# Patient Record
Sex: MALE | Race: WHITE | Employment: OTHER | URBAN - METROPOLITAN AREA
[De-identification: names, ages, dates, MRNs, and addresses within clinical notes are randomized per-mention and may not be internally consistent; named-entity substitution may affect disease eponyms.]

---

## 2017-01-01 ENCOUNTER — TRANSFERRED RECORDS (OUTPATIENT)
Dept: HEALTH INFORMATION MANAGEMENT | Facility: CLINIC | Age: 82
End: 2017-01-01

## 2017-01-01 ENCOUNTER — TELEPHONE (OUTPATIENT)
Dept: FAMILY MEDICINE | Facility: CLINIC | Age: 82
End: 2017-01-01

## 2017-01-01 ENCOUNTER — APPOINTMENT (OUTPATIENT)
Dept: OCCUPATIONAL THERAPY | Facility: CLINIC | Age: 82
DRG: 291 | End: 2017-01-01
Payer: COMMERCIAL

## 2017-01-01 ENCOUNTER — APPOINTMENT (OUTPATIENT)
Dept: SPEECH THERAPY | Facility: CLINIC | Age: 82
DRG: 291 | End: 2017-01-01
Payer: COMMERCIAL

## 2017-01-01 ENCOUNTER — APPOINTMENT (OUTPATIENT)
Dept: GENERAL RADIOLOGY | Facility: CLINIC | Age: 82
DRG: 291 | End: 2017-01-01
Attending: EMERGENCY MEDICINE
Payer: COMMERCIAL

## 2017-01-01 ENCOUNTER — APPOINTMENT (OUTPATIENT)
Dept: ULTRASOUND IMAGING | Facility: CLINIC | Age: 82
DRG: 291 | End: 2017-01-01
Attending: HOSPITALIST
Payer: COMMERCIAL

## 2017-01-01 ENCOUNTER — CARE COORDINATION (OUTPATIENT)
Dept: CASE MANAGEMENT | Facility: CLINIC | Age: 82
End: 2017-01-01

## 2017-01-01 ENCOUNTER — APPOINTMENT (OUTPATIENT)
Dept: CT IMAGING | Facility: CLINIC | Age: 82
DRG: 291 | End: 2017-01-01
Attending: EMERGENCY MEDICINE
Payer: COMMERCIAL

## 2017-01-01 ENCOUNTER — DOCUMENTATION ONLY (OUTPATIENT)
Dept: CASE MANAGEMENT | Facility: CLINIC | Age: 82
End: 2017-01-01

## 2017-01-01 ENCOUNTER — APPOINTMENT (OUTPATIENT)
Dept: GENERAL RADIOLOGY | Facility: CLINIC | Age: 82
DRG: 291 | End: 2017-01-01
Attending: HOSPITALIST
Payer: COMMERCIAL

## 2017-01-01 ENCOUNTER — TELEPHONE (OUTPATIENT)
Dept: CARDIOLOGY | Facility: CLINIC | Age: 82
End: 2017-01-01

## 2017-01-01 ENCOUNTER — DOCUMENTATION ONLY (OUTPATIENT)
Dept: CARE COORDINATION | Facility: CLINIC | Age: 82
End: 2017-01-01

## 2017-01-01 ENCOUNTER — APPOINTMENT (OUTPATIENT)
Dept: GENERAL RADIOLOGY | Facility: CLINIC | Age: 82
DRG: 314 | End: 2017-01-01
Attending: NURSE PRACTITIONER
Payer: COMMERCIAL

## 2017-01-01 ENCOUNTER — NURSING HOME VISIT (OUTPATIENT)
Dept: GERIATRICS | Facility: CLINIC | Age: 82
End: 2017-01-01
Payer: COMMERCIAL

## 2017-01-01 ENCOUNTER — APPOINTMENT (OUTPATIENT)
Dept: OCCUPATIONAL THERAPY | Facility: CLINIC | Age: 82
DRG: 314 | End: 2017-01-01
Attending: INTERNAL MEDICINE
Payer: COMMERCIAL

## 2017-01-01 ENCOUNTER — CARE COORDINATION (OUTPATIENT)
Dept: CARDIOLOGY | Facility: CLINIC | Age: 82
End: 2017-01-01

## 2017-01-01 ENCOUNTER — APPOINTMENT (OUTPATIENT)
Dept: CARDIOLOGY | Facility: CLINIC | Age: 82
DRG: 291 | End: 2017-01-01
Attending: INTERNAL MEDICINE
Payer: COMMERCIAL

## 2017-01-01 ENCOUNTER — DISCHARGE SUMMARY NURSING HOME (OUTPATIENT)
Dept: GERIATRICS | Facility: CLINIC | Age: 82
End: 2017-01-01
Payer: COMMERCIAL

## 2017-01-01 ENCOUNTER — APPOINTMENT (OUTPATIENT)
Dept: OCCUPATIONAL THERAPY | Facility: CLINIC | Age: 82
DRG: 291 | End: 2017-01-01
Attending: HOSPITALIST
Payer: COMMERCIAL

## 2017-01-01 ENCOUNTER — APPOINTMENT (OUTPATIENT)
Dept: GENERAL RADIOLOGY | Facility: CLINIC | Age: 82
DRG: 314 | End: 2017-01-01
Attending: INTERNAL MEDICINE
Payer: COMMERCIAL

## 2017-01-01 ENCOUNTER — HOSPITAL ENCOUNTER (INPATIENT)
Facility: CLINIC | Age: 82
LOS: 4 days | DRG: 314 | End: 2017-05-06
Attending: NURSE PRACTITIONER | Admitting: INTERNAL MEDICINE
Payer: COMMERCIAL

## 2017-01-01 ENCOUNTER — OFFICE VISIT (OUTPATIENT)
Dept: FAMILY MEDICINE | Facility: CLINIC | Age: 82
End: 2017-01-01
Payer: COMMERCIAL

## 2017-01-01 ENCOUNTER — APPOINTMENT (OUTPATIENT)
Dept: GENERAL RADIOLOGY | Facility: CLINIC | Age: 82
DRG: 291 | End: 2017-01-01
Attending: PHYSICIAN ASSISTANT
Payer: COMMERCIAL

## 2017-01-01 ENCOUNTER — APPOINTMENT (OUTPATIENT)
Dept: ULTRASOUND IMAGING | Facility: CLINIC | Age: 82
DRG: 291 | End: 2017-01-01
Attending: EMERGENCY MEDICINE
Payer: COMMERCIAL

## 2017-01-01 ENCOUNTER — HOSPITAL ENCOUNTER (INPATIENT)
Facility: CLINIC | Age: 82
LOS: 12 days | Discharge: NURSING, REHAB OR LONG-TERM CARE | DRG: 291 | End: 2017-02-01
Attending: EMERGENCY MEDICINE | Admitting: HOSPITALIST
Payer: COMMERCIAL

## 2017-01-01 ENCOUNTER — APPOINTMENT (OUTPATIENT)
Dept: PHYSICAL THERAPY | Facility: CLINIC | Age: 82
DRG: 291 | End: 2017-01-01
Attending: HOSPITALIST
Payer: COMMERCIAL

## 2017-01-01 ENCOUNTER — APPOINTMENT (OUTPATIENT)
Dept: PHYSICAL THERAPY | Facility: CLINIC | Age: 82
DRG: 291 | End: 2017-01-01
Payer: COMMERCIAL

## 2017-01-01 ENCOUNTER — APPOINTMENT (OUTPATIENT)
Dept: PHYSICAL THERAPY | Facility: CLINIC | Age: 82
DRG: 314 | End: 2017-01-01
Attending: INTERNAL MEDICINE
Payer: COMMERCIAL

## 2017-01-01 ENCOUNTER — TELEPHONE (OUTPATIENT)
Dept: GERIATRICS | Facility: CLINIC | Age: 82
End: 2017-01-01

## 2017-01-01 ENCOUNTER — APPOINTMENT (OUTPATIENT)
Dept: LAB | Facility: CLINIC | Age: 82
End: 2017-01-01
Attending: FAMILY MEDICINE
Payer: COMMERCIAL

## 2017-01-01 ENCOUNTER — TELEPHONE (OUTPATIENT)
Dept: NURSING | Facility: CLINIC | Age: 82
End: 2017-01-01

## 2017-01-01 ENCOUNTER — APPOINTMENT (OUTPATIENT)
Dept: OCCUPATIONAL THERAPY | Facility: CLINIC | Age: 82
DRG: 314 | End: 2017-01-01
Payer: COMMERCIAL

## 2017-01-01 VITALS
WEIGHT: 161.7 LBS | OXYGEN SATURATION: 93 % | HEIGHT: 71 IN | TEMPERATURE: 97.8 F | HEART RATE: 81 BPM | SYSTOLIC BLOOD PRESSURE: 133 MMHG | BODY MASS INDEX: 22.64 KG/M2 | RESPIRATION RATE: 18 BRPM | DIASTOLIC BLOOD PRESSURE: 66 MMHG

## 2017-01-01 VITALS
HEART RATE: 86 BPM | SYSTOLIC BLOOD PRESSURE: 124 MMHG | WEIGHT: 173.8 LBS | TEMPERATURE: 98.4 F | DIASTOLIC BLOOD PRESSURE: 64 MMHG | RESPIRATION RATE: 18 BRPM | OXYGEN SATURATION: 92 % | BODY MASS INDEX: 24.59 KG/M2

## 2017-01-01 VITALS
OXYGEN SATURATION: 92 % | HEIGHT: 71 IN | DIASTOLIC BLOOD PRESSURE: 62 MMHG | RESPIRATION RATE: 18 BRPM | HEART RATE: 83 BPM | TEMPERATURE: 96.8 F | SYSTOLIC BLOOD PRESSURE: 127 MMHG | WEIGHT: 178.4 LBS | BODY MASS INDEX: 24.98 KG/M2

## 2017-01-01 VITALS
RESPIRATION RATE: 16 BRPM | DIASTOLIC BLOOD PRESSURE: 62 MMHG | WEIGHT: 189.1 LBS | HEART RATE: 79 BPM | OXYGEN SATURATION: 93 % | HEIGHT: 71 IN | TEMPERATURE: 97.4 F | SYSTOLIC BLOOD PRESSURE: 125 MMHG | BODY MASS INDEX: 26.47 KG/M2

## 2017-01-01 VITALS
SYSTOLIC BLOOD PRESSURE: 156 MMHG | HEIGHT: 71 IN | BODY MASS INDEX: 24.33 KG/M2 | RESPIRATION RATE: 20 BRPM | HEART RATE: 81 BPM | WEIGHT: 173.8 LBS | OXYGEN SATURATION: 94 % | TEMPERATURE: 97 F | DIASTOLIC BLOOD PRESSURE: 87 MMHG

## 2017-01-01 VITALS
DIASTOLIC BLOOD PRESSURE: 60 MMHG | BODY MASS INDEX: 23.59 KG/M2 | RESPIRATION RATE: 18 BRPM | WEIGHT: 168.5 LBS | HEART RATE: 79 BPM | OXYGEN SATURATION: 94 % | SYSTOLIC BLOOD PRESSURE: 112 MMHG | TEMPERATURE: 96.9 F | HEIGHT: 71 IN

## 2017-01-01 VITALS
HEART RATE: 80 BPM | DIASTOLIC BLOOD PRESSURE: 51 MMHG | RESPIRATION RATE: 18 BRPM | HEIGHT: 72 IN | WEIGHT: 205.69 LBS | OXYGEN SATURATION: 91 % | SYSTOLIC BLOOD PRESSURE: 98 MMHG | BODY MASS INDEX: 27.86 KG/M2 | TEMPERATURE: 97.6 F

## 2017-01-01 VITALS
OXYGEN SATURATION: 92 % | WEIGHT: 177.3 LBS | DIASTOLIC BLOOD PRESSURE: 59 MMHG | HEART RATE: 85 BPM | TEMPERATURE: 97.5 F | BODY MASS INDEX: 24.82 KG/M2 | RESPIRATION RATE: 18 BRPM | SYSTOLIC BLOOD PRESSURE: 119 MMHG | HEIGHT: 71 IN

## 2017-01-01 VITALS
RESPIRATION RATE: 18 BRPM | HEIGHT: 71 IN | SYSTOLIC BLOOD PRESSURE: 97 MMHG | TEMPERATURE: 97.5 F | DIASTOLIC BLOOD PRESSURE: 53 MMHG | HEART RATE: 79 BPM | WEIGHT: 180.4 LBS | OXYGEN SATURATION: 92 % | BODY MASS INDEX: 25.26 KG/M2

## 2017-01-01 VITALS
WEIGHT: 178.2 LBS | HEIGHT: 71 IN | SYSTOLIC BLOOD PRESSURE: 126 MMHG | BODY MASS INDEX: 24.95 KG/M2 | OXYGEN SATURATION: 92 % | RESPIRATION RATE: 17 BRPM | TEMPERATURE: 96.8 F | DIASTOLIC BLOOD PRESSURE: 65 MMHG | HEART RATE: 83 BPM

## 2017-01-01 VITALS
BODY MASS INDEX: 23.44 KG/M2 | WEIGHT: 167.99 LBS | RESPIRATION RATE: 18 BRPM | SYSTOLIC BLOOD PRESSURE: 96 MMHG | DIASTOLIC BLOOD PRESSURE: 48 MMHG | TEMPERATURE: 96.8 F | HEART RATE: 80 BPM | OXYGEN SATURATION: 98 %

## 2017-01-01 VITALS
TEMPERATURE: 96 F | HEART RATE: 84 BPM | WEIGHT: 169 LBS | SYSTOLIC BLOOD PRESSURE: 110 MMHG | DIASTOLIC BLOOD PRESSURE: 50 MMHG | BODY MASS INDEX: 23.91 KG/M2

## 2017-01-01 VITALS
TEMPERATURE: 97.1 F | WEIGHT: 187.2 LBS | OXYGEN SATURATION: 96 % | BODY MASS INDEX: 26.21 KG/M2 | HEART RATE: 68 BPM | HEIGHT: 71 IN | SYSTOLIC BLOOD PRESSURE: 111 MMHG | DIASTOLIC BLOOD PRESSURE: 54 MMHG | RESPIRATION RATE: 18 BRPM

## 2017-01-01 DIAGNOSIS — I50.31 ACUTE DIASTOLIC HEART FAILURE (H): Primary | ICD-10-CM

## 2017-01-01 DIAGNOSIS — R53.81 PHYSICAL DECONDITIONING: ICD-10-CM

## 2017-01-01 DIAGNOSIS — I50.31 ACUTE DIASTOLIC CONGESTIVE HEART FAILURE (H): ICD-10-CM

## 2017-01-01 DIAGNOSIS — I48.20 CHRONIC ATRIAL FIBRILLATION (H): ICD-10-CM

## 2017-01-01 DIAGNOSIS — N18.4 CKD (CHRONIC KIDNEY DISEASE) STAGE 4, GFR 15-29 ML/MIN (H): ICD-10-CM

## 2017-01-01 DIAGNOSIS — I10 ESSENTIAL HYPERTENSION, BENIGN: ICD-10-CM

## 2017-01-01 DIAGNOSIS — I50.32 CHRONIC DIASTOLIC CONGESTIVE HEART FAILURE (H): ICD-10-CM

## 2017-01-01 DIAGNOSIS — N18.4 TYPE 2 DIABETES MELLITUS WITH STAGE 4 CHRONIC KIDNEY DISEASE, WITHOUT LONG-TERM CURRENT USE OF INSULIN (H): ICD-10-CM

## 2017-01-01 DIAGNOSIS — K59.03 DRUG-INDUCED CONSTIPATION: ICD-10-CM

## 2017-01-01 DIAGNOSIS — H10.33 ACUTE BACTERIAL CONJUNCTIVITIS OF BOTH EYES: ICD-10-CM

## 2017-01-01 DIAGNOSIS — I50.32 CHRONIC DIASTOLIC CONGESTIVE HEART FAILURE (H): Primary | ICD-10-CM

## 2017-01-01 DIAGNOSIS — I50.31 ACUTE DIASTOLIC CONGESTIVE HEART FAILURE (H): Primary | ICD-10-CM

## 2017-01-01 DIAGNOSIS — E87.5 HYPERKALEMIA: ICD-10-CM

## 2017-01-01 DIAGNOSIS — M54.50 LOW BACK PAIN WITHOUT SCIATICA, UNSPECIFIED BACK PAIN LATERALITY, UNSPECIFIED CHRONICITY: Primary | ICD-10-CM

## 2017-01-01 DIAGNOSIS — R19.7 DIARRHEA, UNSPECIFIED TYPE: Primary | ICD-10-CM

## 2017-01-01 DIAGNOSIS — M1A.9XX0 CHRONIC GOUT, UNSPECIFIED CAUSE, UNSPECIFIED SITE: ICD-10-CM

## 2017-01-01 DIAGNOSIS — J18.9 PNEUMONIA OF LEFT LOWER LOBE DUE TO INFECTIOUS ORGANISM: ICD-10-CM

## 2017-01-01 DIAGNOSIS — N17.9 ACUTE-ON-CHRONIC KIDNEY INJURY (H): ICD-10-CM

## 2017-01-01 DIAGNOSIS — I87.2 VENOUS (PERIPHERAL) INSUFFICIENCY: ICD-10-CM

## 2017-01-01 DIAGNOSIS — S91.302D WOUND, OPEN, FOOT, LEFT, SUBSEQUENT ENCOUNTER: Primary | ICD-10-CM

## 2017-01-01 DIAGNOSIS — E11.22 TYPE 2 DIABETES MELLITUS WITH STAGE 4 CHRONIC KIDNEY DISEASE, WITHOUT LONG-TERM CURRENT USE OF INSULIN (H): ICD-10-CM

## 2017-01-01 DIAGNOSIS — F51.01 PRIMARY INSOMNIA: ICD-10-CM

## 2017-01-01 DIAGNOSIS — G20.A1 PARALYSIS AGITANS (H): ICD-10-CM

## 2017-01-01 DIAGNOSIS — R29.6 FALLS FREQUENTLY: ICD-10-CM

## 2017-01-01 DIAGNOSIS — S41.111A SKIN TEAR OF RIGHT UPPER EXTREMITY: ICD-10-CM

## 2017-01-01 DIAGNOSIS — Z71.89 ADVANCED DIRECTIVES, COUNSELING/DISCUSSION: ICD-10-CM

## 2017-01-01 DIAGNOSIS — I50.32 CHRONIC DIASTOLIC HEART FAILURE (H): Primary | ICD-10-CM

## 2017-01-01 DIAGNOSIS — J96.01 ACUTE RESPIRATORY FAILURE WITH HYPOXIA (H): Primary | ICD-10-CM

## 2017-01-01 DIAGNOSIS — F41.8 DEPRESSION WITH ANXIETY: ICD-10-CM

## 2017-01-01 DIAGNOSIS — G20.A1 PARKINSON'S DISEASE (H): Primary | ICD-10-CM

## 2017-01-01 DIAGNOSIS — N17.9 ACUTE RENAL FAILURE, UNSPECIFIED ACUTE RENAL FAILURE TYPE (H): ICD-10-CM

## 2017-01-01 DIAGNOSIS — J96.01 ACUTE RESPIRATORY FAILURE WITH HYPOXIA (H): ICD-10-CM

## 2017-01-01 DIAGNOSIS — E11.9 TYPE 2 DIABETES MELLITUS (H): Primary | ICD-10-CM

## 2017-01-01 DIAGNOSIS — M54.50 ACUTE BILATERAL LOW BACK PAIN WITHOUT SCIATICA: ICD-10-CM

## 2017-01-01 DIAGNOSIS — J18.8 OTHER PNEUMONIA, UNSPECIFIED ORGANISM: ICD-10-CM

## 2017-01-01 DIAGNOSIS — D50.9 IRON DEFICIENCY ANEMIA, UNSPECIFIED IRON DEFICIENCY ANEMIA TYPE: ICD-10-CM

## 2017-01-01 DIAGNOSIS — I50.810 RIGHT-SIDED HEART FAILURE (H): Primary | ICD-10-CM

## 2017-01-01 DIAGNOSIS — J96.21 ACUTE ON CHRONIC RESPIRATORY FAILURE WITH HYPOXIA (H): ICD-10-CM

## 2017-01-01 DIAGNOSIS — R60.9 EDEMA, UNSPECIFIED TYPE: ICD-10-CM

## 2017-01-01 DIAGNOSIS — I50.33 ACUTE ON CHRONIC DIASTOLIC CONGESTIVE HEART FAILURE (H): ICD-10-CM

## 2017-01-01 DIAGNOSIS — G47.00 INSOMNIA, UNSPECIFIED TYPE: Primary | ICD-10-CM

## 2017-01-01 DIAGNOSIS — S81.802S WOUND OF LEFT LEG, SEQUELA: ICD-10-CM

## 2017-01-01 DIAGNOSIS — R09.02 HYPOXIA: ICD-10-CM

## 2017-01-01 DIAGNOSIS — D64.9 ANEMIA, UNSPECIFIED TYPE: ICD-10-CM

## 2017-01-01 DIAGNOSIS — N18.9 ACUTE-ON-CHRONIC KIDNEY INJURY (H): ICD-10-CM

## 2017-01-01 DIAGNOSIS — R60.0 LOWER LEG EDEMA: ICD-10-CM

## 2017-01-01 DIAGNOSIS — S91.302D WOUND, OPEN, FOOT, LEFT, SUBSEQUENT ENCOUNTER: ICD-10-CM

## 2017-01-01 DIAGNOSIS — E79.0 HYPERURICEMIA: Primary | ICD-10-CM

## 2017-01-01 DIAGNOSIS — M54.50 ACUTE BILATERAL LOW BACK PAIN WITHOUT SCIATICA: Primary | ICD-10-CM

## 2017-01-01 DIAGNOSIS — J96.21 ACUTE AND CHRONIC RESPIRATORY FAILURE WITH HYPOXIA (H): ICD-10-CM

## 2017-01-01 DIAGNOSIS — R41.82 ALTERED MENTAL STATUS, UNSPECIFIED ALTERED MENTAL STATUS TYPE: ICD-10-CM

## 2017-01-01 LAB
ABO + RH BLD: NORMAL
ABO + RH BLD: NORMAL
ALBUMIN SERPL ELPH-MCNC: 3.1 G/DL (ref 3.7–5.1)
ALBUMIN SERPL-MCNC: 2.3 G/DL (ref 3.4–5)
ALBUMIN SERPL-MCNC: 2.3 G/DL (ref 3.4–5)
ALBUMIN SERPL-MCNC: 2.4 G/DL (ref 3.4–5)
ALBUMIN SERPL-MCNC: 2.4 G/DL (ref 3.4–5)
ALBUMIN SERPL-MCNC: 2.6 G/DL (ref 3.4–5)
ALBUMIN UR-MCNC: 10 MG/DL
ALP SERPL-CCNC: 117 U/L (ref 40–150)
ALP SERPL-CCNC: 158 U/L (ref 40–150)
ALP SERPL-CCNC: 93 U/L (ref 40–150)
ALP SERPL-CCNC: 95 U/L (ref 40–150)
ALP SERPL-CCNC: 96 U/L (ref 40–150)
ALPHA1 GLOB SERPL ELPH-MCNC: 0.4 G/DL (ref 0.2–0.4)
ALPHA2 GLOB SERPL ELPH-MCNC: 0.8 G/DL (ref 0.5–0.9)
ALT SERPL W P-5'-P-CCNC: 17 U/L (ref 0–70)
ALT SERPL W P-5'-P-CCNC: 32 U/L (ref 0–70)
ALT SERPL W P-5'-P-CCNC: 54 U/L (ref 0–70)
ALT SERPL W P-5'-P-CCNC: 56 U/L (ref 0–70)
ALT SERPL-CCNC: <10 U/L (ref 9–55)
ANION GAP SERPL CALCULATED.3IONS-SCNC: 10 MMOL/L (ref 3–14)
ANION GAP SERPL CALCULATED.3IONS-SCNC: 12 MMOL/L (ref 3–14)
ANION GAP SERPL CALCULATED.3IONS-SCNC: 2 MMOL/L (ref 3–14)
ANION GAP SERPL CALCULATED.3IONS-SCNC: 3 MMOL/L (ref 3–14)
ANION GAP SERPL CALCULATED.3IONS-SCNC: 4 MMOL/L (ref 3–14)
ANION GAP SERPL CALCULATED.3IONS-SCNC: 5 MMOL/L (ref 3–14)
ANION GAP SERPL CALCULATED.3IONS-SCNC: 6 MMOL/L (ref 3–14)
ANION GAP SERPL CALCULATED.3IONS-SCNC: 6 MMOL/L (ref 3–14)
ANION GAP SERPL CALCULATED.3IONS-SCNC: 7 MMOL/L (ref 3–14)
ANION GAP SERPL CALCULATED.3IONS-SCNC: 8 MMOL/L (ref 3–14)
ANION GAP SERPL CALCULATED.3IONS-SCNC: 9 MMOL/L (ref 3–14)
ANION GAP SERPL CALCULATED.3IONS-SCNC: 9 MMOL/L (ref 3–14)
APPEARANCE FLD: NORMAL
APPEARANCE UR: CLEAR
AST SERPL W P-5'-P-CCNC: 133 U/L (ref 0–45)
AST SERPL W P-5'-P-CCNC: 206 U/L (ref 0–45)
AST SERPL W P-5'-P-CCNC: 262 U/L (ref 0–45)
AST SERPL W P-5'-P-CCNC: 287 U/L (ref 0–45)
AST SERPL-CCNC: 17 U/L (ref 10–40)
B-GLOBULIN SERPL ELPH-MCNC: 0.9 G/DL (ref 0.6–1)
BACTERIA SPEC CULT: NO GROWTH
BACTERIA SPEC CULT: NORMAL
BASE DEFICIT BLDV-SCNC: 0.5 MMOL/L
BASE EXCESS BLDA CALC-SCNC: 2.3 MMOL/L
BASOPHILS # BLD AUTO: 0 10E9/L (ref 0–0.2)
BASOPHILS NFR BLD AUTO: 0 %
BASOPHILS NFR BLD AUTO: 0.2 %
BASOPHILS NFR BLD AUTO: 0.5 %
BASOPHILS NFR BLD AUTO: 0.5 %
BILIRUB DIRECT SERPL-MCNC: 0.2 MG/DL (ref 0–0.2)
BILIRUB DIRECT SERPL-MCNC: 0.2 MG/DL (ref 0–0.2)
BILIRUB SERPL-MCNC: 0.5 MG/DL (ref 0.2–1.2)
BILIRUB SERPL-MCNC: 0.6 MG/DL (ref 0.2–1.3)
BILIRUB SERPL-MCNC: 1 MG/DL (ref 0.2–1.3)
BILIRUB UR QL STRIP: NEGATIVE
BLD GP AB SCN SERPL QL: NORMAL
BLD PROD TYP BPU: NORMAL
BLD PROD TYP BPU: NORMAL
BLD UNIT ID BPU: 0
BLOOD BANK CMNT PATIENT-IMP: NORMAL
BLOOD PRODUCT CODE: NORMAL
BPU ID: NORMAL
BUN SERPL-MCNC: 105 MG/DL (ref 7–30)
BUN SERPL-MCNC: 106 MG/DL (ref 7–30)
BUN SERPL-MCNC: 110 MG/DL (ref 7–30)
BUN SERPL-MCNC: 113 MG/DL (ref 7–30)
BUN SERPL-MCNC: 118 MG/DL (ref 7–30)
BUN SERPL-MCNC: 122 MG/DL (ref 7–30)
BUN SERPL-MCNC: 36 MG/DL (ref 9–26)
BUN SERPL-MCNC: 39 MG/DL (ref 9–26)
BUN SERPL-MCNC: 40 MG/DL (ref 9–26)
BUN SERPL-MCNC: 40 MG/DL (ref 9–26)
BUN SERPL-MCNC: 42 MG/DL (ref 7–30)
BUN SERPL-MCNC: 44 MG/DL (ref 7–30)
BUN SERPL-MCNC: 44 MG/DL (ref 7–30)
BUN SERPL-MCNC: 45 MG/DL (ref 7–30)
BUN SERPL-MCNC: 48 MG/DL (ref 9–26)
BUN SERPL-MCNC: 49 MG/DL (ref 9–26)
BUN SERPL-MCNC: 50 MG/DL (ref 7–30)
BUN SERPL-MCNC: 55 MG/DL (ref 7–30)
BUN SERPL-MCNC: 58 MG/DL (ref 7–30)
BUN SERPL-MCNC: 61 MG/DL (ref 7–30)
BUN SERPL-MCNC: 63 MG/DL (ref 7–30)
BUN SERPL-MCNC: 65 MG/DL (ref 7–30)
BUN SERPL-MCNC: 67 MG/DL (ref 7–30)
BUN SERPL-MCNC: 81 MG/DL (ref 7–30)
CALCIUM SERPL-MCNC: 7.8 MG/DL (ref 8.5–10.1)
CALCIUM SERPL-MCNC: 7.9 MG/DL (ref 8.5–10.1)
CALCIUM SERPL-MCNC: 8 MG/DL (ref 8.5–10.1)
CALCIUM SERPL-MCNC: 8.1 MG/DL (ref 8.5–10.1)
CALCIUM SERPL-MCNC: 8.2 MG/DL (ref 8.5–10.1)
CALCIUM SERPL-MCNC: 8.3 MG/DL (ref 8.4–10.2)
CALCIUM SERPL-MCNC: 8.3 MG/DL (ref 8.4–10.2)
CALCIUM SERPL-MCNC: 8.3 MG/DL (ref 8.5–10.1)
CALCIUM SERPL-MCNC: 8.3 MG/DL (ref 8.5–10.1)
CALCIUM SERPL-MCNC: 8.4 MG/DL (ref 8.4–10.2)
CALCIUM SERPL-MCNC: 8.4 MG/DL (ref 8.5–10.1)
CALCIUM SERPL-MCNC: 8.5 MG/DL (ref 8.5–10.1)
CALCIUM SERPL-MCNC: 8.5 MG/DL (ref 8.5–10.1)
CALCIUM SERPL-MCNC: 8.6 MG/DL (ref 8.4–10.2)
CALCIUM SERPL-MCNC: 8.6 MG/DL (ref 8.5–10.1)
CALCIUM SERPL-MCNC: 8.6 MG/DL (ref 8.5–10.1)
CALCIUM SERPL-MCNC: 8.7 MG/DL (ref 8.4–10.2)
CALCIUM SERPL-MCNC: 9.1 MG/DL (ref 8.4–10.2)
CHLORIDE SERPL-SCNC: 101 MMOL/L (ref 94–109)
CHLORIDE SERPL-SCNC: 102 MMOL/L (ref 94–109)
CHLORIDE SERPL-SCNC: 102 MMOL/L (ref 94–109)
CHLORIDE SERPL-SCNC: 103 MMOL/L (ref 94–109)
CHLORIDE SERPL-SCNC: 104 MMOL/L (ref 94–109)
CHLORIDE SERPL-SCNC: 105 MMOL/L (ref 94–109)
CHLORIDE SERPL-SCNC: 106 MMOL/L (ref 94–109)
CHLORIDE SERPL-SCNC: 106 MMOL/L (ref 94–109)
CHLORIDE SERPL-SCNC: 107 MMOL/L (ref 94–109)
CHLORIDE SERPL-SCNC: 107 MMOL/L (ref 94–109)
CHLORIDE SERPL-SCNC: 108 MMOL/L (ref 94–109)
CHLORIDE SERPL-SCNC: 108 MMOL/L (ref 94–109)
CHLORIDE SERPLBLD-SCNC: 103 MMOL/L (ref 98–109)
CHLORIDE SERPLBLD-SCNC: 105 MMOL/L (ref 98–109)
CHLORIDE SERPLBLD-SCNC: 106 MMOL/L (ref 98–109)
CHLORIDE SERPLBLD-SCNC: 106 MMOL/L (ref 98–109)
CK SERPL-CCNC: 64 U/L (ref 30–300)
CO2 SERPL-SCNC: 25 MMOL/L (ref 20–32)
CO2 SERPL-SCNC: 25 MMOL/L (ref 22–31)
CO2 SERPL-SCNC: 26 MMOL/L (ref 20–32)
CO2 SERPL-SCNC: 27 MMOL/L (ref 20–32)
CO2 SERPL-SCNC: 27 MMOL/L (ref 20–32)
CO2 SERPL-SCNC: 27 MMOL/L (ref 22–31)
CO2 SERPL-SCNC: 27 MMOL/L (ref 22–31)
CO2 SERPL-SCNC: 28 MMOL/L (ref 22–31)
CO2 SERPL-SCNC: 28 MMOL/L (ref 22–31)
CO2 SERPL-SCNC: 29 MMOL/L (ref 20–32)
CO2 SERPL-SCNC: 29 MMOL/L (ref 20–32)
CO2 SERPL-SCNC: 31 MMOL/L (ref 20–32)
CO2 SERPL-SCNC: 32 MMOL/L (ref 20–32)
CO2 SERPL-SCNC: 32 MMOL/L (ref 20–32)
CO2 SERPL-SCNC: 32 MMOL/L (ref 22–31)
CO2 SERPL-SCNC: 34 MMOL/L (ref 20–32)
CO2 SERPL-SCNC: 35 MMOL/L (ref 20–32)
CO2 SERPL-SCNC: 37 MMOL/L (ref 20–32)
CO2 SERPL-SCNC: 39 MMOL/L (ref 20–32)
COLOR FLD: NORMAL
COLOR UR AUTO: YELLOW
COPATH REPORT: NORMAL
COPATH REPORT: NORMAL
CREAT SERPL-MCNC: 1.73 MG/DL (ref 0.66–1.25)
CREAT SERPL-MCNC: 1.81 MG/DL (ref 0.66–1.25)
CREAT SERPL-MCNC: 1.84 MG/DL (ref 0.66–1.25)
CREAT SERPL-MCNC: 1.85 MG/DL (ref 0.66–1.25)
CREAT SERPL-MCNC: 1.88 MG/DL (ref 0.73–1.18)
CREAT SERPL-MCNC: 1.9 MG/DL (ref 0.73–1.18)
CREAT SERPL-MCNC: 1.92 MG/DL (ref 0.66–1.25)
CREAT SERPL-MCNC: 2.01 MG/DL (ref 0.66–1.25)
CREAT SERPL-MCNC: 2.02 MG/DL (ref 0.73–1.18)
CREAT SERPL-MCNC: 2.02 MG/DL (ref 0.73–1.18)
CREAT SERPL-MCNC: 2.04 MG/DL (ref 0.66–1.25)
CREAT SERPL-MCNC: 2.06 MG/DL (ref 0.66–1.25)
CREAT SERPL-MCNC: 2.08 MG/DL (ref 0.73–1.18)
CREAT SERPL-MCNC: 2.22 MG/DL (ref 0.73–1.18)
CREAT SERPL-MCNC: 2.29 MG/DL (ref 0.66–1.25)
CREAT SERPL-MCNC: 2.29 MG/DL (ref 0.66–1.25)
CREAT SERPL-MCNC: 2.33 MG/DL (ref 0.66–1.25)
CREAT SERPL-MCNC: 2.45 MG/DL (ref 0.66–1.25)
CREAT SERPL-MCNC: 2.65 MG/DL (ref 0.66–1.25)
CREAT SERPL-MCNC: 2.88 MG/DL (ref 0.66–1.25)
CREAT SERPL-MCNC: 3.27 MG/DL (ref 0.66–1.25)
CREAT SERPL-MCNC: 3.5 MG/DL (ref 0.66–1.25)
CREAT SERPL-MCNC: 3.58 MG/DL (ref 0.66–1.25)
CREAT SERPL-MCNC: 3.71 MG/DL (ref 0.66–1.25)
CREAT SERPL-MCNC: 3.97 MG/DL (ref 0.66–1.25)
DAT IGG-SP REAG RBC-IMP: NORMAL
DEPRECATED CALCIDIOL+CALCIFEROL SERPL-MC: 40 UG/L (ref 20–75)
DIFFERENTIAL METHOD BLD: ABNORMAL
DIFFERENTIAL: ABNORMAL
DIFFERENTIAL: ABNORMAL
EOSINOPHIL # BLD AUTO: 0 10E9/L (ref 0–0.7)
EOSINOPHIL # BLD AUTO: 0 10E9/L (ref 0–0.7)
EOSINOPHIL # BLD AUTO: 0.1 10E9/L (ref 0–0.7)
EOSINOPHIL # BLD AUTO: 0.3 10E9/L (ref 0–0.7)
EOSINOPHIL NFR BLD AUTO: 0 %
EOSINOPHIL NFR BLD AUTO: 0 %
EOSINOPHIL NFR BLD AUTO: 1 %
EOSINOPHIL NFR BLD AUTO: 1.3 %
EOSINOPHIL NFR BLD AUTO: 2.9 %
EOSINOPHIL NFR BLD AUTO: 3.3 %
EOSINOPHIL NFR BLD AUTO: 6.6 %
ERYTHROCYTE [DISTWIDTH] IN BLOOD BY AUTOMATED COUNT: 16.6 % (ref 10–15)
ERYTHROCYTE [DISTWIDTH] IN BLOOD BY AUTOMATED COUNT: 16.7 % (ref 10–15)
ERYTHROCYTE [DISTWIDTH] IN BLOOD BY AUTOMATED COUNT: 16.8 % (ref 10–15)
ERYTHROCYTE [DISTWIDTH] IN BLOOD BY AUTOMATED COUNT: 16.9 % (ref 10–15)
ERYTHROCYTE [DISTWIDTH] IN BLOOD BY AUTOMATED COUNT: 17 % (ref 10–15)
ERYTHROCYTE [DISTWIDTH] IN BLOOD BY AUTOMATED COUNT: 17.1 % (ref 10–15)
ERYTHROCYTE [DISTWIDTH] IN BLOOD BY AUTOMATED COUNT: 17.1 % (ref 10–15)
ERYTHROCYTE [DISTWIDTH] IN BLOOD BY AUTOMATED COUNT: 17.2 % (ref 10–15)
ERYTHROCYTE [DISTWIDTH] IN BLOOD BY AUTOMATED COUNT: 17.2 % (ref 10–15)
ERYTHROCYTE [DISTWIDTH] IN BLOOD BY AUTOMATED COUNT: 17.5 % (ref 10–15)
ERYTHROCYTE [DISTWIDTH] IN BLOOD BY AUTOMATED COUNT: 17.8 % (ref 10–15)
ERYTHROCYTE [DISTWIDTH] IN BLOOD BY AUTOMATED COUNT: 18.1 % (ref 10–15)
ERYTHROCYTE [DISTWIDTH] IN BLOOD BY AUTOMATED COUNT: 19.7 % (ref 11–15)
ERYTHROCYTE [DISTWIDTH] IN BLOOD BY AUTOMATED COUNT: 19.7 % (ref 11–15)
FERRITIN SERPL-MCNC: 117 NG/ML (ref 26–388)
FERRITIN SERPL-MCNC: 32 NG/ML (ref 26–388)
FLUAV+FLUBV AG SPEC QL: NEGATIVE
FLUAV+FLUBV AG SPEC QL: NORMAL
FOLATE SERPL-MCNC: 19.8 NG/ML
GAMMA GLOB SERPL ELPH-MCNC: 1.6 G/DL (ref 0.7–1.6)
GFR SERPL CREATININE-BSD FRML MDRD: 14 ML/MIN/1.7M2
GFR SERPL CREATININE-BSD FRML MDRD: 16 ML/MIN/1.7M2
GFR SERPL CREATININE-BSD FRML MDRD: 16 ML/MIN/1.7M2
GFR SERPL CREATININE-BSD FRML MDRD: 17 ML/MIN/1.7M2
GFR SERPL CREATININE-BSD FRML MDRD: 18 ML/MIN/1.7M2
GFR SERPL CREATININE-BSD FRML MDRD: 21 ML/MIN/1.7M2
GFR SERPL CREATININE-BSD FRML MDRD: 23 ML/MIN/1.7M2
GFR SERPL CREATININE-BSD FRML MDRD: 25 ML/MIN/1.7M2
GFR SERPL CREATININE-BSD FRML MDRD: 26 ML/MIN/1.73M2
GFR SERPL CREATININE-BSD FRML MDRD: 27 ML/MIN/1.7M2
GFR SERPL CREATININE-BSD FRML MDRD: 28 ML/MIN/1.73M2
GFR SERPL CREATININE-BSD FRML MDRD: 29 ML/MIN/1.73M2
GFR SERPL CREATININE-BSD FRML MDRD: 29 ML/MIN/1.73M2
GFR SERPL CREATININE-BSD FRML MDRD: 31 ML/MIN/1.73M2
GFR SERPL CREATININE-BSD FRML MDRD: 31 ML/MIN/1.73M2
GFR SERPL CREATININE-BSD FRML MDRD: 31 ML/MIN/1.7M2
GFR SERPL CREATININE-BSD FRML MDRD: 31 ML/MIN/1.7M2
GFR SERPL CREATININE-BSD FRML MDRD: 32 ML/MIN/1.7M2
GFR SERPL CREATININE-BSD FRML MDRD: 33 ML/MIN/1.7M2
GFR SERPL CREATININE-BSD FRML MDRD: 35 ML/MIN/1.7M2
GFR SERPL CREATININE-BSD FRML MDRD: 35 ML/MIN/1.7M2
GFR SERPL CREATININE-BSD FRML MDRD: 36 ML/MIN/1.7M2
GFR SERPL CREATININE-BSD FRML MDRD: 38 ML/MIN/1.7M2
GLUCOSE BLDC GLUCOMTR-MCNC: 100 MG/DL (ref 70–99)
GLUCOSE BLDC GLUCOMTR-MCNC: 100 MG/DL (ref 70–99)
GLUCOSE BLDC GLUCOMTR-MCNC: 102 MG/DL (ref 70–99)
GLUCOSE BLDC GLUCOMTR-MCNC: 103 MG/DL (ref 70–99)
GLUCOSE BLDC GLUCOMTR-MCNC: 104 MG/DL (ref 70–99)
GLUCOSE BLDC GLUCOMTR-MCNC: 107 MG/DL (ref 70–99)
GLUCOSE BLDC GLUCOMTR-MCNC: 111 MG/DL (ref 70–99)
GLUCOSE BLDC GLUCOMTR-MCNC: 111 MG/DL (ref 70–99)
GLUCOSE BLDC GLUCOMTR-MCNC: 112 MG/DL (ref 70–99)
GLUCOSE BLDC GLUCOMTR-MCNC: 115 MG/DL (ref 70–99)
GLUCOSE BLDC GLUCOMTR-MCNC: 116 MG/DL (ref 70–99)
GLUCOSE BLDC GLUCOMTR-MCNC: 118 MG/DL (ref 70–99)
GLUCOSE BLDC GLUCOMTR-MCNC: 119 MG/DL (ref 70–99)
GLUCOSE BLDC GLUCOMTR-MCNC: 125 MG/DL (ref 70–99)
GLUCOSE BLDC GLUCOMTR-MCNC: 126 MG/DL (ref 70–99)
GLUCOSE BLDC GLUCOMTR-MCNC: 128 MG/DL (ref 70–99)
GLUCOSE BLDC GLUCOMTR-MCNC: 131 MG/DL (ref 70–99)
GLUCOSE BLDC GLUCOMTR-MCNC: 133 MG/DL (ref 70–99)
GLUCOSE BLDC GLUCOMTR-MCNC: 134 MG/DL (ref 70–99)
GLUCOSE BLDC GLUCOMTR-MCNC: 134 MG/DL (ref 70–99)
GLUCOSE BLDC GLUCOMTR-MCNC: 135 MG/DL (ref 70–99)
GLUCOSE BLDC GLUCOMTR-MCNC: 136 MG/DL (ref 70–99)
GLUCOSE BLDC GLUCOMTR-MCNC: 138 MG/DL (ref 70–99)
GLUCOSE BLDC GLUCOMTR-MCNC: 140 MG/DL (ref 70–99)
GLUCOSE BLDC GLUCOMTR-MCNC: 143 MG/DL (ref 70–99)
GLUCOSE BLDC GLUCOMTR-MCNC: 146 MG/DL (ref 70–99)
GLUCOSE BLDC GLUCOMTR-MCNC: 148 MG/DL (ref 70–99)
GLUCOSE BLDC GLUCOMTR-MCNC: 150 MG/DL (ref 70–99)
GLUCOSE BLDC GLUCOMTR-MCNC: 152 MG/DL (ref 70–99)
GLUCOSE BLDC GLUCOMTR-MCNC: 152 MG/DL (ref 70–99)
GLUCOSE BLDC GLUCOMTR-MCNC: 155 MG/DL (ref 70–99)
GLUCOSE BLDC GLUCOMTR-MCNC: 156 MG/DL (ref 70–99)
GLUCOSE BLDC GLUCOMTR-MCNC: 158 MG/DL (ref 70–99)
GLUCOSE BLDC GLUCOMTR-MCNC: 160 MG/DL (ref 70–99)
GLUCOSE BLDC GLUCOMTR-MCNC: 166 MG/DL (ref 70–99)
GLUCOSE BLDC GLUCOMTR-MCNC: 170 MG/DL (ref 70–99)
GLUCOSE BLDC GLUCOMTR-MCNC: 170 MG/DL (ref 70–99)
GLUCOSE BLDC GLUCOMTR-MCNC: 171 MG/DL (ref 70–99)
GLUCOSE BLDC GLUCOMTR-MCNC: 171 MG/DL (ref 70–99)
GLUCOSE BLDC GLUCOMTR-MCNC: 172 MG/DL (ref 70–99)
GLUCOSE BLDC GLUCOMTR-MCNC: 175 MG/DL (ref 70–99)
GLUCOSE BLDC GLUCOMTR-MCNC: 187 MG/DL (ref 70–99)
GLUCOSE BLDC GLUCOMTR-MCNC: 193 MG/DL (ref 70–99)
GLUCOSE BLDC GLUCOMTR-MCNC: 196 MG/DL (ref 70–99)
GLUCOSE BLDC GLUCOMTR-MCNC: 196 MG/DL (ref 70–99)
GLUCOSE BLDC GLUCOMTR-MCNC: 203 MG/DL (ref 70–99)
GLUCOSE BLDC GLUCOMTR-MCNC: 207 MG/DL (ref 70–99)
GLUCOSE BLDC GLUCOMTR-MCNC: 208 MG/DL (ref 70–99)
GLUCOSE BLDC GLUCOMTR-MCNC: 210 MG/DL (ref 70–99)
GLUCOSE BLDC GLUCOMTR-MCNC: 218 MG/DL (ref 70–99)
GLUCOSE BLDC GLUCOMTR-MCNC: 226 MG/DL (ref 70–99)
GLUCOSE BLDC GLUCOMTR-MCNC: 232 MG/DL (ref 70–99)
GLUCOSE BLDC GLUCOMTR-MCNC: 268 MG/DL (ref 70–99)
GLUCOSE BLDC GLUCOMTR-MCNC: 324 MG/DL (ref 70–99)
GLUCOSE BLDC GLUCOMTR-MCNC: 48 MG/DL (ref 70–99)
GLUCOSE BLDC GLUCOMTR-MCNC: 60 MG/DL (ref 70–99)
GLUCOSE BLDC GLUCOMTR-MCNC: 61 MG/DL (ref 70–99)
GLUCOSE BLDC GLUCOMTR-MCNC: 70 MG/DL (ref 70–99)
GLUCOSE BLDC GLUCOMTR-MCNC: 73 MG/DL (ref 70–99)
GLUCOSE BLDC GLUCOMTR-MCNC: 73 MG/DL (ref 70–99)
GLUCOSE BLDC GLUCOMTR-MCNC: 80 MG/DL (ref 70–99)
GLUCOSE BLDC GLUCOMTR-MCNC: 82 MG/DL (ref 70–99)
GLUCOSE BLDC GLUCOMTR-MCNC: 83 MG/DL (ref 70–99)
GLUCOSE BLDC GLUCOMTR-MCNC: 86 MG/DL (ref 70–99)
GLUCOSE BLDC GLUCOMTR-MCNC: 87 MG/DL (ref 70–99)
GLUCOSE BLDC GLUCOMTR-MCNC: 87 MG/DL (ref 70–99)
GLUCOSE BLDC GLUCOMTR-MCNC: 88 MG/DL (ref 70–99)
GLUCOSE BLDC GLUCOMTR-MCNC: 89 MG/DL (ref 70–99)
GLUCOSE BLDC GLUCOMTR-MCNC: 90 MG/DL (ref 70–99)
GLUCOSE BLDC GLUCOMTR-MCNC: 92 MG/DL (ref 70–99)
GLUCOSE BLDC GLUCOMTR-MCNC: 98 MG/DL (ref 70–99)
GLUCOSE SERPL-MCNC: 100 MG/DL (ref 70–100)
GLUCOSE SERPL-MCNC: 104 MG/DL (ref 70–99)
GLUCOSE SERPL-MCNC: 105 MG/DL (ref 70–99)
GLUCOSE SERPL-MCNC: 117 MG/DL (ref 70–100)
GLUCOSE SERPL-MCNC: 132 MG/DL (ref 70–100)
GLUCOSE SERPL-MCNC: 132 MG/DL (ref 70–100)
GLUCOSE SERPL-MCNC: 136 MG/DL (ref 70–99)
GLUCOSE SERPL-MCNC: 141 MG/DL (ref 70–99)
GLUCOSE SERPL-MCNC: 149 MG/DL (ref 70–99)
GLUCOSE SERPL-MCNC: 156 MG/DL (ref 70–100)
GLUCOSE SERPL-MCNC: 178 MG/DL (ref 70–99)
GLUCOSE SERPL-MCNC: 185 MG/DL (ref 70–99)
GLUCOSE SERPL-MCNC: 193 MG/DL (ref 70–99)
GLUCOSE SERPL-MCNC: 298 MG/DL (ref 70–99)
GLUCOSE SERPL-MCNC: 38 MG/DL (ref 70–99)
GLUCOSE SERPL-MCNC: 51 MG/DL (ref 70–99)
GLUCOSE SERPL-MCNC: 71 MG/DL (ref 70–99)
GLUCOSE SERPL-MCNC: 73 MG/DL (ref 70–99)
GLUCOSE SERPL-MCNC: 77 MG/DL (ref 70–99)
GLUCOSE SERPL-MCNC: 88 MG/DL (ref 70–99)
GLUCOSE SERPL-MCNC: 91 MG/DL (ref 70–99)
GLUCOSE SERPL-MCNC: 93 MG/DL (ref 70–99)
GLUCOSE SERPL-MCNC: 97 MG/DL (ref 70–100)
GLUCOSE SERPL-MCNC: 97 MG/DL (ref 70–99)
GLUCOSE UR STRIP-MCNC: NEGATIVE MG/DL
GRAM STN SPEC: NORMAL
GRAM STN SPEC: NORMAL
HAPTOGLOB SERPL-MCNC: 128 MG/DL (ref 35–175)
HBA1C MFR BLD: 6.9 % (ref 4.3–6)
HCO3 BLD-SCNC: 29 MMOL/L (ref 21–28)
HCO3 BLDV-SCNC: 27 MMOL/L (ref 21–28)
HCT VFR BLD AUTO: 21.1 % (ref 40–53)
HCT VFR BLD AUTO: 22.7 % (ref 40–53)
HCT VFR BLD AUTO: 24.3 % (ref 40–53)
HCT VFR BLD AUTO: 24.3 % (ref 40–53)
HCT VFR BLD AUTO: 24.5 % (ref 40–53)
HCT VFR BLD AUTO: 24.6 % (ref 40–53)
HCT VFR BLD AUTO: 25.8 % (ref 40–53)
HCT VFR BLD AUTO: 25.9 % (ref 40–53)
HCT VFR BLD AUTO: 26 % (ref 40–53)
HCT VFR BLD AUTO: 26.3 % (ref 39–51)
HCT VFR BLD AUTO: 26.3 % (ref 40–53)
HCT VFR BLD AUTO: 26.4 % (ref 40–53)
HCT VFR BLD AUTO: 26.4 % (ref 40–53)
HCT VFR BLD AUTO: 26.6 % (ref 40–53)
HCT VFR BLD AUTO: 33.1 % (ref 39–51)
HCT VFR BLD AUTO: 33.1 % (ref 39–51)
HCT VFR BLD AUTO: 36 % (ref 40–53)
HCT VFR BLD AUTO: 36.2 % (ref 40–53)
HEMOGLOBIN: 10.5 G/DL (ref 13.4–17.5)
HEMOGLOBIN: 10.5 G/DL (ref 13.4–17.5)
HEMOGLOBIN: 8.3 G/DL (ref 13.4–17.5)
HGB BLD-MCNC: 11.5 G/DL (ref 13.3–17.7)
HGB BLD-MCNC: 11.9 G/DL (ref 13.3–17.7)
HGB BLD-MCNC: 6.8 G/DL (ref 13.3–17.7)
HGB BLD-MCNC: 7.2 G/DL (ref 13.3–17.7)
HGB BLD-MCNC: 7.7 G/DL (ref 13.3–17.7)
HGB BLD-MCNC: 7.9 G/DL (ref 13.3–17.7)
HGB BLD-MCNC: 8.2 G/DL (ref 13.3–17.7)
HGB BLD-MCNC: 8.2 G/DL (ref 13.3–17.7)
HGB BLD-MCNC: 8.3 G/DL (ref 13.3–17.7)
HGB BLD-MCNC: 8.4 G/DL (ref 13.3–17.7)
HGB BLD-MCNC: 8.6 G/DL (ref 13.3–17.7)
HGB UR QL STRIP: NEGATIVE
HYALINE CASTS #/AREA URNS LPF: 20 /LPF (ref 0–2)
IMM GRANULOCYTES # BLD: 0 10E9/L (ref 0–0.4)
IMM GRANULOCYTES NFR BLD: 0.3 %
IMM GRANULOCYTES NFR BLD: 0.3 %
IMM GRANULOCYTES NFR BLD: 0.4 %
IMM GRANULOCYTES NFR BLD: 0.4 %
IMM GRANULOCYTES NFR BLD: 0.5 %
INR PPP: 1.2 (ref 0.86–1.14)
INTERPRETATION ECG - MUSE: NORMAL
INTERPRETATION ECG - MUSE: NORMAL
IRON SATN MFR SERPL: 5 % (ref 15–46)
IRON SATN MFR SERPL: 6 % (ref 15–46)
IRON SERPL-MCNC: 15 UG/DL (ref 35–180)
IRON SERPL-MCNC: 19 UG/DL (ref 35–180)
KAPPA LC UR-MCNC: 12.7 MG/DL (ref 0.33–1.94)
KAPPA LC/LAMBDA SER: 1.77 {RATIO} (ref 0.26–1.65)
KETONES UR STRIP-MCNC: NEGATIVE MG/DL
LACTATE BLD-SCNC: 1.6 MMOL/L (ref 0.7–2.1)
LACTATE BLD-SCNC: 1.9 MMOL/L (ref 0.7–2.1)
LAMBDA LC SERPL-MCNC: 7.19 MG/DL (ref 0.57–2.63)
LDH FLD L TO P-CCNC: 97 U/L
LDH SERPL L TO P-CCNC: 209 U/L (ref 85–227)
LDH SERPL L TO P-CCNC: 294 U/L (ref 85–227)
LEUKOCYTE ESTERASE UR QL STRIP: NEGATIVE
LYMPHOCYTES # BLD AUTO: 0.4 10E9/L (ref 0.8–5.3)
LYMPHOCYTES # BLD AUTO: 0.5 10E9/L (ref 0.8–5.3)
LYMPHOCYTES # BLD AUTO: 0.6 10E9/L (ref 0.8–5.3)
LYMPHOCYTES # BLD AUTO: 0.6 10E9/L (ref 0.8–5.3)
LYMPHOCYTES # BLD AUTO: 0.7 10E9/L (ref 0.8–5.3)
LYMPHOCYTES # BLD AUTO: 0.8 10E9/L (ref 0.8–5.3)
LYMPHOCYTES NFR BLD AUTO: 11 %
LYMPHOCYTES NFR BLD AUTO: 11.6 %
LYMPHOCYTES NFR BLD AUTO: 12 %
LYMPHOCYTES NFR BLD AUTO: 16.1 %
LYMPHOCYTES NFR BLD AUTO: 18.4 %
LYMPHOCYTES NFR BLD AUTO: 18.5 %
LYMPHOCYTES NFR BLD AUTO: 19 %
LYMPHOCYTES NFR BLD AUTO: 5.7 %
LYMPHOCYTES NFR FLD MANUAL: 89 %
Lab: NORMAL
M PROTEIN SERPL ELPH-MCNC: 0 G/DL
MAGNESIUM SERPL-MCNC: 2.8 MG/DL (ref 1.6–2.3)
MAGNESIUM SERPL-MCNC: 2.9 MG/DL (ref 1.6–2.3)
MCH RBC QN AUTO: 27.7 PG (ref 26.5–33)
MCH RBC QN AUTO: 28 PG (ref 26.5–33)
MCH RBC QN AUTO: 28.1 PG (ref 26.5–33)
MCH RBC QN AUTO: 28.1 PG (ref 26.5–33)
MCH RBC QN AUTO: 28.3 PG (ref 26.5–33)
MCH RBC QN AUTO: 28.4 PG (ref 26.5–33)
MCH RBC QN AUTO: 28.5 PG (ref 26.5–33)
MCH RBC QN AUTO: 28.8 PG (ref 26.5–33)
MCH RBC QN AUTO: 30.3 PG (ref 26.5–33)
MCH RBC QN AUTO: 30.6 PG (ref 26.5–33)
MCHC RBC AUTO-ENTMCNC: 31.2 G/DL (ref 31.5–36.5)
MCHC RBC AUTO-ENTMCNC: 31.4 G/DL (ref 31.5–36.5)
MCHC RBC AUTO-ENTMCNC: 31.7 G/DL (ref 31.5–36.5)
MCHC RBC AUTO-ENTMCNC: 31.8 G/DL (ref 31.5–36.5)
MCHC RBC AUTO-ENTMCNC: 31.8 G/DL (ref 31.5–36.5)
MCHC RBC AUTO-ENTMCNC: 31.9 G/DL (ref 31.5–36.5)
MCHC RBC AUTO-ENTMCNC: 31.9 G/DL (ref 31.5–36.5)
MCHC RBC AUTO-ENTMCNC: 32 G/DL (ref 31.5–36.5)
MCHC RBC AUTO-ENTMCNC: 32.2 G/DL (ref 31.5–36.5)
MCHC RBC AUTO-ENTMCNC: 32.2 G/DL (ref 31.5–36.5)
MCHC RBC AUTO-ENTMCNC: 32.3 G/DL (ref 31.5–36.5)
MCHC RBC AUTO-ENTMCNC: 32.9 G/DL (ref 31.5–36.5)
MCV RBC AUTO: 88 FL (ref 78–100)
MCV RBC AUTO: 89 FL (ref 78–100)
MCV RBC AUTO: 90 FL (ref 78–100)
MCV RBC AUTO: 90 FL (ref 78–100)
MCV RBC AUTO: 90.9 FL (ref 80–100)
MCV RBC AUTO: 90.9 FL (ref 80–100)
MCV RBC AUTO: 93 FL (ref 78–100)
MCV RBC AUTO: 95 FL (ref 78–100)
METAMYELOCYTES # BLD: 0 10E9/L
METAMYELOCYTES NFR BLD MANUAL: 1 %
MICRO REPORT STATUS: NORMAL
MONOCYTES # BLD AUTO: 0.3 10E9/L (ref 0–1.3)
MONOCYTES # BLD AUTO: 0.5 10E9/L (ref 0–1.3)
MONOCYTES # BLD AUTO: 0.6 10E9/L (ref 0–1.3)
MONOCYTES # BLD AUTO: 0.6 10E9/L (ref 0–1.3)
MONOCYTES # BLD AUTO: 0.7 10E9/L (ref 0–1.3)
MONOCYTES # BLD AUTO: 0.8 10E9/L (ref 0–1.3)
MONOCYTES # BLD AUTO: 0.8 10E9/L (ref 0–1.3)
MONOCYTES # BLD AUTO: 1.2 10E9/L (ref 0–1.3)
MONOCYTES NFR BLD AUTO: 12.8 %
MONOCYTES NFR BLD AUTO: 13 %
MONOCYTES NFR BLD AUTO: 15.1 %
MONOCYTES NFR BLD AUTO: 16.5 %
MONOCYTES NFR BLD AUTO: 18 %
MONOCYTES NFR BLD AUTO: 18.2 %
MONOCYTES NFR BLD AUTO: 19.5 %
MONOCYTES NFR BLD AUTO: 6 %
MONOS+MACROS NFR FLD MANUAL: 9 %
MUCOUS THREADS #/AREA URNS LPF: PRESENT /LPF
NEUTROPHILS # BLD AUTO: 2.2 10E9/L (ref 1.6–8.3)
NEUTROPHILS # BLD AUTO: 2.5 10E9/L (ref 1.6–8.3)
NEUTROPHILS # BLD AUTO: 2.7 10E9/L (ref 1.6–8.3)
NEUTROPHILS # BLD AUTO: 3.2 10E9/L (ref 1.6–8.3)
NEUTROPHILS # BLD AUTO: 3.2 10E9/L (ref 1.6–8.3)
NEUTROPHILS # BLD AUTO: 3.3 10E9/L (ref 1.6–8.3)
NEUTROPHILS # BLD AUTO: 4.3 10E9/L (ref 1.6–8.3)
NEUTROPHILS # BLD AUTO: 5 10E9/L (ref 1.6–8.3)
NEUTROPHILS NFR BLD AUTO: 58.4 %
NEUTROPHILS NFR BLD AUTO: 61 %
NEUTROPHILS NFR BLD AUTO: 62 %
NEUTROPHILS NFR BLD AUTO: 67.1 %
NEUTROPHILS NFR BLD AUTO: 68.6 %
NEUTROPHILS NFR BLD AUTO: 75.3 %
NEUTROPHILS NFR BLD AUTO: 76 %
NEUTROPHILS NFR BLD AUTO: 81 %
NEUTS BAND NFR FLD MANUAL: 2 %
NITRATE UR QL: NEGATIVE
NRBC # BLD AUTO: 0.1 10*3/UL
NRBC # BLD AUTO: 0.2 10*3/UL
NRBC BLD AUTO-RTO: 2 /100
NRBC BLD AUTO-RTO: 3 /100
NT-PROBNP SERPL-MCNC: 6608 PG/ML (ref 0–1800)
NT-PROBNP SERPL-MCNC: 8761 PG/ML (ref 0–1800)
NUM BPU REQUESTED: 1
OXYHGB MFR BLD: 92 % (ref 92–100)
OXYHGB MFR BLDV: 27 %
PCO2 BLD: 57 MM HG (ref 35–45)
PCO2 BLDV: 64 MM HG (ref 40–50)
PH BLD: 7.31 PH (ref 7.35–7.45)
PH BLDV: 7.23 PH (ref 7.32–7.43)
PH FLD: 8.5 PH
PH UR STRIP: 5.5 PH (ref 5–7)
PHOSPHATE SERPL-MCNC: 6.1 MG/DL (ref 2.5–4.5)
PHOSPHATE SERPL-MCNC: 6.3 MG/DL (ref 2.5–4.5)
PHOSPHATE SERPL-MCNC: 8.3 MG/DL (ref 2.5–4.5)
PLATELET # BLD AUTO: 104 10E9/L (ref 150–450)
PLATELET # BLD AUTO: 106 10E9/L (ref 150–450)
PLATELET # BLD AUTO: 108 10E9/L (ref 150–450)
PLATELET # BLD AUTO: 108 10E9/L (ref 150–450)
PLATELET # BLD AUTO: 112 10E9/L (ref 150–450)
PLATELET # BLD AUTO: 114 10E9/L (ref 150–450)
PLATELET # BLD AUTO: 114 10E9/L (ref 150–450)
PLATELET # BLD AUTO: 115 10E9/L (ref 150–450)
PLATELET # BLD AUTO: 120 10E9/L (ref 150–450)
PLATELET # BLD AUTO: 125 10E9/L (ref 150–450)
PLATELET # BLD AUTO: 152 10E9/L (ref 150–450)
PLATELET # BLD AUTO: 158 10E9/L (ref 150–450)
PLATELET # BLD AUTO: 178 10E9/L (ref 150–450)
PLATELET # BLD AUTO: 181 K/CMM (ref 140–450)
PLATELET # BLD AUTO: 181 K/CMM (ref 140–450)
PLATELET # BLD AUTO: 187 10E9/L (ref 150–450)
PLATELET # BLD AUTO: 98 10E9/L (ref 150–450)
PO2 BLD: 82 MM HG (ref 80–105)
PO2 BLDV: 25 MM HG (ref 25–47)
POTASSIUM SERPL-SCNC: 3.6 MMOL/L (ref 3.4–5.3)
POTASSIUM SERPL-SCNC: 3.7 MMOL/L (ref 3.4–5.3)
POTASSIUM SERPL-SCNC: 3.8 MMOL/L (ref 3.4–5.3)
POTASSIUM SERPL-SCNC: 3.8 MMOL/L (ref 3.4–5.3)
POTASSIUM SERPL-SCNC: 3.9 MMOL/L (ref 3.4–5.3)
POTASSIUM SERPL-SCNC: 3.9 MMOL/L (ref 3.4–5.3)
POTASSIUM SERPL-SCNC: 4 MMOL/L (ref 3.4–5.3)
POTASSIUM SERPL-SCNC: 4.1 MMOL/L (ref 3.4–5.3)
POTASSIUM SERPL-SCNC: 4.2 MMOL/L (ref 3.4–5.3)
POTASSIUM SERPL-SCNC: 4.3 MMOL/L (ref 3.5–5.2)
POTASSIUM SERPL-SCNC: 4.6 MMOL/L (ref 3.4–5.3)
POTASSIUM SERPL-SCNC: 4.6 MMOL/L (ref 3.5–5.2)
POTASSIUM SERPL-SCNC: 4.6 MMOL/L (ref 3.5–5.2)
POTASSIUM SERPL-SCNC: 4.7 MMOL/L (ref 3.5–5.2)
POTASSIUM SERPL-SCNC: 4.7 MMOL/L (ref 3.5–5.2)
POTASSIUM SERPL-SCNC: 4.8 MMOL/L (ref 3.4–5.3)
POTASSIUM SERPL-SCNC: 4.8 MMOL/L (ref 3.5–5.2)
POTASSIUM SERPL-SCNC: 4.9 MMOL/L (ref 3.4–5.3)
POTASSIUM SERPL-SCNC: 5.1 MMOL/L (ref 3.4–5.3)
POTASSIUM SERPL-SCNC: 5.3 MMOL/L (ref 3.4–5.3)
POTASSIUM SERPL-SCNC: 5.4 MMOL/L (ref 3.4–5.3)
POTASSIUM SERPL-SCNC: 5.6 MMOL/L (ref 3.4–5.3)
POTASSIUM SERPL-SCNC: 5.7 MMOL/L (ref 3.4–5.3)
POTASSIUM SERPL-SCNC: 5.7 MMOL/L (ref 3.4–5.3)
PROCALCITONIN SERPL-MCNC: 0.25 NG/ML
PROCALCITONIN SERPL-MCNC: 0.44 NG/ML
PROT FLD-MCNC: 2.5 G/DL
PROT PATTERN SERPL ELPH-IMP: ABNORMAL
PROT SERPL-MCNC: 6 G/DL (ref 6.4–8.3)
PROT SERPL-MCNC: 6 G/DL (ref 6.8–8.8)
PROT SERPL-MCNC: 6.3 G/DL (ref 6.8–8.8)
PROT SERPL-MCNC: 6.3 G/DL (ref 6.8–8.8)
PROT SERPL-MCNC: 7.1 G/DL (ref 6.8–8.8)
PTH-INTACT SERPL-MCNC: 329 PG/ML (ref 12–72)
RBC # BLD AUTO: 2.39 10E12/L (ref 4.4–5.9)
RBC # BLD AUTO: 2.54 10E12/L (ref 4.4–5.9)
RBC # BLD AUTO: 2.71 10E12/L (ref 4.4–5.9)
RBC # BLD AUTO: 2.75 10E12/L (ref 4.4–5.9)
RBC # BLD AUTO: 2.78 10E12/L (ref 4.4–5.9)
RBC # BLD AUTO: 2.92 10E12/L (ref 4.4–5.9)
RBC # BLD AUTO: 2.92 10E12/L (ref 4.4–5.9)
RBC # BLD AUTO: 2.93 10E12/L (ref 4.4–5.9)
RBC # BLD AUTO: 2.96 10E12/L (ref 4.4–5.9)
RBC # BLD AUTO: 2.99 10E12/L (ref 4.4–5.9)
RBC # BLD AUTO: 2.99 10E12/L (ref 4.4–5.9)
RBC # BLD AUTO: 3 10E12/L (ref 4.4–5.9)
RBC # BLD AUTO: 3.64 M/CMM (ref 4.2–5.9)
RBC # BLD AUTO: 3.64 M/CMM (ref 4.2–5.9)
RBC # BLD AUTO: 3.79 10E12/L (ref 4.4–5.9)
RBC # BLD AUTO: 3.89 10E12/L (ref 4.4–5.9)
RBC # FLD: NORMAL /UL
RBC #/AREA URNS AUTO: 1 /HPF (ref 0–2)
RETICS # AUTO: 25.4 10E9/L (ref 25–95)
RETICS/RBC NFR AUTO: 0.9 % (ref 0.5–2)
SODIUM SERPL-SCNC: 138 MMOL/L (ref 133–144)
SODIUM SERPL-SCNC: 138 MMOL/L (ref 133–144)
SODIUM SERPL-SCNC: 139 MMOL/L (ref 133–144)
SODIUM SERPL-SCNC: 139 MMOL/L (ref 136–145)
SODIUM SERPL-SCNC: 140 MMOL/L (ref 133–144)
SODIUM SERPL-SCNC: 140 MMOL/L (ref 136–145)
SODIUM SERPL-SCNC: 140 MMOL/L (ref 136–145)
SODIUM SERPL-SCNC: 141 MMOL/L (ref 133–144)
SODIUM SERPL-SCNC: 142 MMOL/L (ref 133–144)
SODIUM SERPL-SCNC: 142 MMOL/L (ref 133–144)
SODIUM SERPL-SCNC: 143 MMOL/L (ref 133–144)
SODIUM SERPL-SCNC: 144 MMOL/L (ref 133–144)
SODIUM SERPL-SCNC: 144 MMOL/L (ref 136–145)
SODIUM SERPL-SCNC: 145 MMOL/L (ref 133–144)
SODIUM SERPL-SCNC: 146 MMOL/L (ref 133–144)
SODIUM SERPL-SCNC: 146 MMOL/L (ref 133–144)
SODIUM SERPL-SCNC: 148 MMOL/L (ref 133–144)
SODIUM SERPL-SCNC: 148 MMOL/L (ref 133–144)
SP GR UR STRIP: 1.01 (ref 1–1.03)
SPECIMEN EXP DATE BLD: NORMAL
SPECIMEN SOURCE FLD: NORMAL
SPECIMEN SOURCE: NORMAL
SQUAMOUS #/AREA URNS AUTO: 1 /HPF (ref 0–1)
TIBC SERPL-MCNC: 290 UG/DL (ref 240–430)
TIBC SERPL-MCNC: 296 UG/DL (ref 240–430)
TRANSFUSION STATUS PATIENT QL: NORMAL
TRANSFUSION STATUS PATIENT QL: NORMAL
TROPONIN I SERPL-MCNC: 0.1 UG/L (ref 0–0.04)
TROPONIN I SERPL-MCNC: 0.1 UG/L (ref 0–0.04)
TROPONIN I SERPL-MCNC: 0.11 UG/L (ref 0–0.04)
TROPONIN I SERPL-MCNC: 0.22 UG/L (ref 0–0.04)
TSH SERPL DL<=0.005 MIU/L-ACNC: 1.73 MU/L (ref 0.4–4)
URN SPEC COLLECT METH UR: ABNORMAL
UROBILINOGEN UR STRIP-MCNC: NORMAL MG/DL (ref 0–2)
VIT B12 SERPL-MCNC: 1758 PG/ML (ref 193–986)
WBC # BLD AUTO: 3.1 10E9/L (ref 4–11)
WBC # BLD AUTO: 3.6 10E9/L (ref 4–11)
WBC # BLD AUTO: 3.6 K/CMM (ref 3.8–11)
WBC # BLD AUTO: 3.6 K/CMM (ref 3.8–11)
WBC # BLD AUTO: 4.2 10E9/L (ref 4–11)
WBC # BLD AUTO: 4.2 10E9/L (ref 4–11)
WBC # BLD AUTO: 4.3 10E9/L (ref 4–11)
WBC # BLD AUTO: 4.8 10E9/L (ref 4–11)
WBC # BLD AUTO: 4.8 10E9/L (ref 4–11)
WBC # BLD AUTO: 5.3 10E9/L (ref 4–11)
WBC # BLD AUTO: 5.3 10E9/L (ref 4–11)
WBC # BLD AUTO: 5.4 10E9/L (ref 4–11)
WBC # BLD AUTO: 5.8 10E9/L (ref 4–11)
WBC # BLD AUTO: 6.7 10E9/L (ref 4–11)
WBC # BLD AUTO: 6.7 10E9/L (ref 4–11)
WBC # BLD AUTO: 7.4 10E9/L (ref 4–11)
WBC # FLD AUTO: 551 /UL
WBC #/AREA URNS AUTO: 1 /HPF (ref 0–2)

## 2017-01-01 PROCEDURE — 00000146 ZZHCL STATISTIC GLUCOSE BY METER IP

## 2017-01-01 PROCEDURE — 80048 BASIC METABOLIC PNL TOTAL CA: CPT | Performed by: FAMILY MEDICINE

## 2017-01-01 PROCEDURE — 99232 SBSQ HOSP IP/OBS MODERATE 35: CPT | Performed by: HOSPITALIST

## 2017-01-01 PROCEDURE — 94640 AIRWAY INHALATION TREATMENT: CPT | Mod: 76

## 2017-01-01 PROCEDURE — 40000257 ZZH STATISTIC CONSULT NO CHARGE VASC ACCESS

## 2017-01-01 PROCEDURE — 25000132 ZZH RX MED GY IP 250 OP 250 PS 637: Performed by: HOSPITALIST

## 2017-01-01 PROCEDURE — 25000132 ZZH RX MED GY IP 250 OP 250 PS 637: Performed by: INTERNAL MEDICINE

## 2017-01-01 PROCEDURE — 99223 1ST HOSP IP/OBS HIGH 75: CPT | Mod: AI | Performed by: HOSPITALIST

## 2017-01-01 PROCEDURE — 85027 COMPLETE CBC AUTOMATED: CPT | Performed by: INTERNAL MEDICINE

## 2017-01-01 PROCEDURE — 97110 THERAPEUTIC EXERCISES: CPT | Mod: GO | Performed by: OCCUPATIONAL THERAPIST

## 2017-01-01 PROCEDURE — 25000132 ZZH RX MED GY IP 250 OP 250 PS 637

## 2017-01-01 PROCEDURE — 99233 SBSQ HOSP IP/OBS HIGH 50: CPT | Performed by: HOSPITALIST

## 2017-01-01 PROCEDURE — 99207 ZZC CDG-CORRECTLY CODED, REVIEWED AND AGREE: CPT | Performed by: INTERNAL MEDICINE

## 2017-01-01 PROCEDURE — 97165 OT EVAL LOW COMPLEX 30 MIN: CPT | Mod: GO

## 2017-01-01 PROCEDURE — 25000125 ZZHC RX 250: Performed by: PHYSICIAN ASSISTANT

## 2017-01-01 PROCEDURE — 25000125 ZZHC RX 250: Performed by: INTERNAL MEDICINE

## 2017-01-01 PROCEDURE — 80048 BASIC METABOLIC PNL TOTAL CA: CPT | Performed by: INTERNAL MEDICINE

## 2017-01-01 PROCEDURE — 40000275 ZZH STATISTIC RCP TIME EA 10 MIN

## 2017-01-01 PROCEDURE — 40000225 ZZH STATISTIC SLP WARD VISIT: Performed by: SPEECH-LANGUAGE PATHOLOGIST

## 2017-01-01 PROCEDURE — 25000125 ZZHC RX 250: Performed by: HOSPITALIST

## 2017-01-01 PROCEDURE — 80048 BASIC METABOLIC PNL TOTAL CA: CPT | Performed by: HOSPITALIST

## 2017-01-01 PROCEDURE — 99207 ZZC CDG-CORRECTLY CODED, REVIEWED AND AGREE: CPT | Performed by: NURSE PRACTITIONER

## 2017-01-01 PROCEDURE — 97535 SELF CARE MNGMENT TRAINING: CPT | Mod: GO | Performed by: OCCUPATIONAL THERAPIST

## 2017-01-01 PROCEDURE — 96374 THER/PROPH/DIAG INJ IV PUSH: CPT

## 2017-01-01 PROCEDURE — 20000003 ZZH R&B ICU

## 2017-01-01 PROCEDURE — 40000193 ZZH STATISTIC PT WARD VISIT: Performed by: PHYSICAL THERAPIST

## 2017-01-01 PROCEDURE — 21000001 ZZH R&B HEART CARE

## 2017-01-01 PROCEDURE — 36569 INSJ PICC 5 YR+ W/O IMAGING: CPT

## 2017-01-01 PROCEDURE — 83883 ASSAY NEPHELOMETRY NOT SPEC: CPT | Performed by: INTERNAL MEDICINE

## 2017-01-01 PROCEDURE — 40000986 XR CHEST PORT 1 VW

## 2017-01-01 PROCEDURE — 85018 HEMOGLOBIN: CPT | Performed by: HOSPITALIST

## 2017-01-01 PROCEDURE — 99223 1ST HOSP IP/OBS HIGH 75: CPT | Mod: AI | Performed by: INTERNAL MEDICINE

## 2017-01-01 PROCEDURE — 99233 SBSQ HOSP IP/OBS HIGH 50: CPT | Performed by: INTERNAL MEDICINE

## 2017-01-01 PROCEDURE — 27210222 ZZH KIT SHRLOCK 6FR POWER PICC

## 2017-01-01 PROCEDURE — 92526 ORAL FUNCTION THERAPY: CPT | Mod: GN | Performed by: SPEECH-LANGUAGE PATHOLOGIST

## 2017-01-01 PROCEDURE — 25000128 H RX IP 250 OP 636: Performed by: INTERNAL MEDICINE

## 2017-01-01 PROCEDURE — 84100 ASSAY OF PHOSPHORUS: CPT | Performed by: INTERNAL MEDICINE

## 2017-01-01 PROCEDURE — 40000239 ZZH STATISTIC VAT ROUNDS

## 2017-01-01 PROCEDURE — 99223 1ST HOSP IP/OBS HIGH 75: CPT | Performed by: FAMILY MEDICINE

## 2017-01-01 PROCEDURE — 83735 ASSAY OF MAGNESIUM: CPT | Performed by: HOSPITALIST

## 2017-01-01 PROCEDURE — 99291 CRITICAL CARE FIRST HOUR: CPT | Performed by: PHYSICIAN ASSISTANT

## 2017-01-01 PROCEDURE — 82310 ASSAY OF CALCIUM: CPT | Performed by: HOSPITALIST

## 2017-01-01 PROCEDURE — 40000225 ZZH STATISTIC SLP WARD VISIT

## 2017-01-01 PROCEDURE — 97140 MANUAL THERAPY 1/> REGIONS: CPT | Mod: GP

## 2017-01-01 PROCEDURE — 99309 SBSQ NF CARE MODERATE MDM 30: CPT | Performed by: NURSE PRACTITIONER

## 2017-01-01 PROCEDURE — S0171 BUMETANIDE 0.5 MG: HCPCS | Performed by: INTERNAL MEDICINE

## 2017-01-01 PROCEDURE — 85027 COMPLETE CBC AUTOMATED: CPT | Performed by: HOSPITALIST

## 2017-01-01 PROCEDURE — 87205 SMEAR GRAM STAIN: CPT | Performed by: HOSPITALIST

## 2017-01-01 PROCEDURE — 93325 DOPPLER ECHO COLOR FLOW MAPG: CPT | Mod: 26 | Performed by: INTERNAL MEDICINE

## 2017-01-01 PROCEDURE — 94640 AIRWAY INHALATION TREATMENT: CPT

## 2017-01-01 PROCEDURE — 86923 COMPATIBILITY TEST ELECTRIC: CPT | Performed by: HOSPITALIST

## 2017-01-01 PROCEDURE — 83540 ASSAY OF IRON: CPT | Performed by: INTERNAL MEDICINE

## 2017-01-01 PROCEDURE — 99233 SBSQ HOSP IP/OBS HIGH 50: CPT | Mod: 25 | Performed by: INTERNAL MEDICINE

## 2017-01-01 PROCEDURE — 36415 COLL VENOUS BLD VENIPUNCTURE: CPT | Performed by: INTERNAL MEDICINE

## 2017-01-01 PROCEDURE — 97530 THERAPEUTIC ACTIVITIES: CPT | Mod: GO | Performed by: OCCUPATIONAL THERAPIST

## 2017-01-01 PROCEDURE — 99238 HOSP IP/OBS DSCHRG MGMT 30/<: CPT | Performed by: INTERNAL MEDICINE

## 2017-01-01 PROCEDURE — 25000131 ZZH RX MED GY IP 250 OP 636 PS 637: Performed by: INTERNAL MEDICINE

## 2017-01-01 PROCEDURE — 83550 IRON BINDING TEST: CPT | Performed by: HOSPITALIST

## 2017-01-01 PROCEDURE — 92610 EVALUATE SWALLOWING FUNCTION: CPT | Mod: GN | Performed by: SPEECH-LANGUAGE PATHOLOGIST

## 2017-01-01 PROCEDURE — 32555 ASPIRATE PLEURA W/ IMAGING: CPT

## 2017-01-01 PROCEDURE — 94799 UNLISTED PULMONARY SVC/PX: CPT

## 2017-01-01 PROCEDURE — 99310 SBSQ NF CARE HIGH MDM 45: CPT | Performed by: INTERNAL MEDICINE

## 2017-01-01 PROCEDURE — 71020 XR CHEST 2 VW: CPT

## 2017-01-01 PROCEDURE — 82565 ASSAY OF CREATININE: CPT | Performed by: HOSPITALIST

## 2017-01-01 PROCEDURE — 93308 TTE F-UP OR LMTD: CPT

## 2017-01-01 PROCEDURE — 85610 PROTHROMBIN TIME: CPT | Performed by: HOSPITALIST

## 2017-01-01 PROCEDURE — 82607 VITAMIN B-12: CPT | Performed by: HOSPITALIST

## 2017-01-01 PROCEDURE — 99232 SBSQ HOSP IP/OBS MODERATE 35: CPT | Performed by: INTERNAL MEDICINE

## 2017-01-01 PROCEDURE — 99222 1ST HOSP IP/OBS MODERATE 55: CPT | Performed by: INTERNAL MEDICINE

## 2017-01-01 PROCEDURE — 83605 ASSAY OF LACTIC ACID: CPT | Performed by: EMERGENCY MEDICINE

## 2017-01-01 PROCEDURE — 85014 HEMATOCRIT: CPT | Performed by: HOSPITALIST

## 2017-01-01 PROCEDURE — 76770 US EXAM ABDO BACK WALL COMP: CPT

## 2017-01-01 PROCEDURE — 99223 1ST HOSP IP/OBS HIGH 75: CPT | Performed by: INTERNAL MEDICINE

## 2017-01-01 PROCEDURE — 87070 CULTURE OTHR SPECIMN AEROBIC: CPT | Performed by: HOSPITALIST

## 2017-01-01 PROCEDURE — 25000125 ZZHC RX 250: Performed by: EMERGENCY MEDICINE

## 2017-01-01 PROCEDURE — 96375 TX/PRO/DX INJ NEW DRUG ADDON: CPT

## 2017-01-01 PROCEDURE — 84484 ASSAY OF TROPONIN QUANT: CPT | Performed by: EMERGENCY MEDICINE

## 2017-01-01 PROCEDURE — 99207 ZZC APP CREDIT; MD BILLING SHARED VISIT: CPT | Performed by: INTERNAL MEDICINE

## 2017-01-01 PROCEDURE — 82550 ASSAY OF CK (CPK): CPT | Performed by: HOSPITALIST

## 2017-01-01 PROCEDURE — 40000847 ZZHCL STATISTIC MORPHOLOGY W/INTERP HISTOLOGY TC 85060: Performed by: HOSPITALIST

## 2017-01-01 PROCEDURE — 85025 COMPLETE CBC W/AUTO DIFF WBC: CPT | Performed by: NURSE PRACTITIONER

## 2017-01-01 PROCEDURE — 40000133 ZZH STATISTIC OT WARD VISIT: Performed by: OCCUPATIONAL THERAPIST

## 2017-01-01 PROCEDURE — 82947 ASSAY GLUCOSE BLOOD QUANT: CPT | Performed by: HOSPITALIST

## 2017-01-01 PROCEDURE — 93321 DOPPLER ECHO F-UP/LMTD STD: CPT | Mod: 26 | Performed by: INTERNAL MEDICINE

## 2017-01-01 PROCEDURE — 63400005 ZZH RX 634: Performed by: INTERNAL MEDICINE

## 2017-01-01 PROCEDURE — 83010 ASSAY OF HAPTOGLOBIN QUANT: CPT | Performed by: HOSPITALIST

## 2017-01-01 PROCEDURE — 84100 ASSAY OF PHOSPHORUS: CPT | Performed by: HOSPITALIST

## 2017-01-01 PROCEDURE — 94660 CPAP INITIATION&MGMT: CPT

## 2017-01-01 PROCEDURE — 27210995 ZZH RX 272: Performed by: INTERNAL MEDICINE

## 2017-01-01 PROCEDURE — 84145 PROCALCITONIN (PCT): CPT | Performed by: EMERGENCY MEDICINE

## 2017-01-01 PROCEDURE — 85045 AUTOMATED RETICULOCYTE COUNT: CPT | Performed by: HOSPITALIST

## 2017-01-01 PROCEDURE — 99310 SBSQ NF CARE HIGH MDM 45: CPT | Performed by: NURSE PRACTITIONER

## 2017-01-01 PROCEDURE — 25000128 H RX IP 250 OP 636: Performed by: STUDENT IN AN ORGANIZED HEALTH CARE EDUCATION/TRAINING PROGRAM

## 2017-01-01 PROCEDURE — 99214 OFFICE O/P EST MOD 30 MIN: CPT | Performed by: FAMILY MEDICINE

## 2017-01-01 PROCEDURE — 40000264 ECHO LIMITED WITH LUMASON

## 2017-01-01 PROCEDURE — 93970 EXTREMITY STUDY: CPT

## 2017-01-01 PROCEDURE — 40000193 ZZH STATISTIC PT WARD VISIT

## 2017-01-01 PROCEDURE — 93005 ELECTROCARDIOGRAM TRACING: CPT

## 2017-01-01 PROCEDURE — 86900 BLOOD TYPING SEROLOGIC ABO: CPT | Performed by: HOSPITALIST

## 2017-01-01 PROCEDURE — 97162 PT EVAL MOD COMPLEX 30 MIN: CPT | Mod: GP

## 2017-01-01 PROCEDURE — 86850 RBC ANTIBODY SCREEN: CPT | Performed by: HOSPITALIST

## 2017-01-01 PROCEDURE — S0090 SILDENAFIL CITRATE, 25 MG: HCPCS | Performed by: INTERNAL MEDICINE

## 2017-01-01 PROCEDURE — 83880 ASSAY OF NATRIURETIC PEPTIDE: CPT | Performed by: NURSE PRACTITIONER

## 2017-01-01 PROCEDURE — 99316 NF DSCHRG MGMT 30 MIN+: CPT | Performed by: NURSE PRACTITIONER

## 2017-01-01 PROCEDURE — 80053 COMPREHEN METABOLIC PANEL: CPT | Performed by: NURSE PRACTITIONER

## 2017-01-01 PROCEDURE — 96376 TX/PRO/DX INJ SAME DRUG ADON: CPT

## 2017-01-01 PROCEDURE — 84520 ASSAY OF UREA NITROGEN: CPT | Performed by: HOSPITALIST

## 2017-01-01 PROCEDURE — 84484 ASSAY OF TROPONIN QUANT: CPT | Performed by: INTERNAL MEDICINE

## 2017-01-01 PROCEDURE — 25500064 ZZH RX 255 OP 636: Performed by: HOSPITALIST

## 2017-01-01 PROCEDURE — 99285 EMERGENCY DEPT VISIT HI MDM: CPT | Mod: 25

## 2017-01-01 PROCEDURE — 00000402 ZZHCL STATISTIC TOTAL PROTEIN: Performed by: INTERNAL MEDICINE

## 2017-01-01 PROCEDURE — 86880 COOMBS TEST DIRECT: CPT | Performed by: PATHOLOGY

## 2017-01-01 PROCEDURE — 85049 AUTOMATED PLATELET COUNT: CPT | Performed by: HOSPITALIST

## 2017-01-01 PROCEDURE — 36415 COLL VENOUS BLD VENIPUNCTURE: CPT | Performed by: HOSPITALIST

## 2017-01-01 PROCEDURE — 88112 CYTOPATH CELL ENHANCE TECH: CPT | Performed by: HOSPITALIST

## 2017-01-01 PROCEDURE — 82805 BLOOD GASES W/O2 SATURATION: CPT | Performed by: HOSPITALIST

## 2017-01-01 PROCEDURE — 40000133 ZZH STATISTIC OT WARD VISIT

## 2017-01-01 PROCEDURE — 80053 COMPREHEN METABOLIC PANEL: CPT | Performed by: HOSPITALIST

## 2017-01-01 PROCEDURE — 25000125 ZZHC RX 250

## 2017-01-01 PROCEDURE — 87040 BLOOD CULTURE FOR BACTERIA: CPT | Performed by: HOSPITALIST

## 2017-01-01 PROCEDURE — 71010 XR CHEST PORT 1 VW: CPT

## 2017-01-01 PROCEDURE — 99211 OFF/OP EST MAY X REQ PHY/QHP: CPT

## 2017-01-01 PROCEDURE — 25000128 H RX IP 250 OP 636: Performed by: HOSPITALIST

## 2017-01-01 PROCEDURE — 88112 CYTOPATH CELL ENHANCE TECH: CPT | Mod: 26 | Performed by: HOSPITALIST

## 2017-01-01 PROCEDURE — 84145 PROCALCITONIN (PCT): CPT | Performed by: HOSPITALIST

## 2017-01-01 PROCEDURE — 83880 ASSAY OF NATRIURETIC PEPTIDE: CPT | Performed by: EMERGENCY MEDICINE

## 2017-01-01 PROCEDURE — 99222 1ST HOSP IP/OBS MODERATE 55: CPT | Performed by: NURSE PRACTITIONER

## 2017-01-01 PROCEDURE — 85060 BLOOD SMEAR INTERPRETATION: CPT | Performed by: HOSPITALIST

## 2017-01-01 PROCEDURE — P9047 ALBUMIN (HUMAN), 25%, 50ML: HCPCS | Performed by: INTERNAL MEDICINE

## 2017-01-01 PROCEDURE — 70450 CT HEAD/BRAIN W/O DYE: CPT

## 2017-01-01 PROCEDURE — 85025 COMPLETE CBC W/AUTO DIFF WBC: CPT | Performed by: HOSPITALIST

## 2017-01-01 PROCEDURE — 83615 LACTATE (LD) (LDH) ENZYME: CPT | Performed by: HOSPITALIST

## 2017-01-01 PROCEDURE — 25000308 HC RX OP HPI UCR WEL MED 250 IP 250: Performed by: HOSPITALIST

## 2017-01-01 PROCEDURE — 82306 VITAMIN D 25 HYDROXY: CPT | Performed by: INTERNAL MEDICINE

## 2017-01-01 PROCEDURE — G0180 MD CERTIFICATION HHA PATIENT: HCPCS | Performed by: FAMILY MEDICINE

## 2017-01-01 PROCEDURE — 27210219 ZZH KIT SHRLOCK 5FR DBL LUM PWR P

## 2017-01-01 PROCEDURE — 88305 TISSUE EXAM BY PATHOLOGIST: CPT | Performed by: HOSPITALIST

## 2017-01-01 PROCEDURE — 84484 ASSAY OF TROPONIN QUANT: CPT | Performed by: HOSPITALIST

## 2017-01-01 PROCEDURE — 88305 TISSUE EXAM BY PATHOLOGIST: CPT | Mod: 26 | Performed by: HOSPITALIST

## 2017-01-01 PROCEDURE — 99239 HOSP IP/OBS DSCHRG MGMT >30: CPT | Performed by: HOSPITALIST

## 2017-01-01 PROCEDURE — 82728 ASSAY OF FERRITIN: CPT | Performed by: INTERNAL MEDICINE

## 2017-01-01 PROCEDURE — 99207 ZZC CDG-CODE CATEGORY CHANGED: CPT | Performed by: INTERNAL MEDICINE

## 2017-01-01 PROCEDURE — 12000000 ZZH R&B MED SURG/OB

## 2017-01-01 PROCEDURE — 87804 INFLUENZA ASSAY W/OPTIC: CPT | Performed by: EMERGENCY MEDICINE

## 2017-01-01 PROCEDURE — 96365 THER/PROPH/DIAG IV INF INIT: CPT

## 2017-01-01 PROCEDURE — 99207 ZZC CDG-CORRECTLY CODED, REVIEWED AND AGREE: CPT | Performed by: FAMILY MEDICINE

## 2017-01-01 PROCEDURE — 82746 ASSAY OF FOLIC ACID SERUM: CPT | Performed by: HOSPITALIST

## 2017-01-01 PROCEDURE — 82248 BILIRUBIN DIRECT: CPT | Performed by: HOSPITALIST

## 2017-01-01 PROCEDURE — 84443 ASSAY THYROID STIM HORMONE: CPT | Performed by: INTERNAL MEDICINE

## 2017-01-01 PROCEDURE — 83970 ASSAY OF PARATHORMONE: CPT | Performed by: INTERNAL MEDICINE

## 2017-01-01 PROCEDURE — 92526 ORAL FUNCTION THERAPY: CPT | Mod: GN

## 2017-01-01 PROCEDURE — 93308 TTE F-UP OR LMTD: CPT | Mod: 26 | Performed by: INTERNAL MEDICINE

## 2017-01-01 PROCEDURE — 83036 HEMOGLOBIN GLYCOSYLATED A1C: CPT | Performed by: INTERNAL MEDICINE

## 2017-01-01 PROCEDURE — 83540 ASSAY OF IRON: CPT | Performed by: HOSPITALIST

## 2017-01-01 PROCEDURE — 97140 MANUAL THERAPY 1/> REGIONS: CPT | Mod: GP | Performed by: PHYSICAL THERAPIST

## 2017-01-01 PROCEDURE — 83986 ASSAY PH BODY FLUID NOS: CPT | Performed by: HOSPITALIST

## 2017-01-01 PROCEDURE — 27210347 ZZH DRESSING D STAT DRY WRAP SPEC

## 2017-01-01 PROCEDURE — 40000940 XR CHEST 1 VW

## 2017-01-01 PROCEDURE — 97165 OT EVAL LOW COMPLEX 30 MIN: CPT | Mod: GO | Performed by: OCCUPATIONAL THERAPIST

## 2017-01-01 PROCEDURE — 84157 ASSAY OF PROTEIN OTHER: CPT | Performed by: HOSPITALIST

## 2017-01-01 PROCEDURE — 86901 BLOOD TYPING SEROLOGIC RH(D): CPT | Performed by: HOSPITALIST

## 2017-01-01 PROCEDURE — 80076 HEPATIC FUNCTION PANEL: CPT | Performed by: HOSPITALIST

## 2017-01-01 PROCEDURE — 82805 BLOOD GASES W/O2 SATURATION: CPT | Performed by: EMERGENCY MEDICINE

## 2017-01-01 PROCEDURE — 82728 ASSAY OF FERRITIN: CPT | Performed by: HOSPITALIST

## 2017-01-01 PROCEDURE — 99212 OFFICE O/P EST SF 10 MIN: CPT

## 2017-01-01 PROCEDURE — 89051 BODY FLUID CELL COUNT: CPT | Performed by: HOSPITALIST

## 2017-01-01 PROCEDURE — 00000155 ZZHCL STATISTIC H-CELL BLOCK W/STAIN: Performed by: HOSPITALIST

## 2017-01-01 PROCEDURE — 76705 ECHO EXAM OF ABDOMEN: CPT

## 2017-01-01 PROCEDURE — 25000131 ZZH RX MED GY IP 250 OP 636 PS 637: Performed by: HOSPITALIST

## 2017-01-01 PROCEDURE — 85025 COMPLETE CBC W/AUTO DIFF WBC: CPT | Performed by: EMERGENCY MEDICINE

## 2017-01-01 PROCEDURE — P9016 RBC LEUKOCYTES REDUCED: HCPCS | Performed by: HOSPITALIST

## 2017-01-01 PROCEDURE — 81001 URINALYSIS AUTO W/SCOPE: CPT | Performed by: EMERGENCY MEDICINE

## 2017-01-01 PROCEDURE — 83550 IRON BINDING TEST: CPT | Performed by: INTERNAL MEDICINE

## 2017-01-01 PROCEDURE — 97162 PT EVAL MOD COMPLEX 30 MIN: CPT | Mod: GP | Performed by: PHYSICAL THERAPIST

## 2017-01-01 PROCEDURE — 84165 PROTEIN E-PHORESIS SERUM: CPT | Performed by: INTERNAL MEDICINE

## 2017-01-01 PROCEDURE — 80051 ELECTROLYTE PANEL: CPT | Performed by: HOSPITALIST

## 2017-01-01 PROCEDURE — 80048 BASIC METABOLIC PNL TOTAL CA: CPT | Performed by: EMERGENCY MEDICINE

## 2017-01-01 PROCEDURE — 25000128 H RX IP 250 OP 636: Performed by: NURSE PRACTITIONER

## 2017-01-01 PROCEDURE — 82565 ASSAY OF CREATININE: CPT | Performed by: INTERNAL MEDICINE

## 2017-01-01 PROCEDURE — 83605 ASSAY OF LACTIC ACID: CPT | Performed by: HOSPITALIST

## 2017-01-01 PROCEDURE — 97535 SELF CARE MNGMENT TRAINING: CPT | Mod: GO

## 2017-01-01 PROCEDURE — 0W9B3ZZ DRAINAGE OF LEFT PLEURAL CAVITY, PERCUTANEOUS APPROACH: ICD-10-PCS | Performed by: RADIOLOGY

## 2017-01-01 RX ORDER — ASPIRIN 81 MG/1
324 TABLET, CHEWABLE ORAL ONCE
Status: DISCONTINUED | OUTPATIENT
Start: 2017-01-01 | End: 2017-01-01

## 2017-01-01 RX ORDER — DIAZEPAM 2 MG
2 TABLET ORAL EVERY 6 HOURS PRN
COMMUNITY
End: 2017-01-01

## 2017-01-01 RX ORDER — FUROSEMIDE 20 MG
20 TABLET ORAL EVERY MORNING
COMMUNITY
End: 2017-01-01

## 2017-01-01 RX ORDER — FUROSEMIDE 10 MG/ML
40 INJECTION INTRAMUSCULAR; INTRAVENOUS ONCE
Status: COMPLETED | OUTPATIENT
Start: 2017-01-01 | End: 2017-01-01

## 2017-01-01 RX ORDER — LEVOFLOXACIN 5 MG/ML
250 INJECTION, SOLUTION INTRAVENOUS ONCE
Status: COMPLETED | OUTPATIENT
Start: 2017-01-01 | End: 2017-01-01

## 2017-01-01 RX ORDER — NITROGLYCERIN 0.4 MG/1
0.4 TABLET SUBLINGUAL EVERY 5 MIN PRN
Status: DISCONTINUED | OUTPATIENT
Start: 2017-01-01 | End: 2017-01-01 | Stop reason: HOSPADM

## 2017-01-01 RX ORDER — AMOXICILLIN 250 MG
1-2 CAPSULE ORAL 2 TIMES DAILY PRN
Status: DISCONTINUED | OUTPATIENT
Start: 2017-01-01 | End: 2017-01-01 | Stop reason: HOSPADM

## 2017-01-01 RX ORDER — GUAIFENESIN 600 MG/1
600 TABLET, EXTENDED RELEASE ORAL 2 TIMES DAILY
Qty: 14 TABLET | DISCHARGE
Start: 2017-01-01 | End: 2017-01-01

## 2017-01-01 RX ORDER — MORPHINE SULFATE 2 MG/ML
1 INJECTION, SOLUTION INTRAMUSCULAR; INTRAVENOUS
Status: DISCONTINUED | OUTPATIENT
Start: 2017-01-01 | End: 2017-01-01

## 2017-01-01 RX ORDER — UBIDECARENONE 100 MG
100 CAPSULE ORAL DAILY
Status: DISCONTINUED | OUTPATIENT
Start: 2017-01-01 | End: 2017-01-01 | Stop reason: HOSPADM

## 2017-01-01 RX ORDER — AMOXICILLIN 250 MG
1 CAPSULE ORAL 2 TIMES DAILY PRN
Qty: 100 TABLET | DISCHARGE
Start: 2017-01-01

## 2017-01-01 RX ORDER — ONDANSETRON 2 MG/ML
INJECTION INTRAMUSCULAR; INTRAVENOUS
Status: COMPLETED
Start: 2017-01-01 | End: 2017-01-01

## 2017-01-01 RX ORDER — NICOTINE POLACRILEX 4 MG
15-30 LOZENGE BUCCAL
Status: DISCONTINUED | OUTPATIENT
Start: 2017-01-01 | End: 2017-01-01

## 2017-01-01 RX ORDER — HYDROMORPHONE HYDROCHLORIDE 2 MG/1
2 TABLET ORAL EVERY 6 HOURS PRN
COMMUNITY
End: 2017-01-01

## 2017-01-01 RX ORDER — HYDROMORPHONE HYDROCHLORIDE 1 MG/ML
.3-.5 INJECTION, SOLUTION INTRAMUSCULAR; INTRAVENOUS; SUBCUTANEOUS
Status: DISCONTINUED | OUTPATIENT
Start: 2017-01-01 | End: 2017-01-01 | Stop reason: HOSPADM

## 2017-01-01 RX ORDER — CARBIDOPA AND LEVODOPA 25; 100 MG/1; MG/1
1 TABLET ORAL EVERY 6 HOURS PRN
Status: DISCONTINUED | OUTPATIENT
Start: 2017-01-01 | End: 2017-01-01 | Stop reason: HOSPADM

## 2017-01-01 RX ORDER — HYDROCODONE BITARTRATE AND ACETAMINOPHEN 5; 325 MG/1; MG/1
1 TABLET ORAL EVERY 6 HOURS PRN
Status: DISCONTINUED | OUTPATIENT
Start: 2017-01-01 | End: 2017-01-01

## 2017-01-01 RX ORDER — CARBOXYMETHYLCELLULOSE SODIUM 5 MG/ML
1 SOLUTION/ DROPS OPHTHALMIC 4 TIMES DAILY
Status: DISCONTINUED | OUTPATIENT
Start: 2017-01-01 | End: 2017-01-01 | Stop reason: HOSPADM

## 2017-01-01 RX ORDER — FUROSEMIDE 10 MG/ML
40 INJECTION INTRAMUSCULAR; INTRAVENOUS
Status: DISCONTINUED | OUTPATIENT
Start: 2017-01-01 | End: 2017-01-01

## 2017-01-01 RX ORDER — POLYETHYLENE GLYCOL 1450
17 POWDER (GRAM) MISCELLANEOUS PRN
COMMUNITY
End: 2017-01-01

## 2017-01-01 RX ORDER — ALLOPURINOL 100 MG/1
200 TABLET ORAL DAILY
Qty: 180 TABLET | Refills: 1 | Status: SHIPPED | OUTPATIENT
Start: 2017-01-01

## 2017-01-01 RX ORDER — MIRTAZAPINE 15 MG/1
7.5 TABLET, FILM COATED ORAL AT BEDTIME
COMMUNITY
End: 2017-01-01

## 2017-01-01 RX ORDER — HEPARIN SODIUM,PORCINE 10 UNIT/ML
2-5 VIAL (ML) INTRAVENOUS
Status: DISCONTINUED | OUTPATIENT
Start: 2017-01-01 | End: 2017-01-01 | Stop reason: HOSPADM

## 2017-01-01 RX ORDER — LIDOCAINE 40 MG/G
CREAM TOPICAL
Status: DISCONTINUED | OUTPATIENT
Start: 2017-01-01 | End: 2017-01-01

## 2017-01-01 RX ORDER — PROCHLORPERAZINE MALEATE 5 MG
5 TABLET ORAL EVERY 6 HOURS PRN
Status: DISCONTINUED | OUTPATIENT
Start: 2017-01-01 | End: 2017-01-01

## 2017-01-01 RX ORDER — ALBUTEROL SULFATE 0.83 MG/ML
2.5 SOLUTION RESPIRATORY (INHALATION)
Qty: 360 ML | DISCHARGE
Start: 2017-01-01 | End: 2017-01-01

## 2017-01-01 RX ORDER — LORAZEPAM 2 MG/ML
.5-1 INJECTION INTRAMUSCULAR
Status: DISCONTINUED | OUTPATIENT
Start: 2017-01-01 | End: 2017-01-01 | Stop reason: HOSPADM

## 2017-01-01 RX ORDER — LORAZEPAM 0.5 MG/1
.5-1 TABLET ORAL
Status: DISCONTINUED | OUTPATIENT
Start: 2017-01-01 | End: 2017-01-01 | Stop reason: HOSPADM

## 2017-01-01 RX ORDER — LEVOFLOXACIN 500 MG/1
500 TABLET, FILM COATED ORAL
Status: DISCONTINUED | OUTPATIENT
Start: 2017-01-01 | End: 2017-01-01

## 2017-01-01 RX ORDER — PROCHLORPERAZINE 25 MG
12.5 SUPPOSITORY, RECTAL RECTAL EVERY 12 HOURS PRN
Status: DISCONTINUED | OUTPATIENT
Start: 2017-01-01 | End: 2017-01-01

## 2017-01-01 RX ORDER — MORPHINE SULFATE 2 MG/ML
2-4 INJECTION, SOLUTION INTRAMUSCULAR; INTRAVENOUS
Status: DISCONTINUED | OUTPATIENT
Start: 2017-01-01 | End: 2017-01-01

## 2017-01-01 RX ORDER — ACETAZOLAMIDE 500 MG/5ML
250 INJECTION, POWDER, LYOPHILIZED, FOR SOLUTION INTRAVENOUS ONCE
Status: COMPLETED | OUTPATIENT
Start: 2017-01-01 | End: 2017-01-01

## 2017-01-01 RX ORDER — POLYETHYLENE GLYCOL 3350 17 G/17G
17 POWDER, FOR SOLUTION ORAL DAILY PRN
Status: DISCONTINUED | OUTPATIENT
Start: 2017-01-01 | End: 2017-01-01

## 2017-01-01 RX ORDER — CITALOPRAM HYDROBROMIDE 10 MG/1
10 TABLET ORAL DAILY
COMMUNITY
End: 2017-01-01

## 2017-01-01 RX ORDER — NALOXONE HYDROCHLORIDE 0.4 MG/ML
INJECTION, SOLUTION INTRAMUSCULAR; INTRAVENOUS; SUBCUTANEOUS
Status: DISCONTINUED
Start: 2017-01-01 | End: 2017-01-01 | Stop reason: HOSPADM

## 2017-01-01 RX ORDER — DOBUTAMINE HYDROCHLORIDE 200 MG/100ML
5 INJECTION INTRAVENOUS CONTINUOUS
Status: DISCONTINUED | OUTPATIENT
Start: 2017-01-01 | End: 2017-01-01

## 2017-01-01 RX ORDER — FUROSEMIDE 10 MG/ML
20 INJECTION INTRAMUSCULAR; INTRAVENOUS 3 TIMES DAILY
Status: COMPLETED | OUTPATIENT
Start: 2017-01-01 | End: 2017-01-01

## 2017-01-01 RX ORDER — ASPIRIN 300 MG/1
300 SUPPOSITORY RECTAL DAILY
Status: DISCONTINUED | OUTPATIENT
Start: 2017-01-01 | End: 2017-01-01

## 2017-01-01 RX ORDER — MEROPENEM 500 MG/1
500 INJECTION, POWDER, FOR SOLUTION INTRAVENOUS EVERY 12 HOURS
Status: COMPLETED | OUTPATIENT
Start: 2017-01-01 | End: 2017-01-01

## 2017-01-01 RX ORDER — HYDROCODONE BITARTRATE AND ACETAMINOPHEN 5; 325 MG/1; MG/1
1 TABLET ORAL EVERY 4 HOURS PRN
COMMUNITY
End: 2017-01-01

## 2017-01-01 RX ORDER — FUROSEMIDE 40 MG
40 TABLET ORAL DAILY
Qty: 30 TABLET | DISCHARGE
Start: 2017-01-01 | End: 2017-01-01 | Stop reason: DRUGHIGH

## 2017-01-01 RX ORDER — POLYETHYLENE GLYCOL 3350 17 G/17G
17 POWDER, FOR SOLUTION ORAL 2 TIMES DAILY PRN
Status: DISCONTINUED | OUTPATIENT
Start: 2017-01-01 | End: 2017-01-01 | Stop reason: HOSPADM

## 2017-01-01 RX ORDER — FUROSEMIDE 40 MG
40 TABLET ORAL EVERY MORNING
COMMUNITY
End: 2017-01-01

## 2017-01-01 RX ORDER — AMOXICILLIN 250 MG
1 CAPSULE ORAL 2 TIMES DAILY PRN
Status: DISCONTINUED | OUTPATIENT
Start: 2017-01-01 | End: 2017-01-01 | Stop reason: HOSPADM

## 2017-01-01 RX ORDER — FUROSEMIDE 20 MG
40 TABLET ORAL EVERY MORNING
Qty: 60 TABLET | Refills: 1 | Status: SHIPPED | OUTPATIENT
Start: 2017-01-01

## 2017-01-01 RX ORDER — BUMETANIDE 2 MG/1
2 TABLET ORAL DAILY
Qty: 90 TABLET | Refills: 1 | Status: ON HOLD | OUTPATIENT
Start: 2017-01-01 | End: 2017-01-01

## 2017-01-01 RX ORDER — BISACODYL 10 MG
10 SUPPOSITORY, RECTAL RECTAL DAILY PRN
Status: DISCONTINUED | OUTPATIENT
Start: 2017-01-01 | End: 2017-01-01 | Stop reason: HOSPADM

## 2017-01-01 RX ORDER — ONDANSETRON 4 MG/1
4 TABLET, ORALLY DISINTEGRATING ORAL EVERY 6 HOURS PRN
Status: DISCONTINUED | OUTPATIENT
Start: 2017-01-01 | End: 2017-01-01

## 2017-01-01 RX ORDER — SODIUM CHLORIDE 450 MG/100ML
INJECTION, SOLUTION INTRAVENOUS CONTINUOUS
Status: DISCONTINUED | OUTPATIENT
Start: 2017-01-01 | End: 2017-01-01

## 2017-01-01 RX ORDER — CARBIDOPA AND LEVODOPA 50; 200 MG/1; MG/1
1 TABLET, EXTENDED RELEASE ORAL 2 TIMES DAILY
Status: DISCONTINUED | OUTPATIENT
Start: 2017-01-01 | End: 2017-01-01 | Stop reason: HOSPADM

## 2017-01-01 RX ORDER — FUROSEMIDE 10 MG/ML
20 INJECTION INTRAMUSCULAR; INTRAVENOUS
Status: COMPLETED | OUTPATIENT
Start: 2017-01-01 | End: 2017-01-01

## 2017-01-01 RX ORDER — ACETAMINOPHEN 650 MG/1
650 SUPPOSITORY RECTAL EVERY 4 HOURS PRN
Status: DISCONTINUED | OUTPATIENT
Start: 2017-01-01 | End: 2017-01-01 | Stop reason: HOSPADM

## 2017-01-01 RX ORDER — SIMVASTATIN 40 MG
40 TABLET ORAL AT BEDTIME
Status: DISCONTINUED | OUTPATIENT
Start: 2017-01-01 | End: 2017-01-01 | Stop reason: HOSPADM

## 2017-01-01 RX ORDER — LEVOFLOXACIN 250 MG/1
250 TABLET, FILM COATED ORAL DAILY
COMMUNITY
Start: 2017-01-01 | End: 2017-01-01

## 2017-01-01 RX ORDER — IPRATROPIUM BROMIDE AND ALBUTEROL SULFATE 2.5; .5 MG/3ML; MG/3ML
3 SOLUTION RESPIRATORY (INHALATION) 4 TIMES DAILY
Qty: 360 ML | DISCHARGE
Start: 2017-01-01 | End: 2017-01-01

## 2017-01-01 RX ORDER — PRAMIPEXOLE DIHYDROCHLORIDE 0.5 MG/1
0.5 TABLET ORAL DAILY
Status: DISCONTINUED | OUTPATIENT
Start: 2017-01-01 | End: 2017-01-01 | Stop reason: HOSPADM

## 2017-01-01 RX ORDER — DIAZEPAM 2 MG
2 TABLET ORAL AT BEDTIME
COMMUNITY
End: 2017-01-01

## 2017-01-01 RX ORDER — DEXTROSE MONOHYDRATE 25 G/50ML
25-50 INJECTION, SOLUTION INTRAVENOUS
Status: DISCONTINUED | OUTPATIENT
Start: 2017-01-01 | End: 2017-01-01

## 2017-01-01 RX ORDER — FUROSEMIDE 10 MG/ML
20 INJECTION INTRAMUSCULAR; INTRAVENOUS
Status: DISCONTINUED | OUTPATIENT
Start: 2017-01-01 | End: 2017-01-01

## 2017-01-01 RX ORDER — ACETAMINOPHEN 325 MG/1
650 TABLET ORAL EVERY 4 HOURS PRN
Status: DISCONTINUED | OUTPATIENT
Start: 2017-01-01 | End: 2017-01-01 | Stop reason: HOSPADM

## 2017-01-01 RX ORDER — LIDOCAINE HYDROCHLORIDE 10 MG/ML
10 INJECTION, SOLUTION EPIDURAL; INFILTRATION; INTRACAUDAL; PERINEURAL ONCE
Status: COMPLETED | OUTPATIENT
Start: 2017-01-01 | End: 2017-01-01

## 2017-01-01 RX ORDER — PANTOPRAZOLE SODIUM 40 MG/1
40 TABLET, DELAYED RELEASE ORAL EVERY MORNING
Status: DISCONTINUED | OUTPATIENT
Start: 2017-01-01 | End: 2017-01-01 | Stop reason: HOSPADM

## 2017-01-01 RX ORDER — IPRATROPIUM BROMIDE AND ALBUTEROL SULFATE 2.5; .5 MG/3ML; MG/3ML
3 SOLUTION RESPIRATORY (INHALATION)
Status: DISCONTINUED | OUTPATIENT
Start: 2017-01-01 | End: 2017-01-01 | Stop reason: HOSPADM

## 2017-01-01 RX ORDER — ALBUMIN (HUMAN) 12.5 G/50ML
12.5 SOLUTION INTRAVENOUS EVERY 8 HOURS
Status: COMPLETED | OUTPATIENT
Start: 2017-01-01 | End: 2017-01-01

## 2017-01-01 RX ORDER — NALOXONE HYDROCHLORIDE 0.4 MG/ML
0.4 INJECTION, SOLUTION INTRAMUSCULAR; INTRAVENOUS; SUBCUTANEOUS ONCE
Status: COMPLETED | OUTPATIENT
Start: 2017-01-01 | End: 2017-01-01

## 2017-01-01 RX ORDER — MIRTAZAPINE 15 MG/1
7.5 TABLET, FILM COATED ORAL AT BEDTIME
Qty: 90 TABLET | Refills: 1 | Status: SHIPPED | OUTPATIENT
Start: 2017-01-01

## 2017-01-01 RX ORDER — CARBOXYMETHYLCELLULOSE SODIUM 5 MG/ML
1 SOLUTION/ DROPS OPHTHALMIC EVERY 4 HOURS PRN
Status: DISCONTINUED | OUTPATIENT
Start: 2017-01-01 | End: 2017-01-01 | Stop reason: HOSPADM

## 2017-01-01 RX ORDER — ALLOPURINOL 100 MG/1
200 TABLET ORAL DAILY
Status: DISCONTINUED | OUTPATIENT
Start: 2017-01-01 | End: 2017-01-01

## 2017-01-01 RX ORDER — CARBOXYMETHYLCELLULOSE SODIUM 5 MG/ML
1 SOLUTION/ DROPS OPHTHALMIC 4 TIMES DAILY
COMMUNITY

## 2017-01-01 RX ORDER — ONDANSETRON 2 MG/ML
4 INJECTION INTRAMUSCULAR; INTRAVENOUS ONCE
Status: COMPLETED | OUTPATIENT
Start: 2017-01-01 | End: 2017-01-01

## 2017-01-01 RX ORDER — BISACODYL 10 MG
10 SUPPOSITORY, RECTAL RECTAL
Qty: 30 SUPPOSITORY | DISCHARGE
Start: 2017-01-01 | End: 2017-01-01

## 2017-01-01 RX ORDER — LEVOFLOXACIN 5 MG/ML
500 INJECTION, SOLUTION INTRAVENOUS ONCE
Status: COMPLETED | OUTPATIENT
Start: 2017-01-01 | End: 2017-01-01

## 2017-01-01 RX ORDER — FERROUS SULFATE 325(65) MG
325 TABLET ORAL 2 TIMES DAILY
Status: DISCONTINUED | OUTPATIENT
Start: 2017-01-01 | End: 2017-01-01

## 2017-01-01 RX ORDER — LIDOCAINE 40 MG/G
CREAM TOPICAL
Status: DISCONTINUED | OUTPATIENT
Start: 2017-01-01 | End: 2017-01-01 | Stop reason: HOSPADM

## 2017-01-01 RX ORDER — NICOTINE POLACRILEX 4 MG
15-30 LOZENGE BUCCAL
Status: DISCONTINUED | OUTPATIENT
Start: 2017-01-01 | End: 2017-01-01 | Stop reason: HOSPADM

## 2017-01-01 RX ORDER — FUROSEMIDE 10 MG/ML
20 INJECTION INTRAMUSCULAR; INTRAVENOUS DAILY
Status: DISCONTINUED | OUTPATIENT
Start: 2017-01-01 | End: 2017-01-01

## 2017-01-01 RX ORDER — SODIUM FERRIC GLUCONATE COMPLEX IN SUCROSE 12.5 MG/ML
125 INJECTION INTRAVENOUS DAILY
Status: DISCONTINUED | OUTPATIENT
Start: 2017-01-01 | End: 2017-01-01 | Stop reason: ALTCHOICE

## 2017-01-01 RX ORDER — MIRTAZAPINE 7.5 MG/1
7.5 TABLET, FILM COATED ORAL AT BEDTIME
Status: DISCONTINUED | OUTPATIENT
Start: 2017-01-01 | End: 2017-01-01 | Stop reason: HOSPADM

## 2017-01-01 RX ORDER — FERROUS SULFATE 325(65) MG
325 TABLET ORAL
Status: DISCONTINUED | OUTPATIENT
Start: 2017-01-01 | End: 2017-01-01

## 2017-01-01 RX ORDER — CARBIDOPA AND LEVODOPA 50; 200 MG/1; MG/1
1 TABLET, EXTENDED RELEASE ORAL 2 TIMES DAILY
Qty: 60 TABLET | Refills: 5 | Status: SHIPPED | OUTPATIENT
Start: 2017-01-01 | End: 2017-01-01

## 2017-01-01 RX ORDER — ALBUMIN (HUMAN) 12.5 G/50ML
25 SOLUTION INTRAVENOUS EVERY 8 HOURS
Status: DISCONTINUED | OUTPATIENT
Start: 2017-01-01 | End: 2017-01-01

## 2017-01-01 RX ORDER — ONDANSETRON 2 MG/ML
4 INJECTION INTRAMUSCULAR; INTRAVENOUS EVERY 6 HOURS PRN
Status: DISCONTINUED | OUTPATIENT
Start: 2017-01-01 | End: 2017-01-01 | Stop reason: HOSPADM

## 2017-01-01 RX ORDER — HYDROMORPHONE HYDROCHLORIDE 2 MG/1
2 TABLET ORAL EVERY 6 HOURS PRN
Qty: 60 TABLET | Refills: 0 | Status: ON HOLD | OUTPATIENT
Start: 2017-01-01 | End: 2017-01-01

## 2017-01-01 RX ORDER — ACETAMINOPHEN 650 MG/1
650 SUPPOSITORY RECTAL EVERY 6 HOURS PRN
Status: DISCONTINUED | OUTPATIENT
Start: 2017-01-01 | End: 2017-01-01 | Stop reason: HOSPADM

## 2017-01-01 RX ORDER — OXYCODONE HCL 20 MG/ML
5-7.5 CONCENTRATE, ORAL ORAL
Status: DISCONTINUED | OUTPATIENT
Start: 2017-01-01 | End: 2017-01-01 | Stop reason: HOSPADM

## 2017-01-01 RX ORDER — ALBUTEROL SULFATE 0.83 MG/ML
2.5 SOLUTION RESPIRATORY (INHALATION)
Status: DISCONTINUED | OUTPATIENT
Start: 2017-01-01 | End: 2017-01-01 | Stop reason: HOSPADM

## 2017-01-01 RX ORDER — HYDROMORPHONE HYDROCHLORIDE 2 MG/1
2 TABLET ORAL EVERY 6 HOURS PRN
Status: DISCONTINUED | OUTPATIENT
Start: 2017-01-01 | End: 2017-01-01 | Stop reason: HOSPADM

## 2017-01-01 RX ORDER — DOBUTAMINE HYDROCHLORIDE 200 MG/100ML
3 INJECTION INTRAVENOUS CONTINUOUS
Status: DISCONTINUED | OUTPATIENT
Start: 2017-01-01 | End: 2017-01-01

## 2017-01-01 RX ORDER — NALOXONE HYDROCHLORIDE 0.4 MG/ML
.1-.4 INJECTION, SOLUTION INTRAMUSCULAR; INTRAVENOUS; SUBCUTANEOUS
Status: DISCONTINUED | OUTPATIENT
Start: 2017-01-01 | End: 2017-01-01 | Stop reason: HOSPADM

## 2017-01-01 RX ORDER — ACETAMINOPHEN 650 MG/1
650 SUPPOSITORY RECTAL EVERY 4 HOURS PRN
Status: DISCONTINUED | OUTPATIENT
Start: 2017-01-01 | End: 2017-01-01

## 2017-01-01 RX ORDER — FERROUS SULFATE 325(65) MG
325 TABLET ORAL DAILY
Status: DISCONTINUED | OUTPATIENT
Start: 2017-01-01 | End: 2017-01-01 | Stop reason: HOSPADM

## 2017-01-01 RX ORDER — PRAMIPEXOLE DIHYDROCHLORIDE 0.5 MG/1
0.5 TABLET ORAL
Status: DISCONTINUED | OUTPATIENT
Start: 2017-01-01 | End: 2017-01-01 | Stop reason: HOSPADM

## 2017-01-01 RX ORDER — SIMVASTATIN 40 MG
40 TABLET ORAL AT BEDTIME
Status: DISCONTINUED | OUTPATIENT
Start: 2017-01-01 | End: 2017-01-01

## 2017-01-01 RX ORDER — FUROSEMIDE 20 MG
20 TABLET ORAL EVERY MORNING
Qty: 90 TABLET | Refills: 1 | Status: SHIPPED | OUTPATIENT
Start: 2017-01-01 | End: 2017-01-01

## 2017-01-01 RX ORDER — HYDROMORPHONE HYDROCHLORIDE 2 MG/1
2 TABLET ORAL EVERY 8 HOURS PRN
Qty: 60 TABLET | Refills: 0 | Status: SHIPPED | DISCHARGE
Start: 2017-01-01 | End: 2017-01-01 | Stop reason: DRUGHIGH

## 2017-01-01 RX ORDER — LEVOFLOXACIN 5 MG/ML
500 INJECTION, SOLUTION INTRAVENOUS
Status: DISCONTINUED | OUTPATIENT
Start: 2017-01-01 | End: 2017-01-01

## 2017-01-01 RX ORDER — METHYLPREDNISOLONE SODIUM SUCCINATE 40 MG/ML
40 INJECTION, POWDER, LYOPHILIZED, FOR SOLUTION INTRAMUSCULAR; INTRAVENOUS ONCE
Status: COMPLETED | OUTPATIENT
Start: 2017-01-01 | End: 2017-01-01

## 2017-01-01 RX ORDER — FUROSEMIDE 20 MG
20 TABLET ORAL DAILY
COMMUNITY
End: 2017-01-01

## 2017-01-01 RX ORDER — HYDROCODONE BITARTRATE AND ACETAMINOPHEN 5; 325 MG/1; MG/1
1 TABLET ORAL 4 TIMES DAILY
COMMUNITY
End: 2017-01-01

## 2017-01-01 RX ORDER — HYDROCODONE BITARTRATE AND ACETAMINOPHEN 5; 325 MG/1; MG/1
1 TABLET ORAL EVERY 6 HOURS PRN
Qty: 30 TABLET | Refills: 0 | Status: SHIPPED | OUTPATIENT
Start: 2017-01-01

## 2017-01-01 RX ORDER — ONDANSETRON 2 MG/ML
4 INJECTION INTRAMUSCULAR; INTRAVENOUS EVERY 6 HOURS PRN
Status: DISCONTINUED | OUTPATIENT
Start: 2017-01-01 | End: 2017-01-01

## 2017-01-01 RX ORDER — BUMETANIDE 0.25 MG/ML
2 INJECTION INTRAMUSCULAR; INTRAVENOUS EVERY 8 HOURS
Status: COMPLETED | OUTPATIENT
Start: 2017-01-01 | End: 2017-01-01

## 2017-01-01 RX ORDER — ATROPINE SULFATE 10 MG/ML
1-2 SOLUTION/ DROPS OPHTHALMIC
Status: DISCONTINUED | OUTPATIENT
Start: 2017-01-01 | End: 2017-01-01 | Stop reason: HOSPADM

## 2017-01-01 RX ORDER — ONDANSETRON 4 MG/1
4 TABLET, ORALLY DISINTEGRATING ORAL EVERY 6 HOURS PRN
Status: DISCONTINUED | OUTPATIENT
Start: 2017-01-01 | End: 2017-01-01 | Stop reason: HOSPADM

## 2017-01-01 RX ORDER — ALUMINA, MAGNESIA, AND SIMETHICONE 2400; 2400; 240 MG/30ML; MG/30ML; MG/30ML
15-30 SUSPENSION ORAL EVERY 4 HOURS PRN
Status: DISCONTINUED | OUTPATIENT
Start: 2017-01-01 | End: 2017-01-01 | Stop reason: HOSPADM

## 2017-01-01 RX ORDER — DEXTROSE MONOHYDRATE 25 G/50ML
25-50 INJECTION, SOLUTION INTRAVENOUS
Status: DISCONTINUED | OUTPATIENT
Start: 2017-01-01 | End: 2017-01-01 | Stop reason: HOSPADM

## 2017-01-01 RX ORDER — FUROSEMIDE 40 MG
40 TABLET ORAL DAILY
Status: DISCONTINUED | OUTPATIENT
Start: 2017-01-01 | End: 2017-01-01 | Stop reason: HOSPADM

## 2017-01-01 RX ORDER — GUAIFENESIN 600 MG/1
600 TABLET, EXTENDED RELEASE ORAL 2 TIMES DAILY
Status: DISCONTINUED | OUTPATIENT
Start: 2017-01-01 | End: 2017-01-01 | Stop reason: HOSPADM

## 2017-01-01 RX ORDER — CIPROFLOXACIN HYDROCHLORIDE 3.5 MG/ML
1 SOLUTION/ DROPS TOPICAL 4 TIMES DAILY
Qty: 1 BOTTLE | DISCHARGE
Start: 2017-01-01 | End: 2017-01-01

## 2017-01-01 RX ORDER — MEROPENEM 500 MG/1
500 INJECTION, POWDER, FOR SOLUTION INTRAVENOUS EVERY 12 HOURS
Status: DISCONTINUED | OUTPATIENT
Start: 2017-01-01 | End: 2017-01-01

## 2017-01-01 RX ADMIN — DOBUTAMINE IN DEXTROSE 3 MCG/KG/MIN: 200 INJECTION, SOLUTION INTRAVENOUS at 18:07

## 2017-01-01 RX ADMIN — SIMVASTATIN 40 MG: 40 TABLET, FILM COATED ORAL at 22:28

## 2017-01-01 RX ADMIN — CARBIDOPA AND LEVODOPA 1 TABLET: 50; 200 TABLET, EXTENDED RELEASE ORAL at 21:05

## 2017-01-01 RX ADMIN — DARBEPOETIN ALFA 100 MCG: 100 INJECTION, SOLUTION INTRAVENOUS; SUBCUTANEOUS at 14:38

## 2017-01-01 RX ADMIN — CARBIDOPA AND LEVODOPA 1 TABLET: 50; 200 TABLET, EXTENDED RELEASE ORAL at 08:20

## 2017-01-01 RX ADMIN — IRON SUCROSE 200 MG: 20 INJECTION, SOLUTION INTRAVENOUS at 20:43

## 2017-01-01 RX ADMIN — CARBIDOPA AND LEVODOPA 1 TABLET: 50; 200 TABLET, EXTENDED RELEASE ORAL at 10:34

## 2017-01-01 RX ADMIN — HYDROMORPHONE HYDROCHLORIDE 0.3 MG: 1 INJECTION, SOLUTION INTRAMUSCULAR; INTRAVENOUS; SUBCUTANEOUS at 15:06

## 2017-01-01 RX ADMIN — CARBIDOPA AND LEVODOPA 1 TABLET: 25; 100 TABLET ORAL at 21:28

## 2017-01-01 RX ADMIN — INSULIN GLARGINE 12 UNITS: 100 INJECTION, SOLUTION SUBCUTANEOUS at 22:04

## 2017-01-01 RX ADMIN — IPRATROPIUM BROMIDE AND ALBUTEROL SULFATE 3 ML: .5; 3 SOLUTION RESPIRATORY (INHALATION) at 15:14

## 2017-01-01 RX ADMIN — ALBUMIN (HUMAN) 25 G: 12.5 SOLUTION INTRAVENOUS at 13:58

## 2017-01-01 RX ADMIN — IRON SUCROSE 200 MG: 20 INJECTION, SOLUTION INTRAVENOUS at 21:57

## 2017-01-01 RX ADMIN — GUAIFENESIN 600 MG: 600 TABLET, EXTENDED RELEASE ORAL at 20:34

## 2017-01-01 RX ADMIN — MIRTAZAPINE 7.5 MG: 7.5 TABLET, FILM COATED ORAL at 21:17

## 2017-01-01 RX ADMIN — IPRATROPIUM BROMIDE AND ALBUTEROL SULFATE 3 ML: .5; 3 SOLUTION RESPIRATORY (INHALATION) at 20:16

## 2017-01-01 RX ADMIN — IPRATROPIUM BROMIDE AND ALBUTEROL SULFATE 3 ML: .5; 3 SOLUTION RESPIRATORY (INHALATION) at 11:51

## 2017-01-01 RX ADMIN — SIMVASTATIN 40 MG: 40 TABLET, FILM COATED ORAL at 20:32

## 2017-01-01 RX ADMIN — ASPIRIN 325 MG: 325 TABLET, DELAYED RELEASE ORAL at 09:16

## 2017-01-01 RX ADMIN — GUAIFENESIN 600 MG: 600 TABLET, EXTENDED RELEASE ORAL at 19:59

## 2017-01-01 RX ADMIN — IPRATROPIUM BROMIDE AND ALBUTEROL SULFATE 3 ML: .5; 3 SOLUTION RESPIRATORY (INHALATION) at 19:45

## 2017-01-01 RX ADMIN — MEROPENEM 500 MG: 500 INJECTION, POWDER, FOR SOLUTION INTRAVENOUS at 09:14

## 2017-01-01 RX ADMIN — ONDANSETRON HYDROCHLORIDE 4 MG: 2 SOLUTION INTRAMUSCULAR; INTRAVENOUS at 09:57

## 2017-01-01 RX ADMIN — Medication 100 MG: at 09:17

## 2017-01-01 RX ADMIN — ASPIRIN 300 MG: 300 SUPPOSITORY RECTAL at 09:39

## 2017-01-01 RX ADMIN — IPRATROPIUM BROMIDE AND ALBUTEROL SULFATE 3 ML: .5; 3 SOLUTION RESPIRATORY (INHALATION) at 15:33

## 2017-01-01 RX ADMIN — PRAMIPEXOLE DIHYDROCHLORIDE 0.5 MG: 0.5 TABLET ORAL at 18:40

## 2017-01-01 RX ADMIN — IRON SUCROSE 200 MG: 20 INJECTION, SOLUTION INTRAVENOUS at 21:39

## 2017-01-01 RX ADMIN — SULFUR HEXAFLUORIDE 5 ML: KIT at 13:00

## 2017-01-01 RX ADMIN — CARBOXYMETHYLCELLULOSE SODIUM 1 DROP: 5 SOLUTION/ DROPS OPHTHALMIC at 21:46

## 2017-01-01 RX ADMIN — Medication: at 21:01

## 2017-01-01 RX ADMIN — IPRATROPIUM BROMIDE AND ALBUTEROL SULFATE 3 ML: .5; 3 SOLUTION RESPIRATORY (INHALATION) at 11:17

## 2017-01-01 RX ADMIN — Medication: at 08:56

## 2017-01-01 RX ADMIN — GUAIFENESIN 600 MG: 600 TABLET, EXTENDED RELEASE ORAL at 09:16

## 2017-01-01 RX ADMIN — INSULIN GLARGINE 3 UNITS: 100 INJECTION, SOLUTION SUBCUTANEOUS at 21:06

## 2017-01-01 RX ADMIN — ASPIRIN 325 MG: 325 TABLET, DELAYED RELEASE ORAL at 08:43

## 2017-01-01 RX ADMIN — INSULIN GLARGINE 5 UNITS: 100 INJECTION, SOLUTION SUBCUTANEOUS at 22:08

## 2017-01-01 RX ADMIN — ACETAZOLAMIDE 250 MG: 500 INJECTION, POWDER, LYOPHILIZED, FOR SOLUTION INTRAVENOUS at 14:56

## 2017-01-01 RX ADMIN — IPRATROPIUM BROMIDE AND ALBUTEROL SULFATE 3 ML: .5; 3 SOLUTION RESPIRATORY (INHALATION) at 11:33

## 2017-01-01 RX ADMIN — IPRATROPIUM BROMIDE AND ALBUTEROL SULFATE 3 ML: .5; 3 SOLUTION RESPIRATORY (INHALATION) at 11:36

## 2017-01-01 RX ADMIN — HYDROMORPHONE HYDROCHLORIDE 2 MG: 2 TABLET ORAL at 22:38

## 2017-01-01 RX ADMIN — PANTOPRAZOLE SODIUM 40 MG: 40 INJECTION, POWDER, FOR SOLUTION INTRAVENOUS at 19:53

## 2017-01-01 RX ADMIN — GUAIFENESIN 600 MG: 600 TABLET, EXTENDED RELEASE ORAL at 20:32

## 2017-01-01 RX ADMIN — PRAMIPEXOLE DIHYDROCHLORIDE 0.5 MG: 0.5 TABLET ORAL at 18:53

## 2017-01-01 RX ADMIN — CARBOXYMETHYLCELLULOSE SODIUM 1 DROP: 5 SOLUTION/ DROPS OPHTHALMIC at 13:31

## 2017-01-01 RX ADMIN — FERROUS SULFATE TAB 325 MG (65 MG ELEMENTAL FE) 325 MG: 325 (65 FE) TAB at 10:21

## 2017-01-01 RX ADMIN — BUMETANIDE 0.5 MG/HR: 0.25 INJECTION, SOLUTION INTRAMUSCULAR; INTRAVENOUS at 13:58

## 2017-01-01 RX ADMIN — PANTOPRAZOLE SODIUM 40 MG: 40 TABLET, DELAYED RELEASE ORAL at 08:20

## 2017-01-01 RX ADMIN — IPRATROPIUM BROMIDE AND ALBUTEROL SULFATE 3 ML: .5; 3 SOLUTION RESPIRATORY (INHALATION) at 07:45

## 2017-01-01 RX ADMIN — SIMVASTATIN 40 MG: 40 TABLET, FILM COATED ORAL at 21:38

## 2017-01-01 RX ADMIN — ALBUTEROL SULFATE 2.5 MG: 2.5 SOLUTION RESPIRATORY (INHALATION) at 23:12

## 2017-01-01 RX ADMIN — CARBIDOPA AND LEVODOPA 1 TABLET: 50; 200 TABLET, EXTENDED RELEASE ORAL at 08:46

## 2017-01-01 RX ADMIN — IPRATROPIUM BROMIDE AND ALBUTEROL SULFATE 3 ML: .5; 3 SOLUTION RESPIRATORY (INHALATION) at 11:56

## 2017-01-01 RX ADMIN — ACETAMINOPHEN 650 MG: 325 TABLET, FILM COATED ORAL at 10:19

## 2017-01-01 RX ADMIN — CARBIDOPA AND LEVODOPA 1 TABLET: 50; 200 TABLET, EXTENDED RELEASE ORAL at 08:42

## 2017-01-01 RX ADMIN — FERROUS SULFATE TAB 325 MG (65 MG ELEMENTAL FE) 325 MG: 325 (65 FE) TAB at 20:44

## 2017-01-01 RX ADMIN — DEXTROSE MONOHYDRATE: 50 INJECTION, SOLUTION INTRAVENOUS at 18:19

## 2017-01-01 RX ADMIN — INSULIN GLARGINE 8 UNITS: 100 INJECTION, SOLUTION SUBCUTANEOUS at 22:02

## 2017-01-01 RX ADMIN — CARBOXYMETHYLCELLULOSE SODIUM 1 DROP: 5 SOLUTION/ DROPS OPHTHALMIC at 18:40

## 2017-01-01 RX ADMIN — FERROUS SULFATE TAB 325 MG (65 MG ELEMENTAL FE) 325 MG: 325 (65 FE) TAB at 21:39

## 2017-01-01 RX ADMIN — PRAMIPEXOLE DIHYDROCHLORIDE 0.5 MG: 0.5 TABLET ORAL at 09:10

## 2017-01-01 RX ADMIN — IPRATROPIUM BROMIDE AND ALBUTEROL SULFATE 3 ML: .5; 3 SOLUTION RESPIRATORY (INHALATION) at 21:16

## 2017-01-01 RX ADMIN — IPRATROPIUM BROMIDE AND ALBUTEROL SULFATE 3 ML: .5; 3 SOLUTION RESPIRATORY (INHALATION) at 14:49

## 2017-01-01 RX ADMIN — ASPIRIN 325 MG: 325 TABLET, DELAYED RELEASE ORAL at 08:36

## 2017-01-01 RX ADMIN — CARBOXYMETHYLCELLULOSE SODIUM 1 DROP: 5 SOLUTION/ DROPS OPHTHALMIC at 14:42

## 2017-01-01 RX ADMIN — PANTOPRAZOLE SODIUM 40 MG: 40 TABLET, DELAYED RELEASE ORAL at 09:10

## 2017-01-01 RX ADMIN — INSULIN ASPART 1 UNITS: 100 INJECTION, SOLUTION INTRAVENOUS; SUBCUTANEOUS at 08:33

## 2017-01-01 RX ADMIN — HYDROMORPHONE HYDROCHLORIDE 2 MG: 2 TABLET ORAL at 23:31

## 2017-01-01 RX ADMIN — CARBOXYMETHYLCELLULOSE SODIUM 1 DROP: 5 SOLUTION/ DROPS OPHTHALMIC at 08:07

## 2017-01-01 RX ADMIN — HYDROMORPHONE HYDROCHLORIDE 2 MG: 2 TABLET ORAL at 13:36

## 2017-01-01 RX ADMIN — PANTOPRAZOLE SODIUM 40 MG: 40 INJECTION, POWDER, FOR SOLUTION INTRAVENOUS at 08:12

## 2017-01-01 RX ADMIN — CARBOXYMETHYLCELLULOSE SODIUM 1 DROP: 5 SOLUTION/ DROPS OPHTHALMIC at 12:34

## 2017-01-01 RX ADMIN — MEROPENEM 500 MG: 500 INJECTION, POWDER, FOR SOLUTION INTRAVENOUS at 09:45

## 2017-01-01 RX ADMIN — FUROSEMIDE 40 MG: 10 INJECTION, SOLUTION INTRAVENOUS at 20:36

## 2017-01-01 RX ADMIN — Medication 100 MG: at 10:21

## 2017-01-01 RX ADMIN — MEROPENEM 500 MG: 500 INJECTION, POWDER, FOR SOLUTION INTRAVENOUS at 20:34

## 2017-01-01 RX ADMIN — FERROUS SULFATE TAB 325 MG (65 MG ELEMENTAL FE) 325 MG: 325 (65 FE) TAB at 12:34

## 2017-01-01 RX ADMIN — ALBUTEROL SULFATE 2.5 MG: 2.5 SOLUTION RESPIRATORY (INHALATION) at 00:55

## 2017-01-01 RX ADMIN — ASPIRIN 300 MG: 300 SUPPOSITORY RECTAL at 09:51

## 2017-01-01 RX ADMIN — FUROSEMIDE 20 MG: 10 INJECTION, SOLUTION INTRAVENOUS at 22:26

## 2017-01-01 RX ADMIN — CARBIDOPA AND LEVODOPA 1 TABLET: 25; 100 TABLET ORAL at 14:42

## 2017-01-01 RX ADMIN — CARBOXYMETHYLCELLULOSE SODIUM 1 DROP: 5 SOLUTION/ DROPS OPHTHALMIC at 11:55

## 2017-01-01 RX ADMIN — CARBOXYMETHYLCELLULOSE SODIUM 1 DROP: 5 SOLUTION/ DROPS OPHTHALMIC at 08:35

## 2017-01-01 RX ADMIN — Medication: at 21:58

## 2017-01-01 RX ADMIN — PRAMIPEXOLE DIHYDROCHLORIDE 0.5 MG: 0.5 TABLET ORAL at 08:30

## 2017-01-01 RX ADMIN — FERROUS SULFATE TAB 325 MG (65 MG ELEMENTAL FE) 325 MG: 325 (65 FE) TAB at 08:36

## 2017-01-01 RX ADMIN — MIRTAZAPINE 7.5 MG: 7.5 TABLET, FILM COATED ORAL at 21:22

## 2017-01-01 RX ADMIN — INSULIN GLARGINE 10 UNITS: 100 INJECTION, SOLUTION SUBCUTANEOUS at 21:33

## 2017-01-01 RX ADMIN — Medication: at 10:00

## 2017-01-01 RX ADMIN — IPRATROPIUM BROMIDE AND ALBUTEROL SULFATE 3 ML: .5; 3 SOLUTION RESPIRATORY (INHALATION) at 07:56

## 2017-01-01 RX ADMIN — LEVOFLOXACIN 500 MG: 5 INJECTION, SOLUTION INTRAVENOUS at 18:47

## 2017-01-01 RX ADMIN — LEVOFLOXACIN 250 MG: 5 INJECTION, SOLUTION INTRAVENOUS at 19:53

## 2017-01-01 RX ADMIN — HYDROMORPHONE HYDROCHLORIDE 2 MG: 2 TABLET ORAL at 16:01

## 2017-01-01 RX ADMIN — PRAMIPEXOLE DIHYDROCHLORIDE 0.5 MG: 0.5 TABLET ORAL at 10:34

## 2017-01-01 RX ADMIN — CARBIDOPA AND LEVODOPA 1 TABLET: 50; 200 TABLET, EXTENDED RELEASE ORAL at 08:34

## 2017-01-01 RX ADMIN — ASPIRIN 325 MG: 325 TABLET, DELAYED RELEASE ORAL at 09:17

## 2017-01-01 RX ADMIN — GUAIFENESIN 600 MG: 600 TABLET, EXTENDED RELEASE ORAL at 10:34

## 2017-01-01 RX ADMIN — ACETAMINOPHEN 650 MG: 325 TABLET, FILM COATED ORAL at 00:03

## 2017-01-01 RX ADMIN — CARBIDOPA AND LEVODOPA 1 TABLET: 50; 200 TABLET, EXTENDED RELEASE ORAL at 20:52

## 2017-01-01 RX ADMIN — IPRATROPIUM BROMIDE AND ALBUTEROL SULFATE 3 ML: .5; 3 SOLUTION RESPIRATORY (INHALATION) at 19:10

## 2017-01-01 RX ADMIN — SIMVASTATIN 40 MG: 40 TABLET, FILM COATED ORAL at 21:21

## 2017-01-01 RX ADMIN — CARBIDOPA AND LEVODOPA 1 TABLET: 50; 200 TABLET, EXTENDED RELEASE ORAL at 21:29

## 2017-01-01 RX ADMIN — LEVOFLOXACIN 500 MG: 5 INJECTION, SOLUTION INTRAVENOUS at 15:40

## 2017-01-01 RX ADMIN — ALLOPURINOL 200 MG: 100 TABLET ORAL at 08:35

## 2017-01-01 RX ADMIN — IPRATROPIUM BROMIDE AND ALBUTEROL SULFATE 3 ML: .5; 3 SOLUTION RESPIRATORY (INHALATION) at 07:09

## 2017-01-01 RX ADMIN — Medication 100 MG: at 08:42

## 2017-01-01 RX ADMIN — SIMVASTATIN 40 MG: 40 TABLET, FILM COATED ORAL at 21:53

## 2017-01-01 RX ADMIN — Medication: at 15:30

## 2017-01-01 RX ADMIN — FUROSEMIDE 40 MG: 10 INJECTION, SOLUTION INTRAVENOUS at 08:08

## 2017-01-01 RX ADMIN — ASPIRIN 325 MG: 325 TABLET, DELAYED RELEASE ORAL at 10:34

## 2017-01-01 RX ADMIN — INSULIN GLARGINE 5 UNITS: 100 INJECTION, SOLUTION SUBCUTANEOUS at 21:06

## 2017-01-01 RX ADMIN — DOBUTAMINE HYDROCHLORIDE 5 MCG/KG/MIN: 200 INJECTION INTRAVENOUS at 13:31

## 2017-01-01 RX ADMIN — SIMVASTATIN 40 MG: 40 TABLET, FILM COATED ORAL at 21:31

## 2017-01-01 RX ADMIN — GUAIFENESIN 600 MG: 600 TABLET, EXTENDED RELEASE ORAL at 08:20

## 2017-01-01 RX ADMIN — INSULIN GLARGINE 8 UNITS: 100 INJECTION, SOLUTION SUBCUTANEOUS at 21:31

## 2017-01-01 RX ADMIN — CARBOXYMETHYLCELLULOSE SODIUM 1 DROP: 5 SOLUTION/ DROPS OPHTHALMIC at 08:46

## 2017-01-01 RX ADMIN — PANTOPRAZOLE SODIUM 40 MG: 40 TABLET, DELAYED RELEASE ORAL at 08:36

## 2017-01-01 RX ADMIN — IPRATROPIUM BROMIDE AND ALBUTEROL SULFATE 3 ML: .5; 3 SOLUTION RESPIRATORY (INHALATION) at 20:13

## 2017-01-01 RX ADMIN — IPRATROPIUM BROMIDE AND ALBUTEROL SULFATE 3 ML: .5; 3 SOLUTION RESPIRATORY (INHALATION) at 12:46

## 2017-01-01 RX ADMIN — Medication 10 MG: at 13:59

## 2017-01-01 RX ADMIN — PANTOPRAZOLE SODIUM 40 MG: 40 TABLET, DELAYED RELEASE ORAL at 12:09

## 2017-01-01 RX ADMIN — Medication 100 MG: at 09:20

## 2017-01-01 RX ADMIN — INSULIN ASPART 1 UNITS: 100 INJECTION, SOLUTION INTRAVENOUS; SUBCUTANEOUS at 17:40

## 2017-01-01 RX ADMIN — PRAMIPEXOLE DIHYDROCHLORIDE 0.5 MG: 0.5 TABLET ORAL at 08:20

## 2017-01-01 RX ADMIN — GUAIFENESIN 600 MG: 600 TABLET, EXTENDED RELEASE ORAL at 20:52

## 2017-01-01 RX ADMIN — HYDROMORPHONE HYDROCHLORIDE 0.5 MG: 1 INJECTION, SOLUTION INTRAMUSCULAR; INTRAVENOUS; SUBCUTANEOUS at 18:53

## 2017-01-01 RX ADMIN — GUAIFENESIN 600 MG: 600 TABLET, EXTENDED RELEASE ORAL at 20:44

## 2017-01-01 RX ADMIN — FUROSEMIDE 20 MG: 10 INJECTION, SOLUTION INTRAVENOUS at 09:20

## 2017-01-01 RX ADMIN — HYDROCODONE BITARTRATE AND ACETAMINOPHEN 1 TABLET: 5; 325 TABLET ORAL at 04:07

## 2017-01-01 RX ADMIN — SIMVASTATIN 40 MG: 40 TABLET, FILM COATED ORAL at 21:05

## 2017-01-01 RX ADMIN — PRAMIPEXOLE DIHYDROCHLORIDE 0.5 MG: 0.5 TABLET ORAL at 08:42

## 2017-01-01 RX ADMIN — CARBIDOPA AND LEVODOPA 1 TABLET: 50; 200 TABLET, EXTENDED RELEASE ORAL at 19:58

## 2017-01-01 RX ADMIN — ASPIRIN 325 MG: 325 TABLET, DELAYED RELEASE ORAL at 08:46

## 2017-01-01 RX ADMIN — PANTOPRAZOLE SODIUM 40 MG: 40 TABLET, DELAYED RELEASE ORAL at 10:21

## 2017-01-01 RX ADMIN — NALOXONE HYDROCHLORIDE 0.4 MG: 0.4 INJECTION, SOLUTION INTRAMUSCULAR; INTRAVENOUS; SUBCUTANEOUS at 14:50

## 2017-01-01 RX ADMIN — FERROUS SULFATE TAB 325 MG (65 MG ELEMENTAL FE) 325 MG: 325 (65 FE) TAB at 08:20

## 2017-01-01 RX ADMIN — CARBIDOPA AND LEVODOPA 1 TABLET: 25; 100 TABLET ORAL at 21:26

## 2017-01-01 RX ADMIN — LIDOCAINE HYDROCHLORIDE 2 ML: 10 INJECTION, SOLUTION INFILTRATION; PERINEURAL at 18:35

## 2017-01-01 RX ADMIN — NALOXONE HYDROCHLORIDE 0.4 MG: 0.4 INJECTION, SOLUTION INTRAMUSCULAR; INTRAVENOUS; SUBCUTANEOUS at 09:50

## 2017-01-01 RX ADMIN — SENNOSIDES AND DOCUSATE SODIUM 1 TABLET: 8.6; 5 TABLET ORAL at 16:21

## 2017-01-01 RX ADMIN — Medication: at 10:24

## 2017-01-01 RX ADMIN — Medication: at 13:33

## 2017-01-01 RX ADMIN — PANTOPRAZOLE SODIUM 40 MG: 40 TABLET, DELAYED RELEASE ORAL at 10:34

## 2017-01-01 RX ADMIN — Medication: at 20:47

## 2017-01-01 RX ADMIN — BUMETANIDE 2 MG: 0.25 INJECTION INTRAMUSCULAR; INTRAVENOUS at 21:16

## 2017-01-01 RX ADMIN — CARBIDOPA AND LEVODOPA 1 TABLET: 50; 200 TABLET, EXTENDED RELEASE ORAL at 20:27

## 2017-01-01 RX ADMIN — HYDROMORPHONE HYDROCHLORIDE 0.5 MG: 1 INJECTION, SOLUTION INTRAMUSCULAR; INTRAVENOUS; SUBCUTANEOUS at 00:24

## 2017-01-01 RX ADMIN — CARBIDOPA AND LEVODOPA 1 TABLET: 50; 200 TABLET, EXTENDED RELEASE ORAL at 21:55

## 2017-01-01 RX ADMIN — MEROPENEM 500 MG: 500 INJECTION, POWDER, FOR SOLUTION INTRAVENOUS at 08:41

## 2017-01-01 RX ADMIN — PRAMIPEXOLE DIHYDROCHLORIDE 0.5 MG: 0.5 TABLET ORAL at 10:20

## 2017-01-01 RX ADMIN — Medication: at 21:07

## 2017-01-01 RX ADMIN — Medication 10 MG: at 21:41

## 2017-01-01 RX ADMIN — Medication 100 MG: at 08:36

## 2017-01-01 RX ADMIN — FUROSEMIDE 20 MG: 10 INJECTION, SOLUTION INTRAVENOUS at 01:15

## 2017-01-01 RX ADMIN — IPRATROPIUM BROMIDE AND ALBUTEROL SULFATE 3 ML: .5; 3 SOLUTION RESPIRATORY (INHALATION) at 08:15

## 2017-01-01 RX ADMIN — Medication: at 20:35

## 2017-01-01 RX ADMIN — FERROUS SULFATE TAB 325 MG (65 MG ELEMENTAL FE) 325 MG: 325 (65 FE) TAB at 10:34

## 2017-01-01 RX ADMIN — IPRATROPIUM BROMIDE AND ALBUTEROL SULFATE 3 ML: .5; 3 SOLUTION RESPIRATORY (INHALATION) at 15:49

## 2017-01-01 RX ADMIN — GUAIFENESIN 600 MG: 600 TABLET, EXTENDED RELEASE ORAL at 21:05

## 2017-01-01 RX ADMIN — MEROPENEM 500 MG: 500 INJECTION, POWDER, FOR SOLUTION INTRAVENOUS at 12:09

## 2017-01-01 RX ADMIN — PANTOPRAZOLE SODIUM 40 MG: 40 INJECTION, POWDER, FOR SOLUTION INTRAVENOUS at 06:35

## 2017-01-01 RX ADMIN — PRAMIPEXOLE DIHYDROCHLORIDE 0.5 MG: 0.5 TABLET ORAL at 09:17

## 2017-01-01 RX ADMIN — PRAMIPEXOLE DIHYDROCHLORIDE 0.5 MG: 0.5 TABLET ORAL at 09:21

## 2017-01-01 RX ADMIN — PANTOPRAZOLE SODIUM 40 MG: 40 TABLET, DELAYED RELEASE ORAL at 08:43

## 2017-01-01 RX ADMIN — Medication: at 22:20

## 2017-01-01 RX ADMIN — MEROPENEM 500 MG: 500 INJECTION, POWDER, FOR SOLUTION INTRAVENOUS at 20:32

## 2017-01-01 RX ADMIN — DEXTRAN 70, AND HYPROMELLOSE 2910 3 DROP: 1; 3 SOLUTION/ DROPS OPHTHALMIC at 16:17

## 2017-01-01 RX ADMIN — SODIUM CHLORIDE: 4.5 INJECTION, SOLUTION INTRAVENOUS at 11:19

## 2017-01-01 RX ADMIN — ONDANSETRON 4 MG: 2 INJECTION INTRAMUSCULAR; INTRAVENOUS at 09:57

## 2017-01-01 RX ADMIN — Medication 100 MG: at 09:10

## 2017-01-01 RX ADMIN — Medication 100 MG: at 08:31

## 2017-01-01 RX ADMIN — FUROSEMIDE 20 MG: 10 INJECTION, SOLUTION INTRAVENOUS at 13:11

## 2017-01-01 RX ADMIN — IPRATROPIUM BROMIDE AND ALBUTEROL SULFATE 3 ML: .5; 3 SOLUTION RESPIRATORY (INHALATION) at 07:22

## 2017-01-01 RX ADMIN — FUROSEMIDE 20 MG: 10 INJECTION, SOLUTION INTRAVENOUS at 16:23

## 2017-01-01 RX ADMIN — GUAIFENESIN 600 MG: 600 TABLET, EXTENDED RELEASE ORAL at 09:20

## 2017-01-01 RX ADMIN — DOBUTAMINE IN DEXTROSE 3 MCG/KG/MIN: 200 INJECTION, SOLUTION INTRAVENOUS at 12:25

## 2017-01-01 RX ADMIN — HYDROMORPHONE HYDROCHLORIDE 0.5 MG: 1 INJECTION, SOLUTION INTRAMUSCULAR; INTRAVENOUS; SUBCUTANEOUS at 05:16

## 2017-01-01 RX ADMIN — INSULIN GLARGINE 4 UNITS: 100 INJECTION, SOLUTION SUBCUTANEOUS at 21:16

## 2017-01-01 RX ADMIN — HYDROMORPHONE HYDROCHLORIDE 2 MG: 2 TABLET ORAL at 20:39

## 2017-01-01 RX ADMIN — Medication 100 MG: at 10:33

## 2017-01-01 RX ADMIN — INSULIN GLARGINE 15 UNITS: 100 INJECTION, SOLUTION SUBCUTANEOUS at 22:27

## 2017-01-01 RX ADMIN — CARBOXYMETHYLCELLULOSE SODIUM 1 DROP: 5 SOLUTION/ DROPS OPHTHALMIC at 23:04

## 2017-01-01 RX ADMIN — ALBUMIN (HUMAN) 12.5 G: 12.5 SOLUTION INTRAVENOUS at 12:06

## 2017-01-01 RX ADMIN — FERROUS SULFATE TAB 325 MG (65 MG ELEMENTAL FE) 325 MG: 325 (65 FE) TAB at 08:31

## 2017-01-01 RX ADMIN — HYDROMORPHONE HYDROCHLORIDE 2 MG: 2 TABLET ORAL at 16:21

## 2017-01-01 RX ADMIN — ACETAMINOPHEN 650 MG: 325 TABLET, FILM COATED ORAL at 21:31

## 2017-01-01 RX ADMIN — CARBIDOPA AND LEVODOPA 1 TABLET: 50; 200 TABLET, EXTENDED RELEASE ORAL at 21:22

## 2017-01-01 RX ADMIN — FUROSEMIDE 40 MG: 10 INJECTION, SOLUTION INTRAVENOUS at 08:11

## 2017-01-01 RX ADMIN — INSULIN GLARGINE 10 UNITS: 100 INJECTION, SOLUTION SUBCUTANEOUS at 22:13

## 2017-01-01 RX ADMIN — MEROPENEM 500 MG: 500 INJECTION, POWDER, FOR SOLUTION INTRAVENOUS at 10:25

## 2017-01-01 RX ADMIN — BISACODYL 10 MG: 10 SUPPOSITORY RECTAL at 16:34

## 2017-01-01 RX ADMIN — IPRATROPIUM BROMIDE AND ALBUTEROL SULFATE 3 ML: .5; 3 SOLUTION RESPIRATORY (INHALATION) at 20:07

## 2017-01-01 RX ADMIN — INSULIN GLARGINE 10 UNITS: 100 INJECTION, SOLUTION SUBCUTANEOUS at 21:40

## 2017-01-01 RX ADMIN — MEROPENEM 500 MG: 500 INJECTION, POWDER, FOR SOLUTION INTRAVENOUS at 20:33

## 2017-01-01 RX ADMIN — OMEPRAZOLE 20 MG: 20 CAPSULE, DELAYED RELEASE ORAL at 08:34

## 2017-01-01 RX ADMIN — GUAIFENESIN 600 MG: 600 TABLET, EXTENDED RELEASE ORAL at 08:42

## 2017-01-01 RX ADMIN — INSULIN GLARGINE 6 UNITS: 100 INJECTION, SOLUTION SUBCUTANEOUS at 21:21

## 2017-01-01 RX ADMIN — IRON SUCROSE 200 MG: 20 INJECTION, SOLUTION INTRAVENOUS at 21:32

## 2017-01-01 RX ADMIN — CARBIDOPA AND LEVODOPA 1 TABLET: 50; 200 TABLET, EXTENDED RELEASE ORAL at 21:41

## 2017-01-01 RX ADMIN — FUROSEMIDE 40 MG: 40 TABLET ORAL at 08:21

## 2017-01-01 RX ADMIN — IPRATROPIUM BROMIDE AND ALBUTEROL SULFATE 3 ML: .5; 3 SOLUTION RESPIRATORY (INHALATION) at 16:05

## 2017-01-01 RX ADMIN — ALBUMIN (HUMAN) 25 G: 12.5 SOLUTION INTRAVENOUS at 21:50

## 2017-01-01 RX ADMIN — HYDROMORPHONE HYDROCHLORIDE 0.5 MG: 1 INJECTION, SOLUTION INTRAMUSCULAR; INTRAVENOUS; SUBCUTANEOUS at 02:36

## 2017-01-01 RX ADMIN — IPRATROPIUM BROMIDE AND ALBUTEROL SULFATE 3 ML: .5; 3 SOLUTION RESPIRATORY (INHALATION) at 15:11

## 2017-01-01 RX ADMIN — MEROPENEM 500 MG: 500 INJECTION, POWDER, FOR SOLUTION INTRAVENOUS at 20:52

## 2017-01-01 RX ADMIN — Medication 100 MG: at 09:16

## 2017-01-01 RX ADMIN — FUROSEMIDE 20 MG: 10 INJECTION, SOLUTION INTRAVENOUS at 01:06

## 2017-01-01 RX ADMIN — HYDROMORPHONE HYDROCHLORIDE 2 MG: 2 TABLET ORAL at 09:42

## 2017-01-01 RX ADMIN — ALBUTEROL SULFATE 2.5 MG: 2.5 SOLUTION RESPIRATORY (INHALATION) at 05:31

## 2017-01-01 RX ADMIN — Medication: at 08:06

## 2017-01-01 RX ADMIN — MORPHINE SULFATE 2 MG: 2 INJECTION, SOLUTION INTRAMUSCULAR; INTRAVENOUS at 03:32

## 2017-01-01 RX ADMIN — ALBUMIN (HUMAN) 12.5 G: 12.5 SOLUTION INTRAVENOUS at 21:17

## 2017-01-01 RX ADMIN — CARBOXYMETHYLCELLULOSE SODIUM 1 DROP: 5 SOLUTION/ DROPS OPHTHALMIC at 18:54

## 2017-01-01 RX ADMIN — Medication: at 09:11

## 2017-01-01 RX ADMIN — MORPHINE SULFATE 2 MG: 2 INJECTION, SOLUTION INTRAMUSCULAR; INTRAVENOUS at 00:28

## 2017-01-01 RX ADMIN — CARBIDOPA AND LEVODOPA 1 TABLET: 50; 200 TABLET, EXTENDED RELEASE ORAL at 09:21

## 2017-01-01 RX ADMIN — FERROUS SULFATE TAB 325 MG (65 MG ELEMENTAL FE) 325 MG: 325 (65 FE) TAB at 09:17

## 2017-01-01 RX ADMIN — LIDOCAINE HYDROCHLORIDE 100 MG: 10 INJECTION, SOLUTION EPIDURAL; INFILTRATION; INTRACAUDAL; PERINEURAL at 13:35

## 2017-01-01 RX ADMIN — SIMVASTATIN 40 MG: 40 TABLET, FILM COATED ORAL at 21:40

## 2017-01-01 RX ADMIN — FUROSEMIDE 40 MG: 40 TABLET ORAL at 13:06

## 2017-01-01 RX ADMIN — BUMETANIDE 2 MG: 0.25 INJECTION INTRAMUSCULAR; INTRAVENOUS at 06:52

## 2017-01-01 RX ADMIN — ALTEPLASE 2 MG: 2.2 INJECTION, POWDER, LYOPHILIZED, FOR SOLUTION INTRAVENOUS at 18:14

## 2017-01-01 RX ADMIN — HYDROMORPHONE HYDROCHLORIDE 0.5 MG: 1 INJECTION, SOLUTION INTRAMUSCULAR; INTRAVENOUS; SUBCUTANEOUS at 09:53

## 2017-01-01 RX ADMIN — CARBIDOPA AND LEVODOPA 1 TABLET: 50; 200 TABLET, EXTENDED RELEASE ORAL at 08:31

## 2017-01-01 RX ADMIN — DOBUTAMINE IN DEXTROSE 3 MCG/KG/MIN: 200 INJECTION, SOLUTION INTRAVENOUS at 01:05

## 2017-01-01 RX ADMIN — Medication: at 09:23

## 2017-01-01 RX ADMIN — ALLOPURINOL 200 MG: 100 TABLET ORAL at 08:46

## 2017-01-01 RX ADMIN — IPRATROPIUM BROMIDE AND ALBUTEROL SULFATE 3 ML: .5; 3 SOLUTION RESPIRATORY (INHALATION) at 20:06

## 2017-01-01 RX ADMIN — MEROPENEM 500 MG: 500 INJECTION, POWDER, FOR SOLUTION INTRAVENOUS at 21:19

## 2017-01-01 RX ADMIN — GUAIFENESIN 600 MG: 600 TABLET, EXTENDED RELEASE ORAL at 08:36

## 2017-01-01 RX ADMIN — INSULIN ASPART 1 UNITS: 100 INJECTION, SOLUTION INTRAVENOUS; SUBCUTANEOUS at 22:21

## 2017-01-01 RX ADMIN — MEROPENEM 500 MG: 500 INJECTION, POWDER, FOR SOLUTION INTRAVENOUS at 08:15

## 2017-01-01 RX ADMIN — FERROUS SULFATE TAB 325 MG (65 MG ELEMENTAL FE) 325 MG: 325 (65 FE) TAB at 20:00

## 2017-01-01 RX ADMIN — CARBIDOPA AND LEVODOPA 1 TABLET: 25; 100 TABLET ORAL at 21:23

## 2017-01-01 RX ADMIN — HYDROCODONE BITARTRATE AND ACETAMINOPHEN 1 TABLET: 5; 325 TABLET ORAL at 21:17

## 2017-01-01 RX ADMIN — ASPIRIN 325 MG: 325 TABLET, DELAYED RELEASE ORAL at 09:20

## 2017-01-01 RX ADMIN — CARBOXYMETHYLCELLULOSE SODIUM 1 DROP: 5 SOLUTION/ DROPS OPHTHALMIC at 21:17

## 2017-01-01 RX ADMIN — FERROUS SULFATE TAB 325 MG (65 MG ELEMENTAL FE) 325 MG: 325 (65 FE) TAB at 19:58

## 2017-01-01 RX ADMIN — GUAIFENESIN 600 MG: 600 TABLET, EXTENDED RELEASE ORAL at 09:17

## 2017-01-01 RX ADMIN — IPRATROPIUM BROMIDE AND ALBUTEROL SULFATE 3 ML: .5; 3 SOLUTION RESPIRATORY (INHALATION) at 08:20

## 2017-01-01 RX ADMIN — IPRATROPIUM BROMIDE AND ALBUTEROL SULFATE 3 ML: .5; 3 SOLUTION RESPIRATORY (INHALATION) at 11:46

## 2017-01-01 RX ADMIN — IPRATROPIUM BROMIDE AND ALBUTEROL SULFATE 3 ML: .5; 3 SOLUTION RESPIRATORY (INHALATION) at 19:25

## 2017-01-01 RX ADMIN — MIRTAZAPINE 7.5 MG: 7.5 TABLET, FILM COATED ORAL at 21:41

## 2017-01-01 RX ADMIN — DOBUTAMINE IN DEXTROSE 3 MCG/KG/MIN: 200 INJECTION, SOLUTION INTRAVENOUS at 22:47

## 2017-01-01 RX ADMIN — IPRATROPIUM BROMIDE AND ALBUTEROL SULFATE 3 ML: .5; 3 SOLUTION RESPIRATORY (INHALATION) at 11:18

## 2017-01-01 RX ADMIN — LIDOCAINE HYDROCHLORIDE 3 ML: 10 INJECTION, SOLUTION INFILTRATION; PERINEURAL at 17:42

## 2017-01-01 RX ADMIN — ASPIRIN 300 MG: 300 SUPPOSITORY RECTAL at 19:54

## 2017-01-01 RX ADMIN — ALUMINUM HYDROXIDE, MAGNESIUM HYDROXIDE, SIMETHICONE 10 ML: 400; 400; 40 SUSPENSION ORAL at 20:39

## 2017-01-01 RX ADMIN — GUAIFENESIN 600 MG: 600 TABLET, EXTENDED RELEASE ORAL at 20:00

## 2017-01-01 RX ADMIN — IPRATROPIUM BROMIDE AND ALBUTEROL SULFATE 3 ML: .5; 3 SOLUTION RESPIRATORY (INHALATION) at 12:47

## 2017-01-01 RX ADMIN — PRAMIPEXOLE DIHYDROCHLORIDE 0.5 MG: 0.5 TABLET ORAL at 18:11

## 2017-01-01 RX ADMIN — CARBIDOPA AND LEVODOPA 1 TABLET: 50; 200 TABLET, EXTENDED RELEASE ORAL at 09:17

## 2017-01-01 RX ADMIN — Medication: at 21:44

## 2017-01-01 RX ADMIN — CARBIDOPA AND LEVODOPA 1 TABLET: 50; 200 TABLET, EXTENDED RELEASE ORAL at 08:36

## 2017-01-01 RX ADMIN — Medication: at 08:32

## 2017-01-01 RX ADMIN — DOBUTAMINE IN DEXTROSE 3 MCG/KG/MIN: 200 INJECTION, SOLUTION INTRAVENOUS at 13:33

## 2017-01-01 RX ADMIN — PANTOPRAZOLE SODIUM 40 MG: 40 TABLET, DELAYED RELEASE ORAL at 09:20

## 2017-01-01 RX ADMIN — CARBOXYMETHYLCELLULOSE SODIUM 1 DROP: 5 SOLUTION/ DROPS OPHTHALMIC at 21:42

## 2017-01-01 RX ADMIN — FUROSEMIDE 20 MG: 10 INJECTION, SOLUTION INTRAVENOUS at 17:07

## 2017-01-01 RX ADMIN — MORPHINE SULFATE 2 MG: 2 INJECTION, SOLUTION INTRAMUSCULAR; INTRAVENOUS at 08:07

## 2017-01-01 RX ADMIN — FUROSEMIDE 20 MG: 10 INJECTION, SOLUTION INTRAVENOUS at 16:17

## 2017-01-01 RX ADMIN — HYDROMORPHONE HYDROCHLORIDE 2 MG: 2 TABLET ORAL at 15:07

## 2017-01-01 RX ADMIN — Medication: at 22:07

## 2017-01-01 RX ADMIN — HYDROMORPHONE HYDROCHLORIDE 0.5 MG: 1 INJECTION, SOLUTION INTRAMUSCULAR; INTRAVENOUS; SUBCUTANEOUS at 07:39

## 2017-01-01 RX ADMIN — CARBIDOPA AND LEVODOPA 1 TABLET: 25; 100 TABLET ORAL at 22:38

## 2017-01-01 RX ADMIN — Medication 100 MG: at 08:20

## 2017-01-01 RX ADMIN — CARBIDOPA AND LEVODOPA 1 TABLET: 25; 100 TABLET ORAL at 17:55

## 2017-01-01 RX ADMIN — OMEPRAZOLE 20 MG: 20 CAPSULE, DELAYED RELEASE ORAL at 08:46

## 2017-01-01 RX ADMIN — IPRATROPIUM BROMIDE AND ALBUTEROL SULFATE 3 ML: .5; 3 SOLUTION RESPIRATORY (INHALATION) at 07:51

## 2017-01-01 RX ADMIN — METHYLPREDNISOLONE SODIUM SUCCINATE 40 MG: 40 INJECTION, POWDER, FOR SOLUTION INTRAMUSCULAR; INTRAVENOUS at 09:49

## 2017-01-01 RX ADMIN — FERROUS SULFATE TAB 325 MG (65 MG ELEMENTAL FE) 325 MG: 325 (65 FE) TAB at 20:34

## 2017-01-01 RX ADMIN — FUROSEMIDE 20 MG: 10 INJECTION, SOLUTION INTRAVENOUS at 09:16

## 2017-01-01 RX ADMIN — ACETAMINOPHEN 650 MG: 325 TABLET, FILM COATED ORAL at 17:54

## 2017-01-01 RX ADMIN — IPRATROPIUM BROMIDE AND ALBUTEROL SULFATE 3 ML: .5; 3 SOLUTION RESPIRATORY (INHALATION) at 12:13

## 2017-01-01 RX ADMIN — IPRATROPIUM BROMIDE AND ALBUTEROL SULFATE 3 ML: .5; 3 SOLUTION RESPIRATORY (INHALATION) at 10:58

## 2017-01-01 RX ADMIN — CARBIDOPA AND LEVODOPA 1 TABLET: 50; 200 TABLET, EXTENDED RELEASE ORAL at 20:34

## 2017-01-01 RX ADMIN — IPRATROPIUM BROMIDE AND ALBUTEROL SULFATE 3 ML: .5; 3 SOLUTION RESPIRATORY (INHALATION) at 15:20

## 2017-01-01 RX ADMIN — VANCOMYCIN HYDROCHLORIDE 2000 MG: 1 INJECTION, POWDER, LYOPHILIZED, FOR SOLUTION INTRAVENOUS at 22:22

## 2017-01-01 RX ADMIN — ASPIRIN 325 MG: 325 TABLET, DELAYED RELEASE ORAL at 10:21

## 2017-01-01 RX ADMIN — Medication: at 15:48

## 2017-01-01 RX ADMIN — HYDROMORPHONE HYDROCHLORIDE 2 MG: 2 TABLET ORAL at 04:47

## 2017-01-01 RX ADMIN — CARBIDOPA AND LEVODOPA 1 TABLET: 50; 200 TABLET, EXTENDED RELEASE ORAL at 20:00

## 2017-01-01 RX ADMIN — PRAMIPEXOLE DIHYDROCHLORIDE 0.5 MG: 0.5 TABLET ORAL at 17:32

## 2017-01-01 RX ADMIN — IPRATROPIUM BROMIDE AND ALBUTEROL SULFATE 3 ML: .5; 3 SOLUTION RESPIRATORY (INHALATION) at 07:35

## 2017-01-01 RX ADMIN — IPRATROPIUM BROMIDE AND ALBUTEROL SULFATE 3 ML: .5; 3 SOLUTION RESPIRATORY (INHALATION) at 07:12

## 2017-01-01 RX ADMIN — INSULIN ASPART 1 UNITS: 100 INJECTION, SOLUTION INTRAVENOUS; SUBCUTANEOUS at 18:11

## 2017-01-01 RX ADMIN — CARBOXYMETHYLCELLULOSE SODIUM 1 DROP: 5 SOLUTION/ DROPS OPHTHALMIC at 17:32

## 2017-01-01 RX ADMIN — FUROSEMIDE 40 MG: 10 INJECTION, SOLUTION INTRAVENOUS at 15:29

## 2017-01-01 RX ADMIN — MEROPENEM 500 MG: 500 INJECTION, POWDER, FOR SOLUTION INTRAVENOUS at 09:09

## 2017-01-01 RX ADMIN — Medication: at 21:32

## 2017-01-01 RX ADMIN — CARBIDOPA AND LEVODOPA 1 TABLET: 50; 200 TABLET, EXTENDED RELEASE ORAL at 20:32

## 2017-01-01 RX ADMIN — DOBUTAMINE HYDROCHLORIDE 5 MCG/KG/MIN: 200 INJECTION INTRAVENOUS at 20:03

## 2017-01-01 RX ADMIN — CARBIDOPA AND LEVODOPA 1 TABLET: 50; 200 TABLET, EXTENDED RELEASE ORAL at 09:16

## 2017-01-01 RX ADMIN — FUROSEMIDE 20 MG: 10 INJECTION, SOLUTION INTRAVENOUS at 16:39

## 2017-01-01 RX ADMIN — ASPIRIN 325 MG: 325 TABLET, DELAYED RELEASE ORAL at 09:10

## 2017-01-01 RX ADMIN — SIMVASTATIN 40 MG: 40 TABLET, FILM COATED ORAL at 22:03

## 2017-01-01 RX ADMIN — ASPIRIN 325 MG: 325 TABLET, DELAYED RELEASE ORAL at 08:20

## 2017-01-01 RX ADMIN — IPRATROPIUM BROMIDE AND ALBUTEROL SULFATE 3 ML: .5; 3 SOLUTION RESPIRATORY (INHALATION) at 15:09

## 2017-01-01 RX ADMIN — Medication: at 21:08

## 2017-01-01 RX ADMIN — HYDROMORPHONE HYDROCHLORIDE 2 MG: 2 TABLET ORAL at 21:09

## 2017-01-01 RX ADMIN — GUAIFENESIN 600 MG: 600 TABLET, EXTENDED RELEASE ORAL at 20:27

## 2017-01-01 RX ADMIN — HYDROMORPHONE HYDROCHLORIDE 0.5 MG: 1 INJECTION, SOLUTION INTRAMUSCULAR; INTRAVENOUS; SUBCUTANEOUS at 20:15

## 2017-01-01 RX ADMIN — PRAMIPEXOLE DIHYDROCHLORIDE 0.5 MG: 0.5 TABLET ORAL at 08:36

## 2017-01-01 RX ADMIN — CARBOXYMETHYLCELLULOSE SODIUM 1 DROP: 5 SOLUTION/ DROPS OPHTHALMIC at 16:41

## 2017-01-01 RX ADMIN — CARBIDOPA AND LEVODOPA 1 TABLET: 50; 200 TABLET, EXTENDED RELEASE ORAL at 10:21

## 2017-01-01 RX ADMIN — IPRATROPIUM BROMIDE AND ALBUTEROL SULFATE 3 ML: .5; 3 SOLUTION RESPIRATORY (INHALATION) at 08:28

## 2017-01-01 RX ADMIN — GUAIFENESIN 600 MG: 600 TABLET, EXTENDED RELEASE ORAL at 21:38

## 2017-01-01 RX ADMIN — CARBIDOPA AND LEVODOPA 1 TABLET: 50; 200 TABLET, EXTENDED RELEASE ORAL at 09:10

## 2017-01-01 RX ADMIN — INSULIN GLARGINE 4 UNITS: 100 INJECTION, SOLUTION SUBCUTANEOUS at 21:57

## 2017-01-01 RX ADMIN — GUAIFENESIN 600 MG: 600 TABLET, EXTENDED RELEASE ORAL at 08:30

## 2017-01-01 RX ADMIN — ALBUTEROL SULFATE 2.5 MG: 2.5 SOLUTION RESPIRATORY (INHALATION) at 22:45

## 2017-01-01 RX ADMIN — SIMVASTATIN 40 MG: 40 TABLET, FILM COATED ORAL at 21:41

## 2017-01-01 RX ADMIN — CARBIDOPA AND LEVODOPA 1 TABLET: 25; 100 TABLET ORAL at 16:01

## 2017-01-01 RX ADMIN — GUAIFENESIN 600 MG: 600 TABLET, EXTENDED RELEASE ORAL at 10:21

## 2017-01-01 RX ADMIN — SIMVASTATIN 40 MG: 40 TABLET, FILM COATED ORAL at 21:17

## 2017-01-01 RX ADMIN — BUMETANIDE 2 MG: 0.25 INJECTION INTRAMUSCULAR; INTRAVENOUS at 13:24

## 2017-01-01 RX ADMIN — CARBOXYMETHYLCELLULOSE SODIUM 1 DROP: 5 SOLUTION/ DROPS OPHTHALMIC at 04:50

## 2017-01-01 RX ADMIN — INSULIN GLARGINE 8 UNITS: 100 INJECTION, SOLUTION SUBCUTANEOUS at 21:39

## 2017-01-01 RX ADMIN — CARBOXYMETHYLCELLULOSE SODIUM 1 DROP: 5 SOLUTION/ DROPS OPHTHALMIC at 01:15

## 2017-01-01 RX ADMIN — IPRATROPIUM BROMIDE AND ALBUTEROL SULFATE 3 ML: .5; 3 SOLUTION RESPIRATORY (INHALATION) at 08:42

## 2017-01-01 RX ADMIN — ALBUMIN (HUMAN) 12.5 G: 12.5 SOLUTION INTRAVENOUS at 05:42

## 2017-01-01 RX ADMIN — IPRATROPIUM BROMIDE AND ALBUTEROL SULFATE 3 ML: .5; 3 SOLUTION RESPIRATORY (INHALATION) at 19:53

## 2017-01-01 RX ADMIN — INSULIN GLARGINE 6 UNITS: 100 INJECTION, SOLUTION SUBCUTANEOUS at 21:23

## 2017-01-01 RX ADMIN — CARBOXYMETHYLCELLULOSE SODIUM 1 DROP: 5 SOLUTION/ DROPS OPHTHALMIC at 13:28

## 2017-01-01 RX ADMIN — MEROPENEM 500 MG: 500 INJECTION, POWDER, FOR SOLUTION INTRAVENOUS at 20:37

## 2017-01-01 RX ADMIN — ASPIRIN 325 MG: 325 TABLET, DELAYED RELEASE ORAL at 08:34

## 2017-01-01 RX ADMIN — PANTOPRAZOLE SODIUM 40 MG: 40 TABLET, DELAYED RELEASE ORAL at 08:31

## 2017-01-01 RX ADMIN — INSULIN ASPART 1 UNITS: 100 INJECTION, SOLUTION INTRAVENOUS; SUBCUTANEOUS at 13:26

## 2017-01-01 RX ADMIN — MEROPENEM 500 MG: 500 INJECTION, POWDER, FOR SOLUTION INTRAVENOUS at 21:06

## 2017-01-01 RX ADMIN — FUROSEMIDE 20 MG: 10 INJECTION, SOLUTION INTRAVENOUS at 13:36

## 2017-01-01 RX ADMIN — FERROUS SULFATE TAB 325 MG (65 MG ELEMENTAL FE) 325 MG: 325 (65 FE) TAB at 08:42

## 2017-01-01 RX ADMIN — ASPIRIN 325 MG: 325 TABLET, DELAYED RELEASE ORAL at 08:31

## 2017-01-01 RX ADMIN — PRAMIPEXOLE DIHYDROCHLORIDE 0.5 MG: 0.5 TABLET ORAL at 09:16

## 2017-01-01 RX ADMIN — IRON SUCROSE 200 MG: 20 INJECTION, SOLUTION INTRAVENOUS at 20:01

## 2017-01-01 RX ADMIN — HYDROMORPHONE HYDROCHLORIDE 2 MG: 2 TABLET ORAL at 10:43

## 2017-01-01 RX ADMIN — IPRATROPIUM BROMIDE AND ALBUTEROL SULFATE 3 ML: .5; 3 SOLUTION RESPIRATORY (INHALATION) at 19:29

## 2017-01-01 RX ADMIN — IPRATROPIUM BROMIDE AND ALBUTEROL SULFATE 3 ML: .5; 3 SOLUTION RESPIRATORY (INHALATION) at 15:44

## 2017-01-01 RX ADMIN — IPRATROPIUM BROMIDE AND ALBUTEROL SULFATE 3 ML: .5; 3 SOLUTION RESPIRATORY (INHALATION) at 11:13

## 2017-01-01 RX ADMIN — FERROUS SULFATE TAB 325 MG (65 MG ELEMENTAL FE) 325 MG: 325 (65 FE) TAB at 20:32

## 2017-01-01 RX ADMIN — FERROUS SULFATE TAB 325 MG (65 MG ELEMENTAL FE) 325 MG: 325 (65 FE) TAB at 13:22

## 2017-01-01 RX ADMIN — GUAIFENESIN 600 MG: 600 TABLET, EXTENDED RELEASE ORAL at 09:10

## 2017-01-01 RX ADMIN — IPRATROPIUM BROMIDE AND ALBUTEROL SULFATE 3 ML: .5; 3 SOLUTION RESPIRATORY (INHALATION) at 19:40

## 2017-01-01 RX ADMIN — FERROUS SULFATE TAB 325 MG (65 MG ELEMENTAL FE) 325 MG: 325 (65 FE) TAB at 09:20

## 2017-01-01 RX ADMIN — CARBIDOPA AND LEVODOPA 1 TABLET: 50; 200 TABLET, EXTENDED RELEASE ORAL at 20:44

## 2017-01-01 RX ADMIN — FERROUS SULFATE TAB 325 MG (65 MG ELEMENTAL FE) 325 MG: 325 (65 FE) TAB at 13:30

## 2017-01-01 RX ADMIN — FUROSEMIDE 40 MG: 10 INJECTION, SOLUTION INTRAVENOUS at 13:38

## 2017-01-01 ASSESSMENT — PAIN DESCRIPTION - DESCRIPTORS
DESCRIPTORS: BURNING
DESCRIPTORS: CONSTANT
DESCRIPTORS: ACHING
DESCRIPTORS: CONSTANT
DESCRIPTORS: CONSTANT
DESCRIPTORS: BURNING
DESCRIPTORS: DISCOMFORT;BURNING
DESCRIPTORS: CONSTANT
DESCRIPTORS: CONSTANT
DESCRIPTORS: CONSTANT;ACHING
DESCRIPTORS: CONSTANT

## 2017-01-01 ASSESSMENT — ACTIVITIES OF DAILY LIVING (ADL)
NUMBER_OF_TIMES_PATIENT_HAS_FALLEN_WITHIN_LAST_SIX_MONTHS: 10
RETIRED_EATING: 0-->INDEPENDENT
WHICH_OF_THE_ABOVE_FUNCTIONAL_RISKS_HAD_A_RECENT_ONSET_OR_CHANGE?: FALL HISTORY
SWALLOWING: 0-->SWALLOWS FOODS/LIQUIDS WITHOUT DIFFICULTY
FALL_HISTORY_WITHIN_LAST_SIX_MONTHS: YES
TOILETING: 1-->ASSISTIVE EQUIPMENT
AMBULATION: 1-->ASSISTIVE EQUIPMENT
COGNITION: 0 - NO COGNITION ISSUES REPORTED
RETIRED_COMMUNICATION: 0-->UNDERSTANDS/COMMUNICATES WITHOUT DIFFICULTY
BATHING: 1-->ASSISTIVE EQUIPMENT
TRANSFERRING: 1-->ASSISTIVE EQUIPMENT
DRESS: 1-->ASSISTIVE EQUIPMENT

## 2017-01-01 ASSESSMENT — ENCOUNTER SYMPTOMS
VOMITING: 0
UNEXPECTED WEIGHT CHANGE: 1
COUGH: 1
FATIGUE: 1
HEMATURIA: 0
SHORTNESS OF BREATH: 0
SHORTNESS OF BREATH: 1
CONFUSION: 1
DIZZINESS: 0
ABDOMINAL PAIN: 0
CHILLS: 0
FEVER: 0
NAUSEA: 0
DYSURIA: 0

## 2017-01-03 NOTE — TELEPHONE ENCOUNTER
TELEMANAGEMENT: Wt 181# yesterday, which is first call-in since 11/16/16 as pt was hospitalized X 2 and in TCU since that time. Pt takes bumex 2mg daily and metolazone 2.5mg three times per week (Tu,W,Th).   Spoke with pt's wife, India, who reports she started calling in pt's wt yesterday as pt is now home.  She reports pt is doing fair but has not weighted yet today as he is still in bed.  Offered to reschedule appt with KAROLYN Machado that had been cancelled for 12/15/16 when pt was still in TCU.  She reports pt has hospital f/u appt with PMD scheduled for 1/5/17 and would prefer to wait to schedule f/u with Jeannette as it is difficult for pt to get out of the house at this time.  Will continue to monitor wt/sx on TELEMANAGEMENT.

## 2017-01-04 NOTE — TELEPHONE ENCOUNTER
Patient called and requested to cancel appointment tomorrow for his hospital follow up due to his leg pain and feeling weak getting in and out of car.  Patient sounded SOB on phone to nurse and when looked was Dx and treated for Pneumonia.  States wants to cancel his appointment.  [facility] nurse Zuleika was there and patient had nurse speak to her.  Nurse reports patient Vitals BP 90/56, T 97.8, O2 89% on RA, notes BG today 160.  Nurse reports Lung sounds are clear.  States patient has had decreased appetite.  States has productive cough with tan sputum.  Patient reports he does not feel worse but does not feel better.  Been taking Levaquin since discharge with 3 days left.      Encouraged patient to keep appointment and not schedule.  Patient agreed and will talk with wife and daughter to see if they are able to get a different car to bring him to appointment.      FYI: Nurse also noted that patient states having some hallucinations about being up an dwalking however states he knows he is not since taking Dilaudid.  Will try to decrease dilaudid use to see if that helps hallucinations.      Please advise with further recommendations as appropriate.  Nikki Patricio RN  [facility] [employer]        [facility] nurse Zuleika [phone] - please notify if new orders for patient.

## 2017-01-05 NOTE — TELEPHONE ENCOUNTER
TELEMANAGEMENT: Wt 190# today, up 9# from 1/2/17, and 10# below min wt parameter.  No symptoms reported on survey today.  Pt takes bumex 2mg daily and metolazone 2.5mg three times per week (Tu,W,Th).  Spoke with pt's wife, [place], who reports pt is feeling much better than he was at the beginning of the week, no increased SOB or swelling.  India reports she thinks wt of 181# on 1/2 was not accurate and thinks today's wt of 190# is accurate.  Will route to Jeannette to inquire if wt parameters should be adjusted.

## 2017-01-05 NOTE — TELEPHONE ENCOUNTER
TELEMANAGEMENT min wt parameter adjusted to 190#, per Jeannette recommendation. Also max wt parameter adjusted to 200# from 210#.

## 2017-01-06 NOTE — TELEPHONE ENCOUNTER
PT orders after initial evaluation for:  1 x times week for 2 weeks once weekly for 1 week.    Please approve/order as appropriate  Nikki Patricio RN  [employer] [employer]

## 2017-01-09 NOTE — TELEPHONE ENCOUNTER
Received refill request for:  Bumex  Last OV was: 10/24/2016 with Dr. Carrasco  Labs/EKG: Last BMP 12/30/2016  F/U scheduled: Orders in [facility] for f/u in [facility], pt recently hospitalized, followed by [facility] with [facility] monitoring  New script sent to: [facility]

## 2017-01-10 NOTE — TELEPHONE ENCOUNTER
[facility] PT call for verbal order     PT 2 times per week for 2 week.  1 time per week for 1 week.    Verbal order given to fax for signature.      Caitlin Contreras RN

## 2017-01-16 NOTE — TELEPHONE ENCOUNTER
TELEMANAGEMENT: Wt 186#, same as yesterday, but down 10# from the day before, now 4# below min wt parameters with no symptoms reported. Pt takes Bumex 2mg daily and Metolazone 3x weekly on Tues/Wed/Thurs. Pt overdue for follow up, missed follow up in December due to admission. Called pt, spoke with wife. They think they may have read scale wrong. They aren't sure. Pt denies increased SOB and swelling. He has [facility] and has wound care nurse coming tomorrow. Advised pt needs to be seen by Jeannette on clinic for follow up as he is overdue and to follow up on his 2 hospitalizations. Wife stated its hard for them to get here because of transportation and their vehicle. Asked if someone can bring them. Wife asked if scheduling would just call them later this week. Advised wife pt should be seen in the next 1-2 weeks preferably. JOSE Higgins

## 2017-01-17 NOTE — TELEPHONE ENCOUNTER
Controlled Substance Refill Request for Dilaudid  Problem List Complete:  No     PROVIDER TO CONSIDER COMPLETION OF PROBLEM LIST AND OVERVIEW/CONTROLLED SUBSTANCE AGREEMENT    Last Written Prescription Date:  11/17/2016 - ED discharge not current on med list  Last Fill Quantity: 24,   # refills: 0    Last Office Visit with [facility] primary care provider: 11/24/2016    Future Office visit:     Controlled substance agreement on file: No.     Processing:  Patient will  in clinic   checked in past 6 months?  Yes 1/17/2017   [place] reviewed:  reviewed- no concerns    [place]  [email or web address]    Routing refill request to provider for review/approval because:  Drug not on the [facility], [facility] or  Health refill protocol or controlled substance  Reported as historical on medication list.    Nikki Patricio RN - [employer]

## 2017-01-17 NOTE — TELEPHONE ENCOUNTER
TELEMANAGEMENT: Wt 196#, within wt parameters with no symptoms reported. Pt entered the wrong wt the last 2 days. Wt stable with no symptoms. Wt's adjusted, will continue to monitor. JOSE Higgins

## 2017-01-17 NOTE — TELEPHONE ENCOUNTER
[facility] nurse calling and states patient has multiple blisters that are draining profusly.  Would like Skilled nursing orders to change to daily visits for 1 week and then 3 times a week for wound management.      Wound care as follows: third toe right foot: has wound at base of toenail from trauma that has sloughing will paint BID with betadine, Leg and blisters daily dressing changes, cleans with wound  or soap and water, moisturize with Aquaphor or Sween 24 topical, and cover leg with Xeroform, with absorptive dressing of choice.  Secure with stretch bandage, apply short stretch wraps for compression, from base of toes up to under the knees.     Please approve orders if appropriate,  Nikki Patricio RN  [employer] [employer]

## 2017-01-17 NOTE — TELEPHONE ENCOUNTER
Agree with the orders.     Aniyah Magana MD  [facility], [place]

## 2017-01-17 NOTE — TELEPHONE ENCOUNTER
Patient's spouse India will  hard copy  Requesting this ASAP because he is down to 1 pill  [phone]    HYDROmorphone (DILAUDID) 2 MG tablet      Last Written Prescription Date:  0  Last Fill Quantity:0,   # refills: 0  Last Office Visit with [facility], [facility] or [facility] prescribing provider: 12/29/16  Future Office visit:       Routing refill request to provider for review/approval because:  Medication is reported/historical      Tamara LUGO

## 2017-01-19 NOTE — TELEPHONE ENCOUNTER
"TELEMANAGEMENT: Wt 186#, down 10# from yesterday , below parameters with SOB, noc SOB, pillows, and dizziness reported. Pt takes Bumex 2mg daily and Metolazone 3x weekly on Tues/Wed/Thurs. Called pt to review, spoke with wife. Pt has a cold, he feels congested and has a little SOB but not bad. Wife denied dizziness or increased swelling. Pt refused to weigh today because he was not feeling well, so she just entered the weight she thought he was yesterday. Reviewed with wife, in future, if pt refuses to weigh, please do not call in weight from day before, better to not enter a weight at all. She stated understanding. Advised wife they should call PMD and be seen if fever/chills/cough. Wife stated she understands but \"that just doesn't work for us right now\". Also advised wife they need to be seen by Jeannette with lab in next week or two, but wife does not want to schedule right now, as it does not work with their schedule and too hard to get ride. Will message to Jeannette. JOSE Higgins      "

## 2017-01-19 NOTE — TELEPHONE ENCOUNTER
Let's hold metolazone until he's feeling better. That's a significant wt drop. We should try to get a BMP to see if his creat is stable.  Thanks

## 2017-01-19 NOTE — TELEPHONE ENCOUNTER
Pt did not weigh today, wife entered the weight from the day before. When I spoke with them the day before about the 10# wt loss in one day, they said they read the scale wrong and was 196#, not 186#. Please re-review. JOSE Higgins

## 2017-01-20 PROBLEM — J96.01 ACUTE RESPIRATORY FAILURE WITH HYPOXIA (H): Status: ACTIVE | Noted: 2017-01-01

## 2017-01-20 NOTE — IP AVS SNAPSHOT
[facility] CARDIAC SPECIALTY CARE: [phone]                                              INTERAGENCY TRANSFER FORM - LAB / IMAGING / EKG / EMG RESULTS   2017                    Hospital Admission Date: 2017  GIO BERRY   : 1929  Sex: Male        Attending Provider: Iman Hood MD     Allergies:  Codeine, Penicillins    Infection:  MRSA-Contact Isolation   Service:  HOSPITALIST    Ht:  --   Wt:  76.2 kg (167 lb 15.9 oz)   Admission Wt:  86.7 kg (191 lb 2.2 oz)    BMI:  23.44 kg/m 2   BSA:  1.95 m 2            Patient PCP Information     Provider PCP Type    Aniyah Magana MD General         Lab Results - 3 Days (17 - 17)      Sputum Culture Aerobic Bacterial [[id number]]  Resulted: 17 1407, Result status: Preliminary result    Ordering provider: Iman Hood MD  17 1718 Resulting lab: INFECTIOUS DISEASE DIAGNOSTIC LABORATORY    Specimen Information    Type Source Collected On   Sputum  17 0925          Components       Value Reference Range Flag Lab   Specimen Description Sputum   FrStHsLb   Culture Micro Light growth Normal mike to date   225   Micro Report Status Pending   225            Glucose by meter [[id number]] (Abnormal)  Resulted: 17 1127, Result status: Final result    Ordering provider: Iman Hood MD  17 1119 Resulting lab: POINT OF CARE TEST, GLUCOSE    Specimen Information    Type Source Collected On     17 1119          Components       Value Reference Range Flag Lab   Glucose 158 70 - 99 mg/dL H 170            Basic metabolic panel [[id number]] (Abnormal)  Resulted: 17 0931, Result status: Final result    Ordering provider: Iman Hood MD  17 0718 Resulting lab: [facility]    Specimen Information    Type Source Collected On   Blood  17 0854          Components       Value Reference Range Flag Lab   Sodium 139 133 - 144 mmol/L  FrStHsLb   Potassium 4.2 3.4 -  5.3 mmol/L  FrStHsLb   Chloride 103 94 - 109 mmol/L  FrStHsLb   Carbon Dioxide 31 20 - 32 mmol/L  FrStHsLb   Anion Gap 5 3 - 14 mmol/L  FrStHsLb   Glucose 178 70 - 99 mg/dL H FrStHsLb   Urea Nitrogen 42 7 - 30 mg/dL H FrStHsLb   Creatinine 2.04 0.66 - 1.25 mg/dL H FrStHsLb   GFR Estimate 31 >60 mL/min/1.7m2 L FrStHsLb   Comment:  Non  GFR Calc   GFR Estimate If Black 38 >60 mL/min/1.7m2 L FrStHsLb   Comment:  African American GFR Calc   Calcium 7.8 8.5 - 10.1 mg/dL L FrStHsLb            Glucose by meter [[id number]] (Abnormal)  Resulted: 02/01/17 0746, Result status: Final result    Ordering provider: Iman Hood MD  02/01/17 0733 Resulting lab: POINT OF CARE TEST, GLUCOSE    Specimen Information    Type Source Collected On     02/01/17 0733          Components       Value Reference Range Flag Lab   Glucose 140 70 - 99 mg/dL H 170            Glucose by meter [[id number]] (Abnormal)  Resulted: 02/01/17 0151, Result status: Final result    Ordering provider: Iman Hood MD  02/01/17 0146 Resulting lab: POINT OF CARE TEST, GLUCOSE    Specimen Information    Type Source Collected On     02/01/17 0146          Components       Value Reference Range Flag Lab   Glucose 103 70 - 99 mg/dL H 170            Glucose by meter [[id number]] (Abnormal)  Resulted: 01/31/17 2356, Result status: Final result    Ordering provider: Iman Hood MD  01/31/17 2103 Resulting lab: POINT OF CARE TEST, GLUCOSE    Specimen Information    Type Source Collected On     01/31/17 2103          Components       Value Reference Range Flag Lab   Glucose 175 70 - 99 mg/dL H 170            Glucose by meter [[id number]] (Abnormal)  Resulted: 01/31/17 1709, Result status: Final result    Ordering provider: Iman Hood MD  01/31/17 1700 Resulting lab: POINT OF CARE TEST, GLUCOSE    Specimen Information    Type Source Collected On     01/31/17 1700          Components       Value Reference Range Flag Lab   Glucose 146 70 -  99 mg/dL H 170            Glucose by meter [[id number]] (Abnormal)  Resulted: 01/31/17 1203, Result status: Final result    Ordering provider: Iman Hood MD  01/31/17 1153 Resulting lab: POINT OF CARE TEST, GLUCOSE    Specimen Information    Type Source Collected On     01/31/17 1153          Components       Value Reference Range Flag Lab   Glucose 210 70 - 99 mg/dL H 170            Gram stain [[id number]]  Resulted: 01/31/17 1138, Result status: Final result    Ordering provider: Iman Hood MD  01/31/17 0925 Resulting lab: MICRO RAPID TESTING LAB    Specimen Information    Type Source Collected On     01/31/17 0925          Components       Value Reference Range Flag Lab   Specimen Description Sputum   FrStHsLb   Special Requests Screen   75   Gram Stain --   226   Result:         <10 Squamous epithelial cells/low power field  >25 PMNs/low power field  Mixed gram positive mike     Micro Report Status FINAL 01/31/2017   226   Result:              Glucose by meter [[id number]] (Abnormal)  Resulted: 01/31/17 0806, Result status: Final result    Ordering provider: Iman Hood MD  01/31/17 0740 Resulting lab: POINT OF CARE TEST, GLUCOSE    Specimen Information    Type Source Collected On     01/31/17 0740          Components       Value Reference Range Flag Lab   Glucose 60 70 - 99 mg/dL L 170            Basic metabolic panel [[id number]] (Abnormal)  Resulted: 01/31/17 0610, Result status: Final result    Ordering provider: Winnie Saha DO  01/31/17 0000 Resulting lab: [facility]    Specimen Information    Type Source Collected On   Blood  01/31/17 0525          Components       Value Reference Range Flag Lab   Sodium 141 133 - 144 mmol/L  FrStHsLb   Potassium 3.8 3.4 - 5.3 mmol/L  FrStHsLb   Chloride 104 94 - 109 mmol/L  FrStHsLb   Carbon Dioxide 34 20 - 32 mmol/L H FrStHsLb   Anion Gap 3 3 - 14 mmol/L  FrStHsLb   Glucose 73 70 - 99 mg/dL  FrStHsLb   Urea Nitrogen 44 7 -  30 mg/dL H FrStHsLb   Creatinine 1.85 0.66 - 1.25 mg/dL H FrStHsLb   GFR Estimate 35 >60 mL/min/1.7m2 L FrStHsLb   Comment:  Non  GFR Calc   GFR Estimate If Black 42 >60 mL/min/1.7m2 L FrStHsLb   Comment:  African American GFR Calc   Calcium 7.9 8.5 - 10.1 mg/dL L FrStHsLb            CBC with platelets [[id number]] (Abnormal)  Resulted: 01/31/17 0554, Result status: Final result    Ordering provider: Winnie Saha,   01/31/17 0000 Resulting lab: [facility]    Specimen Information    Type Source Collected On   Blood  01/31/17 0525          Components       Value Reference Range Flag Lab   WBC 5.3 4.0 - 11.0 10e9/L  FrStHsLb   RBC Count 2.78 4.4 - 5.9 10e12/L L FrStHsLb   Hemoglobin 7.9 13.3 - 17.7 g/dL L FrStHsLb   Hematocrit 24.5 40.0 - 53.0 % L FrStHsLb   MCV 88 78 - 100 fl  FrStHsLb   MCH 28.4 26.5 - 33.0 pg  FrStHsLb   MCHC 32.2 31.5 - 36.5 g/dL  FrStHsLb   RDW 17.8 10.0 - 15.0 % H FrStHsLb   Platelet Count 158 150 - 450 10e9/L  FrStHsLb            Glucose by meter [[id number]] (Abnormal)  Resulted: 01/31/17 0236, Result status: Final result    Ordering provider: Iman Hood MD  01/31/17 0224 Resulting lab: POINT OF CARE TEST, GLUCOSE    Specimen Information    Type Source Collected On     01/31/17 0224          Components       Value Reference Range Flag Lab   Glucose 116 70 - 99 mg/dL H 170            Glucose by meter [[id number]] (Abnormal)  Resulted: 01/30/17 2116, Result status: Final result    Ordering provider: Iman Hood MD  01/30/17 2108 Resulting lab: POINT OF CARE TEST, GLUCOSE    Specimen Information    Type Source Collected On     01/30/17 2108          Components       Value Reference Range Flag Lab   Glucose 203 70 - 99 mg/dL H 170            Glucose by meter [[id number]] (Abnormal)  Resulted: 01/30/17 1701, Result status: Final result    Ordering provider: Iman Hood MD  01/30/17 1657 Resulting lab: POINT OF CARE TEST, GLUCOSE     Specimen Information    Type Source Collected On     01/30/17 1658          Components       Value Reference Range Flag Lab   Glucose 131 70 - 99 mg/dL H 170            Glucose by meter [[id number]] (Abnormal)  Resulted: 01/30/17 1151, Result status: Final result    Ordering provider: Iman Hood MD  01/30/17 1140 Resulting lab: POINT OF CARE TEST, GLUCOSE    Specimen Information    Type Source Collected On     01/30/17 1140          Components       Value Reference Range Flag Lab   Glucose 155 70 - 99 mg/dL H 170            Glucose by meter [[id number]] (Abnormal)  Resulted: 01/30/17 0935, Result status: Final result    Ordering provider: Iman Hood MD  01/30/17 0910 Resulting lab: POINT OF CARE TEST, GLUCOSE    Specimen Information    Type Source Collected On     01/30/17 0910          Components       Value Reference Range Flag Lab   Glucose 118 70 - 99 mg/dL H 170            Basic metabolic panel [[id number]] (Abnormal)  Resulted: 01/30/17 0928, Result status: Final result    Ordering provider: Winnie Saha DO  01/30/17 0831 Resulting lab: [facility]    Specimen Information    Type Source Collected On   Blood  01/30/17 0900          Components       Value Reference Range Flag Lab   Sodium 140 133 - 144 mmol/L  FrStHsLb   Potassium 3.8 3.4 - 5.3 mmol/L  FrStHsLb   Chloride 103 94 - 109 mmol/L  FrStHsLb   Carbon Dioxide 31 20 - 32 mmol/L  FrStHsLb   Anion Gap 6 3 - 14 mmol/L  FrStHsLb   Glucose 105 70 - 99 mg/dL H FrStHsLb   Urea Nitrogen 45 7 - 30 mg/dL H FrStHsLb   Creatinine 1.84 0.66 - 1.25 mg/dL H FrStHsLb   GFR Estimate 35 >60 mL/min/1.7m2 L FrStHsLb   Comment:  Non  GFR Calc   GFR Estimate If Black 42 >60 mL/min/1.7m2 L FrStHsLb   Comment:  African American GFR Calc   Calcium 8.0 8.5 - 10.1 mg/dL L FrStHsLb            Glucose by meter [[id number]] (Abnormal)  Resulted: 01/30/17 0746, Result status: Final result    Ordering provider: Iman Hood MD   01/30/17 0722 Resulting lab: POINT OF CARE TEST, GLUCOSE    Specimen Information    Type Source Collected On     01/30/17 0722          Components       Value Reference Range Flag Lab   Glucose 48 70 - 99 mg/dL             Fluid Culture Aerobic Bacterial [[id number]]  Resulted: 01/30/17 0742, Result status: Final result    Ordering provider: Iman Hood MD  01/25/17 1111 Resulting lab: INFECTIOUS DISEASE DIAGNOSTIC LABORATORY    Specimen Information    Type Source Collected On   Pleural fluid Pleural/Thoracic Cavity 01/25/17 1340          Components       Value Reference Range Flag Lab   Specimen Description Pleural fluid   FrStHsLb   Culture Micro No growth   225   Micro Report Status FINAL 01/30/2017   225            Glucose by meter [[id number]]  Resulted: 01/30/17 0212, Result status: Final result    Ordering provider: Iman Hood MD  01/30/17 0204 Resulting lab: POINT OF CARE TEST, GLUCOSE    Specimen Information    Type Source Collected On     01/30/17 0204          Components       Value Reference Range Flag Lab   Glucose 87 70 - 99 mg/dL  170            Glucose by meter [[id number]] (Abnormal)  Resulted: 01/29/17 2156, Result status: Final result    Ordering provider: Iman Hood MD  01/29/17 2152 Resulting lab: POINT OF CARE TEST, GLUCOSE    Specimen Information    Type Source Collected On     01/29/17 2152          Components       Value Reference Range Flag Lab   Glucose 187 70 - 99 mg/dL H 170            Glucose by meter [[id number]] (Abnormal)  Resulted: 01/29/17 1726, Result status: Final result    Ordering provider: Iman Hood MD  01/29/17 1718 Resulting lab: POINT OF CARE TEST, GLUCOSE    Specimen Information    Type Source Collected On     01/29/17 1718          Components       Value Reference Range Flag Lab   Glucose 112 70 - 99 mg/dL H 170            Glucose by meter [[id number]] (Abnormal)  Resulted: 01/29/17 1146, Result status: Final result    Ordering provider:  Iman Hood MD  01/29/17 1143 Resulting lab: POINT OF CARE TEST, GLUCOSE    Specimen Information    Type Source Collected On     01/29/17 1143          Components       Value Reference Range Flag Lab   Glucose 166 70 - 99 mg/dL H 170            Glucose by meter [[id number]]  Resulted: 01/29/17 0756, Result status: Final result    Ordering provider: Iman Hood MD  01/29/17 0749 Resulting lab: POINT OF CARE TEST, GLUCOSE    Specimen Information    Type Source Collected On     01/29/17 0749          Components       Value Reference Range Flag Lab   Glucose 70 70 - 99 mg/dL  170            Basic metabolic panel [[id number]] (Abnormal)  Resulted: 01/29/17 0657, Result status: Final result    Ordering provider: Winnie Saha DO  01/29/17 0000 Resulting lab: [facility]    Specimen Information    Type Source Collected On   Blood  01/29/17 0615          Components       Value Reference Range Flag Lab   Sodium 141 133 - 144 mmol/L  FrStHsLb   Potassium 4.8 3.4 - 5.3 mmol/L  FrStHsLb   Chloride 103 94 - 109 mmol/L  FrStHsLb   Carbon Dioxide 35 20 - 32 mmol/L H FrStHsLb   Anion Gap 3 3 - 14 mmol/L  FrStHsLb   Glucose 77 70 - 99 mg/dL  FrStHsLb   Urea Nitrogen 50 7 - 30 mg/dL H FrStHsLb   Creatinine 1.81 0.66 - 1.25 mg/dL H FrStHsLb   GFR Estimate 36 >60 mL/min/1.7m2 L FrStHsLb   Comment:  Non  GFR Calc   GFR Estimate If Black 43 >60 mL/min/1.7m2 L FrStHsLb   Comment:  African American GFR Calc   Calcium 8.0 8.5 - 10.1 mg/dL L FrStHsLb            CBC with platelets [[id number]] (Abnormal)  Resulted: 01/29/17 0639, Result status: Final result    Ordering provider: Winnie Saha DO  01/29/17 0000 Resulting lab: [facility]    Specimen Information    Type Source Collected On   Blood  01/29/17 0615          Components       Value Reference Range Flag Lab   WBC 7.4 4.0 - 11.0 10e9/L  FrStHsLb   RBC Count 2.93 4.4 - 5.9 10e12/L L FrStHsLb   Hemoglobin  8.2 13.3 - 17.7 g/dL L FrStHsLb   Hematocrit 26.3 40.0 - 53.0 % L FrStHsLb   MCV 90 78 - 100 fl  FrStHsLb   MCH 28.0 26.5 - 33.0 pg  FrStHsLb   MCHC 31.2 31.5 - 36.5 g/dL L FrStHsLb   RDW 17.8 10.0 - 15.0 % H FrStHsLb   Platelet Count 120 150 - 450 10e9/L L FrStHsLb            Testing Performed By     Lab - Abbreviation Name Director Address Valid Date Range    14 - FrStHsLb [facility] Unknown [address] [place] [address] 05/08/15 1057 - Present    75 - Unknown [facility] Unknown [address] [facility] [address] 01/15/15 1019 - Present    170 - Unknown POINT OF CARE TEST, GLUCOSE Unknown Unknown 10/31/11 1114 - Present    225 - Unknown INFECTIOUS DISEASE DIAGNOSTIC LABORATORY Unknown [address] [facility] [address] 12/19/14 0954 - Present    226 - Unknown MICRO RAPID TESTING LAB Unknown [address] [facility] [address] 12/19/14 0955 - Present            Unresulted Labs (24h ago through future)    Start       Ordered    02/02/17 0600  Basic metabolic panel   AM DRAW,   Routine      02/01/17 1310    02/02/17 0600  Phosphorus   AM DRAW,   Routine      02/01/17 1310    02/02/17 0600  Magnesium   AM DRAW,   Routine      02/01/17 1310    Unscheduled  Potassium   CONDITIONAL X 1 STAT,   STAT     Comments:  RN to release order IF pronounced diuresis response greater than 1000 mL or new onset of arrythmia.    01/20/17 1718    Unscheduled  Blood gas blood with oxy   CONDITIONAL X 3,   Routine     Comments:  Release order if patient in respiratory distress and Notify Provider.    01/20/17 1718    Unscheduled  Glucose   CONDITIONAL X 3,   Routine     Comments:  Release order if patient experiencing hyperglycemia or hypoglycemia symptoms and Notify Provider.    01/20/17 1718    Unscheduled  Hemoglobin   CONDITIONAL X 3,   Routine     Comments:  Release order if patient experiencing active bleeding and/or hypotension and Notify Provider.    01/20/17 1718     Unscheduled  Blood gas arterial and oxyhgb   CONDITIONAL (SPECIFY),   Routine     Comments:  STAT PRN at RT/RN discretion.    17 1718    Unscheduled  Blood gas venous and oxyhgb   CONDITIONAL (SPECIFY),   Routine     Comments:  STAT PRN as needed at  RT/RN discretion.  PICC line Draw preferred    17 171    Unscheduled  Red blood cell prepare order unit conditional -  (Conditional Red Blood Cells Unit)   CONDITIONAL (SPECIFY) BLOOD,   Routine     Comments:  For patients with significant heart failure:  Recommend transfusing slowly using the 4 hour allowed time period, if clinically appropriate.  Do NOT change or add to this text.  Use additional instructions field.   Question Answer Comment   Irradiation Indication Not irradiated    Red Blood Cells: NON-Irradiated    Does the patient have sickle cell disease or thalassemia? No    Number of Units 1    When to transfuse Hemoglobin is 7 g/dL or less (anemia)    Special Requirements None    Infusion Duration Infuse within 4 hours of release        17 1225         ECG/EMG Results (17 - 17)      Echo Limited with Lumason [[id number]]  Resulted: 17 1222, Result status: Edited Result - FINAL    Ordering provider: Yasir Farnsworth MD  17 1202 Resulted by: Joe Richards MD    Performed: 17 1257 - 17 1259 Resulting lab: RADIOLOGY RESULTS    Narrative:       [id number]  [id number]  [id number]  [id number]^YASIR^LU           [facility]  Echocardiography Laboratory  [address] [place] [address]        Name: GIO BERRY  MRN: [id number]  : 1929  Study Date: 2017 12:22 PM  Age: 87 yrs  Gender: Male  Patient Location: [facility]^I371-01^  Reason For Study: CHF  Ordering Physician: YASIR FARNSWORTH  Performed By: Niurka Grimes RDCS     BSA: 2.1 m2  Height: 71 in  Weight: 191 lb  HR: 90  BP: 108/64 mmHg  _____________________________________________________________________________  __         Procedure  Limited Portable Echo Adult. Contrast Lumason.  _____________________________________________________________________________  __        Interpretation Summary     The right ventricle is severely dilated and the right ventricular systolic  function is moderate to severely reduced.  There is moderate to mod-severe (2-3+) tricuspid regurgitation. The right  ventricular systolic pressure is approximated at 41mmHg plus the right atrial  pressure is elevated, consistent with moderate to severe pulmonary  hypertension.  Flattened septum is consistent with RV pressure/volume overload.  Moderate left pleural effusion is also seen.  Compared to previous echos, the signifcant pulmonary HTN persists with  secondary effects on the right heart.  _____________________________________________________________________________  __        Left Ventricle  The left ventricle is normal in size. There is normal left ventricular wall  thickness. The left ventricular ejection fraction is normal. The visual  ejection fraction is estimated at 55-60%. Flattened septum is consistent with  RV pressure/volume overload.     Right Ventricle  The right ventricle is severely dilated. The right ventricular systolic  function is moderate to severely reduced.     Atria  Normal left atrial size. Right atrial size is normal. Intact atrial septum.     Mitral Valve  There is moderate mitral annular calcification. The mitral valve leaflets are  mildly thickened. There is no evidence of mitral valve prolapse. There is no  mitral valve stenosis.        Tricuspid Valve  The tricuspid valve is normal in structure and function. The right ventricular  systolic pressure is approximated at 42.1 mmHg plus the right atrial pressure.  There is moderate to mod-severe (2-3+) tricuspid regurgitation. The right  ventricular systolic pressure is approximated at 41mmHg plus the right atrial  pressure. Right ventricular systolic pressure is elevated,  consistent with  moderate to severe pulmonary hypertension.     Aortic Valve  The aortic valve is normal in structure and function. No aortic regurgitation  is present. No aortic stenosis is present.     Pulmonic Valve  The pulmonic valve is not well seen, but is grossly normal. There is trace  pulmonic valvular regurgitation.     Vessels  Normal size aorta. The inferior vena cava is not dilated.     Pericardium  The pericardium appears normal. Moderate left pleural effusion.        Rhythm  The rhythm was atrial fibrillation with controlled ventricular rate at rest.  _____________________________________________________________________________  __  MMode/2D Measurements & Calculations     IVSd: 1.2 cm  LVIDd: 4.4 cm  LVIDs: 2.7 cm  LVPWd: 1.0 cm  FS: 39.5 %  EDV(Teich): 88.5 ml  ESV(Teich): 26.3 ml  LV mass(C)d: 176.8 grams        Doppler Measurements & Calculations  TR max miladis: 324.2 cm/sec  TR max P.1 mmHg           _____________________________________________________________________________  __           Report approved by: Michelle Kyle 2017 04:40 PM       1    Type Source Collected On     17 1222          View Image (below)        Echocardiogram Limited [[id number]]  Resulted: 17 1257, Result status: In process    Ordering provider: Yasir Vick MD  17 1202 Performed: 17 1257 - 17 1257    Resulting lab: [facility]                   Encounter-Level Documents:     There are no encounter-level documents.      Order-Level Documents:     There are no order-level documents.

## 2017-01-20 NOTE — PHARMACY-VANCOMYCIN DOSING SERVICE
Pharmacy Vancomycin Initial Note  Date of Service 2017  Patient's  1929  87 year old, male    Indication: [facility] Pneumonia    Current estimated CrCl = Estimated Creatinine Clearance: 17.5 mL/min (based on Cr of 3.5).  Creatinine for last 3 days  2017:  9:40 AM Creatinine 3.50 mg/dL*    Nephrotoxins and other renal medications (Future)    Start     Dose/Rate Route Frequency Ordered Stop    17 0800  furosemide (LASIX) injection 40 mg      40 mg Intravenous 2 TIMES DAILY. 17 1718      17 1800  vancomycin (VANCOCIN) 2,000 mg in NaCl 0.9 % 500 mL intermittent infusion      2,000 mg Intravenous ONCE 17 174  vancomycin [facility] - receiving intermittent dosing      1 each Does not apply SEE ADMIN INSTRUCTIONS 17           Plan:  1.  Start vancomycin  2000 mg IV x1   2.  Goal Trough Level: 15-20 mg/L   3.  Pharmacy will check trough levels as appropriate in 23 hours.    4. Serum creatinine levels will be ordered daily for the first week of therapy and at least twice weekly for subsequent weeks.    5. [place] method utilized to dose vancomycin therapy: Method 1    Milo Faria

## 2017-01-20 NOTE — PHARMACY-ADMISSION MEDICATION HISTORY
Admission medication history interview status for the 1/20/2017  admission is complete. See EPIC admission navigator for prior to admission medications     Medication history source reliability:Moderate    Actions taken by pharmacist (provider contacted, etc):Direct patient interview who was not familiar with medications so called [facility] pharmacy.     Additional medication history information not noted on PTA med list : Pharmacy said it has been over 3 months since they dispensed allopurinol, hydralazine, and metoprolol.  Not sure if compliant.    Medication reconciliation/reorder completed by provider prior to medication history? No    Time spent in this activity: 30 minutes    Prior to Admission medications    Medication Sig Last Dose Taking? Auth Provider   HYDROmorphone (DILAUDID) 2 MG tablet Take 1 tablet (2 mg) by mouth every 6 hours as needed for moderate to severe pain 1/19/2017 at Unknown time Yes Aniyah Magana MD   bumetanide (BUMEX) 2 MG tablet Take 1 tablet (2 mg) by mouth daily  Yes Cathie Salas, APRN CNP   insulin glargine (LANTUS SOLOSTAR) 100 UNIT/ML injection Inject 8 Units Subcutaneous At Bedtime  Patient taking differently: Inject 20 Units Subcutaneous At Bedtime  1/19/2017 at Unknown time Yes Justin Ortiz MD   MELATONIN PO Take 1 mg by mouth At Bedtime  Yes Unknown, Entered By History   METOLAZONE PO Take 2.5 mg by mouth twice a week Every Tuesday and Thursday 1/19/2017 at Unknown time Yes Unknown, Entered By History   carboxymethylcellulose (REFRESH PLUS) 0.5 % SOLN ophthalmic solution Place 1 drop into both eyes every 4 hours as needed for dry eyes  Yes Unknown, Entered By History   acetaminophen (TYLENOL) 325 MG tablet Take 650 mg by mouth every 4 hours as needed for mild pain  Yes Reported, Patient   Nutritional Supplements (GLUCERNA PO) Take 8 oz by mouth daily  Yes Reported, Patient   carbidopa-levodopa (SINEMET)  MG per tablet Take 1 tablet by mouth 1 tablet by mouth every 5  hours as needed for tremors  Yes Reported, Patient   polyethylene glycol (MIRALAX/GLYCOLAX) powder Take 17 g by mouth daily as needed for constipation  Yes Reported, Patient   Skin Protectants, Misc. (EUCERIN) cream Apply topically 2 times daily  Yes Tenzin Nieto MD   carbidopa-levodopa (SINEMET CR)  MG per tablet Take 1 tablet by mouth 2 times daily  Yes Aniyah Magana MD   omeprazole (PRILOSEC) 20 MG capsule Take 1 capsule (20mg) by mouth once daily 1/19/2017 at Unknown time Yes Aniyah Magana MD   NOVOFINE 30 30G X 8 MM insulin pen needle Use three times daily or as directed  Yes Aniyah Magana MD   pramipexole (MIRAPEX) 0.5 MG tablet Take 1 tablet by mouth once daily (with dinner) 1/19/2017 at Unknown time Yes Aniyah Magana MD   metoprolol (TOPROL-XL) 25 MG 24 hr tablet TAKE ONE TABLET BY MOUTH ONE TIME DAILY   Yes Aniyah Magana MD   hydrALAZINE (APRESOLINE) 25 MG tablet Take 1 tablet (25 mg) by mouth 3 times daily  Yes Cathie Salas APRN CNP   allopurinol (ZYLOPRIM) 100 MG tablet Take 2 tablets (200 mg) by mouth daily  Yes Aniyah Magana MD   simvastatin (ZOCOR) 40 MG tablet Take 1 tablet (40 mg) by mouth At Bedtime 1/19/2017 at Unknown time Yes Aniyah Magana MD   ferrous sulfate (IRON) 325 (65 FE) MG tablet Take 325 mg by mouth daily (with lunch)   Yes Reported, Patient   Loperamide HCl (IMODIUM A-D PO) Take 2 mg by mouth 4 times daily as needed  Yes Reported, Patient   aspirin  MG EC tablet Take 1 tablet (325 mg) by mouth daily  Yes Augusto Carrasco MD   co-enzyme Q-10 100 MG CAPS Take 1 capsule by mouth daily.  Yes Devon Talamantes MD   cholecalciferol 5000 UNITS TABS Take 5,000 Units by mouth daily.  Yes Devon Talamantes MD   neomycin-bacitracin-polymyxin (NEOSPORIN) 5-400-5000 ointment Apply topically daily Unknown at Unknown time  Tenzin Nieto MD   blood glucose monitoring (ONE TOUCH ULTRA) test strip Use to test blood sugar 2 times daily or as directed.    Aniyah Magana MD   blood glucose monitoring (FREESTYLE) lancets Use to test blood sugars 2 times daily or as directed.   Aniyah Magana MD   ACE/ARB NOT PRESCRIBED, INTENTIONAL, ACE & ARB not prescribed due to Symptomatic hypotension not due to excessive diuresis   Reported, Patient

## 2017-01-20 NOTE — LETTER
Transition Communication Hand-off for Care Transitions to Next Level of Care Provider    Name: Armand Smith  MRN #: [id number]  Primary Care Provider: Aniyah Magana     Primary Clinic: [facility] [place] [address]  [place] [address]     Reason for Hospitalization:  Hypoxia [R09.02]  Falls frequently [R29.6]  Acute renal failure, unspecified acute renal failure type (H) [N17.9]  Altered mental status, unspecified altered mental status type [R41.82]  Pneumonia of left lower lobe due to infectious organism [J18.9]  Skin tear of right upper extremity [S41.109A]  Anemia, unspecified type [D64.9]  Edema, unspecified type [R60.9]  Admit Date/Time: 1/20/2017  9:35 AM  Discharge Date: 2/1/17  Payor Source: Payor: [employer] / Plan: [employer] / Product Type: HMO /            Reason for Communication Hand-off Referral: Admission diagnoses: CHF    [facility]       Concern for non-adherence with plan of care:   Y  Discharge Needs Assessment:  Needs       Most Recent Value    Equipment Currently Used at Home grab bar, raised toilet, walker, rolling [pt has shower chair but does not use it]    Transportation Available family or friend will provide    # of Referrals Placed by [facility] [HE ride at 1815]          Already enrolled in Tele-monitoring program and name of program:  no  Follow-up specialty is recommended: Yes    Follow-up plan:  Future Appointments  Date Time Provider Department Center   2/2/2017 6:00 AM Ly Zazueta OTA [facility] [place] [facility]   2/16/2017 12:20 PM [facility]   2/16/2017 1:10 PM Cathie Salas, APRN CNP [facility]       Any outstanding tests or procedures:    Procedures     Future Labs/Procedures    Oxygen - Nasal cannula     Comments:    3 Lpm by nasal cannula, titrate to keep O2 sats 90% or greater.          Referrals     Future Labs/Procedures    Occupational Therapy Adult Consult     Comments:    Evaluate and treat as  clinically indicated.    Reason:  CHF. weakness    Physical Therapy Adult Consult     Comments:    Evaluate and treat as clinically indicated.    Reason:  CHF, weakness            Key Recommendations:  Pt going to [place] today at 1815 via [facility]  Tiesha Anderson RN    AVS/Discharge Summary is the source of truth; this is a helpful guide for improved communication of patient story

## 2017-01-20 NOTE — IP AVS SNAPSHOT
` [facility] CARD[facility] SPECIALTY CARE: [phone]            Medication Administration Report for Armand Smith as of 02/01/17 1716   Legend:    Given Hold Not Given Due Canceled Entry Other Actions    Time Time (Time) Time  Time-Action       Inactive    Active    Linked        Medications 01/26/17 01/27/17 01/28/17 01/29/17 01/30/17 01/31/17 02/01/17    acetaminophen (TYLENOL) Suppository 650 mg  Dose: 650 mg Freq: EVERY 4 HOURS PRN Route: RE  PRN Reason: mild pain  Start: 01/20/17 1718   Admin Instructions: Alternate ibuprofen (if ordered) with acetaminophen.  Maximum acetaminophen dose from all sources = 75 mg/kg/day not to exceed 4 grams/day.               acetaminophen (TYLENOL) tablet 650 mg  Dose: 650 mg Freq: EVERY 4 HOURS PRN Route: PO  PRN Reason: mild pain  Start: 01/20/17 1718   Admin Instructions: Alternate ibuprofen (if ordered) with acetaminophen.  Maximum acetaminophen dose from all sources = 75 mg/kg/day not to exceed 4 grams/day.      2131 (650 mg)-Given            0003 (650 mg)-Given       1019 (650 mg)-Given           albuterol neb solution 2.5 mg  Dose: 2.5 mg Freq: EVERY 2 HOURS PRN Route: NEBULIZATION  PRN Reasons: wheezing,shortness of breath / dyspnea  Start: 01/20/17 1718              aspirin EC EC tablet 325 mg  Dose: 325 mg Freq: DAILY Route: PO  Start: 01/20/17 1730   Admin Instructions: DO NOT CRUSH.     1021 (325 mg)-Given        0917 (325 mg)-Given        0920 (325 mg)-Given        0843 (325 mg)-Given        0831 (325 mg)-Given        1034 (325 mg)-Given        0820 (325 mg)-Given           bisacodyl (DULCOLAX) Suppository 10 mg  Dose: 10 mg Freq: DAILY PRN Route: RE  PRN Reason: constipation  Start: 01/20/17 1718   Admin Instructions: Hold for loose stools.  This is the third step of a three step constipation treatment protocol.     1634 (10 mg)-Given                 carbidopa-levodopa (SINEMET CR)  MG per CR tablet 1 tablet  Dose: 1 tablet Freq: 2 TIMES DAILY  Route: PO  Start: 01/21/17 1145   Admin Instructions: DO NOT CRUSH.     1021 (1 tablet)-Given       2032 (1 tablet)-Given        0917 (1 tablet)-Given       1958 (1 tablet)-Given               0921 (1 tablet)-Given       2155 (1 tablet)-Given        0842 (1 tablet)-Given       2000 (1 tablet)-Given        0831 (1 tablet)-Given       2027 (1 tablet)-Given        1034 (1 tablet)-Given       2105 (1 tablet)-Given        0820 (1 tablet)-Given       [ ] 2100           carbidopa-levodopa (SINEMET)  MG per tablet 1 tablet  Dose: 1 tablet Freq: EVERY 6 HOURS PRN Route: PO  PRN Comment: tremor  Start: 01/21/17 1136    1442 (1 tablet)-Given         1601 (1 tablet)-Given                 2238 (1 tablet)-Given            carboxymethylcellulose (REFRESH PLUS) 0.5 % ophthalmic solution 1 drop  Dose: 1 drop Freq: EVERY 4 HOURS PRN Route: Both Eyes  PRN Reason: dry eyes  Start: 01/20/17 1718    1442 (1 drop)-Given        1155 (1 drop)-Given        1854 (1 drop)-Given       2304 (1 drop)-Given        0450 (1 drop)-Given       1641 (1 drop)-Given        0115 (1 drop)-Given             co-enzyme Q-10 capsule 100 mg  Dose: 100 mg Freq: DAILY Route: PO  Start: 01/21/17 1145    1021 (100 mg)-Given        0916 (100 mg)-Given        0920 (100 mg)-Given        0842 (100 mg)-Given        0831 (100 mg)-Given        1033 (100 mg)-Given        0820 (100 mg)-Given           Continuing beta blocker from home medication list OR beta blocker order already placed during this visit  Freq: DOES NOT GO TO MAR Route: XX  PRN Reason: other  Start: 01/20/17 1718   Admin Instructions: Continuing beta blocker from home medication list OR beta blocker order already placed during this visit               eucerin cream  Freq: 2 TIMES DAILY Route: Top  Start: 01/21/17 1145   Admin Instructions: Apply to legs     1024 ( )-Given       2144 ( )-Given        1333 ( )-Given       2132 ( )-Given        1000 ( )-Given by Other [C]       2220 ( )-Given        6324  ( )-Given       (2152)-Not Given        0832 ( )-Given       (2030)-Not Given        1548 ( )-Given       2108 ( )-Given        [ ] 0900       [ ] 2100           ferrous sulfate (IRON) tablet 325 mg  Dose: 325 mg Freq: DAILY Route: PO  Start: 01/31/17 0900   Admin Instructions: Absorbed best on an empty stomach. If stomach upset occurs, can take with meals.          1034 (325 mg)-Given        0820 (325 mg)-Given           furosemide (LASIX) tablet 40 mg  Dose: 40 mg Freq: DAILY Route: PO  Start: 01/31/17 1215         1306 (40 mg)-Given        0821 (40 mg)-Given           glucose 40 % gel 15-30 g  Dose: 15-30 g Freq: EVERY 15 MIN PRN Route: PO  PRN Reason: low blood sugar  Start: 01/20/17 1718   Admin Instructions: Give 15 g for BG 51 to 69 mg/dL IF patient is conscious and able to swallow. Give 30 g for BG less than or equal to 50 mg/dL IF patient is conscious and able to swallow. Do NOT give glucose gel via enteral tube.  IF patient has enteral tube: give apple juice 120 mL (4 oz or 15 g of CHO) via enteral tube for BG 51 to 69 mg/dL.  Give apple juice 240 mL (8 oz or 30 g of CHO) via enteral tube for BG less than or equal to 50 mg/dL.              Or  dextrose 50 % injection 25-50 mL  Dose: 25-50 mL Freq: EVERY 15 MIN PRN Route: IV  PRN Reason: low blood sugar  Start: 01/20/17 1718   Admin Instructions: Use if have IV access, BG less than 70 mg/dL and meet dose criteria below:  Dose if conscious and alert (or disorientated) and NPO = 25 mL  Dose if unconscious / not alert = 50 mL              Or  glucagon injection 1 mg  Dose: 1 mg Freq: EVERY 15 MIN PRN Route: SC  PRN Reason: low blood sugar  PRN Comment: May repeat x 1 only  Start: 01/20/17 1718   Admin Instructions: May give SQ or IM. IF BG less than or equal to 50 mg/dL and no IV access.  ONLY use glucagon IF patient has NO IV access AND is UNABLE to swallow.               guaiFENesin (MUCINEX) 12 hr tablet 600 mg  Dose: 600 mg Freq: 2 TIMES DAILY Route:  PO  Start: 01/21/17 1145   Admin Instructions: DO NOT CRUSH.     1021 (600 mg)-Given       2032 (600 mg)-Given        0917 (600 mg)-Given       1959 (600 mg)-Given               0920 (600 mg)-Given       2138 (600 mg)-Given        0842 (600 mg)-Given       2000 (600 mg)-Given        0830 (600 mg)-Given       2027 (600 mg)-Given        1034 (600 mg)-Given       2105 (600 mg)-Given        0820 (600 mg)-Given       [ ] 2100           HOLD: All Oral Medications  Freq: HOLD Route: XX  Start: 01/23/17 0953   Admin Instructions: I (provider) have reviewed/adjusted the medications and it is okay to hold all oral medication doses while on BIPAP until patient is able to be off BIPAP for 2 hours with patient off BIPAP for at least 30 minutes before and 30 minutes after taking the medication.  No lozenges or gum should be given while patient on BIPAP.               HYDROmorphone (DILAUDID) tablet 2 mg  Dose: 2 mg Freq: EVERY 6 HOURS PRN Route: PO  PRN Reason: moderate to severe pain  Start: 01/24/17 2257    0447 (2 mg)-Given       2039 (2 mg)-Given        1621 (2 mg)-Given        0942 (2 mg)-Given       1601 (2 mg)-Given        1336 (2 mg)-Given       2109 (2 mg)-Given        1043 (2 mg)-Given        1507 (2 mg)-Given       2238 (2 mg)-Given            hypromellose-dextran (ARTIFICAL TEARS) ophthalmic solution 2-3 drop  Dose: 2-3 drop Freq: EVERY 1 HOUR PRN Route: OP  PRN Reason: dry eyes  Start: 01/28/17 1053   Admin Instructions: To affected eye(s)         1617 (3 drop)-Given             insulin aspart (NovoLOG) inj (RAPID ACTING)  Dose: 1-5 Units Freq: AT BEDTIME Route: SC  Start: 01/23/17 2200   Admin Instructions: MEDIUM INSULIN RESISTANCE DOSING    Do Not give Bedtime Correction Insulin if BG less than  200.   For  - 249 give 1 units.   For  - 299 give 2 units.   For  - 349 give 3 units.   For  -399 give 4 units.   For BG greater than or equal to 400 give 5 units.  Notify provider if glucose  greater than or equal to 350 mg/dL after administration of correction dose.  If given at mealtime, must be administered 5 min before meal or immediately after.     2133 (1 Units)-Given [C]        (2113)-Not Given [C]        (2137)-Not Given [C]        (2152)-Not Given        2121 (1 Units)-Given        (2105)-Not Given        [ ] 2200           insulin aspart (NovoLOG) inj (RAPID ACTING)  Dose: 1-7 Units Freq: 3 TIMES DAILY BEFORE MEALS Route: SC  Start: 01/23/17 0900   Admin Instructions: Correction Scale - MEDIUM INSULIN RESISTANCE DOSING     Do Not give Correction Insulin if Pre-Meal BG less than 140.   For Pre-Meal  - 189 give 1 unit.   For Pre-Meal  - 239 give 2 units.   For Pre-Meal  - 289 give 3 units.   For Pre-Meal  - 339 give 4 units.   For Pre-Meal - 399 give 5 units.   For Pre-Meal -449 give 6 units  For Pre-Meal BG greater than or equal to 450 give 7 units.   To be given with prandial insulin, and based on pre-meal blood glucose.    Notify provider if glucose greater than or equal to 350 mg/dL after administration of correction dose.  If given at mealtime, must be administered 5 min before meal or immediately after.     (1024)-Not Given       (1328)-Not Given       1651 (1 Units)-Given [C]        (0915)-Not Given       (1200)-Not Given [C]       (1743)-Not Given [C]        (0749)-Not Given       (1156)-Not Given       (1745)-Not Given        (0800)-Not Given       1233 (1 Units)-Given       (1730)-Not Given        (0724)-Not Given [C]       1238 (1 Units)-Given       (1707)-Not Given        (0800)-Not Given       1302 (2 Units)-Given [C]       1836 (1 Units)-Given [C]        0821 (1 Units)-Given [C]       1232 (1 Units)-Given [C]       [ ] 1700           insulin glargine (LANTUS) injection 3 Units  Dose: 3 Units Freq: AT BEDTIME Route: SC  Start: 01/31/17 2200         2106 (3 Units)-Given        [ ] 2200           ipratropium - albuterol 0.5 mg/2.5 mg/3 mL (DUONEB)  "neb solution 3 mL  Dose: 3 mL Freq: 4 TIMES DAILY Route: NEBULIZATION  Start: 01/20/17 1900    0828 (3 mL)-Given       1151 (3 mL)-Given       1549 (3 mL)-Given       2013 (3 mL)-Given        0735 (3 mL)-Given       1156 (3 mL)-Given       1533 (3 mL)-Given       2016 (3 mL)-Given        0751 (3 mL)-Given       1117 (3 mL)-Given       1605 (3 mL)-Given       2006 (3 mL)-Given        0756 (3 mL)-Given       1058 (3 mL)-Given       (1615)-Not Given       1929 (3 mL)-Given        (0850)-Not Given       1246 (3 mL)-Given       (1626)-Not Given       2007 (3 mL)-Given        0815 (3 mL)-Given       1133 (3 mL)-Given       1449 (3 mL)-Given       1940 (3 mL)-Given        0712 (3 mL)-Given       1136 (3 mL)-Given       1511 (3 mL)-Given       [ ] 1900           lidocaine (LMX4) cream  Freq: EVERY 1 HOUR PRN Route: Top  PRN Reason: pain  PRN Comment: with VAD insertion or accessing implanted port.  Start: 01/25/17 1111   Admin Instructions: Do NOT give if patient has a history of allergy to any local anesthetic or any \"deena\" product.   Apply 30 minutes prior to VAD insertion or port access. MAX Dose: 2.5 g (  of 5 g tube)               lidocaine 1 % 1 mL  Dose: 1 mL Freq: EVERY 1 HOUR PRN Route: OTHER  PRN Comment: mild pain with VAD insertion or accessing implanted port  Start: 01/25/17 1111   Admin Instructions: Do NOT give if patient has a history of allergy to any local anesthetic or any \"deena\" product. MAX dose 1 mL subcutaneous OR intradermal in divided doses.               magic mouthwash suspension (diphenhydrAMINE 5 mg/5 mL, lidocaine 50 mg/5 mL, Maalox 2.5 g/5 mL , simethicone 6.65 mg/5 mL)  Dose: 10 mL Freq: EVERY 6 HOURS PRN Route: SWISH & SWAL  PRN Reason: mouth sores  Start: 01/26/17 0846   Admin Instructions: Shake well.     2039 (10 mL)-Given                 May take regular AM medications except those listed below.  Freq: CONTINUOUS PRN Route: XX  Start: 01/25/17 1111   Admin Instructions: May take " regular AM medications except those listed below.               metoprolol (LOPRESSOR) injection 2.5 mg  Dose: 2.5 mg Freq: EVERY 4 HOURS PRN Route: IV  PRN Reason: other  PRN Comment: HR > 120, hold for SBP < 90  Start: 01/20/17 1718              naloxone (NARCAN) injection 0.1-0.4 mg  Dose: 0.1-0.4 mg Freq: EVERY 2 MIN PRN Route: IV  PRN Reason: opioid reversal  Start: 01/20/17 1718   Admin Instructions: For respiratory rate LESS than or EQUAL to 8.  Partial reversal dose:  0.1 mg titrated q 2 minutes for Analgesia Side Effects Monitoring Sedation Level of 3 (frequently drowsy, arousable, drifts to sleep during conversation).Full reversal dose:  0.4 mg bolus for Analgesia Side Effects Monitoring Sedation Level of 4 (somnolent, minimal or no response to stimulation).               nitroglycerin (NITROSTAT) sublingual tablet 0.4 mg  Dose: 0.4 mg Freq: EVERY 5 MIN PRN Route: SL  PRN Reason: chest pain  Start: 01/20/17 1718   Admin Instructions: Maximum 3 doses in 15 minutes.  Notify provider if no relief after 3 doses.    Do NOT give nitroglycerin SLIF the patient has taken avanafil (STENDRA), sildenafil (VIAGRA) or (REVATIO) within the last 8 hours, vardenafil (LEVITRA) or (STAXYN) within the last 18 hours, tadalafil (CIALIS) or (ADCIRCA) within the last 36 hours. Inform provider if patient has taken one of these medications.  If patient is still having acute angina requiring treatment, an alternative treatment option may be used such as: IV beta-blocker [2.5 mg - 5 mg metoprolol (LOPRESSOR)] if ordered by a provider.               ondansetron (ZOFRAN-ODT) ODT tab 4 mg  Dose: 4 mg Freq: EVERY 6 HOURS PRN Route: PO  PRN Reason: nausea  Start: 01/20/17 1718   Admin Instructions: This is Step 1 of nausea and vomiting management.  If nausea not resolved in 15 minutes, go to Step 2 prochlorperazine (COMPAZINE). Do not push through foil backing. Peel back foil and gently remove. Place on tongue immediately. Administration  with liquid unnecessary              Or  ondansetron (ZOFRAN) injection 4 mg  Dose: 4 mg Freq: EVERY 6 HOURS PRN Route: IV  PRN Reasons: nausea,vomiting  Start: 01/20/17 1718   Admin Instructions: This is Step 1 of nausea and vomiting management.  If nausea not resolved in 15 minutes, go to Step 2 prochlorperazine (COMPAZINE).               pantoprazole (PROTONIX) EC tablet 40 mg  Dose: 40 mg Freq: EVERY MORNING Route: PO  Start: 01/24/17 0900   Admin Instructions: DO NOT CRUSH.  Pharmacist Dose Change per: IV-PO Policy.     1021 (40 mg)-Given        1209 (40 mg)-Given        0920 (40 mg)-Given        0843 (40 mg)-Given        0831 (40 mg)-Given        1034 (40 mg)-Given        0820 (40 mg)-Given           polyethylene glycol (MIRALAX/GLYCOLAX) Packet 17 g  Dose: 17 g Freq: 2 TIMES DAILY PRN Route: PO  PRN Comment: constipation  Start: 01/26/17 1621   Admin Instructions: 1 Packet = 17 grams. Mixed prescribed dose in 8 ounces of water. Follow with 8 oz. of water.               pramipexole (MIRAPEX) tablet 0.5 mg  Dose: 0.5 mg Freq: DAILY Route: PO  Start: 01/21/17 1145    1020 (0.5 mg)-Given        0917 (0.5 mg)-Given        0921 (0.5 mg)-Given        0842 (0.5 mg)-Given        0830 (0.5 mg)-Given        1034 (0.5 mg)-Given        0820 (0.5 mg)-Given           Reason ACE/ARB order not selected  Freq: DOES NOT GO TO MAR Route: OTHER  PRN Reason: other  Start: 01/20/17 1718              senna-docusate (SENOKOT-S;PERICOLACE) 8.6-50 MG per tablet 1-2 tablet  Dose: 1-2 tablet Freq: 2 TIMES DAILY PRN Route: PO  PRN Reason: constipation  Start: 01/26/17 1621     1621 (1 tablet)-Given                simvastatin (ZOCOR) tablet 40 mg  Dose: 40 mg Freq: AT BEDTIME Route: PO  Start: 01/21/17 2200    2032 (40 mg)-Given               2131 (40 mg)-Given        2138 (40 mg)-Given        2153 (40 mg)-Given        2121 (40 mg)-Given        2105 (40 mg)-Given        [ ] 2200           sodium chloride (PF) 0.9% PF flush 10 mL  Dose: 10  mL Freq: EVERY 8 HOURS Route: IK  Start: 01/20/17 2200   Admin Instructions: And Q1H PRN, to lock CVC - Open Ended (Tunneled and Non-Tunneled) dormant lumen.     (0605)-Not Given       (1527)-Not Given       (2144)-Not Given        0600 (10 mL)-Given       (1603)-Not Given       1628 (10 mL)-Given [C]       2109 (10 mL)-Given        (0600)-Not Given       (1400)-Not Given [C]       2159 (10 mL)-Given        0612 (10 mL)-Given       1336 (10 mL)-Given               0523 (10 mL)-Given       (1302)-Not Given       1621 (10 mL)-Given       (2123)-Not Given        (0526)-Not Given       1839 (10 mL)-Given       2106 (10 mL)-Given        0554 (10 mL)-Given       [ ] 1400       [ ] 2200           sodium chloride (PF) 0.9% PF flush 10-20 mL  Dose: 10-20 mL Freq: EVERY 1 HOUR PRN Route: IK  PRN Reasons: line flush,post meds or blood draw  Start: 01/20/17 1727   Admin Instructions: to flush CVC - Open Ended (Tunneled and Non-Tunneled).  10 mL post IV meds; 20 mL post blood draw.      0916 (10 mL)-Given        0921 (10 mL)-Given       1456 (10 mL)-Given        0613 (20 mL)-Given        0114 (20 mL)-Given             sodium chloride (PF) 0.9% PF flush 3 mL  Dose: 3 mL Freq: EVERY 1 HOUR PRN Route: IK  PRN Reason: line flush  Start: 01/25/17 1111   Admin Instructions: for peripheral IV flush post IV meds              Completed Medications  Medications 01/26/17 01/27/17 01/28/17 01/29/17 01/30/17 01/31/17 02/01/17         Dose: 100 mcg Freq: ONCE Route: SC  Start: 02/01/17 1315   End: 02/01/17 1438   Admin Instructions: Refrigerated.           1438 (100 mcg)-Given             Dose: 200 mg Freq: EVERY 24 HOURS Route: IV  Last Dose: 200 mg (01/29/17 2001)  Start: 01/25/17 2000   End: 01/29/17 2101    2132 (200 mg)-New Bag        2043 (200 mg)-New Bag        2139 (200 mg)-New Bag        2001 (200 mg)-New Bag             Discontinued Medications  Medications 01/26/17 01/27/17 01/28/17 01/29/17 01/30/17 01/31/17 02/01/17         Dose:  300 mg Freq: DAILY Route: RE  Start: 01/20/17 1730   End: 01/30/17 1138                                           1138-Med Discontinued                         Rate: 7.7 mL/hr Freq: CONTINUOUS Route: IV  Last Dose: 3 mcg/kg/min (01/31/17 0927)  Start: 01/24/17 1115   End: 01/31/17 1841    0111 (3 mcg/kg/min)-Rate/Dose Verify       1040 (3 mcg/kg/min)-Rate/Dose Verify       2032 (3 mcg/kg/min)-Rate/Dose Verify        0938 (3 mcg/kg/min)-Rate/Dose Verify       1333 (3 mcg/kg/min)-New Bag       2135 (3 mcg/kg/min)-Rate/Dose Verify         0105 (3 mcg/kg/min)-New Bag        0000 (3 mcg/kg/min)-Rate/Dose Verify       1807 (3 mcg/kg/min)-New Bag        0053 (3 mcg/kg/min)-Rate/Dose Verify       0927 (3 mcg/kg/min)-Rate/Dose Verify       1841-Med Discontinued          Dose: 325 mg Freq: 2 TIMES DAILY Route: PO  Start: 01/24/17 2100   End: 01/30/17 1514   Admin Instructions: Absorbed best on an empty stomach. If stomach upset occurs, can take with meals.     1021 (325 mg)-Given       2032 (325 mg)-Given        0917 (325 mg)-Given       1958 (325 mg)-Given               0920 (325 mg)-Given       2139 (325 mg)-Given        0842 (325 mg)-Given       2000 (325 mg)-Given        0831 (325 mg)-Given       1514-Med Discontinued           Dose: 20 mg Freq: 2 TIMES DAILY. Route: IV  Start: 01/29/17 1600   End: 01/31/17 1203       1336 (20 mg)-Given               0115 (20 mg)-Given       1617 (20 mg)-Given        0106 (20 mg)-Given       1203-Med Discontinued          Dose: 6 Units Freq: AT BEDTIME Route: SC  Start: 01/30/17 2200   End: 01/31/17 1212        2121 (6 Units)-Given        1212-Med Discontinued          Dose: 8 Units Freq: AT BEDTIME Route: SC  Start: 01/27/17 2200   End: 01/30/17 1138     2131 (8 Units)-Given        2139 (8 Units)-Given        2202 (8 Units)-Given        1138-Med Discontinued      Medications 01/26/17 01/27/17 01/28/17 01/29/17 01/30/17 01/31/17 02/01/17

## 2017-01-20 NOTE — CONSULTS
[facility]    History and Physical/Consult  Critical Care Service     Date of Admission:  1/20/2017  Date of Service (when I saw the patient): 01/20/2017    Assessment and Plan  Armand Smith is a 87 year old male with PMHx of CHF, CKD, Type II DM, atrial fibrillation on ASA for anticoagulation, and parkinson's disease who presents on 1/20/17 after sustaining a fall at home and developing worsening confusion, SOB and productive cough. Found to have a left lower lobe PNA and pulmonary congestion on CXR. Patient being admitted to the ICU due to continued hypotension not currently requiring pressors.     Neuro  1. Frequent falls at home and acute confusussion/AMS. CT head with no evidence of acute pathology.    2. Parkinson's disease   3. Acute pain  4. Sedation  Plan:  -- Neuro checks Q4  -- tylenol PRN    CV  1. Hypotension- MAPs 67-69  2. Elevated troponin. Trop 0.11 on admission.   3. HFpEF. Most recent Echo with EF 60-65%, moderately dilated R ventricle, mild pulmonary HTN, mildly decreased right ventricular systolic function, moderate tricuspid regurg. BNP 6608.  4. Atrial fibrillation on ASA for anticoagulation.   Plan:  -- cardiology consult   -- trop Q6 hours   -- continue PTA ASA  -- lasix 40mg BID  -- PRN metoprolol for HR >120bpm  -- PICC line, will start NE is patient requires pressors to maintain MAPs >65  -- 1500 cc fluid restriction     Resp:  1. Acute hypoxic, hypercapnic resp failure. CXR shows pulmonary vascular engorgement and left lung base opacitiy. ABG 7.23/64/25/27.   Plan:  -- scheduled duonebs, albuterol PRN  -- BiPAP 10/8  -- Abx and lasix     GI/Nutrition  1. GERD  Plan:  -- NPO    Renal  1. Acute on chronic kidney disease, CKD stage III-IV. Cr 3.50 from baseline 2.6-2.9.    Plan:  -- Nephrology consulted     Endocrine  1. Type II DM. On lantus at baseline   Plan:  --  SSI  -- 10 units lantus QHS    Heme:  1. Normocytic anemia   2. Leukopenia   Plan:  -- Monitor  hemoglobin.  -- Transfuse to keep > 7.0    ID  1. LA and procalcitionin WNL.   2. Left lower lobe PNA  Plan:  - F/U blood and sputum cultures, influenza swab  - continue vanc, levofloxacin, meropenem     MSK  1. Lymphedema  Plan:  - lymphedema wraps     General cares:  DVT Prophylaxis: Pneumatic Compression Devices  GI Prophylaxis: Not indicated  Restraints: Restraints for medical healing needed: NO  Family update by me today: No  Current lines are required for patient management  Access: planned PICC    Heidi Covarrubias    Time Spent on This Encounter  Billing:  I spent 60 minutes bedside and on the inpatient unit today managing the critical care of Armand Smith in relation to the issues listed in this note.    Code Status  Full Code    Primary Care Physician  Aniyah Magana    Chief Complaint  Altered mental status, SOB    History is obtained from the patient and electronic health record    History of Present Illness  Armand Smith is a 87 year old male with PMHx of CHF, CKD, Type II DM, atrial fibrillation on ASA for anticoagulation, and parkinson's disease who presents on 1/20/17 after sustaining a fall at home. Per notes, the patient has been falling at home frequently over the last 2 days. Since then he has been complaining of sleepiness and fatigue. The patient has also been complaining of increasing SOB, productive cough, and increasing swelling of his groin and left leg. With the increasing confusion at home, EMS was called. In the ambulance, the patient was reported to be hypoxic with sats at 77%.     In the ED, the patient had SBPs ranging from 88-97mmHg, MAPs maintained above 65, HRs 60-70 in atrial fibrillation, RR 15-29, sating 90-92% on 3L NC. Labs remarkable for cr of 3.50, BNP 6000s, trop 0.110, WBC 3.6, Hgb 8.6. UA was unremarkable. VBG with respiratory acidosis.  CT head did not show any new acute abnormalities. CXR showed dense opacities at the posterior left lung base. US LE did not show any concern  for DVT. Patient was given lasix in the ED and started on levofloxacin. Patient was admitted to the ICU for further monitoring given continued hypotension.      Past Medical History   I have reviewed this patient's medical history and updated it with pertinent information if needed.   Past Medical History   Diagnosis Date     Type II or unspecified type diabetes mellitus without mention of complication, not stated as uncontrolled      Essential hypertension, benign      Other testicular hypofunction      Lumbosacral spondylosis without myelopathy      Personal history of diseases of blood and blood-forming organs      Unspecified psychosexual disorder      Basal cell Ca      lg. excision abd. yrs ago; recurrence 12/03 (exc. by shave, DSJ)     Nodular lymphoma, unspecified site, extranodal and solid organ sites 1970     Non-Hodgkins (NHL)     Unspecified cerebral artery occlusion with cerebral infarction 2013     Hyperlipidaemia      Atrial fibrillation (H)      Chronic diastolic congestive heart failure (H)      EF=55-60% per echo 3/30/15     Pulmonary HTN (H)      Great toe amputation status (H)      Aortic regurgitation      mild, 1+ per echo 3/30/15     Tricuspid regurgitation      1-2+ per echo 3/30/15     CKD (chronic kidney disease)      Parkinson's disease (H)      Poor circulation of extremity 1/24/2014       Past Surgical History  I have reviewed this patient's surgical history and updated it with pertinent information if needed.  Past Surgical History   Procedure Laterality Date     Colonoscopy       Splenectomy  1970     Phacoemulsification clear cornea with standard intraocular lens implant  12/12/2013     Procedure: PHACOEMULSIFICATION CLEAR CORNEA WITH STANDARD INTRAOCULAR LENS IMPLANT;  RIGHT PHACOEMULSIFICATION CLEAR CORNEA WITH STANDARD INTRAOCULAR LENS IMPLANT ;  Surgeon: Dwayne Carver MD;  Location: [facility]     Phacoemulsification clear cornea with standard intraocular lens implant   12/30/2013     Procedure: PHACOEMULSIFICATION CLEAR CORNEA WITH STANDARD INTRAOCULAR LENS IMPLANT;  LEFT PHACOEMULSIFICATION CLEAR CORNEA WITH STANDARD INTRAOCULAR LENS IMPLANT ;  Surgeon: Dwayne Carver MD;  Location:  [facility]     Biopsy  skin bx for cancer     Amputate toe(s) Right 4/7/2015     Procedure: AMPUTATE TOE(S);  Surgeon: Leroy Bright DPM;  Location: [facility]     Hc right heart catheterization  9/29/2015     Severely elevated right-sided filling pressures and moderately elevated left-sided filling pressures; mild to moderate mixed pulmonaty HTN     Amputate toe(s)       Amputate toe(s) Right 10/20/2015     Procedure: AMPUTATE TOE(S);  Surgeon: Leroy Bright DPM;  Location: [facility]     Biopsy bone toe Right 10/20/2015     Procedure: BIOPSY BONE TOE;  Surgeon: Leroy Bright DPM;  Location: [facility]       Prior to Admission Medications  Prior to Admission Medications   Prescriptions Last Dose Informant Patient Reported? Taking?   ACE/ARB NOT PRESCRIBED, INTENTIONAL,  Pharmacy Yes No   Sig: ACE & ARB not prescribed due to Symptomatic hypotension not due to excessive diuresis   HYDROmorphone (DILAUDID) 2 MG tablet 1/19/2017 at Unknown time Pharmacy No Yes   Sig: Take 1 tablet (2 mg) by mouth every 6 hours as needed for moderate to severe pain   Loperamide HCl (IMODIUM A-D PO)  Spouse/Significant Other Yes Yes   Sig: Take 2 mg by mouth 4 times daily as needed   MELATONIN PO  Spouse/Significant Other Yes Yes   Sig: Take 1 mg by mouth At Bedtime   METOLAZONE PO 1/19/2017 at Unknown time Pharmacy Yes Yes   Sig: Take 2.5 mg by mouth twice a week Every Tuesday and Thursday   NOVOFINE 30 30G X 8 MM insulin pen needle  Spouse/Significant Other No Yes   Sig: Use three times daily or as directed   Nutritional Supplements (GLUCERNA PO)  Spouse/Significant Other Yes Yes   Sig: Take 8 oz by mouth daily   Skin Protectants, Misc. (EUCERIN) cream  Spouse/Significant Other No Yes   Sig: Apply topically 2  times daily   acetaminophen (TYLENOL) 325 MG tablet  Spouse/Significant Other Yes Yes   Sig: Take 650 mg by mouth every 4 hours as needed for mild pain   allopurinol (ZYLOPRIM) 100 MG tablet  Spouse/Significant Other No Yes   Sig: Take 2 tablets (200 mg) by mouth daily   aspirin  MG [facility] tablet  Spouse/Significant Other Yes Yes   Sig: Take 1 tablet (325 mg) by mouth daily   blood glucose monitoring (FREESTYLE) lancets  Spouse/Significant Other No No   Sig: Use to test blood sugars 2 times daily or as directed.   blood glucose monitoring (ONE TOUCH ULTRA) test strip  Spouse/Significant Other No No   Sig: Use to test blood sugar 2 times daily or as directed.   bumetanide (BUMEX) 2 MG tablet  Pharmacy No Yes   Sig: Take 1 tablet (2 mg) by mouth daily   carbidopa-levodopa (SINEMET CR)  MG per tablet  Pharmacy No Yes   Sig: Take 1 tablet by mouth 2 times daily   carbidopa-levodopa (SINEMET)  MG per tablet  Pharmacy Yes Yes   Sig: Take 1 tablet by mouth 1 tablet by mouth every 5 hours as needed for tremors   carboxymethylcellulose (REFRESH PLUS) 0.5 % SOLN ophthalmic solution  Spouse/Significant Other Yes Yes   Sig: Place 1 drop into both eyes every 4 hours as needed for dry eyes   cholecalciferol 5000 UNITS TABS  Spouse/Significant Other No Yes   Sig: Take 5,000 Units by mouth daily.   co-enzyme Q-10 100 MG CAPS  Spouse/Significant Other No Yes   Sig: Take 1 capsule by mouth daily.   ferrous sulfate (IRON) 325 (65 FE) MG tablet  Spouse/Significant Other Yes Yes   Sig: Take 325 mg by mouth daily (with lunch)    hydrALAZINE (APRESOLINE) 25 MG tablet  Pharmacy No Yes   Sig: Take 1 tablet (25 mg) by mouth 3 times daily   insulin glargine (LANTUS) 100 UNIT/ML injection 1/19/2017 at Unknown time  Yes Yes   Sig: Inject 20 Units Subcutaneous At Bedtime   metoprolol (TOPROL-XL) 25 MG 24 hr tablet  Pharmacy No Yes   Sig: TAKE ONE TABLET BY MOUTH ONE TIME DAILY    neomycin-bacitracin-polymyxin (NEOSPORIN) 5-400-5000  "ointment Unknown at Unknown time Spouse/Significant Other No No   Sig: Apply topically daily   omeprazole (PRILOSEC) 20 MG capsule 1/19/2017 at Unknown time Pharmacy No Yes   Sig: Take 1 capsule (20mg) by mouth once daily   polyethylene glycol (MIRALAX/GLYCOLAX) powder  Spouse/Significant Other Yes Yes   Sig: Take 17 g by mouth daily as needed for constipation   pramipexole (MIRAPEX) 0.5 MG tablet 1/19/2017 at Unknown time Pharmacy No Yes   Sig: Take 1 tablet by mouth once daily (with dinner)   simvastatin (ZOCOR) 40 MG tablet 1/19/2017 at Unknown time Pharmacy No Yes   Sig: Take 1 tablet (40 mg) by mouth At Bedtime      Facility-Administered Medications: None     Allergies  Allergies   Allergen Reactions     Codeine      nausea vomiting     Penicillins      \" turned red from head to foot\"       Social History  I have reviewed this patient's social history and updated it with pertinent information if needed. Armand Smith  reports that he quit smoking about 6 years ago. His smoking use included Pipe. He has never used smokeless tobacco. He reports that he drinks alcohol. He reports that he does not use illicit drugs.    Family History  I have reviewed this patient's family history and updated it with pertinent information if needed.   Family History   Problem Relation Age of Onset     CEREBROVASCULAR DISEASE Father        Review of Systems  The 10 point Review of Systems is negative other than noted in the HPI    Physical Exam  Temp: 97.5  F (36.4  C) Temp src: Oral Temp  Min: 97.5  F (36.4  C)  Max: 97.5  F (36.4  C) BP: (!) 89/60 mmHg Pulse: 77 Heart Rate: 73 Resp: 29 SpO2: 92 % O2 Device: Nasal cannula Oxygen Delivery: 3 LPM  Vital Signs with Ranges  Temp:  [97.5  F (36.4  C)] 97.5  F (36.4  C)  Pulse:  [77] 77  Heart Rate:  [65-80] 73  Resp:  [10-39] 29  BP: ()/(44-62) 89/60 mmHg  SpO2:  [72 %-94 %] 92 %  0 lbs 0 oz    Constitutional:  male. Resting in bed   HEENT: atraumatic, normocephalic "   Respiratory: CTAB, no wheezes   Cardiovascular: irregularly irregular rhythm  Skin: warm and dry  Ext: lymphedema wraps in place bilateral lower extremities, pitting edema above knees   Neurologic: alert, following commands     Data  Results for orders placed or performed during the hospital encounter of 01/20/17 (from the past 24 hour(s))   CBC with platelets differential   Result Value Ref Range    WBC 3.6 (L) 4.0 - 11.0 10e9/L    RBC Count 2.99 (L) 4.4 - 5.9 10e12/L    Hemoglobin 8.6 (L) 13.3 - 17.7 g/dL    Hematocrit 26.6 (L) 40.0 - 53.0 %    MCV 89 78 - 100 fl    MCH 28.8 26.5 - 33.0 pg    MCHC 32.3 31.5 - 36.5 g/dL    RDW 17.5 (H) 10.0 - 15.0 %    Platelet Count 152 150 - 450 10e9/L    Diff Method Automated Method     % Neutrophils 61.0 %    % Lymphocytes 18.4 %    % Monocytes 16.5 %    % Eosinophils 3.3 %    % Basophils 0.5 %    % Immature Granulocytes 0.3 %    Nucleated RBCs 3 (H) 0 /100    Absolute Neutrophil 2.2 1.6 - 8.3 10e9/L    Absolute Lymphocytes 0.7 (L) 0.8 - 5.3 10e9/L    Absolute Monocytes 0.6 0.0 - 1.3 10e9/L    Absolute Eosinophils 0.1 0.0 - 0.7 10e9/L    Absolute Basophils 0.0 0.0 - 0.2 10e9/L    Abs Immature Granulocytes 0.0 0 - 0.4 10e9/L    Absolute Nucleated RBC 0.1    Basic metabolic panel   Result Value Ref Range    Sodium 141 133 - 144 mmol/L    Potassium 5.1 3.4 - 5.3 mmol/L    Chloride 102 94 - 109 mmol/L    Carbon Dioxide 27 20 - 32 mmol/L    Anion Gap 12 3 - 14 mmol/L    Glucose 185 (H) 70 - 99 mg/dL    Urea Nitrogen 105 (H) 7 - 30 mg/dL    Creatinine 3.50 (H) 0.66 - 1.25 mg/dL    GFR Estimate 17 (L) >60 mL/min/1.7m2    GFR Estimate If Black 20 (L) >60 mL/min/1.7m2    Calcium 8.6 8.5 - 10.1 mg/dL   Troponin I   Result Value Ref Range    Troponin I ES 0.110 (H) 0.000 - 0.045 ug/L   Nt probnp inpatient (BNP)   Result Value Ref Range    N-Terminal Pro BNP Inpatient 6608 (H) 0 - 1800 pg/mL   Blood gas venous and oxyhgb   Result Value Ref Range    Ph Venous 7.23 (L) 7.32 - 7.43 pH     PCO2 Venous 64 (H) 40 - 50 mm Hg    PO2 Venous 25 25 - 47 mm Hg    Bicarbonate Venous 27 21 - 28 mmol/L    Oxyhemoglobin Venous 27 %    Base Deficit Venous 0.5 mmol/L   EKG 12-lead, tracing only   Result Value Ref Range    Interpretation ECG Click View Image link to view waveform and result    Influenza A/B antigen   Result Value Ref Range    Influenza A/B Agn Specimen Nares     Influenza A Negative NEG    Influenza B  NEG     Negative   Test results must be correlated with clinical data. If necessary, results   should be confirmed by a molecular assay or viral culture.     XR Chest 2 Views    Narrative    XR CHEST 2 VW  1/20/2017 10:12 AM    HISTORY:  hypoxia and cough    COMPARISON:  12/24/2016      Impression    IMPRESSION:  Marked cardiomegaly. There may be mild pulmonary vascular  engorgement. Dense opacity at the posterior left lung base, consistent  with pneumonia, likely worsened since the prior exam.    KATIE RIDER MD   UA with Microscopic   Result Value Ref Range    Color Urine Yellow     Appearance Urine Clear     Glucose Urine Negative NEG mg/dL    Bilirubin Urine Negative NEG    Ketones Urine Negative NEG mg/dL    Specific Gravity Urine 1.012 1.003 - 1.035    Blood Urine Negative NEG    pH Urine 5.5 5.0 - 7.0 pH    Protein Albumin Urine 10 (A) NEG mg/dL    Urobilinogen mg/dL Normal 0.0 - 2.0 mg/dL    Nitrite Urine Negative NEG    Leukocyte Esterase Urine Negative NEG    Source Midstream Urine     WBC Urine 1 0 - 2 /HPF    RBC Urine 1 0 - 2 /HPF    Squamous Epithelial /HPF Urine 1 0 - 1 /HPF    Mucous Urine Present (A) NEG /LPF    Hyaline Casts 20 (H) 0 - 2 /LPF   CT Head w/o Contrast    Narrative    CT SCAN OF THE HEAD WITHOUT CONTRAST   1/20/2017 12:19 PM     HISTORY: Altered mental status, recent falls.    TECHNIQUE: Axial images of the head and coronal reformations without  IV contrast material. Radiation dose for this scan was reduced using  automated exposure control, adjustment of the mA and/or  kV according  to patient size, or iterative reconstruction technique.    COMPARISON: 8/12/2013.    FINDINGS: Moderate cerebral atrophy is present along with some mild  cerebellar atrophy. Patchy white matter changes are seen in both  hemispheres without mass effect. There is no evidence for intracranial  hemorrhage, mass effect, acute infarct, or a skull fracture.      Impression    IMPRESSION: Chronic changes. No evidence for intracranial hemorrhage  or any acute process.    BURAK MATOS MD   Glucose by meter   Result Value Ref Range    Glucose 133 (H) 70 - 99 mg/dL   Lactic acid   Result Value Ref Range    Lactic Acid 1.6 0.7 - 2.1 mmol/L   Procalcitonin   Result Value Ref Range    Procalcitonin 0.25 ng/ml   US Lower Extremity Venous Duplex Bilateral    Narrative    US LOWER EXTREMITY VENOUS DUPLEX BILATERAL  1/20/2017 2:44 PM    HISTORY: Bilateral pain and swelling.    TECHNIQUE: Color flow and spectral Doppler with waveform analysis  performed.    FINDINGS: Scan is somewhat limited due to edema and bandaging. Both  common femoral, superficial femoral, and popliteal veins are well seen  and appear normal. They have normal patency and compressibility. Deep  veins of the calves are not well visualized but where seen appear  patent. No definite evidence for deep venous thrombosis in either  lower extremity.      Impression    IMPRESSION: Normal bilateral lower extremity venous Doppler.         ERIC CARO MD

## 2017-01-20 NOTE — IP AVS SNAPSHOT
"    [facility] CARDIAC SPECIALTY CARE: [phone]                                              INTERAGENCY TRANSFER FORM - PHYSICIAN ORDERS   1/20/2017                   CHF Orders/Instructions for Nursing Home/TCU       Patient self - management For CHF Education Purposes  1.  Have the patient get a scale to put in their room and then use at home  2.  Post weight chart or calendar in room next to the scale   3.  If able, ask patient to weigh themselves daily first thing when they get up in the morning, before breakfast, with only their night gown on and record on the chart/calendar     Nursing care management orders  1. Record TCU admission weight   2. Record daily am weight, with same clothes every day (If in wheel chair, note weight of wheel chair)  3. Provide patient CHF education  4. Notify providers (NP/MD) if:     HR <50 bpm, SBP <90mmHg, or O2 Sat < 90%    Weight is up 3 lbs in 1 day or 5 lbs in 1 week from \"dry\" weight    Symptoms of CHF such as worsening dyspnea or leg edema  5. Keep legs elevated as needed for swelling, if leg swelling is uncomfortable or not controlled, apply ACE-wraps or Jobst stockings to bilateral lower extremities, knee high, prn edema. On in am and off at hs or discuss with physician.  6. Diet: 2 gm sodium cardiac diet, with additional diet modifications if ordered elsewhere  7. PT to notify RN if wheelchair equipment has been modified (change in weight which should also be noted on the patients weight calendar)     Follow up instructions and TCU Discharge planning  Call to make the following appointment(s) with [facility] clinic nurse at [phone] if discharged from [facility] or [facility], at [phone] if discharged from [facility] or at [phone] if discharged from [facility]. The following appointments should be made:   1.  NP/PA appointment 14 days after hospital discharge if still in " "TCU  2.  Reinforce instructions for home management of CHF including follow up in the  C.O.R.E. Clinic in 3-5 days after discharge from TCU, and give \"General Recommendations To Control Heart Failure When You Get Home (Give at DC from TCU)\" for patient to take home.     What is the CORE clinic?  The C.O.R.E (Cardiomyopathy, Optimization, Rehabilitation, Education) Clinic is a heart failure specialty clinic within the [facility] where you will work with specialized nurse practitioners dedicated to helping patients with heart failure carefully adjust medications, receive education and learn who and when to call if symptoms develop. They specialize in helping your better understand your condition, slowing progression of your disease, improving the length and quality of your life, helping you detect future heart problems before they become life threatening, and avoiding hospitalizations.             Hospital Admission Date: 2017  GIO BERRY   : 1929  Sex: Male        Attending Provider: Iman Hood MD     Allergies:  Codeine, Penicillins    Infection:  MRSA-Contact Isolation   Service:  HOSPITALIST    Ht:  --   Wt:  76.2 kg (167 lb 15.9 oz)   Admission Wt:  86.7 kg (191 lb 2.2 oz)    BMI:  23.44 kg/m 2   BSA:  1.95 m 2            Patient PCP Information     Provider PCP Type    Aniyah Magana MD General      ED Clinical Impression     Diagnosis Description Comment Added By Time Added    Pneumonia of left lower lobe due to infectious organism [J18.9] Pneumonia of left lower lobe due to infectious organism [J18.9]  Joe Martines MD 2017 12:14 PM    Altered mental status, unspecified altered mental status type [R41.82] Altered mental status, unspecified altered mental status type [R41.82]  Joe Martines MD 2017 12:14 PM    Hypoxia [R09.02] Hypoxia [R09.02]  Joe Martines MD 2017 12:14 PM    Falls frequently [R29.6] Falls frequently " [R29.6]  Joe Martines MD 1/20/2017 12:14 PM    Skin tear of right upper extremity [S41.109A] Skin tear of right upper extremity [S41.109A]  Joe Martines MD 1/20/2017 12:15 PM    Acute renal failure, unspecified acute renal failure type (H) [N17.9] Acute renal failure, unspecified acute renal failure type (H) [N17.9]  Joe Martines MD 1/20/2017 12:15 PM    Anemia, unspecified type [D64.9] Anemia, unspecified type [D64.9]  Joe Martines MD 1/20/2017 12:15 PM    Edema, unspecified type [R60.9] Edema, unspecified type [R60.9]  Joe Martines MD 1/20/2017 12:15 PM      Hospital Problems as of 2/1/2017              Priority Class Noted POA    Acute respiratory failure with hypoxia (H) Medium  1/20/2017 Yes      Non-Hospital Problems as of 2/1/2017              Priority Class Noted    Essential hypertension, benign High  12/4/2003    Testicular hypofunction Medium  12/4/2003    Lumbosacral spondylosis without myelopathy Medium  12/4/2003    Impotence of organic origin Low  3/31/2004    Lumbago Medium  3/15/2005    paresthesias Medium  10/20/2005    Calculus of kidney Medium  8/29/2006    Other malignant neoplasm of skin of trunk, except scrotum Medium  11/15/2007    Benign essential tremor Low  5/22/2009    Chronic diarrhea Medium  12/22/2009    Hyperlipidemia LDL goal <100 High  10/31/2010    Advanced directives, counseling/discussion Low  5/19/2011    Balance problem Medium  3/23/2012    Anemia High  3/26/2012    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 High  4/10/2012    Colon polyps Medium  2/6/2013    advanced small vessel disease (brain CT) High  2/13/2013    Transient ischemic attack (TIA), and cerebral infarction without residual deficits Medium  2/15/2013    Diffuse brain atrophy Medium  2/21/2013    Stroke (H) High  4/25/2013    Cerebral infarction (H) High  4/26/2013    Parkinsonism (H)   4/29/2013    Health Care Home   8/13/2013    Poor circulation of extremity (H)   1/24/2014    Renal  failure   3/30/2015    Primary pulmonary hypertension (H) Medium  4/13/2015    Venous (peripheral) insufficiency Medium  4/13/2015    Atrial fibrillation (H) Medium  4/13/2015    Physical deconditioning Medium  4/13/2015    Great toe amputation status (H)   Unknown    Aortic regurgitation   Unknown    Tricuspid regurgitation   Unknown    Chronic diastolic congestive heart failure (H) Medium  Unknown    Type 2 diabetes mellitus with diabetic chronic kidney disease (H) Medium  10/15/2015    Right-sided heart failure (H) Medium  Unknown    Anasarca Medium  2/8/2016    Hyperuricemia Medium  4/19/2016    Cardiorenal disease Medium  8/11/2016    CKD (chronic kidney disease) stage 4, GFR 15-29 ml/min (H) Medium  8/29/2016    Chronic atrial fibrillation (H) Medium  8/29/2016    Cellulitis Medium  11/14/2016    Wound, open, foot Medium  11/18/2016    Paralysis agitans (H) Medium  11/18/2016    Cough Medium  12/2/2016    Pneumonia Medium  12/24/2016      Code Status History     Date Active Date Inactive Code Status Order ID Comments User Context    2/1/2017 10:26 AM  DNR/DNI [id number]  Iman Hood MD Outpatient    1/27/2017  4:15 PM 2/1/2017 10:26 AM DNR/DNI [id number]  Gregorio Grove RN Inpatient    1/20/2017  5:18 PM 1/27/2017  4:15 PM Full Code [id number]  Iman Hood MD Inpatient    12/30/2016 10:46 AM 1/20/2017  5:18 PM Full Code [id number]  Justin Ortiz MD Outpatient    12/24/2016  5:26 AM 12/30/2016 10:46 AM Full Code [id number]  Ezekiel Oconnor MD Inpatient    11/17/2016 10:28 AM 12/24/2016  5:26 AM DNR/DNI [id number]  Tenzin Nieto MD Outpatient    11/14/2016 11:53 PM 11/17/2016 10:28 AM DNR/DNI [id number]  José Luis Betancur MD Inpatient    8/14/2016  8:02 AM 11/14/2016 11:53 PM Full Code [id number]  Mich Ann MD Outpatient    8/11/2016  5:55 PM 8/14/2016  8:02 AM Full Code [id number]  Nabor Gutierres MD Inpatient    1/21/2016  4:56 PM 1/28/2016  4:52 PM Full Code  [id number]  Andrew Maguire MD Inpatient    4/10/2015 10:07 AM 1/21/2016  4:56 PM Full Code [id number]  Oracio Walden MD Outpatient    3/30/2015 11:29 AM 4/10/2015 10:07 AM Full Code [id number]  Sadiq Arellano PA Inpatient    4/25/2013 10:31 PM 4/28/2013  5:27 PM Full Code [id number]  Cruz Camilo PA-C Inpatient    11/10/2003  4:36 PM 11/10/2003  4:36 PM  None  Emmy Crespo Demographics         Medication Review      START taking        Dose / Directions Comments    albuterol (2.5 MG/3ML) 0.083% neb solution   Used for:  Pneumonia of left lower lobe due to infectious organism, Hypoxia        Dose:  2.5 mg   Take 1 vial (2.5 mg) by nebulization every 2 hours as needed for wheezing or shortness of breath / dyspnea   Quantity:  360 mL   Refills:  0        bisacodyl 10 MG Suppository   Commonly known as:  DULCOLAX   Used for:  Drug-induced constipation        Dose:  10 mg   Place 1 suppository (10 mg) rectally every 72 hours as needed for constipation (If no BM for 3 days)   Quantity:  30 suppository   Refills:  0        ciprofloxacin 0.3 % ophthalmic solution   Commonly known as:  CILOXAN   Used for:  Acute bacterial conjunctivitis of both eyes        Dose:  1 drop   Place 1 drop into both eyes 4 times daily for 7 days   Quantity:  1 Bottle   Refills:  0        furosemide 40 MG tablet   Commonly known as:  LASIX   Used for:  Chronic diastolic congestive heart failure (H)        Dose:  40 mg   Take 1 tablet (40 mg) by mouth daily   Quantity:  30 tablet   Refills:  0        guaiFENesin 600 MG 12 hr tablet   Commonly known as:  MUCINEX   Used for:  Pneumonia of left lower lobe due to infectious organism        Dose:  600 mg   Take 1 tablet (600 mg) by mouth 2 times daily for 7 days   Quantity:  14 tablet   Refills:  0        ipratropium - albuterol 0.5 mg/2.5 mg/3 mL 0.5-2.5 (3) MG/3ML neb solution   Commonly known as:  DUONEB   Used for:  Pneumonia of left lower lobe due to infectious organism         Dose:  3 mL   Take 1 vial (3 mLs) by nebulization 4 times daily   Quantity:  360 mL   Refills:  0        senna-docusate 8.6-50 MG per tablet   Commonly known as:  SENOKOT-S;PERICOLACE   Used for:  Drug-induced constipation        Dose:  1 tablet   Take 1 tablet by mouth 2 times daily as needed for constipation   Quantity:  100 tablet   Refills:  0          CONTINUE these medications which may have CHANGED, or have new prescriptions. If we are uncertain of the size of tablets/capsules you have at home, strength may be listed as something that might have changed.        Dose / Directions Comments    HYDROmorphone 2 MG tablet   Commonly known as:  DILAUDID   This may have changed:  when to take this   Used for:  Wound, open, foot, left, subsequent encounter        Dose:  2 mg   Take 1 tablet (2 mg) by mouth every 8 hours as needed for moderate to severe pain   Quantity:  60 tablet   Refills:  0        insulin glargine 100 UNIT/ML injection   Commonly known as:  LANTUS   This may have changed:  how much to take   Used for:  IDDM (insulin dependent diabetes mellitus) (H)        Dose:  4 Units   Inject 4 Units Subcutaneous At Bedtime   Refills:  0        melatonin 1 MG Caps   This may have changed:    - medication strength  - when to take this  - reasons to take this   Used for:  Insomnia, unspecified type        Dose:  1 mg   Take 1 mg by mouth nightly as needed   Refills:  0          CONTINUE these medications which have NOT CHANGED        Dose / Directions Comments    acetaminophen 325 MG tablet   Commonly known as:  TYLENOL        Dose:  650 mg   Take 650 mg by mouth every 4 hours as needed for mild pain   Refills:  0        allopurinol 100 MG tablet   Commonly known as:  ZYLOPRIM   Used for:  Elevated uric acid in blood        Dose:  200 mg   Take 2 tablets (200 mg) by mouth daily   Quantity:  180 tablet   Refills:  3        aspirin 325 MG EC tablet        Dose:  325 mg   Take 1 tablet (325 mg) by mouth daily    Refills:  0        blood glucose monitoring lancets   Used for:  Type II or unspecified type diabetes mellitus with renal manifestations, not stated as uncontrolled        Use to test blood sugars 2 times daily or as directed.   Quantity:  1 Box   Refills:  5        blood glucose monitoring test strip   Commonly known as:  ONE TOUCH ULTRA   Used for:  Type 2 diabetes mellitus with diabetic chronic kidney disease, unspecified CKD stage        Use to test blood sugar 2 times daily or as directed.   Quantity:  100 each   Refills:  PRN        * carbidopa-levodopa  MG per tablet   Commonly known as:  SINEMET        Dose:  1 tablet   Take 1 tablet by mouth 1 tablet by mouth every 5 hours as needed for tremors   Refills:  0        * carbidopa-levodopa  MG per CR tablet   Commonly known as:  SINEMET CR   Used for:  Parkinson disease (H)        Dose:  1 tablet   Take 1 tablet by mouth 2 times daily   Quantity:  60 tablet   Refills:  3        carboxymethylcellulose 0.5 % Soln ophthalmic solution   Commonly known as:  REFRESH PLUS        Dose:  1 drop   Place 1 drop into both eyes every 4 hours as needed for dry eyes   Refills:  0        Cholecalciferol 5000 UNITS Tabs   Used for:  Brainstem stroke (H)        Dose:  5000 Units   Take 5,000 Units by mouth daily.   Quantity:  30 tablet   Refills:  0        co-enzyme Q-10 100 MG Caps capsule   Used for:  Brainstem stroke (H)        Dose:  100 mg   Take 1 capsule by mouth daily.   Quantity:  30 capsule   Refills:  0        eucerin cream   Used for:  Venous (peripheral) insufficiency        Apply topically 2 times daily   Quantity:  120 g   Refills:  0        ferrous sulfate 325 (65 FE) MG tablet   Commonly known as:  IRON        Dose:  325 mg   Take 325 mg by mouth daily (with lunch)   Refills:  0        GLUCERNA PO        Dose:  8 oz   Take 8 oz by mouth daily   Refills:  0        IMODIUM A-D PO        Dose:  2 mg   Take 2 mg by mouth 4 times daily as needed    Refills:  0        neomycin-bacitracin-polymyxin 5-400-5000 ointment   Used for:  Diabetic ulcer of left foot associated with type 2 diabetes mellitus (H)        Apply topically daily   Refills:  1        NOVOFINE 30 30G X 8 MM   Used for:  Type 2 diabetes mellitus with diabetic chronic kidney disease, unspecified CKD stage   Generic drug:  insulin pen needle        Use three times daily or as directed   Quantity:  100 each   Refills:  PRN        omeprazole 20 MG CR capsule   Commonly known as:  priLOSEC   Used for:  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        Take 1 capsule (20mg) by mouth once daily   Quantity:  90 capsule   Refills:  2        polyethylene glycol powder   Commonly known as:  MIRALAX/GLYCOLAX        Dose:  17 g   Take 17 g by mouth daily as needed for constipation   Refills:  0        pramipexole 0.5 MG tablet   Commonly known as:  MIRAPEX   Used for:  Restless legs syndrome (RLS)        Take 1 tablet by mouth once daily (with dinner)   Quantity:  90 tablet   Refills:  2        simvastatin 40 MG tablet   Commonly known as:  ZOCOR   Used for:  Hyperlipidemia LDL goal <100        Dose:  40 mg   Take 1 tablet (40 mg) by mouth At Bedtime   Quantity:  90 tablet   Refills:  3        * Notice:  This list has 2 medication(s) that are the same as other medications prescribed for you. Read the directions carefully, and ask your doctor or other care provider to review them with you.      STOP taking     ACE/ARB NOT PRESCRIBED (INTENTIONAL)           bumetanide 2 MG tablet   Commonly known as:  BUMEX           hydrALAZINE 25 MG tablet   Commonly known as:  APRESOLINE           METOLAZONE PO           metoprolol 25 MG 24 hr tablet   Commonly known as:  TOPROL-XL                     Further instructions from your care team       Patient will discharge to [facility] today via [facility] wheelchair at 18:15.  [facility]'s phone number is [phone].    Summary of Visit     Reason for your hospital stay        Acute hypoxic respiratory failure due to CHF exacerbation and Pneumonia. Acute renal failure on CKD.             After Care     Activity - Up with assistive device           Additional Discharge Instructions       To continue lymphedema treatment per instruction       Advance Diet as Tolerated       Follow this diet upon discharge: Orders Placed This Encounter     Snacks/Supplements Adult: Glucerna (Adult); With Meals  Combination Diet Regular Diet Adult; Thin Liquids (water, ice chips, juice, milk, gelatin, ice cream, etc); 2 gm NA Diet       Daily weights       Call Provider for weight gain of more than 2 pounds per day or 5 pounds per week.       Fall precautions           General info for SNF       Length of Stay Estimate: Short Term Care: Estimated # of Days <30  Condition at Discharge: Stable  Level of care:skilled   Rehabilitation Potential: Fair  Admission H&P remains valid and up-to-date: Yes  Recent Chemotherapy: N/A  Use Nursing Home Standing Orders: Yes       Glucose monitor nursing POCT       Before meals and at bedtime       Intake and output       Every shift       Mantoux instructions       Give two-step Mantoux (PPD) Per Facility Policy Yes       Wound care (specify)       Site:   legs  Instructions:  Per wound care             Procedures     Oxygen - Nasal cannula       3 Lpm by nasal cannula, titrate to keep O2 sats 90% or greater.             Referrals     Occupational Therapy Adult Consult       Evaluate and treat as clinically indicated.    Reason:  CHF. weakness       Physical Therapy Adult Consult       Evaluate and treat as clinically indicated.    Reason:  CHF, weakness             Your next 10 appointments already scheduled     Feb 16, 2017 12:20 PM   LAB with [facility]   [facility] [place] ([facility])    [address] [place] [id number]   [phone]           Patient must bring picture ID.  Patient should be prepared to  give a urine specimen  Please do not eat 10-12 hours before your appointment if you are coming in fasting for labs on lipids, cholesterol, or glucose (sugar).  Pregnant women should follow their Care Team instructions. Water with medications is okay. Do not drink coffee or other fluids.   If you have concerns about taking  your medications, please ask at office or if scheduling via [employer], send a message by clicking on Secure Messaging, Message Your Care Team.            Feb 16, 2017  1:10 PM   Core Return with AMARJIT Galeas CNP   [facility] [place] ([facility])    [address] [place] [id number] [phone]              Follow-Up Appointment Instructions     Future Labs/Procedures    Follow Up and recommended labs and tests     Comments:    Follow up with [facility] physician.  The following labs/tests are recommended: BMP/H&H in 3-5 days.    Cardiology and nephrology follow up as recommended.      Follow-Up Appointment Instructions     Follow Up and recommended labs and tests       Follow up with [facility] physician.  The following labs/tests are recommended: BMP/H&H in 3-5 days.    Cardiology and nephrology follow up as recommended.             Statement of Approval     Ordered          02/01/17 1446  I have reviewed and agree with all the recommendations and orders detailed in this document.   EFFECTIVE NOW     Approved and electronically signed by:  Iman Hood MD               General Recommendations To Control Heart Failure When You Get Home (Give at DC from TCU)     Instructions To Patients and Families: Please read and check off each of these important instructions as you do them when you get home.           Weight and symptoms      ___ Put a scale in your bathroom  ___ Post a weight chart or calendar next to the scale  ___Weigh yourself every day as soon as you you get up in the morning. You should only be wearing your  "jennifer. Write your weight on the chart/calendar.  ___ Bring your weight chart/calendar with you to all appointments    ___Call your doctor if you gain 2 pounds in 1 day or 5 pounds in 1 week from your \"dry\" weight (baseline weight). Also call your doctor if you have shortness of breath that gets worse over time, leg swelling or fatigue.         Medicines and diet     ___ Make sure to take your medicines as prescribed.    ___Bring a current list of your medicines and all of your medicine bottles with you to all appointments.    ___ Limit fluids if you still have swelling or shortness of breath, or if your doctor tells you to do so.  ___ Eat less than 2000 mg of sodium (salt) every day. Read food labels, and do not add salt to meals.   ___ Heart healthy diet with low fat and low cholesterol          Activity and suggested lifestyle changes    ___ Stay active. Talk to your doctor about an exercise program that is safe for your heart.    ___ Stop smoking. Reduce alcohol use.      ___ Lose weight if you are overweight. Extra weight puts a lot of stress on the heart.          Control for Leg Swelling   ___ Keep your legs elevated (raised) as needed for swelling. If swelling is uncomfortable or elevation doesn t help, ask your doctor about using ACE wrap or Jobst stockings.          Follow-up appointments   ___ Make a C.O.R.E. Clinic appointment with a basic metabolic panel lab draw 3 to 5 days after you leave the hospital. Call one of the following locations:   [facility]  [phone],  [facility] [phone],  [facility]  [phone].     ___ Make sure to take your medications as prescribed and bring an accurate list of your medications and your weight chart/calendar to your follow up appointment at the C.O.R.E. Clinic for continued education and adjustments          What is the CORE clinic?    The C.O.R.E (Cardiomyopathy, " Optimization, Rehabilitation, Education) Clinic is a heart failure specialty clinic within the [facility]. At C.O.R.E., you will work with nurse practitioners to carefully adjust medicines, get education and learn who and when to call if symptoms appear. C.O.R.E nurses specialize in helping you:    better understand your disease.    slow the progress of your disease.    improve the length and quality of your life.    detect future heart problems before they become life threatening.    avoid hospital stays.

## 2017-01-20 NOTE — PROGRESS NOTES
Clinic Care Coordination Contact  Care Team Conversations    Per chart review patient is still receiving home care services through [facility].  He will be getting daily SN visits for wound care.  Per chart review he needs to see cardiology and follow up with PCP.    Plan: I sent secure e-mail to Alda Douglass RN his home care  asking for an update.  I also advised her that he needs to schedule the above follow up appointments.  Will check on status in 2-3 weeks.    REYMUNDO Gilmore, RN, PHN  [facility] Care Coordinator  [phone]  January 20, 2017

## 2017-01-20 NOTE — IP AVS SNAPSHOT
"` `     [facility] CARDIAC SPECIALTY CARE: [phone]                 INTERAGENCY TRANSFER FORM - NOTES (H&P, Discharge Summary, Consults, Procedures, Therapies)   2017                    Hospital Admission Date: 2017  GIO SMITH   : 1929  Sex: Male        Patient PCP Information     Provider PCP Type    Aniyah Magana MD General      History & Physicals     No notes of this type exist for this encounter.      Discharge Summaries     No notes of this type exist for this encounter.         Consult Notes      Consults by Vadim Guan MD at 2017  7:30 AM     Author:  Vadim Guan MD Service:  Palliative Author Type:  Physician    Filed:  2017  3:01 PM Note Time:  2017  7:30 AM Status:  Addendum    :  Vadim Guan MD (Physician)      Related Notes: Original Note by Vadim Guan MD (Physician) filed at 2017  3:00 PM         [facility]  Palliative Care Consultation         Gio Smith MRN# [id number]   Age: 87 year old YOB: 1929   Date of Admission: 2017    Reason for consult: GOC       Requesting physician/service: Hospitalist/Dr Ortiz       Recommendations        He demonstrates consistency and capacity to make decisions about his code status.  He wants to pursue restorative cares short of attempts at resuscitation; he requests code status change to DNR/DNI.  His wife and daughter were present, and they agree.    There is apparently an old ACD somewhere at home that the wife is going to bring in.  Not sure if she actually will.  We discussed that current code status request supercedes previous ones, and that unit SW can help fill out current ACD.    POLST form is on file, dated , requesting full aggressive measures.  This needs to be updated to new preferences.    He has insight into the fact that he's \"taken a few steps back\" in his functional status since his recent TCU stay at [facility].  " Family agrees with this.  He's willing (at least at this time) to return to [facility] TCU to build strength after this hospitalization, but hopes to eventually return home. (He had been home for only 4 days after recent TCU discharge before getting re-admitted here for this hospitalization).    His only active symptom issue is pain L calf with dressing changes.  We agree with current order for po dilaudid 2 mg 30'-60' prior to wound care/dressing changes.     Disease Process/es & Symptoms   87-year-old man with PMHx notable for diabetes mellitus type 2, Parkinson disease, hypertension, CHF, atrial fibrillation, stroke history, gout, dyslipidemia, chronic anemia, and chronic kidney disease, who presented with shortness of breath and found to have evidence of left-sided pneumonia.     Support/Coping   Wife and adult daughter involved    Decision-Making & Goals of Care     Discussion/counseling today about prognosis/goals of care/decisions:   See above  Patient has a completed health care directive available in the chart (Y/N): No  Health care agent (only if patient has an available, complete HCD): NA  Physician orders for life-sustaining treatment (POLST) form is on file, dated 4/13, requesting full aggressive measures.  This needs to be updated to new preferences  Code Status: DNR/DNI   Patient has decision-making capacity (Y/N): Yes    Coordination of Care     Findings & plan of care discussed with: Primary team  Follow-up plan from palliative team: Will follow  Thank you for involving us in the patient's care.   Vadim Guan MD  Palliative Medicine Consult Team  Pager: [phone]   TT: 75 minutes, with > 50% spent in C/C/E patient/family/care teams re: GOC, POC, Sx management. [id number]            Chief Complaint     GOC planning         History of Present Illness     87-year-old man with PMHx notable for diabetes mellitus type 2, Parkinson disease, hypertension, CHF, atrial fibrillation, stroke history, gout,  dyslipidemia, chronic anemia, and chronic kidney disease, who presented with shortness of breath and found to have evidence of left-sided pneumonia.    Was hospitalized 11/14/16-11/17/16, then [facility]  He was discharged from TCU to home on 12/20/16, then on 12/24 re-admitted to [facility] with SOB, PNA, weakness, BALAJI on CKD.          Past Medical History:   Past Medical History   Diagnosis Date     Type II or unspecified type diabetes mellitus without mention of complication, not stated as uncontrolled      Essential hypertension, benign      Other testicular hypofunction      Lumbosacral spondylosis without myelopathy      Personal history of diseases of blood and blood-forming organs      Unspecified psychosexual disorder      Basal cell Ca      lg. excision abd. yrs ago; recurrence 12/03 (exc. by shave, DSJ)     Nodular lymphoma, unspecified site, extranodal and solid organ sites 1970     Non-Hodgkins (NHL)     Unspecified cerebral artery occlusion with cerebral infarction 2013     Hyperlipidaemia      Atrial fibrillation (H)      Chronic diastolic congestive heart failure (H)      EF=55-60% per echo 3/30/15     Pulmonary HTN (H)      Great toe amputation status (H)      Aortic regurgitation      mild, 1+ per echo 3/30/15     Tricuspid regurgitation      1-2+ per echo 3/30/15     CKD (chronic kidney disease)      Parkinson's disease (H)      Poor circulation of extremity 1/24/2014              Past Surgical History:   Past Surgical History   Procedure Laterality Date     Colonoscopy       Splenectomy  1970     Phacoemulsification clear cornea with standard intraocular lens implant  12/12/2013     Procedure: PHACOEMULSIFICATION CLEAR CORNEA WITH STANDARD INTRAOCULAR LENS IMPLANT;  RIGHT PHACOEMULSIFICATION CLEAR CORNEA WITH STANDARD INTRAOCULAR LENS IMPLANT ;  Surgeon: Dwayne Carver MD;  Location: [facility]     Phacoemulsification clear cornea with standard intraocular lens implant  12/30/2013      "Procedure: PHACOEMULSIFICATION CLEAR CORNEA WITH STANDARD INTRAOCULAR LENS IMPLANT;  LEFT PHACOEMULSIFICATION CLEAR CORNEA WITH STANDARD INTRAOCULAR LENS IMPLANT ;  Surgeon: Dwayne Carver MD;  Location: [facility]     Biopsy  skin bx for cancer     Amputate toe(s) Right 2015     Procedure: AMPUTATE TOE(S);  Surgeon: Leroy Bright DPM;  Location: [facility]     Hc right heart catheterization  2015     Severely elevated right-sided filling pressures and moderately elevated left-sided filling pressures; mild to moderate mixed pulmonaty HTN     Amputate toe(s)       Amputate toe(s) Right 10/20/2015     Procedure: AMPUTATE TOE(S);  Surgeon: Leroy Bright DPM;  Location: [facility]     Biopsy bone toe Right 10/20/2015     Procedure: BIOPSY BONE TOE;  Surgeon: Leroy Bright DPM;  Location: [facility]               Social/Spiritual History:     Living situation: Home with wife, adult daughter involved, recent long-term TCU stay.  Family system: Wife, daughter  Functional status (needs help with ADLs or IADLs): Assist of 2; lift  Employment/education: Retired x 30 years from             Family History:   Both parents are            Allergies:   Allergies   Allergen Reactions     Codeine      nausea vomiting     Penicillins      \" turned red from head to foot\"              Medications:   I have reviewed this patient's medication profile and medications during this hospitalization  Dilaudid 2 mg po prn; 2 doses yesterday, prior to dressing changes  Dopamine gtt  Insulin           Review of Systems:   The comprehensive review of systems is negative other than noted here and in the HPI.    Palliative Symptom Review (0=no symptom/no concern, 1=mild, 2=moderate, 3=severe):      Pain: 1-2 at rest, up to 3 with calf dressing changes      Fatigue: 1      Nausea: 0      Constipation: 0      Diarrhea: 0      Depressive Symptoms: 0      Anxiety: 0      Drowsiness: 0      Poor Appetite: 0      " Shortness of Breath: 1      Insomnia: 0      Other: 0      Overall (0 good/no concerns, 3 very poor):  2       Physical Exam:  VITALS:  Blood pressure 99/56, pulse 80, temperature 98.1  F (36.7  C), temperature source Oral, resp. rate 18, weight 77 kg (169 lb 12.1 oz), SpO2 96 %.   GEN: Awake, alert, interactive, chronically ill appearing, up in chair  EYES: Sclera non-icteric  EARS: Eumorphic bilaterally  NOSE: No significant nasal drainage  MOUTH: Oral mucosa moist, no evidence of thrush  LUNGS: No increased WOB or accessory muscle use; bibasilar crackles  CV: Irregular radial pulse 84  EXTR:1+ BLE edema, venous stasis changes, dressed wounds calves  SKIN: Warm, dry  NEUROPSYCH: Engaged, coherent thought process          Data Reviewed:     ROUTINE ICU LABS (Last four results)  CMP  Recent Labs  Lab 01/27/17  0600 01/26/17  0540 01/25/17  0543 01/24/17  0545 01/23/17  0515 01/22/17  0405 01/21/17  0420   * 148* 146* 146* 142 145* 143   POTASSIUM 3.9 3.6 3.9 4.0 4.1 4.6 5.3   CHLORIDE 105 106 105 105 101 104 104   CO2 37* 39* 39* 32 31 32 31   ANIONGAP 6 3 2* 9 10 9 8   GLC 51* 38* 97 136* 298* 93 88   BUN 67* 81* 106* 113* 118* 122* 110*   CR 2.01* 2.33* 2.88* 3.27* 3.58* 3.97* 3.71*   GFRESTIMATED 32* 27* 21* 18* 16* 14* 16*   GFRESTBLACK 38* 32* 25* 22* 20* 17* 19*   JARETH 7.9* 7.9* 8.0* 8.4* 8.2* 8.1* 7.9*   MAG  --   --   --   --  2.8* 2.9*  --    PHOS  --   --   --   --  6.3* 8.3*  --    PROTTOTAL  --   --   --   --  6.3* 6.3* 6.0*   ALBUMIN  --   --   --   --  2.3* 2.4* 2.3*   BILITOTAL  --   --   --   --  0.6 0.6 0.6   ALKPHOS  --   --   --   --  93 96 95   AST  --   --   --   --  133* 262* 287*   ALT  --   --   --   --  54 56 32     CBC  Recent Labs  Lab 01/27/17  0600 01/26/17  0540 01/25/17  1124 01/25/17  0543 01/24/17  2335   WBC 5.8 4.8  --  4.2 4.8   RBC 2.96* 2.99*  --  2.92* 2.92*   HGB 8.3* 8.4*  --  8.2* 8.3*   HCT 26.0* 26.4*  --  25.8* 25.9*   MCV 88 88  --  88 89   MCH 28.0 28.1  --  28.1  28.4   MCHC 31.9 31.8  --  31.8 32.0   RDW 16.9* 16.8*  --  16.6* 16.7*   PLT 98* 106* 112* 108* 108*     INR  Recent Labs  Lab 01/25/17  1124   INR 1.20*     Arterial Blood Gas  Recent Labs  Lab 01/20/17  1750   PH 7.31*   PCO2 57*   PO2 82   HCO3 29*                      Consults by Fabiana Torres RPH at 1/24/2017 12:01 PM     Author:  Fabiana Torres RPH Service:  Antimicrobial Management Team (AMT) Author Type:  Pharmacist    Filed:  1/24/2017 12:01 PM Note Time:  1/24/2017 12:01 PM Status:  Signed    :  Fabiana Torres RPH (Pharmacist)           [facility]  Antimicrobial Stewardship Team (AST) Note: Armand Smith  MRN:  [id number]            To:  Hospitalist  Unit:  [facility]  Allergies: PCN (turned red), codeine     Brief Summary: 87-year-old pleasant male with above-mentioned multiple medical problems, notably insulin-dependent diabetes mellitus, hypertension, left-sided diastolic and right-sided systolic dysfunction with 2+ tricuspid regurgitation, Parkinson's disease, chronic atrial fibrillation with history of stroke only on aspirin, chronic kidney disease stage IV, and chronic anemia who was brought to the ER after a mechanical fall and worsening shortness of breath for the past few days.  Acute hypoxic and hypercarbic respiratory failure.  This is multifactorial including diastolic CHF exacerbation, possible PNA and reactive airway disease. He has leukopenia, but no fever or cough, but procalcitonin was elevated/remains elevated. Chest x-ray c/w pulmonary congestion vs left lower lobe infiltrate as per report. Pro calcitonin was elevated.Given episode of hypotension in ER, presumed infection/sepsis, and started on broad spectrum abx-  IV Levaquin, meropenem and vancomycin for suspected healthcare-associated pneumonia.      -- Blood cultures negative so far, unable to collect sputum as not much sputum production.  -- Given his respiratory acidosis and somnolence, he was  maintained on BiPAP on admission, off since  am.  -- Wife reported cough when taking med with water, SLP eval done, no gross sign of aspiration, on Honey thick clear liquid diet. Given repeat episode of suspected PNA, ? Video swallow eval. Also ongoing wheezing for 2-3 days PTA, not better with diuresis and abx, and despite scheduled duo-nebs, no diagnosis of COPD but wheezing markedly improved with one dose steroid this am, will re eval in am and will consider a short course of steroid .  -- Congestive heart failure management as above.    -- Culture negative so far, dc vanco, continue levaquin and meropenem (will review with SLP, if video swallow not pursued, will treat with 5 days of meropenem for presumed aspiration as well).  -- Not on home O2, wean as able  Assessment: Recommend discontinue levofloxacin as no continued indication for double gram negative coverage.    Current Antibiotic therapy: Meropenem (- ) + Levaquin (-)    Clinical Features/Vital Signs (VS): WBC:  = 3.6,  = 4.3,  = 4.2,  = 3.1  Tmax: afebrile  Procal  = 0.25,  = 0.44    Culture Results:  Date Culture Site Organism    Blood x 2 -3d    Influenza negative    Sputum Not collected     Imagin/20 CXR: Marked cardiomegaly. There may be mild pulmonary vascular engorgement. Dense opacity at the posterior left lung base, consistent with pneumonia, likely worsened since the prior exam.     CXR: Marked cardiomegaly. There may be mild pulmonary vascular engorgement. Dense opacity at the posterior left lung base, consistent with pneumonia, likely worsened since the prior exam.    Recommendation/Interventions:   1).  D/c levofloxacin  2).    3).    Discussed w/ ID Staff-Dr. Noel Torres, PharmD, BCPS         Consults signed by Yasir Vick MD at 2017  7:42 PM      Author:  Yasir Vick MD Service:  Cardiology Author Type:  Physician    Filed:  2017  7:42 PM Note Time:   1/21/2017  3:16 PM Status:  Signed    :  Yasir Vick MD (Physician)           CARDIOVASCULAR CONSULTATION      DATE OF CONSULTATION:  01/21/2017 at 2:00 p.m..      REQUESTING PHYSICIAN:  Iman Hood MD      CHIEF COMPLAINT:  History of atrial fibrillation and acute diastolic heart failure.      HISTORY OF PRESENT ILLNESS:  I had the pleasure evaluating Mr. Armand Smith for his history of atrial fibrillation and for acute on chronic diastolic heart failure.  Mr. Smith is a very pleasant 87-year-old gentleman with a past medical history most notable for insulin-dependent diabetes mellitus, hypertension, diastolic heart failure, tricuspid valve regurgitation, Parkinson's disease, chronic atrial fibrillation, not on anticoagulation, chronic kidney disease and anemia who was brought to the ED by his wife for worsening lower extremity edema and a mechanical fall.      The patient and his wife reports that over the past several days, Mr. Smith is becoming increasingly dyspneic.  He has also been having worsening orthopnea.  Yesterday on the day of admission, the patient was ambulating with a rolling walker and fell.  In speaking with them, it appears that this was a mechanical fall.  The patient did not suffer head trauma and did not lose consciousness.  They have also been noting over the past several days, worsening lower extremity edema.  The patient reports being adherent with his home dose of bumetanide.      The patient's respiratory status: The patient was placed on BiPAP yesterday for increased work of breathing and hypoxemia.  The patient was also noted to be hypotensive with systolic blood pressures in the 80s, but did not require vasopressors.      His initial 12-lead ECG was notable for atrial fibrillation with right bundle branch block and inferior inferolateral T-wave abnormality, no change from prior.  His most recent transthoracic echocardiogram was on [facility] on 2016 and was  notable for preserved LV systolic function with an ejection fraction of 60%-65%.  There were no regional wall motion abnormalities.  The RV was moderately dilated with mildly decreased systolic function.  RVSP was mildly elevated.  There was moderate tricuspid valve regurgitation and ascending aortic dilatation.  This echo actually was notable for improved right heart pressures when compared to his echo in 08/2016.  The patient had a 2-view chest x-ray which was notable for a dense left lower lobe consolidation as well as cardiomegaly and increased pulmonary vasculature.  CT head was negative for acute an abnormality.  Lower extremity Dopplers were performed and were negative for DVT.      REVIEW OF SYSTEMS:  A 10-point review of systems was performed and is negative unless mentioned in the HPI.      PAST MEDICAL HISTORY:   1.  Insulin-dependent diabetes mellitus.   2.  Hypertension.   3.  Diastolic heart failure.   4.  Chronic atrial fibrillation, not on anticoagulation due to gastrointestinal bleed and epistaxis.   5.  Parkinson's disease.   6.  History of stroke.   7.  CKD with baseline creatinine of 2.6.   8.  Chronic anemia.   9.  Toe osteomyelitis, status post toe amputation in 2015.      ALLERGIES:  Codeine and penicillin.      SOCIAL HISTORY:  The patient is a former smoker but has quit.  He denies significant drug and alcohol abuse.      FAMILY HISTORY:  Reviewed and was noncontributory to this admission.      PHYSICAL EXAMINATION:   VITAL SIGNS:  Temperature 96.8, a pulse 73, respiratory rate 25, blood pressure 104/51, oxygen saturation 93% on facemask.   GENERAL:  The patient is awake, alert and conversant and quite pleasant.   HEENT:  No scleral icterus.   NECK:  Mildly elevated jugular venous pressure.   LUNGS:  Coarse breath sounds bilaterally with scattered expiratory wheeze.   CARDIOVASCULAR:  Regularly irregular.  There is a 2/6 holosystolic murmur heard along the left sternal border.   EXTREMITIES:   Bilateral lower extremity edema.  I had the chance to look at his legs in the middle of the dressing change and speaking with the patient and his family the edema has been significantly improved since admission.  I was unable to palpate pedal pulses.   SKIN:  Warm, dry and intact.   PSYCHIATRIC:  Calm and pleasant.      ASSESSMENT AND PLAN:   1.  Acute on chronic diastolic heart failure.   2.  Hypoxemic respiratory failure, multifactorial.   3.  Chronic kidney disease.   4.  Hypertension.   5.  Diabetes mellitus.      I had the pleasure evaluating Mr. Armand Smith for his acute on chronic diastolic heart failure exacerbation.  Mr. Smith has already been initiated on intravenous diuretics by the primary service.  He received 2 doses of IV furosemide.  By I's and O's, the patient was negative 700 mL yesterday.  Weight today is 83.2 kilograms.  According to the patient and his family his lower extremity edema has improved as has his, respiratory status.  He is no longer requiring BiPAP to breathe.  By I's and Os, it does not appear that he has had significant diuresis but by the improvement of his symptoms I have opted to continue with furosemide 40 IV b.i.d. for the time being.  If this is inadequate, we can always increase it either tonight or tomorrow morning.  He had a mildly elevated troponin without new changes or chest pain.  This likely represents demand in the setting of acute on chronic diastolic heart failure in addition to underlying CKD.      Thank you for involving us in Mr. Guzman's care.  We will continue to follow along with you.         STEPHEN FARNSWORTH MD             D: 2017 15:16   T: 2017 15:59   MT: DAVE      Name:     ARMAND SMITH   MRN:      [id number]        Account:       [id number]   :      1929           Consult Date:  2017      Document: [id number]         Consults by Niurka Weiss at 2017  8:56 AM     Author:  Niurka Weiss Service:  Nutrition Author Type:  " Dietetic Intern    Filed:  1/23/2017  9:16 AM Note Time:  1/23/2017  8:56 AM Status:  Attested    :  Niurka Weiss (Dietetic Intern) Cosigner:  Yoli Nielson RD, LD at 1/23/2017  2:20 PM    Attestation signed by Yoli Nielson RD, LD at 1/23/2017  2:20 PM        I have reviewed the note below.    Yoli Nielson RD, LD  Clinical Dietitian - [facility]  Pager - [phone]                          REASON FOR ASSESSMENT:  CHF Consult for 2 gm NA Diet Education    NUTRITION HISTORY:  Information obtained from pt and his wife, [place]:   - Pt has follows a \"low-salt\" diet for 20+ years.   - Pt's wife does all the cooking and shopping. She states she is very mindful of sodium content and anything she can buy \"low-salt\" she does. Pt's wife is familiar with reading nutrition labels.  - Pt's wife reports reducing salt content of all her recipes to 1/8th the original amount.   - Pt's typical intake is as follows: (B) cornflakes with 1% milk or bagel or muffin; (L) Healthy choice canned soup; (D) spaghetti and meat sauce made from low-sodium canned red sauce or stir-bryant without soy sauce.  - Pt rarely dines out.    CURRENT DIET:  CL, Honey-thickened liquids and 1500 mL fluid restriction.     NUTRITION DIAGNOSIS:  No nutrition diagnosis at this time.     INTERVENTIONS:    Nutrition Prescription:  CL, honey-thickened and 1,500 mL fluid restriction.   Upon d/c, pt to follow 2 gm Na+ diet.     Implementation:    Nutrition Education (Content):  a) Wife declined handouts as she has received these in the past and reports she is familiar with their contents.  b) Discussed rational for limiting Na for CHF and stressed importance of following 2 gm Na guidelines     Nutrition Education (Application):  a) Discussed current eating habits and recommended alternative food choices - particularly, canned soup. Encouraged wife to read nutrition labels and compare sodium content of different soup " brands.    Anticipated good compliance - Wife has experience shopping/cooking for a low sodium diet.    Diet Education - refer to Education Flowsheet    Goals:    Patient verbalizes understanding of diet by stating he is aware he needs to limit his sodium intake.    All of the above goals met during the education session    Follow Up:    Provided RD contact information for future questions.    Recommend Out-Patient Nutrition Referral, if further diet instructions are needed.      Amaya Weiss  Dietetic Intern           Consults by Diana Robb RN at 1/23/2017 10:04 AM     Author:  Diana Robb RN Service:  (none) Author Type:  Registered Nurse    Filed:  1/23/2017 10:08 AM Note Time:  1/23/2017 10:04 AM Status:  Signed    :  Diana Robb RN (Registered Nurse)       Consult Orders:    1. [facility] Evaluation IP Consult: Patient to be seen: Routine - within 24 hours; Outpatient [facility] Evaluation; Consultant may enter orders: Yes [[id number]] ordered by Iman Hood MD at 01/20/17 1340                [facility] Referral received from Dr. Hood. Patient is currently established in the [facility]. C[facility] education to be given/reinforced by hospital nurse. Nutrition consult noted. Hospital follow up appointments arranged. Of note, pt has been overdue for follow up in clinic. We have tried multiples to schedule this with patient and wife, and they declined.    [facility] appointment made for:   Thursday, February 2nd, 2017 at 2:10pm for non fasting labs, and 3:10pm to see Cathie Salas NP in [place]    Diana Robb RN  [facility] nurse  [phone]      [facility]: Cardiomyopathy, Optimization, Rehabilitation, Education   The [facility] is a heart failure specialty clinic within the [facility] where you will work with specialized nurse practioners, physician assistants, doctors and registered nurses. They are dedicated to helping patients with  heart failure to carefully adjust medications, receive education, and learn who and when to call if symptoms develop. They specialize in helping you better understand your condition, slow the progression of your disease, improve the length and quality of your life, help you detect future heart problems before they become life threatening, and avoid hospitalizations.         Consults signed by Joe Kc MD at 1/22/2017 11:18 AM      Author:  Joe Kc MD Service:  Nephrology Author Type:  Physician    Filed:  1/22/2017 11:18 AM Note Time:  1/21/2017  4:24 PM Status:  Signed    :  Joe Kc MD (Physician)           NEPHROLOGY CONSULTATION      DATE OF SERVICE:  01/21/2017      REQUESTING PHYSICIAN:  Dr. Hood.      PRIMARY CARE PHYSICIAN:  Dr. Magana with [facility] in [place] [place]      REASON FOR CONSULTATION:  Armand Smith is an 87-year-old man whom we are asked to see to assist in the evaluation and management of worsening chronic kidney disease.      HISTORY OF PRESENT ILLNESS:  Mr. Smith was admitted yesterday after he had a fall at home; he has been generally more short of breath and weaker in the past few days. Mr. Smith has a complicated past medical history that is well described already in earlier notes.  Mr. Smith has chronic kidney disease, stage IV, with a baseline creatinine between 2.5 and 3.  He has been followed in our office in the past, and we have provided inpatient nephrology care during previous hospitalizations.  He was last in our office in 09/2016 where he was seen by nurse practitioner, Janet Small. The last hospitalization with which our group was involved was in 08/2016 when he was seen by Dr. Moore.  During a hospitalization last January he was seen by Dr. Beck.      PAST MEDICAL HISTORY: Mr. Smith' history is noteworthy for atherosclerotic heart disease with chronic atrial fibrillation and congestive heart  failure.  He has type 2 diabetes and hypertension.  He is treated for parkinsonism.  There is a past history of non-Hodgkin's lymphoma in the distant past treated with radiation and splenectomy, with no evidence of activity of this problem.  He has chronic anemia.  He has a history of gout.  He is not on anticoagulation other than aspirin for his atrial fibrillation due to his fall risk.      HOME MEDICATIONS:  At this time include a diuretic regimen with bumetanide 2 mg daily and metolazone 2.5 mg twice weekly.  Other noteworthy medications include Sinemet for his parkinsonism, low-dose allopurinol for gout prevention, simvastatin for lipid control, Lantus and Humalog insulin for diabetes management, and a combination of metoprolol and hydralazine, combined with the above-mentioned diuretics, for hypertension and heart failure management.      When Mr. Smith was last seen in our office in September, BUN and creatinine were 84 and 3.1.  More recently prior to this admission, results have been somewhat better.  In December, BUN was about 80 and creatinine 2.7 with satisfactory electrolytes. Creatinine values during past admissions with heart failure exacerbation have been as high as 3.2.  Mr. Smith at baseline has bland urine with minimal proteinuria.  The most recent is from yesterday on admission.  There was trace protein with an otherwise negative dipstick.  The sediment was negative except for the presence of numerous hyaline casts.  Proteinuria has been quantitated in the past and has been found to be about 500 mg per gram of creatinine about 6 months ago.  Imaging of the kidneys was done most recently with ultrasound approximately 18 months ago.  There was no hydronephrosis on that most recent ultrasound.  A left renal cyst was noted, which has been seen before.  There were no other significant findings.      Diabetic control has been satisfactory.  A month ago, hemoglobin A1c was 7.0.  There have been no  significant liver abnormalities in the past, though liver function tests on admission here show an isolated elevation of AST, with normal ALT, alkaline phosphatase and bilirubin.  Serum albumin is chronically low at less than 3; it is 2.3 on admission.  Yesterday's BUN and creatinine in the emergency room were 105 and 3.5.  Electrolytes were normal, although potassium was borderline high at 5.1.  Today's values are somewhat worse.  BUN and creatinine 110 and 3.71.  Electrolytes include potassium 5.3.      Mr. Smith has been placed on intravenous furosemide 40 mg twice daily.  His oral diuretics have been withheld.  He has been continued on insulin.  Both metoprolol and hydralazine have been withheld because of somewhat soft blood pressure.  Sinemet has been continued.  He has been placed empirically on Levaquin and meropenem for the possibility of community-acquired pneumonia.  There is on chest x-ray marked cardiomegaly as before as well as a questionable left-sided infiltrate plus some increase in pulmonary vascular markings.      Since admission, urine output has been fair.  In the last 8 hours, Mr. Smith has made nearly a liter of urine.      PHYSICAL EXAMINATION:   GENERAL:  Mr. Smith is an alert, frail-appearing man.  He is in no acute distress.   VITAL SIGNS:  Temperature is normal.   Pulse is about 70-80, with atrial fibrillation.  Blood pressure is /50-55.  Respirations are 16 and do not seem labored.  With an OxyMask at 4 liters per minute, saturation is 91%.   SKIN:  Shows satisfactory color and turgor.  There are no significant skin lesions.   HEENT AND NECK:  Unremarkable.  I detect no obvious pulmonary vascular congestion.  LUNGS:  Demonstrate crackles at each base.   HEART:  Unremarkable aside from the irregular rhythm.  No murmur, rub or gallop are noted.   ABDOMEN:  Soft.  Bowel sounds are normally active.  There is no tenderness.  No masses or organomegaly are appreciated.   EXTREMITIES:   Have wrapped dressings around the lower legs.  There is 2 to 3+ edema.   NEUROLOGIC:  Demonstrates no obvious focal deficit.      ASSESSMENT:  Mr. Smith' acute renal failure on top of what is already advanced stage IV chronic kidney disease seems most likely to be on the basis of decompensated congestive heart failure.  I agree with the plan for aggressive diuretic therapy at this time.  An echocardiogram has not been obtained during this admission, but that from a month ago showed preserved left ventricular systolic function with an ejection fraction of 60% to 65%.  There was pulmonary hypertension, right ventricular dilatation, decreased right ventricular systolic function, and tricuspid regurgitation as well.  There were no other significant abnormalities.      We will closely monitor the patient's response to diuretic therapy in hopes that that might improve cardiac performance and lead to more satisfactory effective renal perfusion.      Thank you for the opportunity to assist again in Mr. Smith' care.  We will follow him during the remainder of his hospital stay as appropriate.         ABIGAIL KC MD             D: 2017 16:24   T: 2017 18:26   MT: BOBY      Name:     GIO SMITH   MRN:      [id number]        Account:       [id number]   :      1929           Consult Date:  2017      Document: [id number]       cc: Aniyah Hood MD        Consults by Abigail Kc MD at 2017  4:04 PM     Author:  Abigail Kc MD Service:  Nephrology Author Type:  Physician    Filed:  2017  4:04 PM Note Time:  2017  4:04 PM Status:  Signed    :  Abigail Kc MD (Physician)       Consult Orders:    1. Nephrology IP Consult: Patient to be seen: Routine - within 24 hours; acute on chronic renal failure, uremia, drowsiness.; Consultant may enter orders: Yes [[id number]] ordered by Iman Hood MD at  01/20/17 1346                See dictation.    Joe Kc MD  Nephrology; [facility]  [phone]         Consults by Landry Bowie MD at 1/21/2017 11:09 AM     Author:  Landry Bowie MD Service:  ICU Author Type:  Physician    Filed:  1/21/2017 11:43 AM Note Time:  1/21/2017 11:09 AM Status:  Addendum    :  Landry Bowie MD (Physician)      Related Notes: Original Note by Landry Bowie MD (Physician) filed at 1/21/2017 11:24 AM     Consult Orders:    1. Intensivist IP Consult: Patient to be seen: Routine - within 24 hours; Hypotension, possible sepsis vs from medications. discussed with Dr Bowie.; Consultant may enter orders: Yes [[id number]] ordered by Iman Hood MD at 01/20/17 1536                Critical Care Progress Note      01/21/2017    Name: Armand Smith MRN#: [id number]   Age: 87 year old YOB: 1929     [facility] Day# 1  ICU DAY #    MV DAY #             Problem List:   Active Problems:    Acute respiratory failure with hypoxia (H)    87 year old male with PMHx of CHF, CKD, Type II DM, atrial fibrillation on ASA for anticoagulation, and parkinson's disease who presents on 1/20/17 after sustaining a fall at home and developing worsening confusion, SOB and productive cough. Found to have a left lower lobe PNA and pulmonary congestion on CXR. Patient being admitted to the ICU due to continued hypotension not currently requiring pressors.     1/21/2017: Doing much better, no hypotension, stable on bipap. We will sign off.     Neuro: Patient interactive, follows commands  1. Frequent falls at home and acute confusion/AMS. CT head with no evidence of acute pathology.     2. Parkinson's disease    3. Acute pain  4.   Plan:  -- Neuro checks Q4  -- tylenol PRN    CV: acute CCF  1. Blood pressures have been relatively stable   2. Elevated troponin. Trop 0.11 on admission.    3. HFpEF. Most recent Echo with EF 60-65%, moderately dilated R  ventricle, mild pulmonary HTN, mildly decreased right ventricular systolic function, moderate tricuspid regurg. BNP 6608.  4. Chronic Atrial fibrillation on ASA for anticoagulation.    Plan:  -- cardiology consult    -- trop Q6 hours    -- continue PTA ASA  -- lasix: judicious use of diuretics  -- PRN metoprolol for HR >120bpm  -- PICC line, will start NE is patient requires pressors to maintain MAPs >65  -- 1500 cc fluid restriction     Resp:  1. Acute hypoxic, hypercapnic resp failure. CXR shows pulmonary vascular engorgement and left lung base opacity. ABG 7.23/64/25/27. Repeat ABG yesterday is 7.31/57/82/29/  Plan:  -- scheduled duo nebs, albuterol PRN  -- BiPAP to be changed to 12/8  -- Abx and lasix     GI/Nutrition  1. GERD  Plan:  -- can restart feeds    Renal  1. Acute on chronic kidney disease, CKD stage III-IV. Cr 3.50 from baseline 2.6-2.9.     Plan:  -- Nephrology consulted     Endocrine  1. Type II DM. On lantus at baseline  Plan:  --  SSI  -- 10 units lantus QHS    Heme:  1. Normocytic anemia    2. Leukopenia  Plan:  -- Monitor hemoglobin.  -- Transfuse to keep > 7.0    ID  1. LA and Procalcitonin WNL.    2. Left lower lobe PNA  Plan:  - AF/U blood and sputum cultures, influenza swab  - continue vanc, levofloxacin, meropenem (will stop after 3 days) if cultures negative    MKS  1. Lymphedema  Plan:  - lymphedema wraps     General cares:  DVT Prophylaxis: Pneumatic Compression Devices  GI Prophylaxis: Not indicated  Restraints: Restraints for medical healing needed: NO  Family update by me today: No  Current lines are required for patient management  Access: PICC         Key Medications:       insulin glargine  10 Units Subcutaneous At Bedtime     pantoprazole  40 mg Intravenous QAM AC     insulin aspart  1-6 Units Subcutaneous Q4H     furosemide  40 mg Intravenous BID     ipratropium - albuterol 0.5 mg/2.5 mg/3 mL  3 mL Nebulization 4x daily     aspirin  300 mg Rectal Daily    Or     aspirin EC  325  mg Oral Daily     sodium chloride (PF)  3 mL Intracatheter Q8H     meropenem  500 mg Intravenous Q12H     sodium chloride (PF)  10 mL Intracatheter Q8H     [START ON 1/22/2017] levofloxacin  500 mg Intravenous Q48H     vancomycin place johnson - receiving intermittent dosing  1 each Does not apply See Admin Instructions       Reason ACE/ARB order not selected       - MEDICATION INSTRUCTIONS -       - MEDICATION INSTRUCTIONS -                 Physical Examination:   Temp:  [95.9  F (35.5  C)-98.7  F (37.1  C)] 97  F (36.1  C)  Heart Rate:  [65-82] 81  Resp:  [14-39] 30  BP: ()/(44-69) 95/53 mmHg  FiO2 (%):  [40 %-50 %] 40 %  SpO2:  [85 %-100 %] 96 %    Intake/Output Summary (Last 24 hours) at 01/21/17 1110  Last data filed at 01/21/17 0800   Gross per 24 hour   Intake    100 ml   Output    855 ml   Net   -755 ml     Wt Readings from Last 4 Encounters:   12/30/16 90.5 kg (199 lb 8.3 oz)   12/20/16 83.235 kg (183 lb 8 oz)   12/16/16 82.328 kg (181 lb 8 oz)   12/12/16 81.194 kg (179 lb)     BP - Mean:  [56-82] 72  Location:  [-]   FiO2 (%): 40 %  Resp: 30    Recent Labs  Lab 01/20/17  1750   PH 7.31*   PCO2 57*   PO2 82   HCO3 29*       GEN: no acute distress   HEENT: head ncat, sclera anicteric, OP patent, trachea midline   PULM: unlabored synchronous with vent, clear anteriorly    CV/COR: RRR S1S2 no gallop,  No rub, no murmur  ABD: soft nontender, hypoactive bowel sounds, no mass  EXT:  Edema   warm  NEURO: grossly intact  SKIN: no obvious rash  LINES: clean, dry intact         Data:   All data and imaging reviewed     ROUTINE ICU LABS (Last four results)  CMP  Recent Labs  Lab 01/21/17  0420 01/20/17  0940    141   POTASSIUM 5.3 5.1   CHLORIDE 104 102   CO2 31 27   ANIONGAP 8 12   GLC 88 185*   * 105*   CR 3.71* 3.50*   GFRESTIMATED 16* 17*   GFRESTBLACK 19* 20*   JARETH 7.9* 8.6   PROTTOTAL 6.0*  --    ALBUMIN 2.3*  --    BILITOTAL 0.6  --    ALKPHOS 95  --    *  --    ALT 32  --       CBC  Recent Labs  Lab 01/21/17  0420 01/20/17  0940   WBC 4.3 3.6*   RBC 2.54* 2.99*   HGB 7.2* 8.6*   HCT 22.7* 26.6*   MCV 89 89   MCH 28.3 28.8   MCHC 31.7 32.3   RDW 17.2* 17.5*   * 152     INRNo lab results found in last 7 days.  Arterial Blood Gas  Recent Labs  Lab 01/20/17  1750   PH 7.31*   PCO2 57*   PO2 82   HCO3 29*       All cultures:    Recent Labs  Lab 01/20/17  1311   CULT No growth after 16 hours  No growth after 16 hours     Recent Results (from the past 24 hour(s))   CT Head w/o Contrast    Narrative    CT SCAN OF THE HEAD WITHOUT CONTRAST   1/20/2017 12:19 PM     HISTORY: Altered mental status, recent falls.    TECHNIQUE: Axial images of the head and coronal reformations without  IV contrast material. Radiation dose for this scan was reduced using  automated exposure control, adjustment of the mA and/or kV according  to patient size, or iterative reconstruction technique.    COMPARISON: 8/12/2013.    FINDINGS: Moderate cerebral atrophy is present along with some mild  cerebellar atrophy. Patchy white matter changes are seen in both  hemispheres without mass effect. There is no evidence for intracranial  hemorrhage, mass effect, acute infarct, or a skull fracture.      Impression    IMPRESSION: Chronic changes. No evidence for intracranial hemorrhage  or any acute process.    BURAK LAGOS MD   US Lower Extremity Venous Duplex Bilateral    Narrative    US LOWER EXTREMITY VENOUS DUPLEX BILATERAL  1/20/2017 2:44 PM    HISTORY: Bilateral pain and swelling.    TECHNIQUE: Color flow and spectral Doppler with waveform analysis  performed.    FINDINGS: Scan is somewhat limited due to edema and bandaging. Both  common femoral, superficial femoral, and popliteal veins are well seen  and appear normal. They have normal patency and compressibility. Deep  veins of the calves are not well visualized but where seen appear  patent. No definite evidence for deep venous thrombosis in either  lower  extremity.      Impression    IMPRESSION: Normal bilateral lower extremity venous Doppler.         ERIC CARO MD   XR Chest Port 1 View    Narrative    PORTABLE CHEST ONE VIEW  1/20/2017  7:13 PM     COMPARISON: Two-view chest x-ray same day.    HISTORY: RN placed PICC - verify tip placement.      Impression    IMPRESSION: There has been interval placement of a right PICC line  with its tip in the right atrium. Withdrawal of the PICC line by at  least 3 cm is recommended to place the tip in the low superior vena  cava.    Dense opacification of the left lower chest possibly representing  pleural fluid, atelectasis or pneumonia again noted. The cardiac  silhouette is difficult to evaluate but is at least mildly enlarged.  The left upper lung is clear. The right lung is clear. There is no  pneumothorax.    ERIC HANNON MD         Billing: This patient is critically ill: Yes. Total critical care time today 30 min.               Consults by Leni Luis RD, LD at 1/21/2017  8:57 AM     Author:  Leni Luis RD, LD Service:  Nutrition Author Type:  Registered Dietitian    Filed:  1/21/2017  9:01 AM Note Time:  1/21/2017  8:57 AM Status:  Signed    :  Leni Luis RD, LD (Registered Dietitian)       Consult Orders:    1. Nutrition Services Adult IP Consult [[id number]] ordered by Iman Hood MD at 01/20/17 1340                CLINICAL NUTRITION SERVICES BRIEF NOTE    Standard nutrition consult received for CHF diet teaching. Pt on BiPAP, requiring ICU cares.    Note pt has received CHF diet teaching multiple times in the past and appears to adhere to a low Na diet.     RD will follow-up for education needs once appropriate.     Leni Luis RD, LD       Consults by Heidi Covarrubias PA-C at 1/20/2017  3:46 PM     Author:  Heidi Covarrubias PA-C Service:  ICU Author Type:  Physician Assistant - C    Filed:  1/20/2017  5:36 PM Note Time:  1/20/2017  3:46 PM Status:  Attested    :  Heidi Covarrubias PA-C  (Physician Assistant - C) Cosigner:  Landry Bowie MD at 1/20/2017  7:36 PM    Attestation signed by Landry Bowie MD at 1/20/2017  7:36 PM        Physician Attestation  I, Landry Bowie, have reviewed and discussed with the advanced practice provider their history, physical and plan for Armand Smith. I did not participate in a shared visit by interviewing or examining the patient and this should be billed as an advanced practice provider only visit.    Landry Bowie  Date of Service (when I saw the patient): I did not personally see this patient today.                        [facility]    History and Physical/Consult  Critical Care Service     Date of Admission:  1/20/2017  Date of Service (when I saw the patient): 01/20/2017    Assessment and Plan  Armand Smith is a 87 year old male with PMHx of CHF, CKD, Type II DM, atrial fibrillation on ASA for anticoagulation, and parkinson's disease who presents on 1/20/17 after sustaining a fall at home and developing worsening confusion, SOB and productive cough. Found to have a left lower lobe PNA and pulmonary congestion on CXR. Patient being admitted to the ICU due to continued hypotension not currently requiring pressors.     Neuro  1. Frequent falls at home and acute confusussion/AMS. CT head with no evidence of acute pathology.    2. Parkinson's disease   3. Acute pain  4. Sedation  Plan:  -- Neuro checks Q4  -- tylenol PRN    CV  1. Hypotension- MAPs 67-69  2. Elevated troponin. Trop 0.11 on admission.   3. HFpEF. Most recent Echo with EF 60-65%, moderately dilated R ventricle, mild pulmonary HTN, mildly decreased right ventricular systolic function, moderate tricuspid regurg. BNP 6608.  4. Atrial fibrillation on ASA for anticoagulation.   Plan:  -- cardiology consult   -- trop Q6 hours   -- continue PTA ASA  -- lasix 40mg BID  -- PRN metoprolol for HR >120bpm  -- PICC line, will start NE is patient requires pressors to maintain  MAPs >65  -- 1500 cc fluid restriction     Resp:  1. Acute hypoxic, hypercapnic resp failure. CXR shows pulmonary vascular engorgement and left lung base opacitiy. ABG 7.23/64/25/27.   Plan:  -- scheduled duonebs, albuterol PRN  -- BiPAP 10/8  -- Abx and lasix     GI/Nutrition  1. GERD  Plan:  -- NPO    Renal  1. Acute on chronic kidney disease, CKD stage III-IV. Cr 3.50 from baseline 2.6-2.9.    Plan:  -- Nephrology consulted     Endocrine  1. Type II DM. On lantus at baseline   Plan:  --  SSI  -- 10 units lantus QHS    Heme:  1. Normocytic anemia   2. Leukopenia   Plan:  -- Monitor hemoglobin.  -- Transfuse to keep > 7.0    ID  1. LA and procalcitionin WNL.   2. Left lower lobe PNA  Plan:  - F/U blood and sputum cultures, influenza swab  - continue vanc, levofloxacin, meropenem     MSK  1. Lymphedema  Plan:  - lymphedema wraps     General cares:  DVT Prophylaxis: Pneumatic Compression Devices  GI Prophylaxis: Not indicated  Restraints: Restraints for medical healing needed: NO  Family update by me today: No  Current lines are required for patient management  Access: planned PICC    Heidi Covarrubias    Time Spent on This Encounter  Billing:  I spent 60 minutes bedside and on the inpatient unit today managing the critical care of Armand Smith in relation to the issues listed in this note.    Code Status  Full Code    Primary Care Physician  Aniyah Magana    Chief Complaint  Altered mental status, SOB    History is obtained from the patient and electronic health record    History of Present Illness  Armand Smith is a 87 year old male with PMHx of CHF, CKD, Type II DM, atrial fibrillation on ASA for anticoagulation, and parkinson's disease who presents on 1/20/17 after sustaining a fall at home. Per notes, the patient has been falling at home frequently over the last 2 days. Since then he has been complaining of sleepiness and fatigue. The patient has also been complaining of increasing SOB, productive cough, and  increasing swelling of his groin and left leg. With the increasing confusion at home, EMS was called. In the ambulance, the patient was reported to be hypoxic with sats at 77%.     In the ED, the patient had SBPs ranging from 88-97mmHg, MAPs maintained above 65, HRs 60-70 in atrial fibrillation, RR 15-29, sating 90-92% on 3L NC. Labs remarkable for cr of 3.50, BNP 6000s, trop 0.110, WBC 3.6, Hgb 8.6. UA was unremarkable. VBG with respiratory acidosis.  CT head did not show any new acute abnormalities. CXR showed dense opacities at the posterior left lung base. US LE did not show any concern for DVT. Patient was given lasix in the ED and started on levofloxacin. Patient was admitted to the ICU for further monitoring given continued hypotension.      Past Medical History   I have reviewed this patient's medical history and updated it with pertinent information if needed.   Past Medical History   Diagnosis Date     Type II or unspecified type diabetes mellitus without mention of complication, not stated as uncontrolled      Essential hypertension, benign      Other testicular hypofunction      Lumbosacral spondylosis without myelopathy      Personal history of diseases of blood and blood-forming organs      Unspecified psychosexual disorder      Basal cell Ca      lg. excision abd. yrs ago; recurrence 12/03 (exc. by shave, DSJ)     Nodular lymphoma, unspecified site, extranodal and solid organ sites 1970     Non-Hodgkins (NHL)     Unspecified cerebral artery occlusion with cerebral infarction 2013     Hyperlipidaemia      Atrial fibrillation (H)      Chronic diastolic congestive heart failure (H)      EF=55-60% per echo 3/30/15     Pulmonary HTN (H)      Great toe amputation status (H)      Aortic regurgitation      mild, 1+ per echo 3/30/15     Tricuspid regurgitation      1-2+ per echo 3/30/15     CKD (chronic kidney disease)      Parkinson's disease (H)      Poor circulation of extremity 1/24/2014       Past  Surgical History  I have reviewed this patient's surgical history and updated it with pertinent information if needed.  Past Surgical History   Procedure Laterality Date     Colonoscopy       Splenectomy  1970     Phacoemulsification clear cornea with standard intraocular lens implant  12/12/2013     Procedure: PHACOEMULSIFICATION CLEAR CORNEA WITH STANDARD INTRAOCULAR LENS IMPLANT;  RIGHT PHACOEMULSIFICATION CLEAR CORNEA WITH STANDARD INTRAOCULAR LENS IMPLANT ;  Surgeon: Dwayne Carver MD;  Location: [facility]     Phacoemulsification clear cornea with standard intraocular lens implant  12/30/2013     Procedure: PHACOEMULSIFICATION CLEAR CORNEA WITH STANDARD INTRAOCULAR LENS IMPLANT;  LEFT PHACOEMULSIFICATION CLEAR CORNEA WITH STANDARD INTRAOCULAR LENS IMPLANT ;  Surgeon: Dwayne Carver MD;  Location: [facility]     Biopsy  skin bx for cancer     Amputate toe(s) Right 4/7/2015     Procedure: AMPUTATE TOE(S);  Surgeon: Leroy Bright DPM;  Location: [facility]     Hc right heart catheterization  9/29/2015     Severely elevated right-sided filling pressures and moderately elevated left-sided filling pressures; mild to moderate mixed pulmonaty HTN     Amputate toe(s)       Amputate toe(s) Right 10/20/2015     Procedure: AMPUTATE TOE(S);  Surgeon: Leroy Bright DPM;  Location: [facility]     Biopsy bone toe Right 10/20/2015     Procedure: BIOPSY BONE TOE;  Surgeon: Leroy Bright DPM;  Location: [facility]       Prior to Admission Medications  Prior to Admission Medications   Prescriptions Last Dose Informant Patient Reported? Taking?   ACE/ARB NOT PRESCRIBED, INTENTIONAL,  Pharmacy Yes No   Sig: ACE & ARB not prescribed due to Symptomatic hypotension not due to excessive diuresis   HYDROmorphone (DILAUDID) 2 MG tablet 1/19/2017 at Unknown time Pharmacy No Yes   Sig: Take 1 tablet (2 mg) by mouth every 6 hours as needed for moderate to severe pain   Loperamide HCl (IMODIUM A-D PO)  Spouse/Significant Other Yes  Yes   Sig: Take 2 mg by mouth 4 times daily as needed   MELATONIN PO  Spouse/Significant Other Yes Yes   Sig: Take 1 mg by mouth At Bedtime   METOLAZONE PO 1/19/2017 at Unknown time Pharmacy Yes Yes   Sig: Take 2.5 mg by mouth twice a week Every Tuesday and Thursday   NOVOFINE 30 30G X 8 MM insulin pen needle  Spouse/Significant Other No Yes   Sig: Use three times daily or as directed   Nutritional Supplements (GLUCERNA PO)  Spouse/Significant Other Yes Yes   Sig: Take 8 oz by mouth daily   Skin Protectants, Misc. (EUCERIN) cream  Spouse/Significant Other No Yes   Sig: Apply topically 2 times daily   acetaminophen (TYLENOL) 325 MG tablet  Spouse/Significant Other Yes Yes   Sig: Take 650 mg by mouth every 4 hours as needed for mild pain   allopurinol (ZYLOPRIM) 100 MG tablet  Spouse/Significant Other No Yes   Sig: Take 2 tablets (200 mg) by mouth daily   aspirin  MG EC tablet  Spouse/Significant Other Yes Yes   Sig: Take 1 tablet (325 mg) by mouth daily   blood glucose monitoring (FREESTYLE) lancets  Spouse/Significant Other No No   Sig: Use to test blood sugars 2 times daily or as directed.   blood glucose monitoring (ONE TOUCH ULTRA) test strip  Spouse/Significant Other No No   Sig: Use to test blood sugar 2 times daily or as directed.   bumetanide (BUMEX) 2 MG tablet  Pharmacy No Yes   Sig: Take 1 tablet (2 mg) by mouth daily   carbidopa-levodopa (SINEMET CR)  MG per tablet  Pharmacy No Yes   Sig: Take 1 tablet by mouth 2 times daily   carbidopa-levodopa (SINEMET)  MG per tablet  Pharmacy Yes Yes   Sig: Take 1 tablet by mouth 1 tablet by mouth every 5 hours as needed for tremors   carboxymethylcellulose (REFRESH PLUS) 0.5 % SOLN ophthalmic solution  Spouse/Significant Other Yes Yes   Sig: Place 1 drop into both eyes every 4 hours as needed for dry eyes   cholecalciferol 5000 UNITS TABS  Spouse/Significant Other No Yes   Sig: Take 5,000 Units by mouth daily.   co-enzyme Q-10 100 MG CAPS   "Spouse/Significant Other No Yes   Sig: Take 1 capsule by mouth daily.   ferrous sulfate (IRON) 325 (65 FE) MG tablet  Spouse/Significant Other Yes Yes   Sig: Take 325 mg by mouth daily (with lunch)    hydrALAZINE (APRESOLINE) 25 MG tablet  Pharmacy No Yes   Sig: Take 1 tablet (25 mg) by mouth 3 times daily   insulin glargine (LANTUS) 100 UNIT/ML injection 1/19/2017 at Unknown time  Yes Yes   Sig: Inject 20 Units Subcutaneous At Bedtime   metoprolol (TOPROL-XL) 25 MG 24 hr tablet  Pharmacy No Yes   Sig: TAKE ONE TABLET BY MOUTH ONE TIME DAILY    neomycin-bacitracin-polymyxin (NEOSPORIN) 5-400-5000 ointment Unknown at Unknown time Spouse/Significant Other No No   Sig: Apply topically daily   omeprazole (PRILOSEC) 20 MG capsule 1/19/2017 at Unknown time Pharmacy No Yes   Sig: Take 1 capsule (20mg) by mouth once daily   polyethylene glycol (MIRALAX/GLYCOLAX) powder  Spouse/Significant Other Yes Yes   Sig: Take 17 g by mouth daily as needed for constipation   pramipexole (MIRAPEX) 0.5 MG tablet 1/19/2017 at Unknown time Pharmacy No Yes   Sig: Take 1 tablet by mouth once daily (with dinner)   simvastatin (ZOCOR) 40 MG tablet 1/19/2017 at Unknown time Pharmacy No Yes   Sig: Take 1 tablet (40 mg) by mouth At Bedtime      Facility-Administered Medications: None     Allergies  Allergies   Allergen Reactions     Codeine      nausea vomiting     Penicillins      \" turned red from head to foot\"       Social History  I have reviewed this patient's social history and updated it with pertinent information if needed. Armand Smith  reports that he quit smoking about 6 years ago. His smoking use included Pipe. He has never used smokeless tobacco. He reports that he drinks alcohol. He reports that he does not use illicit drugs.    Family History  I have reviewed this patient's family history and updated it with pertinent information if needed.   Family History   Problem Relation Age of Onset     CEREBROVASCULAR DISEASE Father  "       Review of Systems  The 10 point Review of Systems is negative other than noted in the HPI    Physical Exam  Temp: 97.5  F (36.4  C) Temp src: Oral Temp  Min: 97.5  F (36.4  C)  Max: 97.5  F (36.4  C) BP: (!) 89/60 mmHg Pulse: 77 Heart Rate: 73 Resp: 29 SpO2: 92 % O2 Device: Nasal cannula Oxygen Delivery: 3 LPM  Vital Signs with Ranges  Temp:  [97.5  F (36.4  C)] 97.5  F (36.4  C)  Pulse:  [77] 77  Heart Rate:  [65-80] 73  Resp:  [10-39] 29  BP: ()/(44-62) 89/60 mmHg  SpO2:  [72 %-94 %] 92 %  0 lbs 0 oz    Constitutional:  male. Resting in bed   HEENT: atraumatic, normocephalic   Respiratory: CTAB, no wheezes   Cardiovascular: irregularly irregular rhythm  Skin: warm and dry  Ext: lymphedema wraps in place bilateral lower extremities, pitting edema above knees   Neurologic: alert, following commands     Data  Results for orders placed or performed during the hospital encounter of 01/20/17 (from the past 24 hour(s))   CBC with platelets differential   Result Value Ref Range    WBC 3.6 (L) 4.0 - 11.0 10e9/L    RBC Count 2.99 (L) 4.4 - 5.9 10e12/L    Hemoglobin 8.6 (L) 13.3 - 17.7 g/dL    Hematocrit 26.6 (L) 40.0 - 53.0 %    MCV 89 78 - 100 fl    MCH 28.8 26.5 - 33.0 pg    MCHC 32.3 31.5 - 36.5 g/dL    RDW 17.5 (H) 10.0 - 15.0 %    Platelet Count 152 150 - 450 10e9/L    Diff Method Automated Method     % Neutrophils 61.0 %    % Lymphocytes 18.4 %    % Monocytes 16.5 %    % Eosinophils 3.3 %    % Basophils 0.5 %    % Immature Granulocytes 0.3 %    Nucleated RBCs 3 (H) 0 /100    Absolute Neutrophil 2.2 1.6 - 8.3 10e9/L    Absolute Lymphocytes 0.7 (L) 0.8 - 5.3 10e9/L    Absolute Monocytes 0.6 0.0 - 1.3 10e9/L    Absolute Eosinophils 0.1 0.0 - 0.7 10e9/L    Absolute Basophils 0.0 0.0 - 0.2 10e9/L    Abs Immature Granulocytes 0.0 0 - 0.4 10e9/L    Absolute Nucleated RBC 0.1    Basic metabolic panel   Result Value Ref Range    Sodium 141 133 - 144 mmol/L    Potassium 5.1 3.4 - 5.3 mmol/L    Chloride 102  94 - 109 mmol/L    Carbon Dioxide 27 20 - 32 mmol/L    Anion Gap 12 3 - 14 mmol/L    Glucose 185 (H) 70 - 99 mg/dL    Urea Nitrogen 105 (H) 7 - 30 mg/dL    Creatinine 3.50 (H) 0.66 - 1.25 mg/dL    GFR Estimate 17 (L) >60 mL/min/1.7m2    GFR Estimate If Black 20 (L) >60 mL/min/1.7m2    Calcium 8.6 8.5 - 10.1 mg/dL   Troponin I   Result Value Ref Range    Troponin I ES 0.110 (H) 0.000 - 0.045 ug/L   Nt probnp inpatient (BNP)   Result Value Ref Range    N-Terminal Pro BNP Inpatient 6608 (H) 0 - 1800 pg/mL   Blood gas venous and oxyhgb   Result Value Ref Range    Ph Venous 7.23 (L) 7.32 - 7.43 pH    PCO2 Venous 64 (H) 40 - 50 mm Hg    PO2 Venous 25 25 - 47 mm Hg    Bicarbonate Venous 27 21 - 28 mmol/L    Oxyhemoglobin Venous 27 %    Base Deficit Venous 0.5 mmol/L   EKG 12-lead, tracing only   Result Value Ref Range    Interpretation ECG Click View Image link to view waveform and result    Influenza A/B antigen   Result Value Ref Range    Influenza A/B Agn Specimen Nares     Influenza A Negative NEG    Influenza B  NEG     Negative   Test results must be correlated with clinical data. If necessary, results   should be confirmed by a molecular assay or viral culture.     XR Chest 2 Views    Narrative    XR CHEST 2 VW  1/20/2017 10:12 AM    HISTORY:  hypoxia and cough    COMPARISON:  12/24/2016      Impression    IMPRESSION:  Marked cardiomegaly. There may be mild pulmonary vascular  engorgement. Dense opacity at the posterior left lung base, consistent  with pneumonia, likely worsened since the prior exam.    KATIE RIDER MD   UA with Microscopic   Result Value Ref Range    Color Urine Yellow     Appearance Urine Clear     Glucose Urine Negative NEG mg/dL    Bilirubin Urine Negative NEG    Ketones Urine Negative NEG mg/dL    Specific Gravity Urine 1.012 1.003 - 1.035    Blood Urine Negative NEG    pH Urine 5.5 5.0 - 7.0 pH    Protein Albumin Urine 10 (A) NEG mg/dL    Urobilinogen mg/dL Normal 0.0 - 2.0 mg/dL    Nitrite  Urine Negative NEG    Leukocyte Esterase Urine Negative NEG    Source Midstream Urine     WBC Urine 1 0 - 2 /HPF    RBC Urine 1 0 - 2 /HPF    Squamous Epithelial /HPF Urine 1 0 - 1 /HPF    Mucous Urine Present (A) NEG /LPF    Hyaline Casts 20 (H) 0 - 2 /LPF   CT Head w/o Contrast    Narrative    CT SCAN OF THE HEAD WITHOUT CONTRAST   1/20/2017 12:19 PM     HISTORY: Altered mental status, recent falls.    TECHNIQUE: Axial images of the head and coronal reformations without  IV contrast material. Radiation dose for this scan was reduced using  automated exposure control, adjustment of the mA and/or kV according  to patient size, or iterative reconstruction technique.    COMPARISON: 8/12/2013.    FINDINGS: Moderate cerebral atrophy is present along with some mild  cerebellar atrophy. Patchy white matter changes are seen in both  hemispheres without mass effect. There is no evidence for intracranial  hemorrhage, mass effect, acute infarct, or a skull fracture.      Impression    IMPRESSION: Chronic changes. No evidence for intracranial hemorrhage  or any acute process.    BURAK MATOS MD   Glucose by meter   Result Value Ref Range    Glucose 133 (H) 70 - 99 mg/dL   Lactic acid   Result Value Ref Range    Lactic Acid 1.6 0.7 - 2.1 mmol/L   Procalcitonin   Result Value Ref Range    Procalcitonin 0.25 ng/ml   US Lower Extremity Venous Duplex Bilateral    Narrative    US LOWER EXTREMITY VENOUS DUPLEX BILATERAL  1/20/2017 2:44 PM    HISTORY: Bilateral pain and swelling.    TECHNIQUE: Color flow and spectral Doppler with waveform analysis  performed.    FINDINGS: Scan is somewhat limited due to edema and bandaging. Both  common femoral, superficial femoral, and popliteal veins are well seen  and appear normal. They have normal patency and compressibility. Deep  veins of the calves are not well visualized but where seen appear  patent. No definite evidence for deep venous thrombosis in either  lower extremity.      Impression     IMPRESSION: Normal bilateral lower extremity venous Doppler.         ERIC CARO MD                           Progress Notes - Physician (Notes from 01/29/17 through 02/01/17)      Progress Notes by Yanet Rao RT at 2/1/2017  2:20 PM     Author:  Yanet Rao RT Service:  (none) Author Type:  Respiratory Therapist    Filed:  2/1/2017  2:21 PM Note Time:  2/1/2017  2:20 PM Status:  Signed    :  Yanet Rao RT (Respiratory Therapist)           Pt given scheduled neb as ordered. BS diminished. No change post bronchodilator. Currently on 2 lpm via NC and tolerating. Sats 97%.    RT will continue to follow and monitor/assess pt respiratory status.     Yanet Rao  RRT.  2/1/2017         Progress Notes by Jim Will RN at 2/1/2017  1:52 PM     Author:  Jim Will RN Service:  (none) Author Type:  Registered Nurse    Filed:  2/1/2017  1:54 PM Note Time:  2/1/2017  1:52 PM Status:  Signed    :  Jim Will RN (Registered Nurse)           RA sats 93%, with activity 94%         Progress Notes by Brittany Rodríguez MD at 2/1/2017 11:29 AM     Author:  Brittany Rodríguez MD Service:  Cardiology Author Type:  Physician    Filed:  2/1/2017 12:42 PM Note Time:  2/1/2017 11:29 AM Status:  Signed    :  Brittany Rodríguez MD (Physician)      Related Notes: Original Note by Kathie Green APRN CNP (Nurse Practitioner) filed at 2/1/2017 11:40 AM           Cardiology Progress Note  [facility], [place]          Assessment and Plan:   Armand Smith is a 87 year old male with past medical history significant for insulin-dependent diabetes mellitus, hypertension, diastolic heart failure, moderate tricuspid valve regurgitation, 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      Assessment:  1. Acute on chronic diastolic heart failure.                - Difficult to know what his true dry weight is (weight range 180-220; likely ~ 190).                - admit wt 191, current wt recorded at 167              -net negative 17.6L              - improving, but long term prognosis is poor              -switched to oral lasix yesterday, weight stable              -Now DNR/DNI                 2. Hypoxemic respiratory failure, multifactorial chf and ? Of pna, on abx                   - remains on O2 per NC              - L pleural effusion seen on admission, thoracentesis for 700 cc 1/25    3. Chronic kidney disease, severe, stage IV              - Cr at baseline    4. Chronic Atrial Fibrillation - Rate controlled - not on anticoagulation due to GI bleed and epistaxis on A\C.    5. Mild troponinemia - likely type II demand infarct              -No anginal symptoms or ischemic EKG changes. Peak - 0.22    6. Severe anemia.               - 1 u prbc 1/24, increase po iron and possible epo per nephrology    7  Mod- severe TR and elev pulm pressures with D shaped septum    8   Hypotension   - BP looks okay off dobutamine      Has followup with Cathie Salas NP  2/2 in [facility]    Okay to d/c from cardiac perspective.    Elvia Green APRN, CNP    ATTESTATION:  Mr. Smith was seen and examined. Agree with note and plan of care.   Stable cardiac regimen at this time. Follow up arranged.  Will sign off.  Please call with questions.  FELISA Rodríguez MD              Interval History:   Reports feeling well, sitting in chiar on O2 per NC              Medications:       furosemide  40 mg Oral Daily     insulin glargine  3 Units Subcutaneous At Bedtime     ferrous sulfate  325 mg Oral Daily     insulin aspart  1-7 Units Subcutaneous TID AC     insulin aspart  1-5 Units Subcutaneous At Bedtime     pantoprazole  40 mg Oral QAM     carbidopa-levodopa  1 tablet Oral BID     co-enzyme Q-10  100  mg Oral Daily     pramipexole  0.5 mg Oral Daily     simvastatin  40 mg Oral At Bedtime     eucerin   Topical BID     guaiFENesin  600 mg Oral BID     ipratropium - albuterol 0.5 mg/2.5 mg/3 mL  3 mL Nebulization 4x daily     aspirin EC  325 mg Oral Daily     sodium chloride (PF)  10 mL Intracatheter Q8H     hypromellose-dextran, lidocaine visc 2% 2.5mL/5mL & maalox/mylanta w/ simeth 2.5mL/5mL & diphenhydrAMINE 5mg/5mL, senna-docusate, polyethylene glycol, lidocaine, lidocaine 4%, sodium chloride (PF), - MEDICATION INSTRUCTIONS -, HYDROmorphone, HOLD MEDICATION, carbidopa-levodopa, carboxymethylcellulose, metoprolol, glucose **OR** dextrose **OR** glucagon, naloxone, acetaminophen, acetaminophen, bisacodyl, ondansetron **OR** ondansetron, Reason ACE/ARB order not selected, - MEDICATION INSTRUCTIONS -, albuterol, nitroglycerin, sodium chloride (PF)               Physical Exam:   Blood pressure 91/50, pulse 80, temperature 96.3  F (35.7  C), temperature source Oral, resp. rate 18, weight 76.2 kg (167 lb 15.9 oz), SpO2 97 %.  Wt Readings from Last 4 Encounters:   17 76.2 kg (167 lb 15.9 oz)   16 90.5 kg (199 lb 8.3 oz)   16 83.235 kg (183 lb 8 oz)   16 82.328 kg (181 lb 8 oz)         Vital Sign Ranges  Temperature Temp  Av.3  F (36.3  C)  Min: 96.7  F (35.9  C)  Max: 97.6  F (36.4  C)   Blood pressure Systolic (24hrs), Av mmHg, Min:104 mmHg, Max:114 mmHg       Diastolic (24hrs), Av mmHg, Min:37 mmHg, Max:76 mmHg      Pulse No Data Recorded   Respirations Resp  Av  Min: 16  Max: 18   Pulse oximetry SpO2  Av %  Min: 96 %  Max: 100 %         Intake/Output Summary (Last 24 hours) at 17 1223  Last data filed at 17 1156   Gross per 24 hour   Intake    120 ml   Output   2600 ml   Net  -2480 ml       Constitutional: Awake, alert, cooperative, no apparent distress   Lungs: Clear to auscultation bilaterally, no crackles or wheezing   Cardiovascular: Distant heart sounds    Abdomen: Normal bowel sounds, soft, non-distended, non-tender   Skin: No rashes   Other: LE wrapped, mild thigh edema and abdominal edema          Data:     Recent Labs   Lab Test  01/31/17   0525  01/29/17   0615  01/28/17   0625   01/25/17   1124   09/29/15   0940   04/09/15   0538   WBC  5.3  7.4  6.7   < >   --    < >  8.2   < >   --    HGB  7.9*  8.2*  8.3*   < >   --    < >  11.3*   < >   --    MCV  88  90  88   < >   --    < >  85   < >   --    PLT  158  120*  114*   < >  112*   < >  222   < >   --    INR   --    --    --    --   1.20*   --   1.08   --   1.06    < > = values in this interval not displayed.      Recent Labs   Lab Test  02/01/17   0854  01/31/17   0525  01/30/17   0900   NA  139  141  140   POTASSIUM  4.2  3.8  3.8   CHLORIDE  103  104  103   CO2  31  34*  31   BUN  42*  44*  45*   CR  2.04*  1.85*  1.84*   ANIONGAP  5  3  6   JARETH  7.8*  7.9*  8.0*   GLC  178*  73  105*         AMARJIT Davila CNP           Progress Notes by Brittany Rodríguez MD at 1/31/2017 12:23 PM     Author:  Brittany Rodríguez MD Service:  Cardiology Author Type:  Physician    Filed:  1/31/2017 12:30 PM Note Time:  1/31/2017 12:23 PM Status:  Signed    :  Brittany Rodríguez MD (Physician)             Cardiology Progress Note  [facility], [facility]          Assessment and Plan:   Armand Smith is a 87 year old male with past medical history significant for insulin-dependent diabetes mellitus, hypertension, diastolic heart failure, moderate tricuspid valve regurgitation, 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      Assessment:  1. Acute on chronic diastolic heart failure.                - Difficult to know what his true dry weight is (weight range 180-220; likely  ~ 190).                - admit wt 191, current wt recorded at 167              - improving, but long term prognosis is poor              -Now DNR/DNI                 2. Hypoxemic respiratory failure, multifactorial chf and ? Of pna, on abx                   - remains on O2 per NC              - L pleural effusion seen on admission, thoracentesis for 700 cc 1/25    3. Chronic kidney disease, severe, stage IV              - Cr at baseline    4. Chronic Atrial Fibrillation - Rate controlled - not on anticoagulation due to GI bleed and epistaxis on A\C.    5. Mild troponinemia - likely type II demand infarct              -No anginal symptoms or ischemic EKG changes. Peak - 0.22    6. Severe anemia.               - 1 u prbc 1/24, increase po iron and possible epo per nephrology    7  Mod- severe TR and elev pulm pressures with D shaped septum      Plan:   1. D/C dobutamine today  2. Switching to oral lasix today.  3. Has followup with Cathie Salas NP  2/2    From CV standpoint ok to D/C tomorrow, so long as stable with changes made today.     FELISA Rodríguez MD              Interval History:   Reports feeling well.              Medications:       furosemide  40 mg Oral Daily     insulin glargine  3 Units Subcutaneous At Bedtime     ferrous sulfate  325 mg Oral Daily     insulin aspart  1-7 Units Subcutaneous TID AC     insulin aspart  1-5 Units Subcutaneous At Bedtime     pantoprazole  40 mg Oral QAM     carbidopa-levodopa  1 tablet Oral BID     co-enzyme Q-10  100 mg Oral Daily     pramipexole  0.5 mg Oral Daily     simvastatin  40 mg Oral At Bedtime     eucerin   Topical BID     guaiFENesin  600 mg Oral BID     ipratropium - albuterol 0.5 mg/2.5 mg/3 mL  3 mL Nebulization 4x daily     aspirin EC  325 mg Oral Daily     sodium chloride (PF)  10 mL Intracatheter Q8H     hypromellose-dextran, lidocaine visc 2% 2.5mL/5mL & maalox/mylanta w/ simeth 2.5mL/5mL & diphenhydrAMINE 5mg/5mL, senna-docusate, polyethylene glycol,  lidocaine, lidocaine 4%, sodium chloride (PF), - MEDICATION INSTRUCTIONS -, HYDROmorphone, HOLD MEDICATION, carbidopa-levodopa, carboxymethylcellulose, metoprolol, glucose **OR** dextrose **OR** glucagon, naloxone, acetaminophen, acetaminophen, bisacodyl, ondansetron **OR** ondansetron, Reason ACE/ARB order not selected, - MEDICATION INSTRUCTIONS -, albuterol, nitroglycerin, sodium chloride (PF)               Physical Exam:   Blood pressure 107/53, pulse 80, temperature 97.1  F (36.2  C), temperature source Oral, resp. rate 18, weight 75.8 kg (167 lb 1.7 oz), SpO2 96 %.  Wt Readings from Last 4 Encounters:   17 75.8 kg (167 lb 1.7 oz)   16 90.5 kg (199 lb 8.3 oz)   16 83.235 kg (183 lb 8 oz)   16 82.328 kg (181 lb 8 oz)         Vital Sign Ranges  Temperature Temp  Av.3  F (36.3  C)  Min: 96.7  F (35.9  C)  Max: 97.6  F (36.4  C)   Blood pressure Systolic (24hrs), Av mmHg, Min:104 mmHg, Max:114 mmHg       Diastolic (24hrs), Av mmHg, Min:37 mmHg, Max:76 mmHg      Pulse No Data Recorded   Respirations Resp  Av  Min: 16  Max: 18   Pulse oximetry SpO2  Av %  Min: 96 %  Max: 100 %         Intake/Output Summary (Last 24 hours) at 17 1223  Last data filed at 17 1156   Gross per 24 hour   Intake    120 ml   Output   2600 ml   Net  -2480 ml       Constitutional: Awake, alert, cooperative, no apparent distress   Lungs: Clear to auscultation bilaterally, no crackles or wheezing   Cardiovascular: Distant heart sounds   Abdomen: Normal bowel sounds, soft, non-distended, non-tender   Skin: No rashes, no cyanosis, trace edema   Other:           Data:     Recent Labs   Lab Test  17   0525  17   0615  17   0625   17   1124   09/29/15   0940   04/09/15   0538   WBC  5.3  7.4  6.7   < >   --    < >  8.2   < >   --    HGB  7.9*  8.2*  8.3*   < >   --    < >  11.3*   < >   --    MCV  88  90  88   < >   --    < >  85   < >   --    PLT  158  120*  114*   <  >  112*   < >  222   < >   --    INR   --    --    --    --   1.20*   --   1.08   --   1.06    < > = values in this interval not displayed.      Recent Labs   Lab Test  01/31/17   0525  01/30/17   0900  01/29/17   0615   NA  141  140  141   POTASSIUM  3.8  3.8  4.8   CHLORIDE  104  103  103   CO2  34*  31  35*   BUN  44*  45*  50*   CR  1.85*  1.84*  1.81*   ANIONGAP  3  6  3   JARETH  7.9*  8.0*  8.0*   GLC  73  105*  77         Brittany Rodríguez MD           Progress Notes by Iman Hood MD at 1/31/2017 12:00 PM     Author:  Iman Hood MD Service:  Hospitalist Author Type:  Physician    Filed:  1/31/2017 12:11 PM Note Time:  1/31/2017 12:00 PM Status:  Signed    :  Iman Hood MD (Physician)           [facility]    Hospitalist Progress Note    Date of Service (when I saw the patient): 01/31/2017    Assessment and Plan     Armand Smith is a 87 year old male with complex PMHx including hypertension, left sided diastolic HF and right sided systolic dysfunction with 2+ TR, chronic afib with hx of stroke presently on aspirin, insulin-dependent diabetes, Parkinson's disease, stage IV CKD and chronic anemia who was admitted on 1/20/2017 after a mechanical fall with worsening sob and wheezing in the days preceding admission, c/w CHF exacerbation.     Acute diastolic congestive heart failure exacerbation:    PTA meds: [Bumex 2mg daily, metolazone 2.5mg twice weekly].  Weight reportedly fluctuating as outpatient. Ongoing/ worsening dyspnea, orthopnea and increased leg edema consistent with congestive heart failure exacerbation, proBNP elevated. Required BiPAP on admission given respiratory acidosis and somnolence.     Echo from 12/25 showed preserved EF. Repeat echo on 1/22 showed EF 55-60%, severely dilated RV with severely reduced RVSF and mod-severe TR with mod-severe pulmonary HTN    Started on Lasix 40 mg IV BID on admission - tolerated despite soft BP and diuresed well but  then held 1/22-1/23 given development of BALAJI. Resumed at lower dose then maintained as below. Has intermittently required BiPAP dt dyspnea.     -- Lasix 40mg IV BID resumed 1/29 -> ongoing diuresis per cards and nephrology, changed to lasix 40 mg PO daily today 1/31.  -- dobutamine gtt initiated 1/24 with lasix -> defer to cards as to when this will be dc'd  -- follow Is/Os, daily wts -> net neg 1.6L in past 24h, net neg 16.6 L this stay; wts inaccurate   -- No fluid restriction per nephro  -- appreciate assistance from cardiology and nephrology this stay    Acute hypoxic and hypercarbic respiratory failure.    Likely multifactorial including diastolic CHF exacerbation, possible PNA and reactive airway disease. He has leukopenia, but no fever or cough, but procalcitonin was elevated/remains elevated. CXR c/w pulmonary congestion vs left lower lobe infiltrate as per report. Pro-calcitonin was elevated. Given episode of hypotension in ER, presumed infection/sepsis, and was initiailly started on broad spectrum abx with IV Levaquin, meropenem and vancomycin for suspected healthcare-associated pneumonia.        Required BiPAP on admission given underlying respiratory acidosis and somnolence. With treatment as above has been weaned off BiPAP. Continues on supplemental O2 via NC.    Started on diuretics on admission as above.    Blood cultures remain neg. Minimal sputum production. Levaquin dc'd 1/24. Wife endorsed some coughing after taking meds with water -> seen by SLP, no overt signs of aspiration. Completed 7d course of Meropenem on 1/27 for treatment of possible aspiration pneumonia.    Endorsed some wheezing as well, which did not improve with diuresis and antibiotics. Does not carry diagnosis of COPD but used to smoke a pipe and has noted pulmonary HTN. Wheezing subsided following dose of steroid x1 on 1/23. Steroids not continued.     Underwent left sided thoracentesis on 1/25 with removal of 700ml pleural fluid.  Studies c/w transudative effusion.    -- mgmt of CHF as above  -- cont pulmonary toilet with acapella and IS  -- cont sched duonebs and prn albuterol neb  -- wean supplemental O2 as able (not on O2 prior to admission) -> remains on 3L NC, haven't had success in weaning O2 in recent days    Acute kidney injury on stage IV chronic kidney disease.    Baseline Cr appears to be around 2.6 (was 3.1 with nephrology follow recently).  He presented with Cr of 3.5. His BUN is more than 100. Likely due to cardiorenal syndrome from poor renal perfusion.  Renal ultrasound negative for hydronephrosis    BUN/Cr continued to worsen with initial diuresis, peaked at 3.97 on 1/22 ->  IV lasix held after 1/22 am, and started on gentle IV hydration per nephrology. IVFs dc'd on 1/23 due to increased dyspnea.     Started on dobutamine gtt on 1/24. Intermittently diuresing with IV Lasix this stay.     -- chan catheter remains in place for strict Is/Os  -- Cr slowly trending down: 2.88 -> 2.33 -> 2.01 -> 1.92 -> 1.81 -> 1.84->1.85 today  -- nephrology following    Hypertension  Chronic atrial fibrillation and hx of stroke:   [PTA meds: metoprolol XL 25mg daily, hydralazine, simvastatin, full dose aspirin].   Not on any other anticoagulation due to history of GI bleed and epistaxis. Not on ARB due to renal failure.   Presented hypotensive/low normal BP. PTA meds held while pressures soft and initially unable to take po given need for BiPAP.  Have been resuming meds as able.     -- cont aspirin and statin  -- resume Toprol XL once off dobutamine gtt  -- has prn Lopressor for RVR -> none needed this stay     DM II, insulin-dependent.   [PTA regimen: Lantus 20U HS]. A1C 7.0 this stay.   Insulin dose initially lowered given his NPO status. Needs have been variable this stay given poor po intake. Had been on as much as 15U HS on 1/24, needs have greatly decreased since then.    Recent Labs  Lab 01/31/17  0740 01/31/17  0583 01/31/17  6457  01/30/17  2108 01/30/17  1658 01/30/17  1140 01/30/17  0910 01/30/17  0900  01/29/17  0615  01/28/17  0625  01/27/17  0600  01/26/17  0540   GLC  --  73  --   --   --   --   --  105*  --  77  --  71  --  51*  --  38*   BGM 60*  --  116* 203* 131* 155* 118*  --   < >  --   < >  --   < >  --   < >  --    < > = values in this interval not displayed.    -- hypoglycemic again overnight -> will decrease Lantus from 6U HS to 3U HS  -- cont med dose SSI  -- ?need to wake for snack overnight     Parkinson's disease and tremor.  Chronic and stable on PTA meds including Sinemet and Mirapex    Chronic Anemia likely dt ongoing illness and renal failure, and thrombocytopenia.   Baseline hgb around 8. Did require transfusion of 1U PRBCs during hospitalization in 12/2016. Prior workup c/w iron deficiency. Is now on iron supplement.     Hgb down to 6.8 on 1/24 -> transfused 1U PRBCs. Platelet count down to low 100s with findings of petechial lesions on chest, lesions remain stable. No heparin products given this stay. Suspect drop secondary to ongoing illness. Some question of HUS -> mildly elevated LDH, though nl bili and haptoglobin; peripheral smear c/w normochromic normocytic anemia; platelets morphologically unremarkable.    Given Venofer this stay (x5 doses, 1/25-1/29).    -- conts on oral iron as well  -- platelet counts normal today    Fall, now deconditioned:  Prompted ED visit. Likely mechanical.  He is weak and deconditioned from congestive heart failure and possibly pneumonia.  Also, suspected uremic encephalopathy given his somnolence and drowsiness versus from CO2 narcosis. Patient was on narcotic medication as well, and his BP was improving and he became more alert with narcan in ER.  -- narcotics dc'd -> will not resume at discharge  -- had dc'd home from hospital stay in 12/2016 with home care -> will need higher level of care at discharge  -- PT/OT/cardiac rehab following -> TCU at discharge     Gout.  Chronic and  "stable on allopurinol.    Elevated AST: Improving  Unclear etiology. ? Liver/muscle/cardiac origin. ?liver congestion but otherwise normal ALP/ALT. CK normal. RUQ US this stay unremarkable.    Hypernatremia: Resolved  Na with slow upward trend in context of diuresis as above. Na 148 on 1/26 and 1/27.  -- given 500cc free water per nephrology on 1/26 and encouraged oral intake  -- Na normalized on 1/28    FEN: no IVFs, lytes otherwise stable, regular diet w/thin liquids per SLP  DVT Prophylaxis: PCDs  Code Status: DNR/DNI    Disposition: Remains on dobutamine gtt. Discharge pending clinical course - plan for diuretics, response to therapy. Plan to discharge to TCU.    Palliative care met with patient/family on 1/27. Patient clearly stated his goal was restorative care - to get to TCU \"at [facility] to get stronger\". Clearly stated he would not want resuscitative measures thus code status was changed to DNR/DNI.     Appreciate help of subspecialists this stay.    Iman Hood MD  Hospitalist    Interval History     Uneventful night. Hypoglycemic again this morning but BS 60, asymptomatic.  - Requesting to lift off the fluid restriction as he is not getting the foods he desires while on restriction.  - dyspnea and fatigue much better overall, walked twich as much yesterday per patient.     -Data reviewed today: I reviewed all new labs and imaging results over the last 24 hours. I personally reviewed no images or EKG's today.    Physical Exam  Temp: 97.1  F (36.2  C) Temp src: Oral BP: 107/53 mmHg   Heart Rate: 81 Resp: 18 SpO2: 96 % O2 Device: Nasal cannula Oxygen Delivery: 1 LPM  Filed Vitals:    01/29/17 0500 01/30/17 0523 01/31/17 0525   Weight: 83 kg (182 lb 15.7 oz) 84 kg (185 lb 3 oz) 75.8 kg (167 lb 1.7 oz)     Vital Signs with Ranges  Temp:  [96.7  F (35.9  C)-97.6  F (36.4  C)] 97.1  F (36.2  C)  Heart Rate:  [78-86] 81  Resp:  [16-18] 18  BP: (104-114)/(37-76) 107/53 mmHg  SpO2:  [96 %-100 %] 96 %  I/O last " 3 completed shifts:  In: 480 [P.O.:480]  Out: 2150 [Urine:2150]    Constitutional: Resting in chair comfortably, alert, answering questions appropriately, NAD  Respiratory: +bibasilar crackles, somewhat diminished L base, no wheezing, no increased work of breathing.  Cardiovascular: s1s2 irregular, no MGR   GI: S, NT, ND, +BS  Skin/Integumen: warm/dry  Other: has compression wraps on bilateral LEs    Medications    - MEDICATION INSTRUCTIONS -       DOBUTamine 3 mcg/kg/min (01/31/17 0927)     Reason ACE/ARB order not selected       - MEDICATION INSTRUCTIONS -         insulin glargine  6 Units Subcutaneous At Bedtime     ferrous sulfate  325 mg Oral Daily     furosemide  20 mg Intravenous BID     insulin aspart  1-7 Units Subcutaneous TID AC     insulin aspart  1-5 Units Subcutaneous At Bedtime     pantoprazole  40 mg Oral QAM     carbidopa-levodopa  1 tablet Oral BID     co-enzyme Q-10  100 mg Oral Daily     pramipexole  0.5 mg Oral Daily     simvastatin  40 mg Oral At Bedtime     eucerin   Topical BID     guaiFENesin  600 mg Oral BID     ipratropium - albuterol 0.5 mg/2.5 mg/3 mL  3 mL Nebulization 4x daily     aspirin EC  325 mg Oral Daily     sodium chloride (PF)  10 mL Intracatheter Q8H       Data    Recent Labs  Lab 01/31/17  0525 01/30/17  0900 01/29/17  0615 01/28/17  0625  01/25/17  1124   WBC 5.3  --  7.4 6.7  < >  --    HGB 7.9*  --  8.2* 8.3*  < >  --    MCV 88  --  90 88  < >  --      --  120* 114*  < > 112*   INR  --   --   --   --   --  1.20*    140 141 144  < >  --    POTASSIUM 3.8 3.8 4.8 3.7  < >  --    CHLORIDE 104 103 103 102  < >  --    CO2 34* 31 35* 39*  < >  --    BUN 44* 45* 50* 58*  < >  --    CR 1.85* 1.84* 1.81* 1.92*  < >  --    ANIONGAP 3 6 3 3  < >  --    JARETH 7.9* 8.0* 8.0* 8.0*  < >  --    GLC 73 105* 77 71  < >  --    < > = values in this interval not displayed.    No results found for this or any previous visit (from the past 24 hour(s)).         Progress Notes by Jayson  Tenzin Hunter MD at 1/31/2017 11:55 AM     Author:  Tenzin Tyler MD Service:  Nephrology Author Type:  Physician    Filed:  1/31/2017 12:05 PM Note Time:  1/31/2017 11:55 AM Status:  Signed    :  Tenzin Tyler MD (Physician)                    Assessment and Plan:   CKD: Cr stable at around 1.8. Getting lasix 20 mg IV bid. I/O 969/1750. Wt adm 86.7, wt today 75.8. Will change to oral diuretics. Ok for discharge per IM and Cards. F/U in our office 2 weeks with Janet Small NP.             Interval History:   Anemia: on FeSO4.   CHF: on dobutamine.   DM                 Review of Systems:   Feels well, notes marked decrease in LE edema. Taking po well.           Medications:       insulin glargine  6 Units Subcutaneous At Bedtime     ferrous sulfate  325 mg Oral Daily     furosemide  20 mg Intravenous BID     insulin aspart  1-7 Units Subcutaneous TID AC     insulin aspart  1-5 Units Subcutaneous At Bedtime     pantoprazole  40 mg Oral QAM     carbidopa-levodopa  1 tablet Oral BID     co-enzyme Q-10  100 mg Oral Daily     pramipexole  0.5 mg Oral Daily     simvastatin  40 mg Oral At Bedtime     eucerin   Topical BID     guaiFENesin  600 mg Oral BID     ipratropium - albuterol 0.5 mg/2.5 mg/3 mL  3 mL Nebulization 4x daily     aspirin EC  325 mg Oral Daily     sodium chloride (PF)  10 mL Intracatheter Q8H       - MEDICATION INSTRUCTIONS -       DOBUTamine 3 mcg/kg/min (01/31/17 0927)     Reason ACE/ARB order not selected       - MEDICATION INSTRUCTIONS -       Current active medications and PTA medications reviewed, see medication list for details.            Physical Exam:   Vitals were reviewed  Patient Vitals for the past 24 hrs:   BP Temp Temp src Heart Rate Resp SpO2 Weight   01/31/17 1133 - - - - - 96 % -   01/31/17 1104 107/53 mmHg 97.1  F (36.2  C) Oral 81 18 98 % -   01/31/17 0815 - - - - - 96 % -   01/31/17 0745 113/56 mmHg 97.6  F (36.4  C) Oral 78 18 97 % -   01/31/17 0525  114/60 mmHg 97.4  F (36.3  C) Oral 84 16 96 % 75.8 kg (167 lb 1.7 oz)   17 0110 - 97.5  F (36.4  C) Oral - - - -   17 0059 (!) 104/37 mmHg - - 86 18 96 % -   17 - - - - - 98 % -   17 1926 109/76 mmHg 97.5  F (36.4  C) Oral 86 16 97 % -   17 1623 107/55 mmHg 96.7  F (35.9  C) Oral 78 16 100 % -   17 1246 - - - - - 96 % -       Temp:  [96.7  F (35.9  C)-97.6  F (36.4  C)] 97.1  F (36.2  C)  Heart Rate:  [78-86] 81  Resp:  [16-18] 18  BP: (104-114)/(37-76) 107/53 mmHg  SpO2:  [96 %-100 %] 96 %    Temperatures:  Current - Temp: 97.1  F (36.2  C); Max - Temp  Av.3  F (36.3  C)  Min: 96.7  F (35.9  C)  Max: 97.6  F (36.4  C)  Respiration range: Resp  Av  Min: 16  Max: 18  Pulse range: No Data Recorded  Blood pressure range: Systolic (24hrs), Av mmHg, Min:104 mmHg, Max:114 mmHg  ; Diastolic (24hrs), Av mmHg, Min:37 mmHg, Max:76 mmHg    Pulse oximetry range: SpO2  Av %  Min: 96 %  Max: 100 %    I/O last 3 completed shifts:  In: 480 [P.O.:480]  Out: 2150 [Urine:2150]      Intake/Output Summary (Last 24 hours) at 17 1155  Last data filed at 17 0500   Gross per 24 hour   Intake    120 ml   Output   2150 ml   Net  -2030 ml       Alert, up in chair  LE wrapped  Lungs with bibasilar rales and egophony  Cor RRR nl S1 S2 no M       Wt Readings from Last 4 Encounters:   17 75.8 kg (167 lb 1.7 oz)   16 90.5 kg (199 lb 8.3 oz)   16 83.235 kg (183 lb 8 oz)   16 82.328 kg (181 lb 8 oz)          Data:          NA      141   2017  NA      140   2017  NA      141   2017 CHLORIDE      104   2017  CHLORIDE      103   2017  CHLORIDE      103   2017 BUN       44   2017  BUN       45   2017  BUN       50   2017   POTASSIUM      3.8   2017  POTASSIUM      3.8   2017  POTASSIUM      4.8   2017 CO2       34   2017  CO2       31   2017  CO2       35   2017 CR     1.85    1/31/2017  CR     1.84   1/30/2017  CR     1.81   1/29/2017     Recent Labs   Lab Test  01/31/17   0525  01/29/17   0615  01/28/17   0625   WBC  5.3  7.4  6.7   HGB  7.9*  8.2*  8.3*   HCT  24.5*  26.3*  26.4*   MCV  88  90  88   PLT  158  120*  114*     Recent Labs   Lab Test  01/23/17   0515  01/22/17   0405  01/21/17   0420   AST  133*  262*  287*   ALT  54  56  32   ALKPHOS  93  96  95   BILITOTAL  0.6  0.6  0.6       Recent Labs   Lab Test  01/23/17   0515  01/22/17   0405  08/13/16   0515   MAG  2.8*  2.9*  2.2     Recent Labs   Lab Test  01/23/17   0515  01/22/17   0405  08/13/16   0515   PHOS  6.3*  8.3*  3.6     Recent Labs   Lab Test  01/31/17   0525  01/30/17   0900  01/29/17   0615   JARETH  7.9*  8.0*  8.0*       Lab Results   Component Value Date    JARETH 7.9* 01/31/2017     Lab Results   Component Value Date    WBC 5.3 01/31/2017    HGB 7.9* 01/31/2017    HCT 24.5* 01/31/2017    MCV 88 01/31/2017     01/31/2017     Lab Results   Component Value Date     01/31/2017    POTASSIUM 3.8 01/31/2017    CHLORIDE 104 01/31/2017    CO2 34* 01/31/2017    GLC 73 01/31/2017     Lab Results   Component Value Date    BUN 44* 01/31/2017    CR 1.85* 01/31/2017     Lab Results   Component Value Date    MAG 2.8* 01/23/2017     Lab Results   Component Value Date    PHOS 6.3* 01/23/2017       CREATININE   Date Value Ref Range Status   01/31/2017 1.85* 0.66 - 1.25 mg/dL Final   01/30/2017 1.84* 0.66 - 1.25 mg/dL Final   01/29/2017 1.81* 0.66 - 1.25 mg/dL Final   01/28/2017 1.92* 0.66 - 1.25 mg/dL Final   01/27/2017 2.01* 0.66 - 1.25 mg/dL Final   01/26/2017 2.33* 0.66 - 1.25 mg/dL Final       Attestation:  I have reviewed today's vital signs, notes, medications, labs and imaging.     Tenzin Tyler MD                   Progress Notes by Tenzin Tyler MD at 1/30/2017  3:12 PM     Author:  Tenzin Tyler MD Service:  Nephrology Author Type:  Physician    Filed:  1/30/2017  3:18 PM Note Time:   1/30/2017  3:12 PM Status:  Signed    :  Tenzin Tyler MD (Physician)                    Assessment and Plan:   CKD/BALAJI: Cr improved over last week. Likely Cardiorenal syndrome. Has baseline CKD-4. He is on lasix 20 mg IV BID. No need for fluid restriction with nl sodium level. Follow labs             Interval History:   CHF: remains on dobutamine.   Pneumonia LLL  Anemia: on oral and IV iron.     DM               Review of Systems:   C/O thirst and doesn't like fluid restriction. Comfortable in the chair with no SOB as rest.           Medications:       insulin glargine  6 Units Subcutaneous At Bedtime     furosemide  20 mg Intravenous BID     ferrous sulfate  325 mg Oral BID     insulin aspart  1-7 Units Subcutaneous TID AC     insulin aspart  1-5 Units Subcutaneous At Bedtime     pantoprazole  40 mg Oral QAM     carbidopa-levodopa  1 tablet Oral BID     co-enzyme Q-10  100 mg Oral Daily     pramipexole  0.5 mg Oral Daily     simvastatin  40 mg Oral At Bedtime     eucerin   Topical BID     guaiFENesin  600 mg Oral BID     ipratropium - albuterol 0.5 mg/2.5 mg/3 mL  3 mL Nebulization 4x daily     aspirin EC  325 mg Oral Daily     sodium chloride (PF)  10 mL Intracatheter Q8H       - MEDICATION INSTRUCTIONS -       DOBUTamine 3 mcg/kg/min (01/30/17 0000)     Reason ACE/ARB order not selected       - MEDICATION INSTRUCTIONS -       Current active medications and PTA medications reviewed, see medication list for details.            Physical Exam:   Vitals were reviewed  Patient Vitals for the past 24 hrs:   BP Temp Temp src Heart Rate Resp SpO2 Weight   01/30/17 1246 - - - - - 96 % -   01/30/17 1145 108/51 mmHg 98.3  F (36.8  C) Oral 73 16 97 % -   01/30/17 1128 - - - - 16 - -   01/30/17 1043 - - - - 16 - -   01/30/17 0726 106/53 mmHg 97.9  F (36.6  C) Oral 87 18 94 % -   01/30/17 0523 - - - - - - 84 kg (185 lb 3 oz)   01/30/17 0118 113/61 mmHg 97.7  F (36.5  C) Oral 84 18 96 % -   01/29/17 1630  114/55 mmHg 96  F (35.6  C) Axillary 81 18 95 % -       Temp:  [96  F (35.6  C)-98.3  F (36.8  C)] 98.3  F (36.8  C)  Heart Rate:  [73-87] 73  Resp:  [16-18] 16  BP: (106-114)/(51-61) 108/51 mmHg  SpO2:  [94 %-97 %] 96 %    Temperatures:  Current - Temp: 98.3  F (36.8  C); Max - Temp  Av.5  F (36.4  C)  Min: 96  F (35.6  C)  Max: 98.3  F (36.8  C)  Respiration range: Resp  Av  Min: 16  Max: 18  Pulse range: No Data Recorded  Blood pressure range: Systolic (24hrs), Av mmHg, Min:106 mmHg, Max:114 mmHg  ; Diastolic (24hrs), Av mmHg, Min:51 mmHg, Max:61 mmHg    Pulse oximetry range: SpO2  Av.6 %  Min: 94 %  Max: 97 %    I/O last 3 completed shifts:  In: 1209 [P.O.:1020; I.V.:189]  Out: 1650 [Urine:1650]      Intake/Output Summary (Last 24 hours) at 17 1512  Last data filed at 17 1400   Gross per 24 hour   Intake   1209 ml   Output   1650 ml   Net   -441 ml       Alert and responsive  Lungs with egophony left base and rales in R base  Cor RRR nl S1 S2 no M, No JVD  LE wrapped.        Wt Readings from Last 4 Encounters:   17 84 kg (185 lb 3 oz)   16 90.5 kg (199 lb 8.3 oz)   16 83.235 kg (183 lb 8 oz)   16 82.328 kg (181 lb 8 oz)          Data:          NA      140   2017  NA      141   2017  NA      144   2017 CHLORIDE      103   2017  CHLORIDE      103   2017  CHLORIDE      102   2017 BUN       45   2017  BUN       50   2017  BUN       58   2017   POTASSIUM      3.8   2017  POTASSIUM      4.8   2017  POTASSIUM      3.7   2017 CO2       31   2017  CO2       35   2017  CO2       39   2017 CR     1.84   2017  CR     1.81   2017  CR     1.92   2017     Recent Labs   Lab Test  17   0615  17   0625  17   0600   WBC  7.4  6.7  5.8   HGB  8.2*  8.3*  8.3*   HCT  26.3*  26.4*  26.0*   MCV  90  88  88   PLT  120*  114*  98*     Recent Labs   Lab Test  17    0515  01/22/17   0405  01/21/17   0420   AST  133*  262*  287*   ALT  54  56  32   ALKPHOS  93  96  95   BILITOTAL  0.6  0.6  0.6       Recent Labs   Lab Test  01/23/17   0515  01/22/17   0405  08/13/16   0515   MAG  2.8*  2.9*  2.2     Recent Labs   Lab Test  01/23/17   0515  01/22/17   0405  08/13/16   0515   PHOS  6.3*  8.3*  3.6     Recent Labs   Lab Test  01/30/17   0900  01/29/17   0615  01/28/17   0625   JARETH  8.0*  8.0*  8.0*       Lab Results   Component Value Date    JARETH 8.0* 01/30/2017     Lab Results   Component Value Date    WBC 7.4 01/29/2017    HGB 8.2* 01/29/2017    HCT 26.3* 01/29/2017    MCV 90 01/29/2017    * 01/29/2017     Lab Results   Component Value Date     01/30/2017    POTASSIUM 3.8 01/30/2017    CHLORIDE 103 01/30/2017    CO2 31 01/30/2017    * 01/30/2017     Lab Results   Component Value Date    BUN 45* 01/30/2017    CR 1.84* 01/30/2017     Lab Results   Component Value Date    MAG 2.8* 01/23/2017     Lab Results   Component Value Date    PHOS 6.3* 01/23/2017       CREATININE   Date Value Ref Range Status   01/30/2017 1.84* 0.66 - 1.25 mg/dL Final   01/29/2017 1.81* 0.66 - 1.25 mg/dL Final   01/28/2017 1.92* 0.66 - 1.25 mg/dL Final   01/27/2017 2.01* 0.66 - 1.25 mg/dL Final   01/26/2017 2.33* 0.66 - 1.25 mg/dL Final   01/25/2017 2.88* 0.66 - 1.25 mg/dL Final       Attestation:  I have reviewed today's vital signs, notes, medications, labs and imaging.     Tenzin Tyler MD                   Progress Notes by Brittany Rodríguez MD at 1/30/2017 11:14 AM     Author:  Brittany Rodríguez MD Service:  Cardiology Author Type:  Physician    Filed:  1/30/2017  1:54 PM Note Time:  1/30/2017 11:14 AM Status:  Signed    :  Brittany Rodríguez MD (Physician)      Related Notes: Original Note by Kathie Green APRN CNP (Nurse Practitioner) filed at 1/30/2017 12:57 PM         [facility]  Cardiology Progress Note  Date of  Service: 01/30/2017  Primary Cardiologist:    Assessment and Plan    Armand Smith is a 87 year old male with past medical history significant for insulin-dependent diabetes mellitus, hypertension, diastolic heart failure, moderate tricuspid valve regurgitation, 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     Assessment:  1. Acute on chronic diastolic heart failure.    - Difficult to know what his true dry weight is (weight range 180-220; likely ~ 190).    - admit wt 191, current wt 185   - I/o -14.6 L net    - improving, but long term prognosis is poor   -Now DNR/DNI      2. Hypoxemic respiratory failure, multifactorial chf and ? Of pna, on abx    - remains on O2 per NC   - L pleural effusion seen on admission, thoracentesis for 700 cc 1/25, bloody effusion with cytology pending   -  good results on cxr- although still congestion    3. Chronic kidney disease, severe, stage IV   - Cr improving- peak 3.9>> 1.74    4. Chronic Atrial Fibrillation - Rate controlled - not on anticoagulation due to GI bleed and epistaxis on A\C.    5. Mild troponinemia - likely type II demand infarct   -No anginal symptoms or ischemic EKG changes. Peak - 0.22    6. Severe anemia. Would do much better with higher Hgb with less diuretic resistance and probably better renal function.    - 1 u prbc 1/24, increase po iron and possible epo per nephrology    7  Mod- severe TR and elev pulm pressures with D shaped septum      Plan:   1. Continue dobutamine gtt, to po IV lasix  2. Daily bmp  3. Has followup with Cathie Salas NP  2/2, this will need to be rescheduled.    ATTESTATION:  Mr. Smith was seen and examined. Agree with note and plan of care.  Weight up a bit but I/Os show net negative. Creatinine stable. On 20mg IV BID Lasix at this time, in  addition to Dobutamine. Reports feeling better ; up and walking intermittently. Hgb stable.  Hopefully will be able to transition to PO tomorrow and see how he does.  FELISA Rodríguez MD       Interval History  No acute events overnight. Denies any chest pain, exertional chest pain, shortness of breath, dyspnea on exertion. Still on dobutamine drip    Physical Exam  Temp: 97.9  F (36.6  C) Temp src: Oral BP: 106/53 mmHg   Heart Rate: 87 Resp: 16 SpO2: 94 % O2 Device: Nasal cannula Oxygen Delivery: 3 LPM  Filed Vitals:    01/28/17 0500 01/29/17 0500 01/30/17 0523   Weight: 80 kg (176 lb 5.9 oz) 83 kg (182 lb 15.7 oz) 84 kg (185 lb 3 oz)       Constitutional   elderly, frail, chronically ill appearing  Skin   warm and dry to touch  ENT   no pallor or cyanosis  Neck  +JVP at 90d  Lungs coarse throughout  Cardiac irregularly irregular, II/VI systolic murmur  Abdomen   distented  Extremities and Back   Multiple toe amputations, legs wrapped to knees, pitting edema to his upper thighs, abdomen but improving  Neurological   no gross motor deficits noted, affect appropriate, slow to respond to questions.      Medications    - MEDICATION INSTRUCTIONS -       DOBUTamine 3 mcg/kg/min (01/30/17 0000)     Reason ACE/ARB order not selected       - MEDICATION INSTRUCTIONS -         furosemide  20 mg Intravenous BID     insulin glargine  8 Units Subcutaneous At Bedtime     ferrous sulfate  325 mg Oral BID     insulin aspart  1-7 Units Subcutaneous TID AC     insulin aspart  1-5 Units Subcutaneous At Bedtime     pantoprazole  40 mg Oral QAM     carbidopa-levodopa  1 tablet Oral BID     co-enzyme Q-10  100 mg Oral Daily     pramipexole  0.5 mg Oral Daily     simvastatin  40 mg Oral At Bedtime     eucerin   Topical BID     guaiFENesin  600 mg Oral BID     ipratropium - albuterol 0.5 mg/2.5 mg/3 mL  3 mL Nebulization 4x daily     aspirin  300 mg Rectal Daily    Or     aspirin EC  325 mg Oral Daily     sodium chloride (PF)  10 mL  Intracatheter Q8H       Data  Most Recent 3 CBC's:  Recent Labs   Lab Test  01/29/17   0615  01/28/17   0625  01/27/17   0600   WBC  7.4  6.7  5.8   HGB  8.2*  8.3*  8.3*   MCV  90  88  88   PLT  120*  114*  98*     Most Recent 3 BMP's:  Recent Labs   Lab Test  01/30/17   0900  01/29/17   0615  01/28/17   0625   NA  140  141  144   POTASSIUM  3.8  4.8  3.7   CHLORIDE  103  103  102   CO2  31  35*  39*   BUN  45*  50*  58*   CR  1.84*  1.81*  1.92*   ANIONGAP  6  3  3   JARETH  8.0*  8.0*  8.0*   GLC  105*  77  71     Most Recent 3 Troponin's:  Recent Labs   Lab Test  01/22/17   0405  01/20/17   0940  12/24/16   0229   TROPI  0.223*  0.110*  0.041     Last 24 hours labs reviewed     Echo 1/20/17  The right ventricle is severely dilated and the right ventricular systolic function is moderate to severely reduced.  There is moderate to mod-severe (2-3+) tricuspid regurgitation. The right ventricular systolic pressure is approximated at 41mmHg plus the right atrial pressure is elevated, consistent with moderate to severe pulmonary  hypertension.  Flattened septum is consistent with RV pressure/volume overload.  Moderate left pleural effusion is also seen.  Compared to previous echos, the signifcant pulmonary HTN persists with secondary effects on the right heart.           Progress Notes by Winnie Saha DO at 1/30/2017 11:30 AM     Author:  Winnie Saha DO Service:  Hospitalist Author Type:  Physician    Filed:  1/30/2017 11:59 AM Note Time:  1/30/2017 11:30 AM Status:  Addendum    :  Winnie Saha DO (Physician)      Related Notes: Original Note by Winnie Saha DO (Physician) filed at 1/30/2017 11:57 AM         [facility]    Hospitalist Progress Note    Date of Service (when I saw the patient): 01/30/2017    Assessment and Plan  Armand Smith is a 87 year old male with complex PMHx including hypertension, left sided diastolic HF and right sided  systolic dysfunction with 2+ TR, chronic afib with hx of stroke presently on aspirin, insulin-dependent diabetes, Parkinson's disease, stage IV CKD and chronic anemia who was admitted on 1/20/2017 after a mechanical fall with worsening sob and wheezing in the days preceding admission, c/w CHF exacerbation.     Acute diastolic congestive heart failure exacerbation:    PTA meds: [Bumex 2mg daily, metolazone 2.5mg twice weekly].  Weight reportedly fluctuating as outpatient. Ongoing/ worsening dyspnea, orthopnea and increased leg edema consistent with congestive heart failure exacerbation, proBNP elevated. Required BiPAP on admission given respiratory acidosis and somnolence.     Echo from 12/25 showed preserved EF. Repeat echo on 1/22 showed EF 55-60%, severely dilated RV with severely reduced RVSF and mod-severe TR with mod-severe pulmonary HTN    Started on Lasix 40 mg IV BID on admission - tolerated despite soft BP and diuresed well but then held 1/22-1/23 given development of BALAJI. Has intermittently required BiPAP dt dyspnea.     -- intermittently diuresing this stay, Lasix 40mg IV BID resumed 1/29 -> ongoing diuresis per cards and nephrology  -- dobutamine gtt initiated 1/24 -> defer to cards as to when this will be dc'd  -- follow Is/Os, daily wts -> net neg 1.3L in past 24h, net neg 14.6L this stay; wts inaccurate   -- cont 1.2L fluid restriction  -- appreciate assistance from cardiology and nephrology this stay    Acute hypoxic and hypercarbic respiratory failure.    Likely multifactorial including diastolic CHF exacerbation, possible PNA and reactive airway disease. He has leukopenia, but no fever or cough, but procalcitonin was elevated/remains elevated. CXR c/w pulmonary congestion vs left lower lobe infiltrate as per report. Pro-calcitonin was elevated. Given episode of hypotension in ER, presumed infection/sepsis, and was initiailly started on broad spectrum abx with IV Levaquin, meropenem and vancomycin  for suspected healthcare-associated pneumonia.        Required BiPAP on admission given underlying respiratory acidosis and somnolence. With treatment as above has been weaned off BiPAP. Continues on supplemental O2 via NC.    Started on diuretics on admission as above.    Blood cultures remain neg. Minimal sputum production. Levaquin dc'd 1/24. Wife endorsed some coughing after taking meds with water -> seen by SLP, no overt signs of aspiration. Completed 7d course of Meropenem on 1/27 for treatment of possible aspiration pneumonia.    Endorsed some wheezing as well, which did not improve with diuresis and antibiotics. Does not carry diagnosis of COPD but used to smoke a pipe and has noted pulmonary HTN. Wheezing subsided following dose of steroid x1 on 1/23. Steroids not continued.     Underwent left sided thoracentesis on 1/25 with removal of 700ml pleural fluid. Studies c/w transudative effusion.    -- mgmt of CHF as above  -- cont pulmonary toilet with acapella and IS  -- cont sched duonebs and prn albuterol neb  -- wean supplemental O2 as able (not on O2 prior to admission) -> remains on 3L NC, haven't had success in weaning O2 in recent days    Acute kidney injury on stage IV chronic kidney disease.    Baseline Cr appears to be around 2.6 (was 3.1 with nephrology follow recently).  He presented with Cr of 3.5. His BUN is more than 100. Likely due to cardiorenal syndrome from poor renal perfusion.  Renal ultrasound negative for hydronephrosis    BUN/Cr continued to worsen with initial diuresis, peaked at 3.97 on 1/22 ->  IV lasix held after 1/22 am, and started on gentle IV hydration per nephrology. IVFs dc'd on 1/23 due to increased dyspnea.     Started on dobutamine gtt on 1/24. Intermittently diuresing with IV Lasix this stay.     -- chan catheter remains in place for strict Is/Os  -- Cr slowly trending down: 2.88 -> 2.33 -> 2.01 -> 1.92 -> 1.81 -> 1.84 today  -- nephrology  following    Hypertension  Chronic atrial fibrillation and hx of stroke:   [PTA meds: metoprolol XL 25mg daily, hydralazine, simvastatin, full dose aspirin].   Not on any other anticoagulation due to history of GI bleed and epistaxis. Not on ARB due to renal failure.   Presented hypotensive/low normal BP. PTA meds held while pressures soft and initially unable to take po given need for BiPAP.  Have been resuming meds as able.     -- cont aspirin and statin  -- resume Toprol XL once off dobutamine gtt  -- has prn Lopressor for RVR -> none needed this stay     DM II, insulin-dependent.   [PTA regimen: Lantus 20U HS]. A1C 7.0 this stay.   Insulin dose initially lowered given his NPO status. Needs have been variable this stay given poor po intake. Had been on as much as 15U HS on 1/24, needs have greatly decreased since then.      Recent Labs  Lab 01/30/17  0910 01/30/17  0900 01/30/17  0722 01/30/17  0204 01/29/17  2152 01/29/17  1718 01/29/17  1143  01/29/17  0615  01/28/17  0625  01/27/17  0600  01/26/17  0540  01/25/17  0543   GLC  --  105*  --   --   --   --   --   --  77  --  71  --  51*  --  38*  --  97   *  --  48* 87 187* 112* 166*  < >  --   < >  --   < >  --   < >  --   < >  --    < > = values in this interval not displayed.    -- hypoglycemic again overnight -> will decrease Lantus from 8U HS to 6U HS  -- cont med dose SSI  -- ?need to wake for snack overnight     Parkinson's disease and tremor.  Chronic and stable on PTA meds including Sinemet and Mirapex    Chronic Anemia likely dt ongoing illness and renal failure, and thrombocytopenia.   Baseline hgb around 8. Did require transfusion of 1U PRBCs during hospitalization in 12/2016. Prior workup c/w iron deficiency. Is now on iron supplement.     Hgb down to 6.8 on 1/24 -> transfused 1U PRBCs. Platelet count down to low 100s with findings of petechial lesions on chest, lesions remain stable. No heparin products given this stay. Suspect drop secondary  "to ongoing illness. Some question of HUS -> mildly elevated LDH, though nl bili and haptoglobin; peripheral smear c/w normochromic normocytic anemia; platelets morphologically unremarkable.    Given Venofer this stay (x5 doses, 1/25-1/29).    -- conts on oral iron as well  -- platelet counts remain stable -> no need for heme consult at this time    Fall, now deconditioned:  Prompted ED visit. Likely mechanical.  He is weak and deconditioned from congestive heart failure and possibly pneumonia.  Also, suspected uremic encephalopathy given his somnolence and drowsiness versus from CO2 narcosis. Patient was on narcotic medication as well, and his BP was improving and he became more alert with narcan in ER.  -- narcotics dc'd -> will not resume at discharge  -- had dc'd home from hospital stay in 12/2016 with home care -> will need higher level of care at discharge  -- PT/OT/cardiac rehab following -> TCU at discharge     Gout.  Chronic and stable on allopurinol.    Elevated AST: Improving  Unclear etiology. ? Liver/muscle/cardiac origin. ?liver congestion but otherwise normal ALP/ALT. CK normal. RUQ US this stay unremarkable.    Hypernatremia: Resolved  Na with slow upward trend in context of diuresis as above. Na 148 on 1/26 and 1/27.  -- given 500cc free water per nephrology on 1/26 and encouraged oral intake  -- Na normalized on 1/28    FEN: no IVFs, lytes otherwise stable, regular diet w/thin liquids per SLP  DVT Prophylaxis: PCDs  Code Status: DNR/DNI    Disposition: Remains on dobutamine gtt. Discharge pending clinical course - plan for diuretics, response to therapy. Plan to discharge to TCU.    Palliative care met with patient/family on 1/27. Patient clearly stated his goal was restorative care - to get to TCU \"at [facility] to get stronger\". Clearly stated he would not want resuscitative measures thus code status was changed to DNR/DNI.     Appreciate help of subspecialists this stay.    Winnie Cuevas" White    Interval History  Uneventful night. Hypoglycemic again this morning with BG 43, improved to 118 after some juice. Feeling okay today, didn't eat much for breakfast, complains the food isn't good and it's too dry. Denies cp/sob/cough. Daily wts inaccurate.    -Data reviewed today: I reviewed all new labs and imaging results over the last 24 hours. I personally reviewed no images or EKG's today.    Physical Exam  Temp: 97.9  F (36.6  C) Temp src: Oral BP: 106/53 mmHg   Heart Rate: 87 Resp: 16 SpO2: 94 % O2 Device: Nasal cannula Oxygen Delivery: 3 LPM  Filed Vitals:    01/28/17 0500 01/29/17 0500 01/30/17 0523   Weight: 80 kg (176 lb 5.9 oz) 83 kg (182 lb 15.7 oz) 84 kg (185 lb 3 oz)     Vital Signs with Ranges  Temp:  [96  F (35.6  C)-97.9  F (36.6  C)] 97.9  F (36.6  C)  Heart Rate:  [81-87] 87  Resp:  [16-18] 16  BP: (105-114)/(52-61) 106/53 mmHg  SpO2:  [94 %-97 %] 94 %  I/O last 3 completed shifts:  In: 1209 [P.O.:1020; I.V.:189]  Out: 2050 [Urine:2050]    Constitutional: Resting comfortably, alert, answering questions appropriately, NAD  Respiratory: +bibasilar crackles mostly over the L base, no wheezing, no increased work of breathing  Cardiovascular: HR/rhythm irregular, no MGR   GI: S, NT, ND, +BS  Skin/Integumen: warm/dry  Other: has compression wraps on bilateral LEs    Medications    - MEDICATION INSTRUCTIONS -       DOBUTamine 3 mcg/kg/min (01/30/17 0000)     Reason ACE/ARB order not selected       - MEDICATION INSTRUCTIONS -         furosemide  20 mg Intravenous BID     insulin glargine  8 Units Subcutaneous At Bedtime     ferrous sulfate  325 mg Oral BID     insulin aspart  1-7 Units Subcutaneous TID AC     insulin aspart  1-5 Units Subcutaneous At Bedtime     pantoprazole  40 mg Oral QAM     carbidopa-levodopa  1 tablet Oral BID     co-enzyme Q-10  100 mg Oral Daily     pramipexole  0.5 mg Oral Daily     simvastatin  40 mg Oral At Bedtime     eucerin   Topical BID     guaiFENesin  600 mg Oral  BID     ipratropium - albuterol 0.5 mg/2.5 mg/3 mL  3 mL Nebulization 4x daily     aspirin  300 mg Rectal Daily    Or     aspirin EC  325 mg Oral Daily     sodium chloride (PF)  10 mL Intracatheter Q8H       Data    Recent Labs  Lab 01/30/17  0900 01/29/17  0615 01/28/17  0625 01/27/17  0600  01/25/17  1124   WBC  --  7.4 6.7 5.8  < >  --    HGB  --  8.2* 8.3* 8.3*  < >  --    MCV  --  90 88 88  < >  --    PLT  --  120* 114* 98*  < > 112*   INR  --   --   --   --   --  1.20*    141 144 148*  < >  --    POTASSIUM 3.8 4.8 3.7 3.9  < >  --    CHLORIDE 103 103 102 105  < >  --    CO2 31 35* 39* 37*  < >  --    BUN 45* 50* 58* 67*  < >  --    CR 1.84* 1.81* 1.92* 2.01*  < >  --    ANIONGAP 6 3 3 6  < >  --    JARETH 8.0* 8.0* 8.0* 7.9*  < >  --    * 77 71 51*  < >  --    < > = values in this interval not displayed.    No results found for this or any previous visit (from the past 24 hour(s)).         Progress Notes by Prateek Dotson RN at 1/30/2017  7:31 AM     Author:  Prateek Dotson RN Service:  (none) Author Type:  Registered Nurse    Filed:  1/30/2017 10:23 AM Note Time:  1/30/2017  7:31 AM Status:  Addendum    :  Prateek Dotson RN (Registered Nurse)      Related Notes: Original Note by Prateek Dotson RN (Registered Nurse) filed at 1/30/2017  7:32 AM         Blood sugar 48, 240 apple juice given. Blood sugar increased to 118.       Progress Notes by Messi Burkett MD at 1/29/2017 10:28 AM     Author:  Messi Burkett MD Service:  Cardiology Author Type:  Physician    Filed:  1/29/2017  9:01 PM Note Time:  1/29/2017 10:28 AM Status:  Signed    :  Messi Burkett MD (Physician)           [facility]  Cardiology Progress Note  Date of Service: 01/29/2017  Primary Cardiologist:    Assessment and Plan    Armand Smith is a 87 year old male with past medical history significant for insulin-dependent diabetes mellitus, hypertension, diastolic heart  failure, tricuspid valve regurgitation, Parkinson's disease, chronic atrial fibrillation, not on anticoagulation, chronic kidney disease and anemia who was admitted on 1/20/2017.     Assessment:  1. Acute on chronic diastolic heart failure.    - Difficult to know what his true dry weight is (weight range 180-220; likely ~ 190).    - admit wt 191, current wt 182   - I/o -13 L net    - improving, but long term prognosis is poor, apparently uninterested in palliatve, long hx of non compliance      2. Hypoxemic respiratory failure, multifactorial chf and ? Of pna, on abx    - remains on high levels of O2   - L pleural effusion seen on admission, thoracentesis for 700 cc 1/25, bloody effusion with cytology pending   -  good results on cxr- although still congestion    3. Chronic kidney disease, severe, stage IV   - Cr improving- peak 3.9>> 2.9 >> 2.3>>2.1   4. Chronic Atrial Fibrillation - Rate controlled - not on anticoagulation due to GI bleed and epistaxis on A\C.    5. Mild troponinemia - likely type II demand infarct   -No anginal symptoms or ischemic EKG changes. Peak - 0.22    6. Severe anemia. Would do much better with higher Hgb with less diuretic resistance and probably better renal function.    - 1 u prbc 1/24, increase po iron and possible epo per nephrology    7  Mod- severe MR and elev pulm pressures with D shaped septum- unclear if underlying pulm disease, elevated L pressures, chronic pe- long standing noted in 2013   - will continue with diuresis and consider reassessment      Plan:   1. Continue dobutamine gtt and IV lasix until cr worsens or at least until congestion is resolved on cxr  2. Daily bmp  3. Has followed with Cathie Salas NP as outpt- will arrange f/u      Interval History  No acute events overnight. Denies any chest pain, exertional chest pain, shortness of breath, dyspnea on exertion    Physical Exam  Temp: 96.8  F (36  C) Temp src: Oral BP: 91/42 mmHg   Heart Rate: 83 Resp: 18 SpO2: 99 %  O2 Device: Nasal cannula with humidification Oxygen Delivery: 3 LPM  Filed Vitals:    01/27/17 0500 01/28/17 0500 01/29/17 0500   Weight: 77 kg (169 lb 12.1 oz) 80 kg (176 lb 5.9 oz) 83 kg (182 lb 15.7 oz)       Constitutional   elderly, frail, chronically ill appearing  Skin   warm and dry to touch  ENT   no pallor or cyanosis  Neck  +JVP at 90d  Lungs absent LLL- improved, R clear but diminished in bases  Cardiac irregularly irregular, II/VI systolic murmur  Abdomen   abdomen soft, mildly distented  Extremities and Back   Multiple toe amputations, ecchymosis, pitting edema to his upper thighs, abdomen but improving  Neurological   no gross motor deficits noted, affect appropriate, slow to respond to questions.      Medications    - MEDICATION INSTRUCTIONS -       DOBUTamine 3 mcg/kg/min (01/29/17 0105)     Reason ACE/ARB order not selected       - MEDICATION INSTRUCTIONS -         insulin glargine  8 Units Subcutaneous At Bedtime     iron sucrose (VENOFER) intermittent infusion  200 mg Intravenous Q24H     ferrous sulfate  325 mg Oral BID     insulin aspart  1-7 Units Subcutaneous TID AC     insulin aspart  1-5 Units Subcutaneous At Bedtime     pantoprazole  40 mg Oral QAM     carbidopa-levodopa  1 tablet Oral BID     co-enzyme Q-10  100 mg Oral Daily     pramipexole  0.5 mg Oral Daily     simvastatin  40 mg Oral At Bedtime     eucerin   Topical BID     guaiFENesin  600 mg Oral BID     ipratropium - albuterol 0.5 mg/2.5 mg/3 mL  3 mL Nebulization 4x daily     aspirin  300 mg Rectal Daily    Or     aspirin EC  325 mg Oral Daily     sodium chloride (PF)  10 mL Intracatheter Q8H       Data  Most Recent 3 CBC's:  Recent Labs   Lab Test  01/29/17   0615  01/28/17   0625  01/27/17   0600   WBC  7.4  6.7  5.8   HGB  8.2*  8.3*  8.3*   MCV  90  88  88   PLT  120*  114*  98*     Most Recent 3 BMP's:  Recent Labs   Lab Test  01/29/17   0615  01/28/17   0625  01/27/17   0600   NA  141  144  148*   POTASSIUM  4.8  3.7  3.9    CHLORIDE  103  102  105   CO2  35*  39*  37*   BUN  50*  58*  67*   CR  1.81*  1.92*  2.01*   ANIONGAP  3  3  6   JARETH  8.0*  8.0*  7.9*   GLC  77  71  51*     Most Recent 3 Troponin's:  Recent Labs   Lab Test  01/22/17   0405  01/20/17   0940  12/24/16   0229   TROPI  0.223*  0.110*  0.041     Last 24 hours labs reviewed     Echo 1/20/17  The right ventricle is severely dilated and the right ventricular systolic function is moderate to severely reduced.  There is moderate to mod-severe (2-3+) tricuspid regurgitation. The right ventricular systolic pressure is approximated at 41mmHg plus the right atrial pressure is elevated, consistent with moderate to severe pulmonary  hypertension.  Flattened septum is consistent with RV pressure/volume overload.  Moderate left pleural effusion is also seen.  Compared to previous echos, the signifcant pulmonary HTN persists with secondary effects on the right heart.           Progress Notes by Jamir De Souza RT at 1/29/2017  6:05 PM     Author:  Jamir De Souza RT Service:  Respiratory Therapy Author Type:  Respiratory Therapist    Filed:  1/29/2017  6:06 PM Note Time:  1/29/2017  6:05 PM Status:  Signed    :  Jamir De Souza RT (Respiratory Therapist)           Pt stable on 3 LPM nc, BBS diminished. All tx given as ordered, will continue to follow.     Jamir De Souza RT        Progress Notes by Harley Beck MD at 1/29/2017 12:55 PM     Author:  Harley Beck MD Service:  Nephrology Author Type:  Physician    Filed:  1/29/2017  1:00 PM Note Time:  1/29/2017 12:55 PM Status:  Signed    :  Harley Beck MD (Physician)            Renal Medicine Progress Note            Assessment/Plan:     1. Pt patient with baseline CKD III-IV    2. BALAJI in the setting of heart failure. Kidney function continues to improve.    3. HFpEF. Pt is hypervolemic. Decompensated DD and right-sided heart failure. Good diuresis with dobuatmine and low dose lasix    4. LLL PNA and  "pleural effusion    5. Anemia. + severe Fe def.                -on IV Fe    Plan  1. Still diuresing quite well. Likely can stop dobutamine in 1-2 days. Cont Lasix 20 mg bid.         Interval History:     No new issues. No new cardiopulmonary complaints.            Medications and Allergies:       insulin glargine  8 Units Subcutaneous At Bedtime     iron sucrose (VENOFER) intermittent infusion  200 mg Intravenous Q24H     ferrous sulfate  325 mg Oral BID     insulin aspart  1-7 Units Subcutaneous TID AC     insulin aspart  1-5 Units Subcutaneous At Bedtime     pantoprazole  40 mg Oral QAM     carbidopa-levodopa  1 tablet Oral BID     co-enzyme Q-10  100 mg Oral Daily     pramipexole  0.5 mg Oral Daily     simvastatin  40 mg Oral At Bedtime     eucerin   Topical BID     guaiFENesin  600 mg Oral BID     ipratropium - albuterol 0.5 mg/2.5 mg/3 mL  3 mL Nebulization 4x daily     aspirin  300 mg Rectal Daily    Or     aspirin EC  325 mg Oral Daily     sodium chloride (PF)  10 mL Intracatheter Q8H        Allergies   Allergen Reactions     Codeine      nausea vomiting     Penicillins      \" turned red from head to foot\"            Physical Exam:   Vitals were reviewed  Heart Rate: 85, Blood pressure 105/52, pulse 80, temperature 97.1  F (36.2  C), temperature source Oral, resp. rate 18, weight 83 kg (182 lb 15.7 oz), SpO2 97 %.    Wt Readings from Last 3 Encounters:   01/29/17 83 kg (182 lb 15.7 oz)   12/30/16 90.5 kg (199 lb 8.3 oz)   12/20/16 83.235 kg (183 lb 8 oz)       Intake/Output Summary (Last 24 hours) at 01/29/17 1255  Last data filed at 01/29/17 0930   Gross per 24 hour   Intake 1205.6 ml   Output   1900 ml   Net -694.4 ml     GENERAL APPEARANCE: NAD. Frail  HEENT:  Eyes/ears/nose/neck grossly normal  RESP: Diminish airflow left base. Minimal crackles right base.  CV: irregular. + murmur    ABDOMEN: obese, soft, NT  EXTREMITIES/SKIN: edema is improved  Neuro: awake, alert and oriented           Data:     CBC " RESULTS:     Recent Labs  Lab 01/29/17  0615 01/28/17  0625 01/27/17  0600 01/26/17  0540 01/25/17  1124 01/25/17  0543 01/24/17  2335   WBC 7.4 6.7 5.8 4.8  --  4.2 4.8   RBC 2.93* 3.00* 2.96* 2.99*  --  2.92* 2.92*   HGB 8.2* 8.3* 8.3* 8.4*  --  8.2* 8.3*   HCT 26.3* 26.4* 26.0* 26.4*  --  25.8* 25.9*   * 114* 98* 106* 112* 108* 108*       Basic Metabolic Panel:    Recent Labs  Lab 01/29/17  0615 01/28/17  0625 01/27/17  0600 01/26/17  0540 01/25/17  0543 01/24/17  0545    144 148* 148* 146* 146*   POTASSIUM 4.8 3.7 3.9 3.6 3.9 4.0   CHLORIDE 103 102 105 106 105 105   CO2 35* 39* 37* 39* 39* 32   BUN 50* 58* 67* 81* 106* 113*   CR 1.81* 1.92* 2.01* 2.33* 2.88* 3.27*   GLC 77 71 51* 38* 97 136*   JARETH 8.0* 8.0* 7.9* 7.9* 8.0* 8.4*       INR  Recent Labs  Lab 01/25/17  1124   INR 1.20*      Attestation:   I have reviewed today's relevant vital signs, notes, medications, labs and imaging.    Harley Beck MD  [facility] - Nephrology  Office phone :[phone]  Pager: [phone]         Progress Notes by Tiesha Rick LICSW at 1/29/2017 11:43 AM     Author:  Tiesha Rick LICSW Service:  Social Work Author Type:      Filed:  1/29/2017 11:46 AM Note Time:  1/29/2017 11:43 AM Status:  Signed    :  Tiesha Rick LICSW ()           SW:  D:  Call placed to update [facility] as to patient's status and potential for discharge on Wednesday.  They are hopeful they will have a bed available for patient on discharge  P:  Will continue to follow.         Progress Notes by Winnie Saha DO at 1/29/2017  8:48 AM     Author:  Winnie Saha DO Service:  Hospitalist Author Type:  Physician    Filed:  1/29/2017  9:10 AM Note Time:  1/29/2017  8:48 AM Status:  Signed    :  Winnie Saha DO (Physician)           [facility]    Hospitalist Progress Note    Date of Service (when I saw the patient):  01/29/2017    Assessment and Plan  Armand Smith is a 87 year old male with complex PMHx including hypertension, left sided diastolic HF and right sided systolic dysfunction with 2+ TR, chronic afib with hx of stroke presently on aspirin, insulin-dependent diabetes, Parkinson's disease, stage IV CKD and chronic anemia who was admitted on 1/20/2017 after a mechanical fall with worsening sob and wheezing in the days preceding admission, c/w CHF exacerbation.     Acute diastolic congestive heart failure exacerbation:    PTA meds: [Bumex 2mg daily, metolazone 2.5mg twice weekly].  Weight reportedly fluctuating as outpatient. Ongoing/ worsening dyspnea, orthopnea and increased leg edema consistent with congestive heart failure exacerbation, proBNP elevated. Required BiPAP on admission given respiratory acidosis and somnolence.     Echo from 12/25 showed preserved EF. Repeat echo on 1/22 showed EF 55-60%, severely dilated RV with severely reduced RVSF and mod-severe TR with mod-severe pulmonary HTN    Started on Lasix 40 mg IV BID on admission - tolerated despite soft BP and diuresed well but then held 1/22-1/23 given development of BALAJI. Has intermittently required BiPAP dt dyspnea.     -- given Lasix 20mg IV TID x3 doses on 1/24, diuretics held on 1/25, resumed 1/26 and was given Lasix 20mg IV x2 doses, on 1/27 was given Lasix 20mg IV x1 and Diamox 250mg IV x1 -> ongoing diuresis per cards and nephrology  -- dobutamine gtt initiated 1/24  -- follow Is/Os, daily wts -> net neg 690mL in past 24h, net neg 14.1L this stay; wts inaccurate though overall trending down  -- cont 1.2L fluid restriction  -- appreciate assistance from cardiology this stay    Acute hypoxic and hypercarbic respiratory failure.    Likely multifactorial including diastolic CHF exacerbation, possible PNA and reactive airway disease. He has leukopenia, but no fever or cough, but procalcitonin was elevated/remains elevated. CXR c/w pulmonary congestion  vs left lower lobe infiltrate as per report. Pro-calcitonin was elevated. Given episode of hypotension in ER, presumed infection/sepsis, and was initiailly started on broad spectrum abx with IV Levaquin, meropenem and vancomycin for suspected healthcare-associated pneumonia.        Required BiPAP on admission given underlying respiratory acidosis and somnolence. With treatment as above has been weaned off BiPAP. Continues on supplemental O2 via NC.    Started on diuretics on admission as above.    Blood cultures remain neg. Minimal sputum production. Levaquin dc'd 1/24. Wife endorsed some coughing after taking meds with water -> seen by SLP, no overt signs of aspiration. Completed 7d course of Meropenem on 1/27 for treatment of possible aspiration pneumonia.    Endorsed some wheezing as well, which did not improve with diuresis and antibiotics. Does not carry diagnosis of COPD but used to smoke a pipe and has noted pulmonary HTN. Wheezing subsided following dose of steroid x1 on 1/23. Steroids not continued.     Underwent left sided thoracentesis on 1/25 with removal of 700ml pleural fluid. Studies c/w transudative effusion.    -- mgmt of CHF as above  -- cont pulmonary toilet with acapella and IS  -- cont sched duonebs and prn albuterol neb  -- wean supplemental O2 as able (not on O2 prior to admission) -> down to 3L NC    Acute kidney injury on stage IV chronic kidney disease.    Baseline Cr appears to be around 2.6 (was 3.1 with nephrology follow recently).  He presented with Cr of 3.5. His BUN is more than 100. Likely due to cardiorenal syndrome from poor renal perfusion.  Renal ultrasound negative for hydronephrosis    BUN/Cr continued to worsen with initial diuresis, peaked at 3.97 on 1/22 ->  IV lasix held after 1/22 am, and started on gentle IV hydration per nephrology. IVFs dc'd on 1/23 due to increased dyspnea.     Started on dobutamine gtt on 1/24. Also given 3 doses on Lasix on 1/24. BID IV Lasix resumed  1/26 as above.     -- chan catheter remains in place for strict Is/Os  -- nephrology following  -- BALAJI now resolved, Cr conts trending down: 2.88 -> 2.33 -> 2.01 -> 1.92 ->1.81 today    Hypertension  Chronic atrial fibrillation and hx of stroke:   [PTA meds: metoprolol XL, hydralazine, simvastatin, full dose aspirin].   Not on any other anticoagulation due to history of GI bleed and epistaxis. Not on ARB due to renal failure.   Presented hypotensive/low normal BP. PTA meds held while pressures soft and initially unable to take po given need for BiPAP.  Have been resuming meds as able.     -- cont aspirin and statin  -- has prn Lopressor for RVR -> none needed this stay     DM II, insulin-dependent.   [PTA regimen: Lantus 20U HS]. A1C 7.0 this stay.   Insulin dose initially lowered given his NPO status. Needs have been variable this stay given poor po intake.      Recent Labs  Lab 01/29/17  0749 01/29/17  0615 01/29/17  0156 01/28/17  2125 01/28/17  1756 01/28/17  1140 01/28/17  0719 01/28/17  0625  01/27/17  0600  01/26/17  0540  01/25/17  0543  01/24/17  0545   GLC  --  77  --   --   --   --   --  71  --  51*  --  38*  --  97  --  136*   BGM 70  --  90 193* 138* 125* 73  --   < >  --   < >  --   < >  --   < >  --    < > = values in this interval not displayed.    -- no issues with hypoglycemia overnight, cont Lantus at 8U HS and med dose SSI     Parkinson's disease and tremor.  Chronic and stable on PTA meds including Sinemet and Mirapex    Chronic Anemia likely dt ongoing illness and renal failure, and thrombocytopenia.   Baseline hgb around 8. Did require transfusion of 1U PRBCs during hospitalization in 12/2016. Prior workup c/w iron deficiency. Is now on iron supplement.     Hgb down to 6.8 on 1/24 -> transfused 1U PRBCs. Platelet count down to low 100s with findings of petechial lesions on chest, lesions remain stable. No heparin products given this stay. Suspect drop secondary to ongoing illness. Some  "question of HUS -> mildly elevated LDH, though nl bili and haptoglobin; peripheral smear c/w normochromic normocytic anemia; platelets morphologically unremarkable.    -- started on Venofer per nephrology on 1/25, plan is for 5 doses  -- conts on oral iron as well  -- platelet counts remain relatively stable -> no need for heme consult at this time    Fall, now deconditioned:  Prompted ED visit. Likely mechanical.  He is weak and deconditioned from congestive heart failure and possibly pneumonia.  Also, suspected uremic encephalopathy given his somnolence and drowsiness versus from CO2 narcosis. Patient was on narcotic medication as well, and his BP was improving and he became more alert with narcan in ER.  -- narcotics dc'd -> will not resume at discharge  -- had dc'd home from hospital stay in 12/2016 with home care -> will need higher level of care at discharge  -- PT/OT/cardiac rehab following -> TCU at discharge     Gout.  Chronic and stable on allopurinol.    Elevated AST: Improving  Unclear etiology. ? Liver/muscle/cardiac origin. ?liver congestion but otherwise normal ALP/ALT. CK normal. RUQ US this stay unremarkable.    Hypernatremia: Resolved  Na with slow upward trend in context of diuresis as above. Na 148 on 1/26 and 1/27.  -- given 500cc free water per nephrology on 1/26 and encouraged oral intake  -- Na normalized on 1/28    FEN: no IVFs, lytes otherwise stable, regular diet w/thin liquids per SLP  DVT Prophylaxis: PCDs  Code Status: DNR/DNI    Disposition: Remains on dobutamine gtt. Discharge pending clinical course - plan for diuretics, response to therapy. Plan to discharge to TCU.    Palliative care met with patient/family on 1/27. Patient clearly stated his goal was restorative care - to get to TCU \"at [facility] to get stronger\". Clearly stated he would not want resuscitative measures thus code status was changed to DNR/DNI.     Appreciate help of subspecialists this stay.    Winnie Cuevas" White    Interval History  Uneventful night. No recurrent hypoglycemia. Feeling okay today. Denies sob/cough. Appetite okay. Complaining of some pain in his back which appears to be over a bony prominence. Not the best historian but does indicate pain gets better when out of bed and in chair.     -Data reviewed today: I reviewed all new labs and imaging results over the last 24 hours. I personally reviewed no images or EKG's today.    Physical Exam  Temp: 96.8  F (36  C) Temp src: Oral BP: 91/42 mmHg   Heart Rate: 83 Resp: 18 SpO2: 99 % O2 Device: Nasal cannula with humidification Oxygen Delivery: 3 LPM  Filed Vitals:    01/27/17 0500 01/28/17 0500 01/29/17 0500   Weight: 77 kg (169 lb 12.1 oz) 80 kg (176 lb 5.9 oz) 83 kg (182 lb 15.7 oz)     Vital Signs with Ranges  Temp:  [96.4  F (35.8  C)-97.6  F (36.4  C)] 96.8  F (36  C)  Heart Rate:  [83-94] 83  Resp:  [16-18] 18  BP: ()/(42-63) 91/42 mmHg  SpO2:  [92 %-99 %] 99 %  I/O last 3 completed shifts:  In: 1315.6 [P.O.:450; I.V.:865.6]  Out: 1900 [Urine:1900]    Constitutional: Resting comfortably, alert, answering questions appropriately, NAD  Respiratory: +bibasilar crackles mostly over the L base, no wheezing, no increased work of breathing  Cardiovascular: HR/rhythm irregular, no MGR   GI: S, NT, ND, +BS  Skin/Integumen: warm/dry  Other: trace bilateral LE edema, has compression wraps on    Medications    - MEDICATION INSTRUCTIONS -       DOBUTamine 3 mcg/kg/min (01/29/17 0105)     Reason ACE/ARB order not selected       - MEDICATION INSTRUCTIONS -         insulin glargine  8 Units Subcutaneous At Bedtime     furosemide  20 mg Intravenous Daily     iron sucrose (VENOFER) intermittent infusion  200 mg Intravenous Q24H     ferrous sulfate  325 mg Oral BID     insulin aspart  1-7 Units Subcutaneous TID AC     insulin aspart  1-5 Units Subcutaneous At Bedtime     pantoprazole  40 mg Oral QAM     carbidopa-levodopa  1 tablet Oral BID     co-enzyme Q-10  100 mg  Oral Daily     pramipexole  0.5 mg Oral Daily     simvastatin  40 mg Oral At Bedtime     eucerin   Topical BID     guaiFENesin  600 mg Oral BID     ipratropium - albuterol 0.5 mg/2.5 mg/3 mL  3 mL Nebulization 4x daily     aspirin  300 mg Rectal Daily    Or     aspirin EC  325 mg Oral Daily     sodium chloride (PF)  10 mL Intracatheter Q8H       Data    Recent Labs  Lab 01/29/17  0615 01/28/17  0625 01/27/17  0600  01/25/17  1124  01/23/17  0515   WBC 7.4 6.7 5.8  < >  --   < > 3.1*   HGB 8.2* 8.3* 8.3*  < >  --   < > 7.7*   MCV 90 88 88  < >  --   < > 88   * 114* 98*  < > 112*  < > 114*   INR  --   --   --   --  1.20*  --   --     144 148*  < >  --   < > 142   POTASSIUM 4.8 3.7 3.9  < >  --   < > 4.1   CHLORIDE 103 102 105  < >  --   < > 101   CO2 35* 39* 37*  < >  --   < > 31   BUN 50* 58* 67*  < >  --   < > 118*   CR 1.81* 1.92* 2.01*  < >  --   < > 3.58*   ANIONGAP 3 3 6  < >  --   < > 10   JARETH 8.0* 8.0* 7.9*  < >  --   < > 8.2*   GLC 77 71 51*  < >  --   < > 298*   ALBUMIN  --   --   --   --   --   --  2.3*   PROTTOTAL  --   --   --   --   --   --  6.3*   BILITOTAL  --   --   --   --   --   --  0.6   ALKPHOS  --   --   --   --   --   --  93   ALT  --   --   --   --   --   --  54   AST  --   --   --   --   --   --  133*   < > = values in this interval not displayed.    No results found for this or any previous visit (from the past 24 hour(s)).               Procedure Notes     No notes of this type exist for this encounter.         Progress Notes - Therapies (Notes from 01/29/17 through 02/01/17)      Progress Notes by Yanet Rao RT at 2/1/2017  2:20 PM     Author:  Yanet Rao RT Service:  (none) Author Type:  Respiratory Therapist    Filed:  2/1/2017  2:21 PM Note Time:  2/1/2017  2:20 PM Status:  Signed    :  Yanet Rao RT (Respiratory Therapist)           Pt given scheduled neb as ordered. BS diminished. No change post bronchodilator. Currently on 2 lpm via NC and tolerating. Sats  97%.    RT will continue to follow and monitor/assess pt respiratory status.     Yanet Rao  RRT.  2/1/2017         Progress Notes by Jamir De Souza, RT at 1/29/2017  6:05 PM     Author:  Jamir De Souza RT Service:  Respiratory Therapy Author Type:  Respiratory Therapist    Filed:  1/29/2017  6:06 PM Note Time:  1/29/2017  6:05 PM Status:  Signed    :  Jamir De Souza RT (Respiratory Therapist)           Pt stable on 3 LPM nc, BBS diminished. All tx given as ordered, will continue to follow.     Jamir De Souza RT

## 2017-01-20 NOTE — H&P
PRIMARY CARE PHYSICIAN:  Aniyah Magana MD      CHIEF COMPLAINT:  Fall and shortness of breath.      HISTORY OF PRESENT ILLNESS:  Armand Smith is an 87-year-old pleasant male with past medical history significant for insulin-dependent diabetes mellitus, hypertension, diastolic CHF, 2-3+ tricuspid regurgitation, Parkinson's disease, chronic atrial fibrillation with prior stroke, CKD stage IV, chronic anemia, gout who was recently admitted here in [facility] and was discharged on 12/30 after treating healthcare associated pneumonia was brought to the ER for evaluation after fall.  I reviewed his prior admission and discharge notes in Epic, discussed with Dr. Martines, ED physician, who evaluated the patient in the ER and also discussed with the patient and his wife present at bedside for further information.      According to the patient's wife, the patient was walking this morning with the help of his walker and fell to the floor which was carpeted with multiple boxes and different stuff in the floor.  He did hit his head on those boxes, but did not pass out.  Wife was right there, who called the neighbor who came and helped patient to get up and as they were trying to help him go to bed, he had another fall.  This time they called EMS.  The patient was on the floor until they arrived.      The patient and his wife also report that he has worsening shortness of breath for the last few days without fever or chills.  He is shaking, but that is usual, given his Parkinson's disease.  No nausea, vomiting or diarrhea.  Denies any cough.  He is significantly kyphotic but also gives history of orthopnea.  Also, they have noticed somewhat increased wheezing.  He did have leg swelling and was followed up by the wound care nurse and has Ace wraps, but despite that swelling has worsened and is up to his waist.  The patient denies any dizziness.  No hematochezia or melena, but he feels tired and as per wife falls to sleep easily.       REVIEW OF SYSTEMS:  All 10-point systems were reviewed and is negative other than mentioned in HPI.      In ER, the patient was evaluated by Dr. Martines.  He was found to have respiratory acidosis, anemia is at baseline.  ProBNP was worse than his prior.  There with slightly elevated troponin.  Also, BUN and creatinine has worsened than his baseline.  Lactic acid was negative.  Chest x-ray was significant for cardiomegaly and possible vascular congestion versus pneumonia.  Procalcitonin was ordered.  He was hypotensive with systolic blood pressure in the 80s, but as per his wife blood pressure normally runs low normal, but this is lower than his usual.  The patient was felt to be volume overloaded and a dose of Lasix was ordered as his repeat blood pressure was up to 90 systolic and he is being admitted for further management.      PAST MEDICAL HISTORY:   1.  Insulin-dependent diabetes mellitus, last hemoglobin A1c 7 4 weeks ago.   2.  Hypertension.   3.  Congestive heart failure, left-sided diastolic dysfunction with right-sided systolic function.  Echocardiogram from 12/25 shows normal ejection fraction of 60%-65%, decreased right ventricular systolic function, pulmonary hypertension and 2+ tricuspid regurgitation with moderate aortic root dilatation.   4.  Chronic atrial fibrillation, not on anticoagulation due to history of GI bleed and epistaxis.   5.  Parkinson's disease.   6.  History of stroke without neuro deficit.   7.  CKD, baseline creatinine 2.6.   8.  Chronic anemia.  Also, with iron deficiency.   9.  History of gout.   10.  History of non-Hodgkin's lymphoma post-radiation and also splenectomy   11.  Peptic ulcer disease with duodenal ulcer by EGD in 04/2012.   12.  Basal cell skin cancer.   13.  Colon polyps and diverticulosis.   14.  Toe osteomyelitis, had a right hallux and right second toe resected in 2015.      PAST SURGICAL HISTORY:     1.  Post cataract surgery.   2.  Skin biopsy.   3.   Splenectomy.   4.  Right hallux amputation.   5.  Post right second toe amputation.      ALLERGIES:  Codeine and penicillin.      SOCIAL HISTORY:  History of smoking pipe for 58 years, but quit a long time ago.  Drinks alcohol frequently/wine with dinner.  He is  and has 3 children, used to work as a  and is retired, lives with his wife.      FAMILY HISTORY:  Reviewed and noncontributory at this time to his presentation.      PRIOR TO ADMISSION MEDICATIONS:    Prior to Admission medications    Medication Sig Last Dose Taking? Auth Provider   melatonin 1 MG CAPS Take 1 mg by mouth nightly as needed  Yes Iman Hood MD   HYDROmorphone (DILAUDID) 2 MG tablet Take 1 tablet (2 mg) by mouth every 8 hours as needed for moderate to severe pain  Yes Iman Hood MD   ipratropium - albuterol 0.5 mg/2.5 mg/3 mL (DUONEB) 0.5-2.5 (3) MG/3ML neb solution Take 1 vial (3 mLs) by nebulization 4 times daily  Yes Iman Hood MD   albuterol (2.5 MG/3ML) 0.083% neb solution Take 1 vial (2.5 mg) by nebulization every 2 hours as needed for wheezing or shortness of breath / dyspnea  Yes Iman Hood MD   insulin glargine (LANTUS) 100 UNIT/ML injection Inject 4 Units Subcutaneous At Bedtime  Yes Iman Hood MD   senna-docusate (SENOKOT-S;PERICOLACE) 8.6-50 MG per tablet Take 1 tablet by mouth 2 times daily as needed for constipation  Yes Iman Hood MD   bisacodyl (DULCOLAX) 10 MG Suppository Place 1 suppository (10 mg) rectally every 72 hours as needed for constipation (If no BM for 3 days)  Yes Iman Hood MD   carboxymethylcellulose (REFRESH PLUS) 0.5 % SOLN ophthalmic solution Place 1 drop into both eyes every 4 hours as needed for dry eyes  Yes Unknown, Entered By History   acetaminophen (TYLENOL) 325 MG tablet Take 1,000 mg by mouth 2 times daily And daily PRN  Yes Reported, Patient   Nutritional Supplements (GLUCERNA PO) Take 8 oz by mouth daily  Yes Reported, Patient    carbidopa-levodopa (SINEMET)  MG per tablet Take 1 tablet by mouth 1 tablet by mouth every 5 hours as needed for tremors  Yes Reported, Patient   polyethylene glycol (MIRALAX/GLYCOLAX) powder Take 17 g by mouth daily as needed for constipation  Yes Reported, Patient   Skin Protectants, Misc. (EUCERIN) cream Apply topically 2 times daily  Yes Tenzin Nieto MD   carbidopa-levodopa (SINEMET CR)  MG per tablet Take 1 tablet by mouth 2 times daily  Yes Aniyah Magana MD   omeprazole (PRILOSEC) 20 MG capsule Take 1 capsule (20mg) by mouth once daily 1/19/2017 at Unknown time Yes Aniyah Magana MD   NOVOFINE 30 30G X 8 MM insulin pen needle Use three times daily or as directed  Yes Aniyah Magana MD   pramipexole (MIRAPEX) 0.5 MG tablet Take 1 tablet by mouth once daily (with dinner) 1/19/2017 at Unknown time Yes Aniyah Magana MD   allopurinol (ZYLOPRIM) 100 MG tablet Take 2 tablets (200 mg) by mouth daily  Yes Aniyah Magana MD   simvastatin (ZOCOR) 40 MG tablet Take 1 tablet (40 mg) by mouth At Bedtime 1/19/2017 at Unknown time Yes Aniyah Magana MD   ferrous sulfate (IRON) 325 (65 FE) MG tablet Take 325 mg by mouth daily (with lunch)   Yes Reported, Patient   Loperamide HCl (IMODIUM A-D PO) Take 2 mg by mouth 4 times daily as needed  Yes Reported, Patient   aspirin  MG EC tablet Take 1 tablet (325 mg) by mouth daily  Yes Augusto Carrasco MD   co-enzyme Q-10 100 MG CAPS Take 1 capsule by mouth daily.  Yes Devon Talamantes MD   cholecalciferol 5000 UNITS TABS Take 5,000 Units by mouth daily.  Yes Devon Talamantes MD   furosemide (LASIX) 20 MG tablet Take 20 mg by mouth daily At noon   Reported, Patient   furosemide (LASIX) 40 MG tablet Take 40 mg by mouth every morning   Reported, Patient   neomycin-bacitracin-polymyxin (NEOSPORIN) 5-400-5000 ointment Apply topically daily Unknown at Unknown time  Tenzin Nieto MD   blood glucose monitoring (ONE TOUCH ULTRA) test strip Use  to test blood sugar 2 times daily or as directed.   Aniyah Magana MD   blood glucose monitoring (FREESTYLE) lancets Use to test blood sugars 2 times daily or as directed.   Aniyah Magana MD           PHYSICAL EXAMINATION:   GENERAL:  The patient is drowsy/somnolence and wakes up with verbal stimuli but seems to have difficulty maintaining concentration for longer than a minute or so and falls back to sleep.  He appears to be comfortable.   VITAL SIGNS:  Blood pressure 90/54, heart rate 71, temperature 97.5, respirations 15 at times it is up to 39, pulse ox 93 on 3 liter nasal cannula.   HEENT:  Pupils are bilaterally equal, round, reactive to light.  Oral mucosa moist.   NECK:  Supple.   LUNGS:  Bilaterally diminished, scattered wheezing and crackles half of the lungs bilaterally.  The patient does appear mildly tachypneic,  but is maintaining O2 sats of low to mid 90s on 3 liter nasal cannula and does not appear to have any accessory muscle use or significant increase in work of breathing.   CARDIOVASCULAR:  Irregularly, irregular, no tachycardia.  Soft systolic murmur heard.     EXTREMITIES:  The patient has bilateral significant leg edema which has extended up to his thigh and waist.  The skin appears edematous in his thigh.  There is no erythema or blisters.  There are Ace wraps in his legs.   SKIN:  No rash.  The patient does have lacerated skin right wrist.  No active bleeding noted.   NEUROLOGIC:  Somnolent but arousable difficult to maintain concentration, but when awake, follows verbal commands appropriately, appears oriented and is without focal neuro deficit.      LABORATORY DATA:  Sodium 141, potassium 5.1, chloride 102, bicarbonate 27, , creatinine 3.5, calcium 8.2, anion gap 12.  Lactic acid 1.6.  Procalcitonin 0.25.  ProBNP 6608.  Troponin 0.110.  Blood sugar 113.  Venous blood gas shows pH of 7.23, pCO2 64, pO2 25, White cell count 3.6, hemoglobin 8.6, hematocrit 26.6, platelet 152.000.  UA is  negative for any sign of infection.  Does have some albumin in urine and hyaline cast.  Two sets of blood cultures were drawn in the ER.      IMAGING DATA:  Chest x-ray, 2 views, reviewed by me, appears to have significant cardiomegaly, obscuring the left costophrenic and cardiophrenic angles.   A finding concerning for pulmonary congestion versus infiltrate.      A 12-lead EKG atrial fibrillation with a right bundle branch block, unchanged from prior.      ASSESSMENT AND PLAN:  Armand Smith is an 87-year-old pleasant male with above-mentioned multiple medical problems, notably insulin-dependent diabetes mellitus, hypertension, left-sided diastolic and right-sided systolic dysfunction with 2+ tricuspid regurgitation, Parkinson's disease, chronic atrial fibrillation with history of stroke only on aspirin, chronic kidney disease stage IV, and chronic anemia who was brought to the ER after a mechanical fall and worsening shortness of breath for the past few days.     1.  Acute congestive heart failure exacerbation.  It appears that the patient is on Bumex and metolazone.  He does weigh himself regularly as per his wife, but his weight has been all over from 186 up to 206-pounds as per his wife.  He has worsening dyspnea, orthopnea and increased leg edema consistent with congestive heart failure exacerbation, also his proBNP is higher than his prior.   --  The patient's blood pressure is low normal, so I am concerned if he is going to tolerate diuresis well.  He will be getting 1 time dose of Lasix 40 mg IV in the ER. Will follow his blood pressure.  If drops, he will likely need to be on Lasix drip with dobutamine.  Follow intake and output and daily weight.     -- Monitor on telemetry.  The patient will be n.p.o.  When p.o. is resumed will put him on fluid --restriction.     --Cardiology consult.  I have reviewed with Dr. Roper regarding recommendation.  His echo was done 12/25 as reported above, likely we will  defer to Cardiology Service.     2.  Acute hypoxic respiratory failure.  This is multifactorial including congestive heart failure exacerbation and I suspect he likely has pneumonia as well.  He has leukopenia, no fever or cough, but procalcitonin is elevated and chest x-ray and given his pulmonary congestion also raising concern for left lower lobe infiltrate as per report.   -- At this point, he will be treated with IV antibiotics, Levaquin, meropenem and vancomycin for suspected healthcare-associated pneumonia.     -- Follow procalcitonin.     -- Follow blood cultures.     -- If he starts expectorating will send sputum for Gram stain and culture.   -- Given his respiratory acidosis and elevated CO2 and somnolence will be maintained on BiPAP and repeat ABG after a few hours on BiPAP.  Even if it improves, I will leave her on BiPAP overnight.   --- N.p.o.   --  Congestive heart failure management as above.   --  Scheduled DuoNeb and p.r.n. albuterol ordered.   --  Once he is more awake.  Encourage incentive spirometry and Acapella.   --  Will deescalate antibiotic based on culture in 48 hours or so.     3.  Acute kidney injury on top of chronic kidney disease, stage IV.  The patient's baseline creatinine appears to be around 2.6.  He has presented with a creatinine of 3.5.  His BUN is more than 100 and likely causing somnolence from uremia.   --  I suspect this is likely due to cardiorenal syndrome from poor perfusion.  I think he might benefit from dobutamine and Lasix drip.  I have discussed with Cardiology; await their evaluation and recommendation.     -- I have also requested a Nephrology consult on above.     -- We will get renal ultrasound.     -- He will have a Cortez catheter for strict in and out.     -- Follow daily labs.     4.  Hypertension, chronic atrial fibrillation and history of stroke.  Prior to admission medications including simvastatin, hydralazine will be held as he is n.p.o. Also will hold  p.o. diuretic and will be diuresed IV as above.   --  Once his blood pressure improves, we will resume gradually.     -- I have ordered p.r.n. metoprolol for rapid ventricular response, given his atrial fibrillation if blood pressure tolerates.     -- P.o. or rectal aspirin ordered.  Given history of stroke.  He is not on any other anticoagulation due to history of GI bleed and epistaxis.     --  He is not on ARB due to renal failure.     5.  Diabetes mellitus type 2:  The patient takes Lantus 20 units subcutaneously daily.  Since he is going to be n.p.o., I have decreased the dose to half his regular dose so I will order Lantus 10 units subcutaneously daily.   --   Medium intensity sliding scale every 4 hours while n.p.o.     6.  Parkinson's disease and cold.  Prior to admission p.o. Sinemet, resume once off BiPAP.   -- Also resume Mirapex when p.o. is started.     7.  Anemia, chronic, likely due to ongoing illness and renal failure.   ---  Prior workup also shows iron deficiency and is on p.o. iron supplement which will be continued once he is off BiPAP:     --- The patient has received 1 unit of PRBC transfusion in his last admission, but his hemoglobin is at baseline, monitored daily.  His wife has signed consent for blood transfusion and will likely transfuse if hemoglobin less than 8.       8.  Fall.  This seems mechanical.  He also is weak and deconditioned from congestive heart failure and possibly pneumonia.  Also, possibly he has uremic encephalopathy given his somnolence and drowsiness versus from CO2 narcosis.   ---  Once he is off BiPAP, we will order PT, OT eval.    ---.  Patient was discharged home with home health, but likely needs a higher level of care, probably TCU at time of discharge at this time which was discussed with his wife.       9.  History of gout.  Resume prior to admission Allopurinol 200 mg by mouth once p.o. is started.     10.  Deep venous thrombosis prophylaxis with PCDs     11.   Gastrointestinal prophylaxis:  Proton pump inhibitor.      CODE STATUS:  Full code.  This was discussed with the patient and his wife since prior notes says full code and DNR/DNI.  The patient would like to go ahead with 1 attempt at resuscitation.      Anticipate 3-5 days of hospital stay.  Above plan was discussed with the patient and his wife.  Admitted as [facility] status.         BENIGNO MARQUEZ MD             D: 2017 14:56   T: 2017 16:30   MT:       Name:     GIO BERRY   MRN:      [id number]        Account:      [id number]   :      1929           Admitted:     [id number]      Document: [id number]       cc: Aniyah Magana MD

## 2017-01-20 NOTE — IP AVS SNAPSHOT
[facility]    [address] [place] [address], [address]    [place] [address]    Phone:  [phone]                                       After Visit Summary   1/20/2017    Armand Smith    MRN: [id number]           After Visit Summary Signature Page     I have received my discharge instructions, and my questions have been answered. I have discussed any challenges I see with this plan with the nurse or doctor.    ..........................................................................................................................................  Patient/Patient Representative Signature      ..........................................................................................................................................  Patient Representative Print Name and Relationship to Patient    ..................................................               ................................................  Date                                            Time    ..........................................................................................................................................  Reviewed by Signature/Title    ...................................................              ..............................................  Date                                                            Time

## 2017-01-20 NOTE — ED PROVIDER NOTES
History     Chief Complaint:  Fall    HPI   Armand Smith is a 87 year old male with extensive PMH significant for CHF, CKD, DM type II, Parkinson's, and atrial fibrillation anticoagulated on Aspirin, who presents by EMS to the emergency department today for evaluation after fall at home. EMS reports that patient has had 2-3 falls within the last 2 days, and has complained of sleepiness and fatigue. Patient's fall today was reportedly after tripping at home. Patient here complains of  skin tear to his right arm, congestions, shortness of breath for the last few days, and productive cough. Spouse also noted to EMS that he has seemed more confused over the past 24 hours. EMS reports 77% oxygen sats en route, though patient is not on oxygen supplementation at home, and there was concern for increased swelling to his groin and leg swelling. Patient denies blood in stool, urinary symptoms, fever/chills, nausea, vomiting, and abdominal pain. He also reports no preceding chest pain or lightheadedness. No other concerns were voiced at this time.    Allergies:  Codeine  Penicillins      Medications:    Dilaudid  Bumex  Lantuss  Metolazone  Glucerna  Sinemet  Miralax  Sinemet  Prilosec  Mirapex  Metoprolol  Apresoline  Zyloprim  Zocor  Imodium  Aspirin     Past Medical History:    DM type 2  Essential HTN  Lumbosacral spondylosis without myelopathy  Basal cell Ca  Nodular lymphoma   Hyperlipidemia  Atrial fibrillation   CHF  HTN  Great to amputation  Aortic regurgitation  Tricuspid regurgitation   CKD  Parkinson's disease  Poor circulation of extremity    Past Surgical History:     Colonoscopy  Splenectomy    Cornea phacoemulsification IOL  Biopsy  Amputate toes, right  Right heart cath  Biopsy bone toe, right     Family History:     Father - CV disease     Social History:  Presents via EMS    Tobacco use: Former smoker of 58 years, QD 2011  Alcohol use: Occasional wine   Marital Status:        Review of Systems    Constitutional: Positive for fatigue. Negative for fever and chills.   HENT: Positive for congestion.    Respiratory: Positive for cough and shortness of breath.    Cardiovascular: Positive for leg swelling. Negative for chest pain.   Gastrointestinal: Negative for nausea, vomiting and abdominal pain.   Genitourinary: Negative for dysuria and hematuria.   Psychiatric/Behavioral: Positive for confusion.   All other systems reviewed and are negative.    Physical Exam     Patient Vitals for the past 24 hrs:   BP Temp Temp src Pulse Heart Rate Resp SpO2   01/20/17 1545 (!) 89/54 mmHg - - - 73 29 91 %   01/20/17 1535 (!) 89/60 mmHg - - - 73 29 92 %   01/20/17 1530 (!) 82/53 mmHg - - - 71 (!) 32 93 %   01/20/17 1525 (!) 89/50 mmHg - - - 73 24 94 %   01/20/17 1520 (!) 86/53 mmHg - - - 74 23 93 %   01/20/17 1515 92/53 mmHg - - - 70 30 93 %   01/20/17 1510 (!) 84/59 mmHg - - - 76 (!) 37 93 %   01/20/17 1505 91/58 mmHg - - - 69 26 91 %   01/20/17 1500 (!) 87/53 mmHg - - - 79 30 90 %   01/20/17 1455 (!) 82/57 mmHg - - - 68 (!) 34 90 %   01/20/17 1450 96/55 mmHg - - - 74 26 (!) 88 %   01/20/17 1444 (!) 79/44 mmHg - - - 72 - (!) 87 %   01/20/17 1437 (!) 77/46 mmHg - - - 78 - (!) 85 %   01/20/17 1330 90/54 mmHg - - - 71 15 93 %   01/20/17 1320 (!) 88/53 mmHg - - - 80 (!) 39 93 %   01/20/17 1310 - - - - - - 90 %   01/20/17 1300 (!) 87/48 mmHg - - - 75 28 93 %   01/20/17 1254 90/57 mmHg - - - 65 (!) 32 92 %   01/20/17 1245 (!) 84/47 mmHg - - - 77 (!) 31 -   01/20/17 1230 (!) 84/45 mmHg - - - 71 21 -   01/20/17 1200 96/60 mmHg - - - 68 29 94 %   01/20/17 1150 (!) 88/49 mmHg - - - 69 19 -   01/20/17 1140 - - - - 74 (!) 38 -   01/20/17 1130 95/51 mmHg - - - 75 (!) 37 -   01/20/17 1120 98/60 mmHg - - - 73 (!) 32 -   01/20/17 1100 105/61 mmHg - - - 76 10 90 %   01/20/17 1045 106/58 mmHg - - - 69 30 91 %   01/20/17 1035 100/60 mmHg - - - 67 (!) 35 -   01/20/17 1032 (!) 89/48 mmHg - - - 72 - -   01/20/17 1030 - - - - - - 90 %   01/20/17  1000 - - - - - - 90 %   01/20/17 0945 97/51 mmHg - - - 73 20 90 %   01/20/17 0941 101/56 mmHg - - - - - -   01/20/17 0940 99/62 mmHg 97.5  F (36.4  C) Oral 77 - 30 (!) 72 %        Physical Exam  SKIN:  Warm, dry.  Scattered areas of ecchymosis.  Superficial skin tear/abrasion of distal RUE.  No jaundice.  No rash.  Faint erythema about the groin and the upper thighs.  HEMATOLOGIC/IMMUNOLOGIC/LYMPHATIC:  No pallor.  Symmetric pitting edema of the BLE which extends into the groin and genitals and the lower abdomen.  HENT:  Moist oral mucosa.  No oral trauma.  Painless ROM of the head and neck.  EYES:  Conjunctivae normal.  Anicteric.  PERRL.  CARDIOVASCULAR:  Regular rate and an irregularly irregular rhythm.  No murmur.  RESPIRATORY:  No respiratory distress but poor respiratory effort, breath sounds equal with bilateral crackles.  GASTROINTESTINAL:  Soft, nontender abdomen with active bowel sounds.  GENITOURINARY:  Edematous scrotum.  MUSCULOSKELETAL:  Full painless upper and lower extremity passive range of motion.  NEUROLOGIC:  Somnolent, oriented.  No gross motor or sensory deficit.  PSYCHIATRIC:  No psychotic features.    Emergency Department Course     ECG:  ECG taken at 0945, ECG read at 0956  Atrial fibrillation  Right bundle branch block  T wave abnormality, consider inferolateral ischemia  Abnormal ECG  Rate 65 bpm. IA interval *. QRS duration 158. QT/QTc 442/459. P-R-T axes * 47 5.    Imaging:  Radiology findings were communicated with the patient who voiced understanding of the findings.    Chest xray 2 views:  Marked cardiomegaly. There may be mild pulmonary vascular  engorgement. Dense opacity at the posterior left lung base, consistent  with pneumonia, likely worsened since the prior exam.  Reading per radiology    CT head w/o contrast:  Chronic changes. No evidence for intracranial hemorrhage  or any acute process.  Reading per radiology    US lower extremity venous duplex bilateral:   Normal bilateral  lower extremity venous Doppler.  Reading per radiology     Laboratory:  Laboratory findings were communicated with the patient who voiced understanding of the findings.  CBC: WBC 3.6(L), HGB 8.6(L),    BMP: Glucose: 185(H)m BUN: 105(H), GFR Estimate: 17(L), Creatinine 3.50  Troponin: 0.110(H)  UA: Protein Albumin Urine: 10(A), Mucous Urine: Present(A), Hyaline Casts: 20(H)   BNP: 6608(H)  Lactic Acid: 1.6  Glucose by meter: 133(H)  Procalcitonin: 0.25  Venous Blood Gas: pH: 7.23(L), PCO2: 64(H), PO2: 25, Bicarbonate: 27  Influenza A/B antigen: Negative    Blood culture: Pending x2    Interventions:  0950 Narcan 0.4 mg IV  0957 Zofran 4 mg IV  1338 Lasix 40 mg IV  1450 Narcan 0.4 mg IV  1540 Levaquin 500 mg IV     Emergency Department Course:  Nursing notes and vitals reviewed.  I performed an exam of the patient as documented above.   IV was inserted and blood was drawn for laboratory testing, results above.  The patient was sent for a chest xray and head CT while in the emergency department, results above.   The patient provided a urine sample here in the emergency department. This was sent for laboratory testing, findings above.  I discussed the treatment plan with the patient. They expressed understanding of this plan and consented to admission.     1257: I discussed the patient with Dr Hood, who will admit the patient to a monitored bed for further evaluation and treatment. After evaluating the patient, he requests bilateral leg US during ED course.   At 1301 the patient was rechecked and I updated spouse on plan of care. I also updated patient on lab and imaging results.     Patient was returned to the ED from US after abnormal labs.   At 1502 the patient was rechecked and we discussed lab results. BP was noted to have increased after 2nd dose of Narcan today. He continues to feel fatigued.    1510: I spoke with Dr. Hood of the hospitalists service regarding updated available lab results and, after  rechecking the patient, the BP has now stabilized.     I personally reviewed the laboratory results with the Patient's spouse and answered all related questions prior to admission.    Impression & Plan      Medical Decision Making:  Armand Smith is a 87 year old male who presents to the emergency department today with altered mentation, frequent falls, hypoxia, and dyspnea. A host of problems were entertained and his symptoms were likely multifactorial. His BP was soft, running largely in the high 80s, and of course considered sepsis as a root cause of this although he was not febrile, not coughing here, although his chest xray reveal pneumonia but with maybe being worse that a previous chest xray. Consider hypotension from over medication of Dilaudid so Narcan was attempted on 2 occasions, and he did become more alert and his BP did seem to improve with that. Cardiogenic shock is a possibility although seems less likely as a root cause of hypotension. Clearly, very edematous to the groin and the abdomen and sounds that this is chronic, but on further interview, increased over the last few days. His BNP is elevated which suggests a component of CHF with this clinical picture. Appears that his renal function is worse in comparison so nephrotic syndrome is possible or just worsened renal function from poor intake, seemingly prerenal. His renal function precluded CT chest regarding PE as a root cause of dyspnea, hypotension, and hypoxia. US of the legs performed was negative for clot. After further discussion and consultation with the admitting hospitalists, Dr Hood, we opted for a dose of Levaquin regarding potential for pneumonia and although being aggressive with fluids was complicated by his CHF and edema. The patient will be admitted to the ICU for further close monitoring. At the time of admission his BP, albeit low in the 80s, was stable.    Diagnosis:    ICD-10-CM    1. Pneumonia of left lower lobe due to  infectious organism J18.9 Glucose by meter     Glucose by meter     Lactic acid     Procalcitonin     Blood culture     Blood culture   2. Altered mental status, unspecified altered mental status type R41.82    3. Hypoxia R09.02    4. Falls frequently R29.6    5. Skin tear of right upper extremity S41.109A    6. Acute renal failure, unspecified acute renal failure type (H) N17.9    7. Anemia, unspecified type D64.9    8. Edema, unspecified type R60.9      Disposition:   Admission under hospitalists service to ICU.    Scribe Disclosure:  JEREL, Filipe Brown, am serving as a scribe at 9:52 AM on 1/20/2017 to document services personally performed by Joe Martines MD, based on my observations and the provider's statements to me.     EMERGENCY DEPARTMENT        Joe Martines MD  01/20/17 2955

## 2017-01-20 NOTE — ED NOTES
"[facility]  ED Nurse Handoff Report    ED Chief complaint: Altered Mental Status      ED Diagnosis:   Final diagnoses:   Pneumonia of left lower lobe due to infectious organism   Altered mental status, unspecified altered mental status type   Hypoxia   Falls frequently   Skin tear of right upper extremity   Acute renal failure, unspecified acute renal failure type (H)   Anemia, unspecified type   Edema, unspecified type       Code Status: see epic    Allergies:   Allergies   Allergen Reactions     Codeine      nausea vomiting     Penicillins      \" turned red from head to foot\"       Activity level:  Stand with Assist with walker at home     Needed?: No    Isolation: Yes  Infection: Not Applicable  MRSA    Bariatric?: No      Vital Signs:   Filed Vitals:    01/20/17 1030 01/20/17 1032 01/20/17 1035 01/20/17 1045   BP:  89/48 100/60 106/58   Pulse:       Temp:       TempSrc:       Resp:   35 30   SpO2: 90%   91%       Cardiac Rhythm: ,   Cardiac  Cardiac Rhythm: Atrial fibrillation    Pain level: 0-10 Pain Scale: 0    Is this patient confused?: No    Patient Report: Initial Complaint: lives at home with wife who reports pt has fallen 1-2X/day for the last 3 days. Pt fell again this am and then had altered mental status. EMS found pt to be responsive but not alert. Pt sating 77% on RA, pt does not use home O2, but hx of COPD. Pt denies hittting his head or neck pain.  Hx TIAs, CHF, A fib, parkinsons, see epic for full hx list   Focused Assessment: pt is very lethargic, falls asleep if not being talked to, but is AOX4. Bilateral wrappings to LEs for fluid retention and weeping-home health RN has been out everyday to change dressings, will need to be changed today. DISLA/SOB, exp wheezes, ^RR. sating 72% on RA, up to 90% on 3L O2. Skin tear to right wrist, cleaned and dressed. BPs run low. 4+ edmea to bilateral LEs up to abd. C/o chronic back pain, very painful for pt to lay flat. Pt takes " frequent PO dilaudid at home.   Tests Performed: cxr, head CT, labs, UA, influenza, BC x2, lactic ; US LEs  Abnormal Results: crt^, cxr shows possible left pneumonia, ^BNP, ^trop;   Treatments provided: narcan given, pt became shakey and nauseated immediately after administration and was groaning, but states his shakes were from his parkinson's, however, pt has not shook at rest since narcan wore off. And is asleep in the bed unless moved. zofran for nausea. 40mg IV lasix per hospitalist     Family Comments: wife at bedside, pt wears hearing aids, but the battery is dead in one- wife will take it home.    OBS brochure/video discussed/provided to patient: No    ED Medications:   Medications   sodium chloride (PF) 0.9% PF flush 3 mL (not administered)   sodium chloride (PF) 0.9% PF flush 3 mL (not administered)   naloxone (NARCAN) injection 0.4 mg (0.4 mg Intravenous Given 1/20/17 8562)   ondansetron (ZOFRAN) injection 4 mg (4 mg Intravenous Given 1/20/17 5657)       Drips infusing?:  No      ED NURSE PHONE NUMBER: [phone]

## 2017-01-20 NOTE — IP AVS SNAPSHOT
` ` Patient Information     Patient Name Sex     Armand Smith ([id number]) Male 1929       Room Bed    [facility] 0245-      Patient Demographics     Address Phone E-mail Address    [address] [place] [address] [phone] (Home) *Preferred*  none (Work)  none (Mobile) [email or web address]      Patient Ethnicity & Race     Ethnic Group Patient Race    American White      Emergency Contact(s)     Name Relation Home Work Mobile    nIdia Smith Spouse [phone] none [phone]    Emmy Smith Daughter none none [phone]      Documents on File        Status Date Received Description       Documents for the Patient    Insurance Card       Face Sheet  07     Privacy Notice - [place] Received 03     Insurance Card  04     Insurance Card  06     Face Sheet Received 08     Insurance Card  07     External Medication Information Consent Accepted 09     Face Sheet Received 09     Patient ID Received 13 Exp: 2013     Consent for Services - Hospital/Clinic       Insurance Card Received 11/11/10 [facility]     Insurance Card Received 11/11/10 Medicare    Consent for Services - Hospital/Clinic Received 11/11/10     External Medication Information Consent Accepted 11/11/10     Privacy Notice - [place] Received 11/11/10     Consent for Services - Hospital/Clinic Received 11     Insurance Card Received 12 [facility]     External Medication Information Consent Accepted 04/10/12     Advance Directives and Living Will Received 02/03/10     Consent for Services - Hospital/Clinic Received 12     Consent for EHR Access  13 Copied from existing Consent for services - C/HOD collected on 2012    [facility] Specified Other       External Medication Information Consent Accepted 13     Advance Directives and Living Will Not Received  POLST 13    Consent for Services - Geriatrics Received 13     HIM ADAMARIS Authorization   05/14/13     Consent for Services - Hospital/Clinic Received 10/04/13     Consent for Services - Hospital/Clinic Received 10/04/13     Insurance Card Received 01/07/14 Medicare    Patient ID Received 01/07/14 Exp: 11/2017    Insurance Card Received 01/07/14     Insurance Card Received 01/07/14     Consent to Communicate       Consent for Services - Hospital/Clinic Received 10/13/14     Insurance Card Received 04/17/15 pt did not have insurance card    HIM ADAMARIS Authorization  07/22/15 [facility]-referral    HIM ADAMARIS Authorization  08/06/15 [facility] [place]    Patient ID Received 08/06/15     Insurance Card Received 08/06/15 [employer]- 2015, Medicare    HIM ADAMARIS Authorization  09/18/15 Patient    Consent for Services - Hospital/Clinic Received 09/29/15     Consent for Services/Privacy Notice - Hospital/Clinic Received 01/21/16     Business/Insurance/Care Coordination/Health Form - Patient  08/05/16 [employer] SIGNATURE REQUEST 8/2/16    Insurance Card Received 08/25/16     HIM ADAMARIS Authorization  08/29/16 [employer] [facility]    Business/Insurance/Care Coordination/Health Form - Patient  08/31/16 UCARE MEDICATION- DENIAL LETTER 8/17/16    Privacy Notice - [place] Received 01/20/17     Consent for Services/Privacy Notice - Hospital/Clinic Received 01/20/17        Documents for the Encounter    CMS IM for Patient Signature Received 01/21/17 1MM    Consent for Services - Informed  01/31/17 INFORMED CONSENT FOR SURGERY OR DESIGNATED PROCEDURE (SHORT VERSION)    CMS IM for Patient Signature Received 02/01/17 2MM      Admission Information     Attending Provider Admitting Provider Admission Type Admission Date/Time    Iman Hood MD Kharel, Tirtha R, MD Emergency 01/20/17  0935    Discharge Date Hospital Service Auth/Cert Status Service Area     Hospitalist Incomplete [facility] SERVICES    Unit Room/Bed Admission Status    SH CARDIAC SPEC CARE 0245/0245-01 Admission (Confirmed)             Admission     Complaint    Acute respiratory failure with hypoxia (H)      Hospital Account     Name Acct ID Class Status Primary Coverage    Armand Smith [id number] Inpatient Open UCARE - [employer] FOR S[employer]ORS            Guarantor Account (for Hospital Account #[id number])     Name Relation to Pt Service Area Active? Acct Type    Armand Smith  FCS Yes Personal/Family    Address Phone          [address] [address] [phone](H)  none(O)              Coverage Information (for Hospital Account #[id number])     F/O Payor/Plan Precert #    UCARE/UCARE FOR SENIORS     Subscriber Subscriber #    Armand Smith [id number]    Address Phone    [address]  [place] [address] [phone]

## 2017-01-20 NOTE — ED NOTES
Bed: ED10  Expected date:   Expected time:   Means of arrival:   Comments:  rogder - 516- altered mental status eta 7607

## 2017-01-20 NOTE — IP AVS SNAPSHOT
MRN:[id number]                      After Visit Summary   1/20/2017    Armand Smith    MRN: [id number]           Thank you!     Thank you for choosing [facility] for your care. Our goal is always to provide you with excellent care. Hearing back from our patients is one way we can continue to improve our services. Please take a few minutes to complete the written survey that you may receive in the mail after you visit with us. Thank you!        Patient Information     Date Of Birth          11/7/1929        About your hospital stay     You were admitted on:  January 20, 2017 You last received care in the:  [facility]    You were discharged on:  February 1, 2017        Reason for your hospital stay       Acute hypoxic respiratory failure due to CHF exacerbation and Pneumonia. Acute renal failure on CKD.                  Who to Call     For medical emergencies, please call 911.  For non-urgent questions about your medical care, please call your primary care provider or clinic, [phone]          Attending Provider     Provider    Joe Martines MD Kharel, Tirtha R, MD       Primary Care Provider Office Phone # Fax #    Aniyah Magana [phone]       [facility] [place] [address]  [place] [address]        After Care Instructions     Activity - Up with assistive device           Additional Discharge Instructions       To continue lymphedema treatment per instruction            Advance Diet as Tolerated       Follow this diet upon discharge: Orders Placed This Encounter     Snacks/Supplements Adult: Glucerna (Adult); With Meals  Combination Diet Regular Diet Adult; Thin Liquids (water, ice chips, juice, milk, gelatin, ice cream, etc); 2 gm NA Diet            Daily weights       Call Provider for weight gain of more than 2 pounds per day or 5 pounds per week.            Fall precautions           General info for SNF        Length of Stay Estimate: Short Term Care: Estimated # of Days <30  Condition at Discharge: Stable  Level of care:skilled   Rehabilitation Potential: Fair  Admission H&P remains valid and up-to-date: Yes  Recent Chemotherapy: N/A  Use Nursing Home Standing Orders: Yes            Glucose monitor nursing POCT       Before meals and at bedtime            Intake and output       Every shift            Mantoux instructions       Give two-step Mantoux (PPD) Per Facility Policy Yes            Oxygen - Nasal cannula       3 Lpm by nasal cannula, titrate to keep O2 sats 90% or greater.            Wound care (specify)       Site:   legs  Instructions:  Per wound care                  Follow-up Appointments     Follow Up and recommended labs and tests       Follow up with jail physician.  The following labs/tests are recommended: BMP/H&H in 3-5 days.    Cardiology and nephrology follow up as recommended.                  Your next 10 appointments already scheduled     Feb 16, 2017 12:20 PM   LAB with [facility]   [facility] ([facility])    [address] [place] [id number] [phone]           Patient must bring picture ID.  Patient should be prepared to give a urine specimen  Please do not eat 10-12 hours before your appointment if you are coming in fasting for labs on lipids, cholesterol, or glucose (sugar).  Pregnant women should follow their Care Team instructions. Water with medications is okay. Do not drink coffee or other fluids.   If you have concerns about taking  your medications, please ask at office or if scheduling via [employer], send a message by clicking on Secure Messaging, Message Your Care Team.            Feb 16, 2017  1:10 PM   Core Return with AMARJIT Galeas CNP   [facility] ([facility])    [address] [place], [place] [id number]  [facility] [id number] [phone]             "  Additional Services     Occupational Therapy Adult Consult       Evaluate and treat as clinically indicated.    Reason:  CHF. weakness            Physical Therapy Adult Consult       Evaluate and treat as clinically indicated.    Reason:  CHF, weakness                  Further instructions from your care team       Patient will discharge to [facility] today via [facility] wheelchair at 18:15.  [facility]'s phone number is [phone].    General Recommendations To Control Heart Failure When You Get Home     Instructions To Patients and Families: Please read and check off each of these important instructions as you do them when you get home.           Weight and symptoms      ___ Put a scale in your bathroom  ___ Post a weight chart or calendar next to the scale  ___Weigh yourself every day as soon as you you get up in the morning. You should only be wearing your pajamas. Write your weight on the chart/calendar.  ___ Bring your weight chart/calendar with you to all appointments    ___Call your doctor if you gain 2 pounds in 1 day or 5 pounds in 1 week from your \"dry\" weight (baseline weight). Also call your doctor if you have shortness of breath that gets worse over time, leg swelling or fatigue.         Medicines and diet     ___ Make sure to take your medicines as prescribed.    ___Bring a current list of your medicines and all of your medicine bottles with you to all appointments.    ___ Limit fluids if you still have swelling or shortness of breath, or if your doctor tells you to do so.  ___ Eat less than 2000 mg of sodium (salt) every day. Read food labels, and do not add salt to meals.   ___ Heart healthy diet with low fat and low cholesterol          Activity and suggested lifestyle changes    ___ Stay active. Talk to your doctor about an exercise program that is safe for your heart.    ___ Stop smoking. Reduce alcohol use.      ___ Lose weight if you are overweight. Extra weight puts a lot of stress on " the heart.          Control for Leg Swelling   ___ Keep your legs elevated (raised) as needed for swelling. If swelling is uncomfortable or elevation doesn t help, ask your doctor about using ACE wrap or Jobst stockings.          Follow-up appointments   ___ Make a [facility] appointment with a basic metabolic panel lab draw 3 to 5 days after you leave the hospital. Call one of the following locations:   [facility]  [phone],  [facility] [phone],  [facility]  [phone].     ___ Make sure to take your medications as prescribed and bring an accurate list of your medications and your weight chart/calendar to your follow up appointment at the [facility] Clinic for continued education and adjustments          What is the CORE clinic?    The C.O.R.E (Cardiomyopathy, Optimization, Rehabilitation, Education) Clinic is a heart failure specialty clinic within the [facility]. At [facility].O.R.E[facility] you will work with nurse practitioners to carefully adjust medicines, get education and learn who and when to call if symptoms appear. [facility] nurses specialize in helping you:    better understand your disease.    slow the progress of your disease.    improve the length and quality of your life.    detect future heart problems before they become life threatening.    avoid hospital stays.            Pending Results     Date and Time Order Name Status Description    1/20/2017 1718 Sputum Culture Aerobic Bacterial Preliminary             Statement of Approval     Ordered          02/01/17 1446  I have reviewed and agree with all the recommendations and orders detailed in this document.   EFFECTIVE NOW     Approved and electronically signed by:  Iman Hood MD             Admission Information        Provider Department Dept Phone    1/20/2017 Iman Hood MD  Cardiac Spec Care [phone]       Your Vitals Were     Blood Pressure Pulse Temperature Respirations Weight Pulse Oximetry    96/48 mmHg 80 96.8  F (36  C) (Oral) 18 76.2 kg (167 lb 15.9 oz) 98%      [facility] Information     [employer] gives you secure access to your electronic health record. If you see a primary care provider, you can also send messages to your care team and make appointments. If you have questions, please call your primary care clinic.  If you do not have a primary care provider, please call [phone] and they will assist you.        Care EveryWhere ID     This is your Care EveryWhere ID. This could be used by other organizations to access your [place] medical records  [id number]           Review of your medicines      START taking        Dose / Directions    albuterol (2.5 MG/3ML) 0.083% neb solution   Used for:  Pneumonia of left lower lobe due to infectious organism, Hypoxia        Dose:  2.5 mg   Take 1 vial (2.5 mg) by nebulization every 2 hours as needed for wheezing or shortness of breath / dyspnea   Quantity:  360 mL   Refills:  0       bisacodyl 10 MG Suppository   Commonly known as:  DULCOLAX   Used for:  Drug-induced constipation        Dose:  10 mg   Place 1 suppository (10 mg) rectally every 72 hours as needed for constipation (If no BM for 3 days)   Quantity:  30 suppository   Refills:  0       ciprofloxacin 0.3 % ophthalmic solution   Commonly known as:  CILOXAN   Used for:  Acute bacterial conjunctivitis of both eyes        Dose:  1 drop   Place 1 drop into both eyes 4 times daily for 7 days   Quantity:  1 Bottle   Refills:  0       furosemide 40 MG tablet   Commonly known as:  LASIX   Used for:  Chronic diastolic congestive heart failure (H)        Dose:  40 mg   Take 1 tablet (40 mg) by mouth daily   Quantity:  30 tablet   Refills:  0       guaiFENesin 600 MG 12 hr tablet   Commonly known as:  MUCINEX   Used for:  Pneumonia of left lower lobe due to infectious organism        Dose:  600 mg   Take 1 tablet  (600 mg) by mouth 2 times daily for 7 days   Quantity:  14 tablet   Refills:  0       ipratropium - albuterol 0.5 mg/2.5 mg/3 mL 0.5-2.5 (3) MG/3ML neb solution   Commonly known as:  DUONEB   Used for:  Pneumonia of left lower lobe due to infectious organism        Dose:  3 mL   Take 1 vial (3 mLs) by nebulization 4 times daily   Quantity:  360 mL   Refills:  0       senna-docusate 8.6-50 MG per tablet   Commonly known as:  SENOKOT-S;PERICOLACE   Used for:  Drug-induced constipation        Dose:  1 tablet   Take 1 tablet by mouth 2 times daily as needed for constipation   Quantity:  100 tablet   Refills:  0         CONTINUE these medicines which may have CHANGED, or have new prescriptions. If we are uncertain of the size of tablets/capsules you have at home, strength may be listed as something that might have changed.        Dose / Directions    HYDROmorphone 2 MG tablet   Commonly known as:  DILAUDID   This may have changed:  when to take this   Used for:  Wound, open, foot, left, subsequent encounter        Dose:  2 mg   Take 1 tablet (2 mg) by mouth every 8 hours as needed for moderate to severe pain   Quantity:  60 tablet   Refills:  0       insulin glargine 100 UNIT/ML injection   Commonly known as:  LANTUS   This may have changed:  how much to take   Used for:  IDDM (insulin dependent diabetes mellitus) (H)        Dose:  4 Units   Inject 4 Units Subcutaneous At Bedtime   Refills:  0       melatonin 1 MG Caps   This may have changed:    - medication strength  - when to take this  - reasons to take this   Used for:  Insomnia, unspecified type        Dose:  1 mg   Take 1 mg by mouth nightly as needed   Refills:  0         CONTINUE these medicines which have NOT CHANGED        Dose / Directions    acetaminophen 325 MG tablet   Commonly known as:  TYLENOL        Dose:  650 mg   Take 650 mg by mouth every 4 hours as needed for mild pain   Refills:  0       allopurinol 100 MG tablet   Commonly known as:  ZYLOPRIM    Used for:  Elevated uric acid in blood        Dose:  200 mg   Take 2 tablets (200 mg) by mouth daily   Quantity:  180 tablet   Refills:  3       aspirin 325 MG EC tablet        Dose:  325 mg   Take 1 tablet (325 mg) by mouth daily   Refills:  0       blood glucose monitoring lancets   Used for:  Type II or unspecified type diabetes mellitus with renal manifestations, not stated as uncontrolled        Use to test blood sugars 2 times daily or as directed.   Quantity:  1 Box   Refills:  5       blood glucose monitoring test strip   Commonly known as:  ONE TOUCH ULTRA   Used for:  Type 2 diabetes mellitus with diabetic chronic kidney disease, unspecified CKD stage        Use to test blood sugar 2 times daily or as directed.   Quantity:  100 each   Refills:  PRN       * carbidopa-levodopa  MG per tablet   Commonly known as:  SINEMET        Dose:  1 tablet   Take 1 tablet by mouth 1 tablet by mouth every 5 hours as needed for tremors   Refills:  0       * carbidopa-levodopa  MG per CR tablet   Commonly known as:  SINEMET CR   Used for:  Parkinson disease (H)        Dose:  1 tablet   Take 1 tablet by mouth 2 times daily   Quantity:  60 tablet   Refills:  3       carboxymethylcellulose 0.5 % Soln ophthalmic solution   Commonly known as:  REFRESH PLUS        Dose:  1 drop   Place 1 drop into both eyes every 4 hours as needed for dry eyes   Refills:  0       Cholecalciferol 5000 UNITS Tabs   Used for:  Brainstem stroke (H)        Dose:  5000 Units   Take 5,000 Units by mouth daily.   Quantity:  30 tablet   Refills:  0       co-enzyme Q-10 100 MG Caps capsule   Used for:  Brainstem stroke (H)        Dose:  100 mg   Take 1 capsule by mouth daily.   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)        Apply topically daily   Refills:  1       NOVOFINE 30 30G X 8 MM   Used for:  Type 2 diabetes mellitus with diabetic chronic kidney disease, unspecified CKD stage   Generic drug:  insulin pen needle        Use three times daily or as directed   Quantity:  100 each   Refills:  PRN       omeprazole 20 MG CR capsule   Commonly known as:  priLOSEC   Used for:  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        Take 1 capsule (20mg) by mouth once daily   Quantity:  90 capsule   Refills:  2       polyethylene glycol powder   Commonly known as:  MIRALAX/GLYCOLAX        Dose:  17 g   Take 17 g by mouth daily as needed for constipation   Refills:  0       pramipexole 0.5 MG tablet   Commonly known as:  MIRAPEX   Used for:  Restless legs syndrome (RLS)        Take 1 tablet by mouth once daily (with dinner)   Quantity:  90 tablet   Refills:  2       simvastatin 40 MG tablet   Commonly known as:  ZOCOR   Used for:  Hyperlipidemia LDL goal <100        Dose:  40 mg   Take 1 tablet (40 mg) by mouth At Bedtime   Quantity:  90 tablet   Refills:  3       * Notice:  This list has 2 medication(s) that are the same as other medications prescribed for you. Read the directions carefully, and ask your doctor or other care provider to review them with you.      STOP taking     ACE/ARB NOT PRESCRIBED (INTENTIONAL)           bumetanide 2 MG tablet   Commonly known as:  BUMEX           hydrALAZINE 25 MG tablet   Commonly known as:  APRESOLINE           METOLAZONE PO           metoprolol 25 MG 24 hr tablet   Commonly known as:  TOPROL-XL                Where to get your  medicines      Some of these will need a paper prescription and others can be bought over the counter. Ask your nurse if you have questions.     You don't need a prescription for these medications    - albuterol (2.5 MG/3ML) 0.083% neb solution  - bisacodyl 10 MG Suppository  - ciprofloxacin 0.3 % ophthalmic solution  - furosemide 40 MG tablet  - guaiFENesin 600 MG 12 hr tablet  - insulin glargine 100 UNIT/ML injection  - ipratropium - albuterol 0.5 mg/2.5 mg/3 mL 0.5-2.5 (3) MG/3ML neb solution  - melatonin 1 MG Caps  - senna-docusate 8.6-50 MG per tablet      Information about where to get these medications is not yet available     ! Ask your nurse or doctor about these medications    - HYDROmorphone 2 MG tablet             Protect others around you: Learn how to safely use, store and throw away your medicines at www.disposemymeds.org.             Medication List: This is a list of all your medications and when to take them. Check marks below indicate your daily home schedule. Keep this list as a reference.      Medications           Morning Afternoon Evening Bedtime As Needed    acetaminophen 325 MG tablet   Commonly known as:  TYLENOL   Take 650 mg by mouth every 4 hours as needed for mild pain   Last time this was given:  650 mg on 2/1/2017 10:19 AM                                   albuterol (2.5 MG/3ML) 0.083% neb solution   Take 1 vial (2.5 mg) by nebulization every 2 hours as needed for wheezing or shortness of breath / dyspnea   Last time this was given:  2.5 mg on 1/24/2017 11:12 PM                                   allopurinol 100 MG tablet   Commonly known as:  ZYLOPRIM   Take 2 tablets (200 mg) by mouth daily                                   aspirin 325 MG EC tablet   Take 1 tablet (325 mg) by mouth daily   Last time this was given:  325 mg on 2/1/2017  8:20 AM                                   bisacodyl 10 MG Suppository   Commonly known as:  DULCOLAX   Place 1 suppository (10 mg) rectally every 72  hours as needed for constipation (If no BM for 3 days)   Last time this was given:  10 mg on 1/26/2017  4:34 PM                                   blood glucose monitoring lancets   Use to test blood sugars 2 times daily or as directed.                                blood glucose monitoring test strip   Commonly known as:  ONE TOUCH ULTRA   Use to test blood sugar 2 times daily or as directed.                                * carbidopa-levodopa  MG per tablet   Commonly known as:  SINEMET   Take 1 tablet by mouth 1 tablet by mouth every 5 hours as needed for tremors   Last time this was given:  1 tablet on 1/31/2017 10:38 PM                                   * carbidopa-levodopa  MG per CR tablet   Commonly known as:  SINEMET CR   Take 1 tablet by mouth 2 times daily   Last time this was given:  1 tablet on 2/1/2017  8:20 AM                                      carboxymethylcellulose 0.5 % Soln ophthalmic solution   Commonly known as:  REFRESH PLUS   Place 1 drop into both eyes every 4 hours as needed for dry eyes   Last time this was given:  1 drop on 1/30/2017  1:15 AM                                   Cholecalciferol 5000 UNITS Tabs   Take 5,000 Units by mouth daily.                                   ciprofloxacin 0.3 % ophthalmic solution   Commonly known as:  CILOXAN   Place 1 drop into both eyes 4 times daily for 7 days                                            co-enzyme Q-10 100 MG Caps capsule   Take 1 capsule by mouth daily.   Last time this was given:  100 mg on 2/1/2017  8:20 AM                                   eucerin cream   Apply topically 2 times daily   Last time this was given:  1/31/2017  9:08 PM                                      ferrous sulfate 325 (65 FE) MG tablet   Commonly known as:  IRON   Take 325 mg by mouth daily (with lunch)   Last time this was given:  325 mg on 2/1/2017  8:20 AM                                   furosemide 40 MG tablet   Commonly known as:  LASIX   Take  1 tablet (40 mg) by mouth daily   Last time this was given:  40 mg on 2/1/2017  8:21 AM                                   GLUCERNA PO   Take 8 oz by mouth daily                                   guaiFENesin 600 MG 12 hr tablet   Commonly known as:  MUCINEX   Take 1 tablet (600 mg) by mouth 2 times daily for 7 days   Last time this was given:  600 mg on 2/1/2017  8:20 AM                                      HYDROmorphone 2 MG tablet   Commonly known as:  DILAUDID   Take 1 tablet (2 mg) by mouth every 8 hours as needed for moderate to severe pain   Last time this was given:  2 mg on 1/31/2017 10:38 PM                                   IMODIUM A-D PO   Take 2 mg by mouth 4 times daily as needed                                   insulin glargine 100 UNIT/ML injection   Commonly known as:  LANTUS   Inject 4 Units Subcutaneous At Bedtime   Last time this was given:  3 Units on 1/31/2017  9:06 PM                                   ipratropium - albuterol 0.5 mg/2.5 mg/3 mL 0.5-2.5 (3) MG/3ML neb solution   Commonly known as:  DUONEB   Take 1 vial (3 mLs) by nebulization 4 times daily   Last time this was given:  3 mLs on 2/1/2017  3:11 PM                                   melatonin 1 MG Caps   Take 1 mg by mouth nightly as needed                                   neomycin-bacitracin-polymyxin 5-400-5000 ointment   Apply topically daily                                   NOVOFINE 30 30G X 8 MM   Use three times daily or as directed   Generic drug:  insulin pen needle                                omeprazole 20 MG CR capsule   Commonly known as:  priLOSEC   Take 1 capsule (20mg) by mouth once daily                                   polyethylene glycol powder   Commonly known as:  MIRALAX/GLYCOLAX   Take 17 g by mouth daily as needed for constipation                                   pramipexole 0.5 MG tablet   Commonly known as:  MIRAPEX   Take 1 tablet by mouth once daily (with dinner)   Last time this was given:  0.5 mg on  2/1/2017  8:20 AM                                   senna-docusate 8.6-50 MG per tablet   Commonly known as:  SENOKOT-S;PERICOLACE   Take 1 tablet by mouth 2 times daily as needed for constipation   Last time this was given:  1 tablet on 1/27/2017  4:21 PM                                   simvastatin 40 MG tablet   Commonly known as:  ZOCOR   Take 1 tablet (40 mg) by mouth At Bedtime   Last time this was given:  40 mg on 1/31/2017  9:05 PM                                   * Notice:  This list has 2 medication(s) that are the same as other medications prescribed for you. Read the directions carefully, and ask your doctor or other care provider to review them with you.

## 2017-01-20 NOTE — PROGRESS NOTES
Clinic Care Coordination Contact  Care Team Conversations    Per chart review amalia was in ER at [facility] today and will be admitting to ICU for pneumonia and altered mental status.    Plan:  Alda RN with [facility] updated.  I will monitor and follow up after discharge.    REYMUNDO Gilmore, RN, PHN  [facility] Care Coordinator  [phone]  January 20, 2017

## 2017-01-21 NOTE — CONSULTS
CARDIOVASCULAR CONSULTATION      DATE OF CONSULTATION:  01/21/2017 at 2:00 p.m..      REQUESTING PHYSICIAN:  Iman Hood MD      CHIEF COMPLAINT:  History of atrial fibrillation and acute diastolic heart failure.      HISTORY OF PRESENT ILLNESS:  I had the pleasure evaluating Mr. Armand Smith for his history of atrial fibrillation and for acute on chronic diastolic heart failure.  Mr. Smith is a very pleasant 87-year-old gentleman with a past medical history most notable for insulin-dependent diabetes mellitus, hypertension, diastolic heart failure, tricuspid valve regurgitation, Parkinson's disease, chronic atrial fibrillation, not on anticoagulation, chronic kidney disease and anemia who was brought to the ED by his wife for worsening lower extremity edema and a mechanical fall.      The patient and his wife reports that over the past several days, Mr. Smith is becoming increasingly dyspneic.  He has also been having worsening orthopnea.  Yesterday on the day of admission, the patient was ambulating with a rolling walker and fell.  In speaking with them, it appears that this was a mechanical fall.  The patient did not suffer head trauma and did not lose consciousness.  They have also been noting over the past several days, worsening lower extremity edema.  The patient reports being adherent with his home dose of bumetanide.      The patient's respiratory status: The patient was placed on BiPAP yesterday for increased work of breathing and hypoxemia.  The patient was also noted to be hypotensive with systolic blood pressures in the 80s, but did not require vasopressors.      His initial 12-lead ECG was notable for atrial fibrillation with right bundle branch block and inferior inferolateral T-wave abnormality, no change from prior.  His most recent transthoracic echocardiogram was on Christmas Eve on 2016 and was notable for preserved LV systolic function with an ejection fraction of 60%-65%.  There were no  regional wall motion abnormalities.  The RV was moderately dilated with mildly decreased systolic function.  RVSP was mildly elevated.  There was moderate tricuspid valve regurgitation and ascending aortic dilatation.  This echo actually was notable for improved right heart pressures when compared to his echo in 08/2016.  The patient had a 2-view chest x-ray which was notable for a dense left lower lobe consolidation as well as cardiomegaly and increased pulmonary vasculature.  CT head was negative for acute an abnormality.  Lower extremity Dopplers were performed and were negative for DVT.      REVIEW OF SYSTEMS:  A 10-point review of systems was performed and is negative unless mentioned in the HPI.      PAST MEDICAL HISTORY:   1.  Insulin-dependent diabetes mellitus.   2.  Hypertension.   3.  Diastolic heart failure.   4.  Chronic atrial fibrillation, not on anticoagulation due to gastrointestinal bleed and epistaxis.   5.  Parkinson's disease.   6.  History of stroke.   7.  CKD with baseline creatinine of 2.6.   8.  Chronic anemia.   9.  Toe osteomyelitis, status post toe amputation in 2015.      ALLERGIES:  Codeine and penicillin.      SOCIAL HISTORY:  The patient is a former smoker but has quit.  He denies significant drug and alcohol abuse.      FAMILY HISTORY:  Reviewed and was noncontributory to this admission.      PHYSICAL EXAMINATION:   VITAL SIGNS:  Temperature 96.8, a pulse 73, respiratory rate 25, blood pressure 104/51, oxygen saturation 93% on facemask.   GENERAL:  The patient is awake, alert and conversant and quite pleasant.   HEENT:  No scleral icterus.   NECK:  Mildly elevated jugular venous pressure.   LUNGS:  Coarse breath sounds bilaterally with scattered expiratory wheeze.   CARDIOVASCULAR:  Regularly irregular.  There is a 2/6 holosystolic murmur heard along the left sternal border.   EXTREMITIES:  Bilateral lower extremity edema.  I had the chance to look at his legs in the middle of the  dressing change and speaking with the patient and his family the edema has been significantly improved since admission.  I was unable to palpate pedal pulses.   SKIN:  Warm, dry and intact.   PSYCHIATRIC:  Calm and pleasant.      ASSESSMENT AND PLAN:   1.  Acute on chronic diastolic heart failure.   2.  Hypoxemic respiratory failure, multifactorial.   3.  Chronic kidney disease.   4.  Hypertension.   5.  Diabetes mellitus.      I had the pleasure evaluating Mr. Gio Smith for his acute on chronic diastolic heart failure exacerbation.  Mr. Smith has already been initiated on intravenous diuretics by the primary service.  He received 2 doses of IV furosemide.  By I's and O's, the patient was negative 700 mL yesterday.  Weight today is 83.2 kilograms.  According to the patient and his family his lower extremity edema has improved as has his, respiratory status.  He is no longer requiring BiPAP to breathe.  By I's and Os, it does not appear that he has had significant diuresis but by the improvement of his symptoms I have opted to continue with furosemide 40 IV b.i.d. for the time being.  If this is inadequate, we can always increase it either tonight or tomorrow morning.  He had a mildly elevated troponin without new changes or chest pain.  This likely represents demand in the setting of acute on chronic diastolic heart failure in addition to underlying CKD.      Thank you for involving us in Mr. Guzman's care.  We will continue to follow along with you.         STEPHEN FARNSWORTH MD             D: 2017 15:16   T: 2017 15:59   MT: DAVE      Name:     GIO SMITH   MRN:      [id number]        Account:       [id number]   :      1929           Consult Date:  2017      Document: [id number]

## 2017-01-21 NOTE — PLAN OF CARE
Problem: Goal Outcome Summary  Goal: Goal Outcome Summary  Lymphedema: 86 yo male adm s/p fall found to have volume overload and increased B LE swelling/edema. Pt has been receiving home care to assist with wound cares and wrap placement, per family. Pt presented with wraps and wound dressings in place. RN present at bedside, therapist assisted RN with dressing changes, RN assisted therapist with re-application of gradient compression bandages. Reviewed signs and symptoms of intolerance, placed review on white board. Also updated white board for lymph return time. Educated pt and family on the role of lymphedema presence with wound healing and mobility. Per RN WOC consult placed, likely to see Monday. Will continue with edema wrap placement as tolerated, nursing to address wounds. Recommended Cardiac Rehab hold for today, as pt quite lethargic, sleeping through wrap placement. Did slightly desaturate with repositioning for pressure relief in bed. At this time recommend TCU with lymphedema cares at discharge.

## 2017-01-21 NOTE — PROGRESS NOTES
01/21/17 1600   General Information   Onset Date 01/20/17   Start of Care Date 01/21/17   Referring Physician Dr. Hood   Patient Profile Review/OT: Additional Occupational Profile Info See Profile for full history and prior level of function   Patient/Family Goals Statement Pt wants water   Swallowing Evaluation Bedside swallow evaluation   Behaviorial Observations Lethargic   Mode of current nutrition NPO   Respiratory Status O2 Supply   Type of O2 supply (oxymizer)   Comments Armand Smith is an 87-year-old pleasant male with above-mentioned multiple medical problems, notably insulin-dependent diabetes mellitus, hypertension, left-sided diastolic and right-sided systolic dysfunction with 2+ tricuspid regurgitation, Parkinson's disease, chronic atrial fibrillation with history of stroke only on aspirin, chronic kidney disease stage IV, and chronic anemia who was brought to the ER after a mechanical fall and worsening shortness of breath for the past few days.     Clinical Swallow Evaluation   Oral Musculature unable to assess due to poor participation/comprehension   Secretion Management problems swallowing secretions;other (see comments)  (self-suctioning)   Laryngeal Function Swallow  (decreased laryngeal elevation, unproductive cough)   Oral Musculature Comments moderate-severe deficits for laryngeal function   Additional Documentation Yes   Clinical Swallow Eval: Thin Liquid Texture Trial   Mode of Presentation, Thin Liquids spoon;fed by clinician   Volume of Liquid or Food Presented 3 ice chips   Oral Phase of Swallow other (see comments)   Pharyngeal Phase of Swallow impaired;coughing/choking  (delayed swallow, elayed cough, required suctioning)   Diagnostic Statement overt signs of aspiration, poor airway protection   General Therapy Interventions   Planned Therapy Interventions Dysphagia Treatment   Dysphagia treatment (PO readiness)   Swallow Eval: Clinical Impressions   Skilled Criteria for Therapy  Intervention Skilled criteria met.  Treatment indicated.   Functional Assessment Scale (FAS) 2   Treatment Diagnosis moderate-severe dysphagia   Diet texture recommendations NPO  (frequent oral cares)   Demonstrates Need for Referral to Another Service occupational therapy;physical therapy;respiratory therapy;social work;/spiritual care   Therapy Frequency daily   Predicted Duration of Therapy Intervention (days/wks) x7 days   Anticipated Discharge Disposition inpatient rehabilitation facility   Risks and Benefits of Treatment have been explained. Yes   Patient, family and/or staff in agreement with Plan of Care Yes   Clinical Impression Comments SLP:  Bedside swallow evaluation completed.  Pt lethargic, self suctioning, asking for ice chips. Pt wearing oxymask. Oral-motor skills not formally assessed due to Pt's lethargy. Pt's oral cavity dry.  Pt presented with moderate-severe dysphagia with trials of ice chips characterized by delayed swallow, decreased laryngeal elevation, delayed cough, and requiring suctioning to clear secretions.  Pt was seen for dysphagia Tx during previous hospital stay Dec. 2016.  Pt was admitted for LLL pneumonia during that hospital stay.  At that time Pt was discharge on a regular texture diet w/thin liquids(Pt declined having textures modified to a mechanical soft texture).   Total Evaluation Time   Total Evaluation Time (Minutes) 20

## 2017-01-21 NOTE — PLAN OF CARE
Problem: Goal Outcome Summary  Goal: Goal Outcome Summary  SLP:  Bedside swallow evaluation completed.  Pt lethargic, self suctioning, asking for ice chips. Pt wearing oxymask. Oral-motor skills not formally assessed due to Pt's lethargy. Pt's oral cavity dry.  Pt presented with moderate-severe dysphagia with trials of ice chips characterized by delayed swallow, decreased laryngeal elevation, delayed cough, and requiring suctioning to clear secretions.  Pt was seen for dysphagia Tx during previous hospital stay Dec. 2016.  Pt was admitted for LLL pneumonia during that hospital stay.  At that time Pt was discharge on a regular texture diet w/thin liquids(Pt declined having textures modified to a mechanical soft texture).  Recommendations:1.  Pt to remain NPO due to overt signs of aspiration with trials of ice chips, Pt is at risk for choking/coughing on any PO at this time. Recommend frequent oral cares to aid in keeping oral cavity moist.  2.  SLP will follow for dysphagia Tx 1/22 for PO readiness.  3.  Pt would benefit from a Palliative Care Consult due to this being second hospitalization for LLL pneumonia in the past month, Pt is Full code with dx of Parkinson's. 4. Discharge recommendations pending progress during hospital stay.

## 2017-01-21 NOTE — PLAN OF CARE
Problem: Goal Outcome Summary  Goal: Goal Outcome Summary  Outcome: No Change  Ox4, more alert throughout shift. VSS on BiPAP overnight. C/o back pain relieved somewhat with repositioning. Will continue to monitor.

## 2017-01-21 NOTE — PROGRESS NOTES
[facility]    Hospitalist Progress Note    Date of Service (when I saw the patient): 01/21/2017    Assessment and Plan     Armand Smith is an 87-year-old pleasant male with above-mentioned multiple medical problems, notably insulin-dependent diabetes mellitus, hypertension, left-sided diastolic and right-sided systolic dysfunction with 2+ tricuspid regurgitation, Parkinson's disease, chronic atrial fibrillation with history of stroke only on aspirin, chronic kidney disease stage IV, and chronic anemia who was brought to the ER after a mechanical fall and worsening shortness of breath for the past few days.      1.  Acute diastolic congestive heart failure exacerbation:  Patient on Bumex and metolazone PTA. weight reportedly fluctuating as outpatient. Ongoing/ worsening dyspnea, orthopnea and increased leg edema consistent with congestive heart failure exacerbation, also his proBNP was much higher.  -- On Lasix 40 mg IV BID, BP soft, but tolerated diuresis, 1.4 L neg so far  -- Needed BiPAP on admission due to respiratory acidosis, and somnolence. Off this am 1/21  -- Follow intake and output and daily weight.      -- Monitor on telemetry.   -- Will put him on 1.5L fluid restriction once PO resumed.     -- Cardiology consult, discussed with Dr. Roper on admission.  -- His echo was done 12/25, shows preserved EF, will defer to Cardiology Service if needs a repeat one.    -- Has PICC line if hypotensive requiring     2.  Acute hypoxic respiratory failure.  This is multifactorial including diastolic CHF exacerbation, possible PNA as well. He has leukopenia, but no fever or cough, but procalcitonin was elevated/remains elevated. Chest x-ray c/w pulmonary congestion vs left lower lobe infiltrate as per report.  Given episode of hypotension in ER, presumed infection/sepsis, and started on broad spectrum abx-  IV Levaquin, meropenem and vancomycin for suspected healthcare-associated pneumonia.      --  Follow blood cultures.      -- BiPAP PRN. Given his respiratory acidosis and somnolence, he was maintained on BiPAP on admission, off since am.  -- If he starts expectorating will send sputum for Gram stain and culture.    --- N.p.o.  SLP eval, likely ok for Pills with apple sauce until SLP eval with close monitoring for signs of aspiration.  --  Congestive heart failure management as above.    --  Scheduled DuoNeb and p.r.n. albuterol ordered.    -- AScapella and IS as able.  -- Culture negative so far, dc vanco, continue levaquin and meropenem.   -- Intensivist following    3.  Acute kidney injury on top of chronic kidney disease, stage IV.  The patient's baseline creatinine appears to be around 2.6 (was 3.1 with nephrology follow recently).  He presented with a creatinine of 3.5. His BUN is more than 100, ? causing somnolence from uremia but is more awake currently    -- I suspect this is likely due to cardiorenal syndrome from poor renal perfusion.        -- Renal ultrasound.      -- Cortez catheter for strict in and out.      -- Follow daily labs. Cr has worsened to 3.7 today, Await nephrology input.    4.  h/o Hypertension, chronic atrial fibrillation and history of stroke.  patient is hypotensive/low normal BP since admission. Prior to admission medications including simvastatin, hydralazine  be held as he is n.p.o.     -- p.r.n. metoprolol for rapid ventricular response, given his atrial fibrillation if blood pressure tolerates.      -- P.O. or rectal aspirin ordered, given history of stroke and permanent a fib.  He is not on any other anticoagulation due to history of GI bleed and epistaxis.      -- He is not on ARB due to renal failure.      5.  Diabetes mellitus type 2: The patient takes Lantus 20 units subcutaneously daily. Since he is going to be n.p.o., decreased the dose to half his regular dose - Lantus 10 units subcutaneously daily on admission.    -- BS in 80s, will further decrease to 5 units  tonight.  --  Medium intensity sliding scale every 4 hours while n.p.o.     6.  Parkinson's disease and cold.  Prior to admission p.o. Sinemet, resumed    --  Also Mirapex resumed.    7.  Anemia, chronic, likely due to ongoing illness and renal failure.    ---  Prior workup also shows iron deficiency and is on p.o. iron supplement      --- The patient has received 1 unit of PRBC transfusion in his last admission (dec 25)   --- His wife has signed consent for blood transfusion and will likely transfuse if hemoglobin less than 7.  --- dropped from 8.6 on admission to 7.2. Recheck Hb this afternoon pending, conditional transfusion tranfusion order for <7. Will need additional lasix 40 mg IV for transfusion.    8.  Fall.  This seems mechanical.  He also is weak and deconditioned from congestive heart failure and possibly pneumonia.  Also, suspected uremic encephalopathy given his somnolence and drowsiness versus from CO2 narcosis. Patient was on narcotic medication as well, and his BP was improving and he became more alert with narcan in ER.  ---  PT, OT eval if continues to improve tomorrow.  ---  No narcotic     ---  Patient was discharged home with home health, but likely needs a higher level of care, probably TCU at time of discharge at this time which was discussed with his wife.        9.  History of gout.  prior to admission Allopurinol 200 mg by mouth daily.    DVT Prophylaxis: Pneumatic Compression Devices    Code Status: Full Code    Disposition: Expected discharge in >2 days pending clinical course. Discussed with patient and his wife present in the room. Care plan reviewed with RN.  - still tenuous respiratory and cardiac status, likely overnight in ICU and re eval in am.    Iman Hood MD  Hospitalist    Interval History  Patient was seen in examined, his wife feels he is more awake and remains awake/alert today. Dyspnea has improved.   - cough but unable to expectorate sputum.  - denies chest  pain.  - no nausea or vomiting.  - Off BiPAP since this am but easily de-sats on coughing/removing mask.  - his wife reports he has spells of coughing when taking medications with water, does better with apple sauce.    -Data reviewed today: I reviewed all new labs and imaging results over the last 24 hours. I personally reviewed the EKG tracing showing a fib, controlled rate on tele.    Physical Exam  Temp: 98.1  F (36.7  C) Temp src: Bladder BP: 91/56 mmHg   Heart Rate: 88 Resp: 27 SpO2: 93 % O2 Device: Oxymask Oxygen Delivery: 4 LPM  There were no vitals filed for this visit.  Vital Signs with Ranges  Temp:  [95.9  F (35.5  C)-98.7  F (37.1  C)] 98.1  F (36.7  C)  Heart Rate:  [67-91] 88  Resp:  [14-39] 27  BP: ()/(50-69) 91/56 mmHg  FiO2 (%):  [40 %-50 %] 40 %  SpO2:  [83 %-100 %] 93 %  I/O last 3 completed shifts:  In: 200 [I.V.:200]  Out: 1575 [Urine:1575]    Constitutional: Alert, awake today, oriented, not in distress.   HEENT: PERRLA, EOMI  Respiratory: Bilateral dim, very coarse with crackles bilateral, scattered wheezing. On 4 L oxy mask.  Cardiovascular: Irregular s1s2, no murmur, rub or gallop. No tachycardia.  GI: Soft, non distended, non tender, bowel tones active.  Skin/Integumen: chest with petechial non blanching rash ? Sc hemorrhage, no blister.  Other:  bilateral leg edema wrapped with ace wraps.    Medications    Reason ACE/ARB order not selected       - MEDICATION INSTRUCTIONS -       - MEDICATION INSTRUCTIONS -         carbidopa-levodopa  1 tablet Oral BID     co-enzyme Q-10  100 mg Oral Daily     pramipexole  0.5 mg Oral Daily     simvastatin  40 mg Oral At Bedtime     eucerin   Topical BID     ferrous sulfate  325 mg Oral Daily with lunch     guaiFENesin  600 mg Oral BID     insulin glargine  10 Units Subcutaneous At Bedtime     pantoprazole  40 mg Intravenous QAM AC     insulin aspart  1-6 Units Subcutaneous Q4H     furosemide  40 mg Intravenous BID     ipratropium - albuterol 0.5  mg/2.5 mg/3 mL  3 mL Nebulization 4x daily     aspirin  300 mg Rectal Daily    Or     aspirin EC  325 mg Oral Daily     sodium chloride (PF)  3 mL Intracatheter Q8H     meropenem  500 mg Intravenous Q12H     sodium chloride (PF)  10 mL Intracatheter Q8H     [START ON 1/22/2017] levofloxacin  500 mg Intravenous Q48H     vancomycin [facility]son - receiving intermittent dosing  1 each Does not apply See Admin Instructions       Data    Recent Labs  Lab 01/21/17  0420 01/20/17  0940   WBC 4.3 3.6*   HGB 7.2* 8.6*   MCV 89 89   * 152    141   POTASSIUM 5.3 5.1   CHLORIDE 104 102   CO2 31 27   * 105*   CR 3.71* 3.50*   ANIONGAP 8 12   JARETH 7.9* 8.6   GLC 88 185*   ALBUMIN 2.3*  --    PROTTOTAL 6.0*  --    BILITOTAL 0.6  --    ALKPHOS 95  --    ALT 32  --    *  --    TROPI  --  0.110*       Recent Results (from the past 24 hour(s))   XR Chest Port 1 View    Narrative    PORTABLE CHEST ONE VIEW  1/20/2017  7:13 PM     COMPARISON: Two-view chest x-ray same day.    HISTORY: RN placed PICC - verify tip placement.      Impression    IMPRESSION: There has been interval placement of a right PICC line  with its tip in the right atrium. Withdrawal of the PICC line by at  least 3 cm is recommended to place the tip in the low superior vena  cava.    Dense opacification of the left lower chest possibly representing  pleural fluid, atelectasis or pneumonia again noted. The cardiac  silhouette is difficult to evaluate but is at least mildly enlarged.  The left upper lung is clear. The right lung is clear. There is no  pneumothorax.    ERIC HANNON MD

## 2017-01-21 NOTE — CONSULTS
CLINICAL NUTRITION SERVICES BRIEF NOTE    Standard nutrition consult received for CHF diet teaching. Pt on BiPAP, requiring ICU cares.    Note pt has received CHF diet teaching multiple times in the past and appears to adhere to a low Na diet.     RD will follow-up for education needs once appropriate.     Leni Luis, AILIN, LD

## 2017-01-21 NOTE — PROGRESS NOTES
01/21/17 1300   Rehab Discipline   Discipline PT   General Information   Start of care 01/21/17   Referring physician Iman Hood MD   Orders Evaluate and treat as indicated   Order date 01/20/17   Medical diagnosis Possible Sepsis   Onset of illness / date of surgery 01/20/17   Affected body parts RLE;LLE;Genitals   Edema etiology comments Nodular lymphoma, basal cell CA, poor circulation, CKD   Pertinent history of current problem (PT: include personal factors and/or comorbidities that impact the POC; OT: include additional occupational profile info) 88 yo male with multiple co-morbidities, adm with septic picture, SOB, use of BiPAP, now facemask at 4L. PMH inclues multiple falls and PD.   Surgical / medical history reviewed Yes   Prior level of functional mobility Assist of one with walker   Prior treatment Diuretics;Elevation;Exercise;Compression garments;Gradient compression bandaging;ACE wraps   Community support Family / friend caregiver;Home health aid;Therapy services  (Pt's family reports nursing assist at home)   Patient role / employment history Retired   Current assistive devices Front wheeled walker   Precautions   Precautions Cardiac Edema/CHF;Renal Insufficiency   Vital Signs   Vital signs SPO2   SPO2 89  (89% with turning; on 4L face mask)   Cognitive Status   Orientation Person;Place   Level of consciousness Lethargic / Somnolent;Alert   Follows commands and answers questions 75% of the time;Able to follow single-step instructions   Cognitive status comments Defer toOT/CR PMH does include PD. Pt inneed of assist with answereing subjective questions with family present   Edema Exam / Assessment   Skin condition Pitting;Dryness;Hemosiderin deposits   Skin condition comments Pt with quarter size wound on the L calf (posterior) weeping, blood tinged drainage. R LE calf with 3-4 different dime to radha size sores, open and weeping as well. Dressings were in place, RN present to remove prior to  PT addtiong.   Pitting 2+   Pitting location mid shin to thighs, dorsum of the feet   Capillary refill Symmetrical   Dorsal pedal pulse Decreased   Stemmer sign Positive   Range of Motion   ROM comments Pt is generally ridgid/stiff in B LEs with motion, able to complete flexion at the hip, knee and ankle in sitting, but lacks end range when doing so. Kyphotic, impaired trunk ROM   Strength   Strength comments Decreased functional strength, generalized weakness, impaired functional activity tolerance.   Bed Mobility   Bed mobility Mod A to roll L and R   Transfers   Transfers Not assessed, pt too fatigued   Sensory   Sensory perception comments Impaired sensation to light otuch over the plantar surface fo the feet   Coordination   Coordination Deficits identified   Coordination comments Pt with neruo deficits secondary to PD at baseline   Muscle Tone   Muscle tone comments Thin, limited muscle bulk, likley disease, age, disuse, nutrition related   Planned Edema Interventions   Planned edema interventions Gradient compression bandaging;Education;Manual therapy;Skin care / precautions;Soft tissue mobilization;Exercises;Manual lymph drainage   Clinical Impression   Criteria for skilled therapeutic intervention met Yes   Therapy diagnosis Impaired functional m obility   Influenced by the following impairments / conditions Stage 1;Edema   Functional limitations due to impairments / conditions Impaired functional independence with mobiltiy   Clinical Presentation Evolving/Changing   Clinical Presentation Rationale PLOF, currentmobiltiy status,    Clinical Decision Making (Complexity) Moderate complexity   Treatment frequency Daily   Treatment duration 1 week   Patient / family and/or staff in agreement with plan of care Yes   Risks and benefits of therapy have been explained Yes   Total Evaluation Time   Total evaluation time 10

## 2017-01-21 NOTE — PLAN OF CARE
Problem: Goal Outcome Summary  Goal: Goal Outcome Summary  OT/CR: Order received and chart reviewed. Per discussion with PT, pt not appropriate to initiate OT/CR eval and intervention at this time due to limited tolerance.

## 2017-01-21 NOTE — CONSULTS
Critical Care Progress Note      01/21/2017    Name: Armand Smith MRN#: [id number]   Age: 87 year old YOB: 1929     [facility] Day# 1  ICU DAY #    MV DAY #             Problem List:   Active Problems:    Acute respiratory failure with hypoxia (H)    87 year old male with PMHx of CHF, CKD, Type II DM, atrial fibrillation on ASA for anticoagulation, and parkinson's disease who presents on 1/20/17 after sustaining a fall at home and developing worsening confusion, SOB and productive cough. Found to have a left lower lobe PNA and pulmonary congestion on CXR. Patient being admitted to the ICU due to continued hypotension not currently requiring pressors.     1/21/2017: Doing much better, no hypotension, stable on bipap. We will sign off.     Neuro: Patient interactive, follows commands  1. Frequent falls at home and acute confusion/AMS. CT head with no evidence of acute pathology.     2. Parkinson's disease    3. Acute pain  4.   Plan:  -- Neuro checks Q4  -- tylenol PRN    CV: acute CCF  1. Blood pressures have been relatively stable   2. Elevated troponin. Trop 0.11 on admission.    3. HFpEF. Most recent Echo with EF 60-65%, moderately dilated R ventricle, mild pulmonary HTN, mildly decreased right ventricular systolic function, moderate tricuspid regurg. BNP 6608.  4. Chronic Atrial fibrillation on ASA for anticoagulation.    Plan:  -- cardiology consult    -- trop Q6 hours    -- continue PTA ASA  -- lasix: judicious use of diuretics  -- PRN metoprolol for HR >120bpm  -- PICC line, will start NE is patient requires pressors to maintain MAPs >65  -- 1500 cc fluid restriction     Resp:  1. Acute hypoxic, hypercapnic resp failure. CXR shows pulmonary vascular engorgement and left lung base opacity. ABG 7.23/64/25/27. Repeat ABG yesterday is 7.31/57/82/29/  Plan:  -- scheduled duo nebs, albuterol PRN  -- BiPAP to be changed to 12/8  -- Abx and lasix     GI/Nutrition  1. GERD  Plan:  -- can restart  feeds    Renal  1. Acute on chronic kidney disease, CKD stage III-IV. Cr 3.50 from baseline 2.6-2.9.     Plan:  -- Nephrology consulted     Endocrine  1. Type II DM. On lantus at baseline  Plan:  --  SSI  -- 10 units lantus QHS    Heme:  1. Normocytic anemia    2. Leukopenia  Plan:  -- Monitor hemoglobin.  -- Transfuse to keep > 7.0    ID  1. LA and Procalcitonin WNL.    2. Left lower lobe PNA  Plan:  - AF/U blood and sputum cultures, influenza swab  - continue vanc, levofloxacin, meropenem (will stop after 3 days) if cultures negative    MKS  1. Lymphedema  Plan:  - lymphedema wraps     General cares:  DVT Prophylaxis: Pneumatic Compression Devices  GI Prophylaxis: Not indicated  Restraints: Restraints for medical healing needed: NO  Family update by me today: No  Current lines are required for patient management  Access: PICC         Key Medications:       insulin glargine  10 Units Subcutaneous At Bedtime     pantoprazole  40 mg Intravenous QAM AC     insulin aspart  1-6 Units Subcutaneous Q4H     furosemide  40 mg Intravenous BID     ipratropium - albuterol 0.5 mg/2.5 mg/3 mL  3 mL Nebulization 4x daily     aspirin  300 mg Rectal Daily    Or     aspirin EC  325 mg Oral Daily     sodium chloride (PF)  3 mL Intracatheter Q8H     meropenem  500 mg Intravenous Q12H     sodium chloride (PF)  10 mL Intracatheter Q8H     [START ON 1/22/2017] levofloxacin  500 mg Intravenous Q48H     vancomycin place johnson - receiving intermittent dosing  1 each Does not apply See Admin Instructions       Reason ACE/ARB order not selected       - MEDICATION INSTRUCTIONS -       - MEDICATION INSTRUCTIONS -                 Physical Examination:   Temp:  [95.9  F (35.5  C)-98.7  F (37.1  C)] 97  F (36.1  C)  Heart Rate:  [65-82] 81  Resp:  [14-39] 30  BP: ()/(44-69) 95/53 mmHg  FiO2 (%):  [40 %-50 %] 40 %  SpO2:  [85 %-100 %] 96 %    Intake/Output Summary (Last 24 hours) at 01/21/17 1110  Last data filed at 01/21/17 0800   Gross per  24 hour   Intake    100 ml   Output    855 ml   Net   -755 ml     Wt Readings from Last 4 Encounters:   12/30/16 90.5 kg (199 lb 8.3 oz)   12/20/16 83.235 kg (183 lb 8 oz)   12/16/16 82.328 kg (181 lb 8 oz)   12/12/16 81.194 kg (179 lb)     BP - Mean:  [56-82] 72  Location:  [-]   FiO2 (%): 40 %  Resp: 30    Recent Labs  Lab 01/20/17  1750   PH 7.31*   PCO2 57*   PO2 82   HCO3 29*       GEN: no acute distress   HEENT: head ncat, sclera anicteric, OP patent, trachea midline   PULM: unlabored synchronous with vent, clear anteriorly    CV/COR: RRR S1S2 no gallop,  No rub, no murmur  ABD: soft nontender, hypoactive bowel sounds, no mass  EXT:  Edema   warm  NEURO: grossly intact  SKIN: no obvious rash  LINES: clean, dry intact         Data:   All data and imaging reviewed     ROUTINE ICU LABS (Last four results)  CMP  Recent Labs  Lab 01/21/17  0420 01/20/17  0940    141   POTASSIUM 5.3 5.1   CHLORIDE 104 102   CO2 31 27   ANIONGAP 8 12   GLC 88 185*   * 105*   CR 3.71* 3.50*   GFRESTIMATED 16* 17*   GFRESTBLACK 19* 20*   JARETH 7.9* 8.6   PROTTOTAL 6.0*  --    ALBUMIN 2.3*  --    BILITOTAL 0.6  --    ALKPHOS 95  --    *  --    ALT 32  --      CBC  Recent Labs  Lab 01/21/17  0420 01/20/17  0940   WBC 4.3 3.6*   RBC 2.54* 2.99*   HGB 7.2* 8.6*   HCT 22.7* 26.6*   MCV 89 89   MCH 28.3 28.8   MCHC 31.7 32.3   RDW 17.2* 17.5*   * 152     INRNo lab results found in last 7 days.  Arterial Blood Gas  Recent Labs  Lab 01/20/17  1750   PH 7.31*   PCO2 57*   PO2 82   HCO3 29*       All cultures:    Recent Labs  Lab 01/20/17  1311   CULT No growth after 16 hours  No growth after 16 hours     Recent Results (from the past 24 hour(s))   CT Head w/o Contrast    Narrative    CT SCAN OF THE HEAD WITHOUT CONTRAST   1/20/2017 12:19 PM     HISTORY: Altered mental status, recent falls.    TECHNIQUE: Axial images of the head and coronal reformations without  IV contrast material. Radiation dose for this scan was  reduced using  automated exposure control, adjustment of the mA and/or kV according  to patient size, or iterative reconstruction technique.    COMPARISON: 8/12/2013.    FINDINGS: Moderate cerebral atrophy is present along with some mild  cerebellar atrophy. Patchy white matter changes are seen in both  hemispheres without mass effect. There is no evidence for intracranial  hemorrhage, mass effect, acute infarct, or a skull fracture.      Impression    IMPRESSION: Chronic changes. No evidence for intracranial hemorrhage  or any acute process.    BURAK LAGOS MD   US Lower Extremity Venous Duplex Bilateral    Narrative    US LOWER EXTREMITY VENOUS DUPLEX BILATERAL  1/20/2017 2:44 PM    HISTORY: Bilateral pain and swelling.    TECHNIQUE: Color flow and spectral Doppler with waveform analysis  performed.    FINDINGS: Scan is somewhat limited due to edema and bandaging. Both  common femoral, superficial femoral, and popliteal veins are well seen  and appear normal. They have normal patency and compressibility. Deep  veins of the calves are not well visualized but where seen appear  patent. No definite evidence for deep venous thrombosis in either  lower extremity.      Impression    IMPRESSION: Normal bilateral lower extremity venous Doppler.         ERIC CARO MD   XR Chest Port 1 View    Narrative    PORTABLE CHEST ONE VIEW  1/20/2017  7:13 PM     COMPARISON: Two-view chest x-ray same day.    HISTORY: RN placed PICC - verify tip placement.      Impression    IMPRESSION: There has been interval placement of a right PICC line  with its tip in the right atrium. Withdrawal of the PICC line by at  least 3 cm is recommended to place the tip in the low superior vena  cava.    Dense opacification of the left lower chest possibly representing  pleural fluid, atelectasis or pneumonia again noted. The cardiac  silhouette is difficult to evaluate but is at least mildly enlarged.  The left upper lung is clear. The right lung  is clear. There is no  pneumothorax.    ERIC HANNON MD         Billing: This patient is critically ill: Yes. Total critical care time today 30 min.

## 2017-01-21 NOTE — PLAN OF CARE
Problem: Goal Outcome Summary  Goal: Goal Outcome Summary  Outcome: No Change  More alert this morning but afternoon is more lethargic.  Dyspnea w any movement and desats if O2 off for short while to swab mouth.  Asking for drink of water but await swallow eval by ST. This RN is requesting a wound consult for lower leg wounds--see note--dressings changed when lymphedema wraps done by PT.  Diuressed well after lasix given--less coarse crackles in lungs.  Wife has been updated.

## 2017-01-22 NOTE — CONSULTS
NEPHROLOGY CONSULTATION      DATE OF SERVICE:  01/21/2017      REQUESTING PHYSICIAN:  Dr. Hood.      PRIMARY CARE PHYSICIAN:  Dr. Magana with [facility] in [place].      REASON FOR CONSULTATION:  Armand Smith is an 87-year-old man whom we are asked to see to assist in the evaluation and management of worsening chronic kidney disease.      HISTORY OF PRESENT ILLNESS:  Mr. Smith was admitted yesterday after he had a fall at home; he has been generally more short of breath and weaker in the past few days. Mr. Smith has a complicated past medical history that is well described already in earlier notes.  Mr. Smith has chronic kidney disease, stage IV, with a baseline creatinine between 2.5 and 3.  He has been followed in our office in the past, and we have provided inpatient nephrology care during previous hospitalizations.  He was last in our office in 09/2016 where he was seen by nurse practitioner, Janet Small. The last hospitalization with which our group was involved was in 08/2016 when he was seen by Dr. Moore.  During a hospitalization last January he was seen by Dr. Beck.      PAST MEDICAL HISTORY: Mr. Smith' history is noteworthy for atherosclerotic heart disease with chronic atrial fibrillation and congestive heart failure.  He has type 2 diabetes and hypertension.  He is treated for parkinsonism.  There is a past history of non-Hodgkin's lymphoma in the distant past treated with radiation and splenectomy, with no evidence of activity of this problem.  He has chronic anemia.  He has a history of gout.  He is not on anticoagulation other than aspirin for his atrial fibrillation due to his fall risk.      HOME MEDICATIONS:  At this time include a diuretic regimen with bumetanide 2 mg daily and metolazone 2.5 mg twice weekly.  Other noteworthy medications include Sinemet for his parkinsonism, low-dose allopurinol for gout prevention, simvastatin for lipid control, Lantus and Humalog insulin for  diabetes management, and a combination of metoprolol and hydralazine, combined with the above-mentioned diuretics, for hypertension and heart failure management.      When Mr. Smith was last seen in our office in September, BUN and creatinine were 84 and 3.1.  More recently prior to this admission, results have been somewhat better.  In December, BUN was about 80 and creatinine 2.7 with satisfactory electrolytes. Creatinine values during past admissions with heart failure exacerbation have been as high as 3.2.  Mr. Smith at baseline has bland urine with minimal proteinuria.  The most recent is from yesterday on admission.  There was trace protein with an otherwise negative dipstick.  The sediment was negative except for the presence of numerous hyaline casts.  Proteinuria has been quantitated in the past and has been found to be about 500 mg per gram of creatinine about 6 months ago.  Imaging of the kidneys was done most recently with ultrasound approximately 18 months ago.  There was no hydronephrosis on that most recent ultrasound.  A left renal cyst was noted, which has been seen before.  There were no other significant findings.      Diabetic control has been satisfactory.  A month ago, hemoglobin A1c was 7.0.  There have been no significant liver abnormalities in the past, though liver function tests on admission here show an isolated elevation of AST, with normal ALT, alkaline phosphatase and bilirubin.  Serum albumin is chronically low at less than 3; it is 2.3 on admission.  Yesterday's BUN and creatinine in the emergency room were 105 and 3.5.  Electrolytes were normal, although potassium was borderline high at 5.1.  Today's values are somewhat worse.  BUN and creatinine 110 and 3.71.  Electrolytes include potassium 5.3.      Mr. Smith has been placed on intravenous furosemide 40 mg twice daily.  His oral diuretics have been withheld.  He has been continued on insulin.  Both metoprolol and hydralazine  have been withheld because of somewhat soft blood pressure.  Sinemet has been continued.  He has been placed empirically on Levaquin and meropenem for the possibility of community-acquired pneumonia.  There is on chest x-ray marked cardiomegaly as before as well as a questionable left-sided infiltrate plus some increase in pulmonary vascular markings.      Since admission, urine output has been fair.  In the last 8 hours, Mr. Smith has made nearly a liter of urine.      PHYSICAL EXAMINATION:   GENERAL:  Mr. Smith is an alert, frail-appearing man.  He is in no acute distress.   VITAL SIGNS:  Temperature is normal.   Pulse is about 70-80, with atrial fibrillation.  Blood pressure is /50-55.  Respirations are 16 and do not seem labored.  With an OxyMask at 4 liters per minute, saturation is 91%.   SKIN:  Shows satisfactory color and turgor.  There are no significant skin lesions.   HEENT AND NECK:  Unremarkable.  I detect no obvious pulmonary vascular congestion.  LUNGS:  Demonstrate crackles at each base.   HEART:  Unremarkable aside from the irregular rhythm.  No murmur, rub or gallop are noted.   ABDOMEN:  Soft.  Bowel sounds are normally active.  There is no tenderness.  No masses or organomegaly are appreciated.   EXTREMITIES:  Have wrapped dressings around the lower legs.  There is 2 to 3+ edema.   NEUROLOGIC:  Demonstrates no obvious focal deficit.      ASSESSMENT:  Mr. Smith' acute renal failure on top of what is already advanced stage IV chronic kidney disease seems most likely to be on the basis of decompensated congestive heart failure.  I agree with the plan for aggressive diuretic therapy at this time.  An echocardiogram has not been obtained during this admission, but that from a month ago showed preserved left ventricular systolic function with an ejection fraction of 60% to 65%.  There was pulmonary hypertension, right ventricular dilatation, decreased right ventricular systolic function, and  tricuspid regurgitation as well.  There were no other significant abnormalities.      We will closely monitor the patient's response to diuretic therapy in hopes that that might improve cardiac performance and lead to more satisfactory effective renal perfusion.      Thank you for the opportunity to assist again in Mr. Smith' care.  We will follow him during the remainder of his hospital stay as appropriate.         ABIGAIL VAUGHAN MD             D: 2017 16:24   T: 2017 18:26   MT: BOBY      Name:     GIO SMITH   MRN:      [id number]        Account:       [id number]   :      1929           Consult Date:  2017      Document: [id number]       cc: Aniyah Hood MD

## 2017-01-22 NOTE — PROGRESS NOTES
01/22/17 1220   Quick Adds   Type of Visit Initial Occupational Therapy Evaluation   Living Environment   Lives With spouse   Living Arrangements house  (split level)   Home Accessibility grab bars present (bathtub);grab bars present (toilet);stairs within home   Number of Stairs Within Home 7  (pt has stair lift available)   Stair Railings at Home inside, present on right side   Transportation Available family or friend will provide   Living Environment Comment pt gets helpf from spouse but states he does his own dressing and bathing.  Spouse does the chores at home.     Self-Care   Dominant Hand right   Usual Activity Tolerance fair   Current Activity Tolerance poor   Regular Exercise no   Equipment Currently Used at Home grab bar;raised toilet;walker, rolling  (pt has shower chair but does not use it)   Functional Level Prior   Ambulation 1-->assistive equipment   Transferring 1-->assistive equipment   Toileting 1-->assistive equipment   Bathing 1-->assistive equipment   Dressing 1-->assistive equipment   Eating 0-->independent   Communication 0-->understands/communicates without difficulty   Swallowing 0-->swallows foods/liquids without difficulty   Cognition 0 - no cognition issues reported   Fall history within last six months yes   Number of times patient has fallen within last six months 10   Which of the above functional risks had a recent onset or change? fall history   General Information   Onset of Illness/Injury or Date of Surgery - Date 01/20/17   Referring Physician Iman Hood   Patient/Family Goals Statement pt would like something to drink,  Currently on swallow restrictions   Additional Occupational Profile Info/Pertinent History of Current Problem pt admitted with worsening chronic kidney disease, possible CHF/pnuemonia.  PMH is complex and includes chronic A fib, DM, Parkinsons, CKD stage 4, hx gout, leg swelling/edema.  Getting lymphadema treatments as well.     Precautions/Limitations fall  precautions;oxygen therapy device and L/min  (4 L O2)   Heart Disease Risk Factors Diabetes;Lack of physical activity;Medical history;Gender;Age   General Observations Pt laying in bed, willing to participate in bed   Cognitive Status Examination   Level of Consciousness alert   Able to Follow Commands mild impairment   Personal Safety (Cognitive) at risk behaviors demonstrated  (not understanding fluid restriction despite many explanation)   Memory impaired   Attention No deficits were identified   Visual Perception   Visual Perception No deficits were identified   Pain Assessment   Patient Currently in Pain No   Range of Motion (ROM)   ROM Quick Adds No deficits were identified   ROM Comment B UEs   Strength   Manual Muscle Testing Quick Adds Other   Strength Comments general deconditioning   Hand Strength   Hand Strength Comments gross grasp fair and equal   Coordination   Upper Extremity Coordination No deficits were identified   Activities of Daily Living Analysis   Impairments Contributing to Impaired Activities of Daily Living cognition impaired;strength decreased   General Therapy Interventions   Planned Therapy Interventions ADL retraining;cognition;strengthening;transfer training;progressive activity/exercise   Clinical Impression   Criteria for Skilled Therapeutic Interventions Met yes, treatment indicated   OT Diagnosis decreased independence and endurance for ADLS   Influenced by the following impairments weakness, possible confusion, poor respiratory function   Assessment of Occupational Performance 3-5 Performance Deficits   Identified Performance Deficits decreased endurance to dress, complete hygiene tasks, transfer to toilet and tub   Clinical Decision Making (Complexity) Moderate complexity   Therapy Frequency daily   Predicted Duration of Therapy Intervention (days/wks) 5 days   Anticipated Discharge Disposition Home with Assist;Transitional Care Facility   Risks and Benefits of Treatment have  "been explained. Yes   Patient, Family & other staff in agreement with plan of care Yes   [facility] TM \"6 Clicks\"   2016, Trustees of [facility], under license to [employer].  All rights reserved.   6 Clicks Short Forms Daily Activity Inpatient Short Form   [facility]  \"6 Clicks\" Daily Activity Inpatient Short Form   1. Putting on and taking off regular lower body clothing? 1 - Total   2. Bathing (including washing, rinsing, drying)? 1 - Total   3. Toileting, which includes using toilet, bedpan or urinal? 2 - A Lot   4. Putting on and taking off regular upper body clothing? 2 - A Lot   5. Taking care of personal grooming such as brushing teeth? 2 - A Lot   6. Eating meals? 2 - A Lot   Daily Activity Raw Score (Score out of 24.Lower scores equate to lower levels of function) 10     "

## 2017-01-22 NOTE — PLAN OF CARE
Problem: Goal Outcome Summary  Goal: Goal Outcome Summary  Lymphedema: Pt tolerated wraps, but did remove the top layer on the L LE secondary to discomfort earlier today. Wounds much less weepy, pt with improved skin. Edema is firm at the knees and above, scrotum elevated with pillowcase. Mepilex placed over the dorsum of B feet over bony prominences with redness-blanchable, for added cushion under wrap placement. RN completed wound cares with assist of therapist. Gradient compression wraps re-placed with re-education on benefit of gentle compression for assist with wound healing as well as volume control. Recommend discharge to TCU with lymphedema cares.

## 2017-01-22 NOTE — PLAN OF CARE
Problem: Goal Outcome Summary  Goal: Goal Outcome Summary  Outcome: Improving  More alert today.  Requesting a glass of H2O from everyone who comes into room.  Swallow eval done and pt started on cl liquid w honey thickening by spoonful.  Lungs are coarse--has non-productive cough.  Wraps changed on LE--see wound assessment. UO good--had lasix today & now it is dc'd.  IV started per nephrology.  ECHO done per cardiology.  Wife and daughter at bedside.

## 2017-01-22 NOTE — PLAN OF CARE
Problem: Pneumonia (Adult)  Goal: Signs and Symptoms of Listed Potential Problems Will be Absent or Manageable (Pneumonia)  Signs and symptoms of listed potential problems will be absent or manageable by discharge/transition of care (reference Pneumonia (Adult) CPG).   Outcome: Improving  Pt tolerating oxymask overnight. Pt remains dyspneic with exertion. Pt's legs in lymphedma wraps. Pt VSS. Will continue to monitor.

## 2017-01-22 NOTE — PLAN OF CARE
Problem: Goal Outcome Summary  Goal: Goal Outcome Summary  SLP:  Pt seen for dysphagia Tx for PO readiness.  Pt sitting upright in bed, overall appearance mch better today, able to hold hios head up.  Pt on oxymask at 4L O2.  Pt given trials of honey-thick liquids by spoon. SLP lowered mask to give trials by spoon, after 2-3 swallows, sats dropped into low 80's, with mask back in place and with cues for deep breaths, O2 sats went back up to low 90's. Pt showed holding with a delayed swallow, good laryngeal elevation noted with swallow.  Pt had difficulty initiating a dry swallow to help clear any possible pharyngeal residue.  Pt's voice clear sounding after swallow, no overt signs of aspiration noted, however silent aspiration cannot be R/O bedside.  Pt stated he has no appetite just wants liquids to drink.  Recommendations: 1. Initiate a Honey-thick Clear Liquid diet by spoon only.  Pt to sit fully upright at 90 degrees for all PO, swallow 2x/each tsp.  Remain upright 30 minutes after liquids.  SLP spoke with Pt re: recommendation to have a video swallow study.  Pt stated he doesn't want to have one.  If Pt shows s/sx of aspiration on diet, please hold PO.  2.  SLP will follow for dysphagia Tx 1/23/17 for diet tolerance, ensure safe swallow strategies are implemented, trial advanced textures, as indicated.  3.  Recommend discharge to TCU.  4.  Pt would benefit from a Palliative Care Consult to aid with D/C plans.

## 2017-01-22 NOTE — PLAN OF CARE
Problem: Goal Outcome Summary  Goal: Goal Outcome Summary  Outcome: Therapy, progress toward functional goals is gradual  OT: Initial evaluation complete, treatment started.  Limited session secondary to MD visit.  Pt completed 3 CVC exercises with B UEs, 1-2 minutes exercise, 1 -2 minutes rest.  Initial BP  100/57, HR 77.  Ending BP 96/53, HR 88.  On 4L O2.  O2 sats at 93-95% at rest, desatted once to 84% but was generally around 90% during exercise.  Recommend DC to TCU at this time.

## 2017-01-22 NOTE — PROGRESS NOTES
[facility]    Cardiology Progress Note    Date of Service (when I saw the patient): 01/22/2017     Assessment and Plan  Armand Smith is a 87 year old male with a PMH insulin-dependent diabetes mellitus, hypertension, diastolic heart failure, tricuspid valve regurgitation, Parkinson's disease, chronic atrial fibrillation, not on anticoagulation, chronic kidney disease and anemia who was admitted on 1/20/2017     RECOMMENDATIONS:  Acute on chronic diastolic heart failure. - Difficult to know what his true dry weight is (weight range 180-220; likely ~ 190). Weight today is 191 lbs; negative 2.3L (3.5L over admission) yesterday with furosemide 40 IV BID. Creatine is uptrending and based on renal recommendations his diuretic has been held.     Hypoxemic respiratory failure, multifactorial.  - There was likely a component of volume contributing to his respiratory failure as it did improve with diuretics but his LLL infiltrate is likely playing a role as well; being treated with broad spectrum ABX.     Chronic kidney disease.  - Creatinine worsening - diuretics per nephrology.    Atrial Fibrillation - Rate controlled - not on anticoagulation due to GI bleed and epistaxis on A\C.    Mild troponinemia - likely represents demand in the setting of acute hypoxemic resp failure. No anginal symptoms or ischemic EKG changes. Peak - 0.22    Yasir Vick MD  [phone]    ------------------------------------------------------------------------------  Interval History  Feeling better from a respiratory standpoint; worsening renal function; wants to eat but failed swallow assessment.    Physical Exam  Temp: 96.3  F (35.7  C) Temp src: Bladder BP: 108/64 mmHg   Heart Rate: 74 Resp: 21 SpO2: 95 % O2 Device: Oxymask Oxygen Delivery: 4 LPM  Filed Vitals:    01/22/17 0600   Weight: 86.7 kg (191 lb 2.2 oz)     Vital Signs with Ranges  Temp:  [92.5  F (33.6  C)-98.2  F (36.8  C)] 96.3  F (35.7  C)  Heart Rate:  [67-95]  74  Resp:  [9-31] 21  BP: ()/(51-75) 108/64 mmHg  SpO2:  [87 %-99 %] 95 %  I/O last 3 completed shifts:  In: 200 [I.V.:200]  Out: 2500 [Urine:2500]    GENERAL:  The patient is awake, alert and conversant and quite pleasant.    HEENT:  No scleral icterus.    NECK:  Mildly elevated jugular venous pressure.    LUNGS:  Coarse breath sounds bilaterally with scattered expiratory wheeze.    CARDIOVASCULAR:  Regularly irregular.  There is a 2/6 holosystolic murmur heard along the left sternal border.    EXTREMITIES:  Improved LE edema, R foot with toe amp and multiple small dry ulcers with eschar.    SKIN:  Warm, dry and intact.    PSYCHIATRIC:  Calm and pleasant.      Medications    IV infusion builder WITH additives 50 mL/hr at 01/22/17 1119     Reason ACE/ARB order not selected       - MEDICATION INSTRUCTIONS -       - MEDICATION INSTRUCTIONS -         carbidopa-levodopa  1 tablet Oral BID     co-enzyme Q-10  100 mg Oral Daily     pramipexole  0.5 mg Oral Daily     simvastatin  40 mg Oral At Bedtime     eucerin   Topical BID     ferrous sulfate  325 mg Oral Daily with lunch     guaiFENesin  600 mg Oral BID     insulin glargine  5 Units Subcutaneous At Bedtime     pantoprazole  40 mg Intravenous QAM AC     insulin aspart  1-6 Units Subcutaneous Q4H     ipratropium - albuterol 0.5 mg/2.5 mg/3 mL  3 mL Nebulization 4x daily     aspirin  300 mg Rectal Daily    Or     aspirin EC  325 mg Oral Daily     meropenem  500 mg Intravenous Q12H     sodium chloride (PF)  10 mL Intracatheter Q8H     levofloxacin  500 mg Intravenous Q48H       Data    Recent Labs  Lab 01/22/17  0405 01/21/17  1755 01/21/17  0420 01/20/17  0940   WBC 4.2  --  4.3 3.6*   HGB 7.7* 7.7* 7.2* 8.6*   MCV 90  --  89 89   *  --  125* 152   *  --  143 141   POTASSIUM 4.6  --  5.3 5.1   CHLORIDE 104  --  104 102   CO2 32  --  31 27   *  --  110* 105*   CR 3.97*  --  3.71* 3.50*   ANIONGAP 9  --  8 12   JARETH 8.1*  --  7.9* 8.6   GLC 93  --   88 185*   ALBUMIN 2.4*  --  2.3*  --    PROTTOTAL 6.3*  --  6.0*  --    BILITOTAL 0.6  --  0.6  --    ALKPHOS 96  --  95  --    ALT 56  --  32  --    *  --  287*  --    TROPI 0.223*  --   --  0.110*

## 2017-01-22 NOTE — PROGRESS NOTES
Nephrology Progress Note          Assessment and Plan:   Acute on chronic renal failure worse, though UO good.  Seems unlikely acute problem is due to decompensated CHF.  Still may be a pre-renal issue, since UA on admn consistent with this.  Plan gentle volume expansion with 1/2NS infusion, and d/c Furosemide.  Recheck UA with FENA later today or tomorrow.  Continue close watch on chemistries.  Cause of isolated AST elevation now clear.  Does not seem due to new hepatic or cardiac event; consider hemolysis, though Hgb not much changed.         Acute respiratory failure with hypoxia (H)    * No resolved hospital problems. *               Interval History:   denies chest pain, denies shortness of breath, denies abdominal pain and alert, oriented to person, place and time.  Complains of being thirsty; wants to eat.  Note swallow test failure 1/21; repeat planned for today.  VS ok.  No fever; controlled a fib; BP ok.  Good SaO2 with mask O2.  UO 2.5L last 24 hrs.  Fluid balance negative 3.5L since admn.  Meds and labs reviewed.  Lytes ok; Na+ sl higher, 145; K+ better, 4.6.  BUN/Cr up to ~120/4.  AST sl lower, 362; other LFT's again nl.  Glucose ok.  Phos up to >8, sith stable Ca++ ~8.  Troponin sl higher, ~0.2.  CBC ~same; Hgb ~8; plts sl lower, 115K.  WBC ok.  Renal US similar to August 2015; no obstruction, cyst on left.                Medications:       carbidopa-levodopa  1 tablet Oral BID     co-enzyme Q-10  100 mg Oral Daily     pramipexole  0.5 mg Oral Daily     simvastatin  40 mg Oral At Bedtime     eucerin   Topical BID     ferrous sulfate  325 mg Oral Daily with lunch     guaiFENesin  600 mg Oral BID     insulin glargine  5 Units Subcutaneous At Bedtime     pantoprazole  40 mg Intravenous QAM AC     insulin aspart  1-6 Units Subcutaneous Q4H     ipratropium - albuterol 0.5 mg/2.5 mg/3 mL  3 mL Nebulization 4x daily     aspirin  300 mg Rectal Daily    Or     aspirin EC  325 mg Oral Daily     meropenem  500 mg  Intravenous Q12H     sodium chloride (PF)  10 mL Intracatheter Q8H     levofloxacin  500 mg Intravenous Q48H       IV infusion builder WITH additives 50 mL/hr at 01/22/17 1119     Reason ACE/ARB order not selected       - MEDICATION INSTRUCTIONS -       - MEDICATION INSTRUCTIONS -                      Physical Exam:       Vital Sign Ranges  Temp:  [92.5  F (33.6  C)-98.2  F (36.8  C)] 96.3  F (35.7  C)  Heart Rate:  [67-95] 74  Resp:  [9-31] 21  BP: ()/(51-75) 108/64 mmHg  SpO2:  [87 %-99 %] 95 %    Weight, current:  86.7 kg (actual weight)  Weight change:     I/O last 3 completed shifts:  In: 200 [I.V.:200]  Out: 2500 [Urine:2500]    Physical Exam:   General:  Patient uncomfortable, but in no acute distress.  Awake, alert, oriented x3.  Neck:  Supple, no JVD.  Lungs:  Few wheezes and rhonchi to auscultation bilaterally.  Cardiac:  Irregular rate and rhythm, no murmurs, rub, or gallops.  Abdomen:  Soft, nontender, physiologic sounds.  Extremities:  Same modest edema.  2+ pulses.  Skin:  Warm, dry.  Neurologic:  No focal deficits.             Data:        Lab Results   Component Value Date    * 01/22/2017    Lab Results   Component Value Date    CHLORIDE 104 01/22/2017    Lab Results   Component Value Date    * 01/22/2017      Lab Results   Component Value Date    POTASSIUM 4.6 01/22/2017    Lab Results   Component Value Date    CO2 32 01/22/2017    Lab Results   Component Value Date    CR 3.97* 01/22/2017        Lab Results   Component Value Date    * 01/22/2017     01/21/2017     01/20/2017     Lab Results   Component Value Date    POTASSIUM 4.6 01/22/2017    POTASSIUM 5.3 01/21/2017    POTASSIUM 5.1 01/20/2017     Lab Results   Component Value Date    CHLORIDE 104 01/22/2017    CHLORIDE 104 01/21/2017    CHLORIDE 102 01/20/2017     Lab Results   Component Value Date    CO2 32 01/22/2017    CO2 31 01/21/2017    CO2 27 01/20/2017     Lab Results   Component Value Date    CR 3.97*  01/22/2017    CR 3.71* 01/21/2017    CR 3.50* 01/20/2017     Lab Results   Component Value Date    * 01/22/2017    * 01/21/2017    * 01/20/2017     Lab Results   Component Value Date    HGB 7.7* 01/22/2017    HGB 7.7* 01/21/2017    HGB 7.2* 01/21/2017     Lab Results   Component Value Date    PH 7.31* 01/20/2017    PO2 82 01/20/2017    PCO2 57* 01/20/2017    HCO3 29* 01/20/2017    NEMESIO 2.3 01/20/2017             Joe Kc MD  Nephrology; [employer]  [phone]

## 2017-01-22 NOTE — PROGRESS NOTES
[facility]    Hospitalist Progress Note    Date of Service (when I saw the patient): 01/22/2017    Assessment and Plan     Armand Smith is an 87-year-old pleasant male with above-mentioned multiple medical problems, notably insulin-dependent diabetes mellitus, hypertension, left-sided diastolic and right-sided systolic dysfunction with 2+ tricuspid regurgitation, Parkinson's disease, chronic atrial fibrillation with history of stroke only on aspirin, chronic kidney disease stage IV, and chronic anemia who was brought to the ER after a mechanical fall and worsening shortness of breath for the past few days.      1.  Acute diastolic congestive heart failure exacerbation:  Patient on Bumex and metolazone PTA. weight reportedly fluctuating as outpatient. Ongoing/ worsening dyspnea, orthopnea and increased leg edema consistent with congestive heart failure exacerbation, also his proBNP was much higher.  -- Was on Lasix 40 mg IV BID which he tolerated despite soft BP and diuresed well- 4.3 L neg so far, lasix on hold now (see below BALAJI)  -- Needed BiPAP on admission due to respiratory acidosis, and somnolence. Off since 1/21 am.  -- Follow intake and output and daily weight. 1.5L fluid restriction. Monitor on telemetry.   -- Cardiology consult appreciated. Echo from 12/25 shows preserved EF, repeat ECHO per Cardiology Service   -- Has PICC line if hypotensive requiring pressor.    2.  Acute hypoxic and hypercarbic respiratory failure.  This is multifactorial including diastolic CHF exacerbation, possible PNA and reactive airway disease. He has leukopenia, but no fever or cough, but procalcitonin was elevated/remains elevated. Chest x-ray c/w pulmonary congestion vs left lower lobe infiltrate as per report. Pro calcitonin was elevated.Given episode of hypotension in ER, presumed infection/sepsis, and started on broad spectrum abx-  IV Levaquin, meropenem and vancomycin for suspected healthcare-associated  pneumonia.      -- Blood cultures negative so far, unable to collect sputum as not much sputum production.  -- Given his respiratory acidosis and somnolence, he was maintained on BiPAP on admission, off since 1/21 am.  -- Wife reported cough when taking med with water, SLP eval done, no gross sign of aspiration, on Honey thick clear liquid diet. Given repeat episode of suspected PNA, ? Video swallow eval. Also ongoing wheezing for 2-3 days PTA, not better with diuresis and abx, and despite scheduled duo-nebs, no diagnosis of COPD but wheezing markedly improved with one dose steroid this am, will re eval in am and will consider a short course of steroid .  -- Congestive heart failure management as above.    -- Scheduled DuoNeb and p.r.n. albuterol    -- AScapella and IS as able.  -- Culture negative so far, dc vanco, continue levaquin and meropenem (will review with SLP, if video swallow not pursued, will treat with 5 days of meropenem for presumed aspiration as well).  -- Not on home O2, wean as able.    3.  Acute kidney injury on top of chronic kidney disease, stage IV.  The patient's baseline creatinine appears to be around 2.6 (was 3.1 with nephrology follow recently).  He presented with a creatinine of 3.5. His BUN is more than 100    -- I suspect this is likely due to cardiorenal syndrome from poor renal perfusion.        -- Renal ultrasound negative     -- Cortez catheter for strict in and out.      -- Follow daily labs. Cr has continued to worsen to 3.97, , IV lasix held and started on gentle IV hydration, monitor for respiratory status.    4.  h/o Hypertension, chronic atrial fibrillation and stroke: Presented hypotensive/low normal BP. Prior to admission medications including simvastatin, hydralazine were held while he was n.p.o.     -- p.r.n. metoprolol for rapid ventricular response, given his atrial fibrillation if blood pressure tolerates.      -- P.O. or rectal aspirin ordered, given history of  stroke and permanent a fib.    -- He is not on any other anticoagulation due to history of GI bleed and epistaxis.      -- He is not on ARB due to renal failure.   -- statin resumed.     5.  Diabetes mellitus type 2: The patient takes Lantus 20 units subcutaneously daily. Since he was n.p.o., decreased the dose to half his regular dose - Lantus 10 units subcutaneously daily on admission.    -- BS in 80s, and so further decreased to 5 units. Continue at this dose as poor oral intake  -- Low intensity sliding scale      6.  Parkinson's disease and tremor: Prior to admission p.o. Sinemet, resumed    --  PTA Mirapex resumed.    7.  Anemia, chronic, likely due to ongoing illness and renal failure.    ---  Prior workup also shows iron deficiency and is on p.o. iron supplement      --- The patient has received 1 unit of PRBC transfusion in his last admission (dec 25)   --- His wife has signed consent for blood transfusion and will likely transfuse if hemoglobin less than 7.  --- dropped from 8.6 on admission to 7.2. Recheck Hb stable, conditional transfusion tranfusion order for <7. Will need additional lasix 40 mg IV for transfusion.    8.  Fall.  This seems mechanical.  He also is weak and deconditioned from congestive heart failure and possibly pneumonia.  Also, suspected uremic encephalopathy given his somnolence and drowsiness versus from CO2 narcosis. Patient was on narcotic medication as well, and his BP was improving and he became more alert with narcan in ER.  ---  PT, OT eval. Cardiac rehab.  ---  No narcotic     ---  Patient was discharged home with home health, but likely needs a higher level of care, probably TCU at time of discharge at this time which was discussed with his wife.        9.  History of gout.  prior to admission Allopurinol 200 mg by mouth daily.    10. Elevated AST: Unclear etiology. ? Liver/muscle/cardiac origin. ?liver congestion but otherwise maria guadalupe ALP/ALT  - CK normal  - RUQ US  unremarkable.  -Haptoglobin, total/direct bili, LDH ordered    DVT Prophylaxis: Pneumatic Compression Devices given significant anemia, unclear source for drop in Hb.    Code Status: Full Code    Disposition: Expected discharge in >2 days pending clinical course. Discussed with patient and his wife present in the room. Care plan reviewed with RN.  -Transfer to [facility], orders entered.  -palliative care consult tomorrow.    Iman Hood MD  Hospitalist    Interval History  Patient was seen in examined this afternoon and reevaluated just now.  - Remains more awake, c/o increased thirst. Dyspnea has improved, did not need BiPAP overnight.  - c/o pain due to lymphedema wraps at the Lt leg ulcer site.  - cough better, still unable to expectorate.    -Data reviewed today: I reviewed all new labs and imaging results over the last 24 hours. I personally reviewed the EKG tracing showing a fib, controlled rate on tele.    Physical Exam  Temp: 95.9  F (35.5  C) Temp src: Bladder BP: 122/68 mmHg   Heart Rate: 86 Resp: 21 SpO2: 95 % O2 Device: Oxymask Oxygen Delivery: 4 LPM  Filed Vitals:    01/22/17 0600   Weight: 86.7 kg (191 lb 2.2 oz)     Vital Signs with Ranges  Temp:  [92.5  F (33.6  C)-98.2  F (36.8  C)] 95.9  F (35.5  C)  Heart Rate:  [66-90] 86  Resp:  [9-29] 21  BP: ()/(51-75) 122/68 mmHg  SpO2:  [87 %-99 %] 95 %  I/O last 3 completed shifts:  In: 234.17 [I.V.:234.17]  Out: 2650 [Urine:2650]    Constitutional: Alert, awake today, oriented, Ill looking.  HEENT: PERRLA, EOMI  Respiratory: Bilateral dim and coarse with crackles bilateral. Wheezing has significantly improved. On 4 L oxy mask.  Cardiovascular: Irregular s1s2, no murmur, rub or gallop. No tachycardia.  GI: Soft, non distended, non tender, bowel tones active.  Skin/Integumen: chest with petechial non blanching rash, no blister. Also has ulcer Lt calf, no sign of infection.  Other:  bilateral leg edema wrapped with ace wraps.    Medications    IV  infusion builder WITH additives 50 mL/hr at 01/22/17 1550     Reason ACE/ARB order not selected       - MEDICATION INSTRUCTIONS -       - MEDICATION INSTRUCTIONS -         insulin aspart  1-3 Units Subcutaneous TID AC     insulin aspart  1-3 Units Subcutaneous At Bedtime     carbidopa-levodopa  1 tablet Oral BID     co-enzyme Q-10  100 mg Oral Daily     pramipexole  0.5 mg Oral Daily     simvastatin  40 mg Oral At Bedtime     eucerin   Topical BID     ferrous sulfate  325 mg Oral Daily with lunch     guaiFENesin  600 mg Oral BID     insulin glargine  5 Units Subcutaneous At Bedtime     pantoprazole  40 mg Intravenous QAM AC     ipratropium - albuterol 0.5 mg/2.5 mg/3 mL  3 mL Nebulization 4x daily     aspirin  300 mg Rectal Daily    Or     aspirin EC  325 mg Oral Daily     meropenem  500 mg Intravenous Q12H     sodium chloride (PF)  10 mL Intracatheter Q8H     levofloxacin  500 mg Intravenous Q48H       Data    Recent Labs  Lab 01/22/17  0405 01/21/17  1755 01/21/17  0420 01/20/17  0940   WBC 4.2  --  4.3 3.6*   HGB 7.7* 7.7* 7.2* 8.6*   MCV 90  --  89 89   *  --  125* 152   *  --  143 141   POTASSIUM 4.6  --  5.3 5.1   CHLORIDE 104  --  104 102   CO2 32  --  31 27   *  --  110* 105*   CR 3.97*  --  3.71* 3.50*   ANIONGAP 9  --  8 12   JARETH 8.1*  --  7.9* 8.6   GLC 93  --  88 185*   ALBUMIN 2.4*  --  2.3*  --    PROTTOTAL 6.3*  --  6.0*  --    BILITOTAL 0.6  --  0.6  --    ALKPHOS 96  --  95  --    ALT 56  --  32  --    *  --  287*  --    TROPI 0.223*  --   --  0.110*       Recent Results (from the past 24 hour(s))   US Renal Complete    Narrative    US RENAL COMPLETE   1/21/2017 6:06 PM     HISTORY: Evaluate for hydronephrosis.    COMPARISON: Renal ultrasound 8/6/2015.    FINDINGS:  No hydronephrosis. Right kidney measures 11.2 x 4.3 x 4.8  cm. Right cortical thickness is 0.9 cm. Left kidney measures 10.4 x  3.9 x 5.5 cm. Left cortical thickness is 0.8 cm. Simple cyst left  kidney is 2.5  cm at the upper kidney. This is stable in size. There is  free fluid at the right lower quadrant.      Impression    IMPRESSION:  1. No hydronephrosis.  2. Free fluid at the right lower quadrant.  3. Left renal cyst.         RADHA WHITAKER MD   US Abdomen Limited    Narrative    RIGHT UPPER QUADRANT ULTRASOUND 1/22/2017 8:55 AM    HISTORY: Elevated liver function tests.    COMPARISON: None    FINDINGS:   Gallbladder: Normal with no cholelithiasis, wall thickening or focal  tenderness.     Bile ducts: CHD is normal diameter. No intrahepatic biliary  dilatation.    Liver: Hepatomegaly with liver measuring 19 cm craniocaudal length in  the right midclavicular line. Normal echogenicity and no focal  lesions.    Pancreas: Completely obscured by gas.    Right kidney: Diffusely thinned cortex and increased echogenicity. No  masses or hydronephrosis.    Small amount of free fluid is seen adjacent to the gallbladder.      Impression    IMPRESSION:   1. Hepatomegaly, but no focal hepatic lesion. No biliary dilatation.  2. Right renal atrophy with no hydronephrosis.  3. Small amount of free fluid in the peritoneum.  4. Pancreas obscured by gas.    BRYON WILLETT MD

## 2017-01-23 NOTE — PROGRESS NOTES
"SPIRITUAL HEALTH SERVICES  SPIRITUAL ASSESSMENT Progress Note    [facility]    PRIMARY FOCUS:     Goals of care    Support for coping    ILLNESS CIRCUMSTANCES:   Reviewed documentation. Reflective conversation shared with pt and his wife which integrated elements of illness and family narratives.     Context of Serious Illness/Symptom(s) - Brief visit with pt, due to palliative consult.  Pt was \"brushing his teeth\" (with a green spongey), but invited me to stay and visit for a few minutes.  He's been in and out of the hospital several times lately, per pt and wife.  She said, \"He's doing better now--at least he's out of the ICU.\"    Resources for Support - Pt lives with his wife.  He has one daughter who lives close by (in [place]) and two other daughters who live in [place].    DISTRESS:     Emotional/Existential/Relational Distress - Not discussed.    Spiritual/Rastafarian Distress - Not discussed.    Social/Cultural/Economic Distress - Not discussed.    SPIRITUAL/Faith COPING:     Roman Catholic/Gretchen - Pentecostal (Muslim of [place]) by background, then [place]ism [place] in [place] but no formal Adventist connection.    Spiritual Practice(s) - Unknown.     Emotional/Existential/Relational Connections - Unknown.      GOALS OF CARE:    Goals of Care - Pt is hopeful to recover and get back home soon.  Per prior  notes in last 1-2 years, pt is surprised to be finding himself with such serious health problems at this age, since his mother  at the age of 106, and he's only 87.    Meaning/Sense-Making - Not discussed.    PLAN:  SH team will continue to be available for support.      Silvia Laura  Staff   Pager [phone]  "

## 2017-01-23 NOTE — PLAN OF CARE
Problem: Goal Outcome Summary  Goal: Goal Outcome Summary  Outcome: No Change  Pt transferred from ICU this evening. Denies pain, up with the lift. Tolerating clear honey thickened liquids.

## 2017-01-23 NOTE — PLAN OF CARE
Problem: Fluid Volume Excess (Adult,Obstetrics,Pediatric)  Goal: Identify Related Risk Factors and Signs and Symptoms  Related risk factors and signs and symptoms are identified upon initiation of Human Response Clinical Practice Guideline (CPG)   Outcome: No Change  Patient awake most of night.  Restless.  VSS.  Remains AFIB.  Denies pain.  BPAP attempted for one hour, but patient kept calling and complaining of dry mouth and needing to swab.  Returned to NC and saturating in low 90's.  BGM at 0200 was 329, which was expected due to decreased dose of Lantus.  Lymphedema wraps remain on.

## 2017-01-23 NOTE — PROGRESS NOTES
" Renal Medicine Progress Note            Assessment/Plan:     1. Pt patient with baseline CKD IV    2. BALAJI in the setting of heart failure.     3. HFpEF. Pt is hypervolemic. 3+ pitting edema at the thighs. Decompensated DD and right-sided heart failure    4. LLL PNA and pleural effusion    5. Anemia. + Fe def.     Plan.   1. Pt has 3+ pitting edema at the thigh. He is hypervolemic, and he will need to get more fluid off. His heart failure seems to be more right-sided. SBP is soft, which makes it difficult to diures without bumping his BUN/Scr. Lets watch him off diuretic again to see how much urine he makes. He needs to be on 1200 fluid restriction and goal is to 1-1.5 liters off daily. But he will need albumin or ionotropic support if we resume diuretic since his SBP is soft    2. Check Fe studies    3. Discontinue IVF.        Interval History:     \"I feel better,\" he says. His breathing is better. Net neg 1.2 liters last 24 hrs. BUN/Scr are better.           Medications and Allergies:       insulin glargine  10 Units Subcutaneous At Bedtime     insulin aspart  1-7 Units Subcutaneous TID AC     insulin aspart  1-5 Units Subcutaneous At Bedtime     [START ON 1/24/2017] pantoprazole  40 mg Oral QAM     carbidopa-levodopa  1 tablet Oral BID     co-enzyme Q-10  100 mg Oral Daily     pramipexole  0.5 mg Oral Daily     simvastatin  40 mg Oral At Bedtime     eucerin   Topical BID     ferrous sulfate  325 mg Oral Daily with lunch     guaiFENesin  600 mg Oral BID     ipratropium - albuterol 0.5 mg/2.5 mg/3 mL  3 mL Nebulization 4x daily     aspirin  300 mg Rectal Daily    Or     aspirin EC  325 mg Oral Daily     meropenem  500 mg Intravenous Q12H     sodium chloride (PF)  10 mL Intracatheter Q8H     levofloxacin  500 mg Intravenous Q48H        Allergies   Allergen Reactions     Codeine      nausea vomiting     Penicillins      \" turned red from head to foot\"            Physical Exam:   Vitals were reviewed  Heart Rate: 80, " Blood pressure 106/59, pulse 80, temperature 97.4  F (36.3  C), temperature source Oral, resp. rate 16, weight 86.1 kg (189 lb 13.1 oz), SpO2 92 %.    Wt Readings from Last 3 Encounters:   01/23/17 86.1 kg (189 lb 13.1 oz)   12/30/16 90.5 kg (199 lb 8.3 oz)   12/20/16 83.235 kg (183 lb 8 oz)       Intake/Output Summary (Last 24 hours) at 01/23/17 1107  Last data filed at 01/23/17 0600   Gross per 24 hour   Intake 1851.17 ml   Output   2625 ml   Net -773.83 ml       GENERAL APPEARANCE: NAD. Frail  HEENT:  Eyes/ears/nose/neck grossly normal  RESP: BS diminish, more on the left base. No wheezes.  CV: irregular. + murmur   ABDOMEN: obese, soft, NT  EXTREMITIES/SKIN: 3+ edema at the thighs.   Neuro: awake, alert and oriented           Data:     CBC RESULTS:     Recent Labs  Lab 01/23/17  0515 01/22/17  0405 01/21/17  1755 01/21/17  0420 01/20/17  0940   WBC 3.1* 4.2  --  4.3 3.6*   RBC 2.75* 2.71*  --  2.54* 2.99*   HGB 7.7* 7.7* 7.7* 7.2* 8.6*   HCT 24.3* 24.3* 24.6* 22.7* 26.6*   * 115*  --  125* 152       Basic Metabolic Panel:    Recent Labs  Lab 01/23/17  0515 01/22/17  0405 01/21/17  0420 01/20/17  0940    145* 143 141   POTASSIUM 4.1 4.6 5.3 5.1   CHLORIDE 101 104 104 102   CO2 31 32 31 27   * 122* 110* 105*   CR 3.58* 3.97* 3.71* 3.50*   * 93 88 185*   JARETH 8.2* 8.1* 7.9* 8.6       INRNo lab results found in last 7 days.   Attestation:   I have reviewed today's relevant vital signs, notes, medications, labs and imaging.    Harley Beck MD  [facility] - Nephrology  Office phone :[phone]  Pager: [phone]

## 2017-01-23 NOTE — PLAN OF CARE
Problem: Goal Outcome Summary  Goal: Goal Outcome Summary  Lymphedema: Pt tolerated wraps, pt states noting decreased swelling in B LEs, however believes no change in scrotum. Wounds continue to be less weepy. Edema remains firm at the knees and above, scrotum was not elevated on PT entry, elevated with pillowcase. Mepilex placed over the dorsum of B feet over bony lateral and medial prominences d/t continued redness-blanchable, for added cushion under wrap placement. RN completed wound cares with assist of therapist. Gradient compression wraps re-placed. Edema management patient care order placed with nursing to continue cares going forward throughout rest of stay, PT lymph order completed at this time. Recommend discharge to TCU with lymphedema cares.      Physical Therapy Discharge Summary    Reason for therapy discharge:    All goals and outcomes met, no further needs identified.    Progress towards therapy goal(s). See goals on Care Plan in [facility] electronic health record for goal details.  Goals met    Therapy recommendation(s):    continued lymphedema intervention/wrapping at discharge

## 2017-01-23 NOTE — PROGRESS NOTES
Care Transition Initial Assessment -   Reason For Consult: discharge planning  Met with: Patient and Family    Active Problems:    Acute respiratory failure with hypoxia (H)         DATA  Lives With: spouse  Living Arrangements: house  Description of Support System: Supportive, Involved  Who is your support system?: Wife, Children  Support Assessment: Adequate family and caregiver support.   Identified issues/concerns regarding health management:   Patient feels that they have adequate support @ home?  Yes      Quality Of Family Relationships: supportive, involved  Transportation Available: family or friend will provide    Per social service protocol for discharge planning.  Patient was admitted on 1-20-17 with acute respiratory failure with hypoxia.  The tentative date of discharge is yet to be determined.  Reviewed chart and spoke with patient and daughter regarding discharge plans.  Per patient and daughter report, patient lives with his wife in a split level house with 7 steps up and 7 steps down.  Patient uses a rolling walker at baseline.  Patient has a raised toilet seat and grab bars in the bathroom.  Patient is able to dress and bathe himself.  Patient's wife does the cooking and the cleaning.  Patient is open to [facility] for nursing and lymph wraps.  Discussed the discharge recommendation of tcu placement and patient is not in agreement with this.  Patient state he would like to return home upon discharge.  Patient's daughter discussed what it would look like to have 24 hour care.  Explained that this would be private pay and what is involved.  Patient states he will speak with his family about how to proceed.    ASSESSMENT  Cognitive Status:  awake, alert and oriented  Concerns to be addressed: discharge planning.     PLAN  Patient Goals and Preferences: discharge to home.  Patient anticipates discharging to:  Home with homecare.    Will continue to follow and assist with a safe discharge  plan.    GABRIELA Levin, [facility]  [phone]

## 2017-01-23 NOTE — PLAN OF CARE
Problem: Individualization  Goal: Patient Preferences  Outcome: Improving  Pt alert and oriented, Frequent requests for water, wife giving bedside from room sink. Educated on pt's orders/ condition. Otherwise PERRL, follows commands. BP stable. De-sats while drinking and talking. Remains on 4 L oxymask. Encouraged to use acapella. Good UO from chan. Scattered ecchymotic areas noted. Pt denies pain. Transfer to [facility] at 1645 with wife present bedside.

## 2017-01-23 NOTE — PLAN OF CARE
Problem: Goal Outcome Summary  Goal: Goal Outcome Summary  OT: Pt working with wound care at this time.

## 2017-01-23 NOTE — PROGRESS NOTES
[facility] Nurse Inpatient Wound Assessment     Initial Assessment of wound(s) on pt's:   LE wounds        Data:   Patient History:   per MD note(s): Armand Smith is an 87-year-old pleasant male with past medical history significant for insulin-dependent diabetes mellitus, hypertension, diastolic CHF, 2-3+ tricuspid regurgitation, Parkinson's disease, chronic atrial fibrillation with prior stroke, CKD stage IV, chronic anemia, gout who was recently admitted here in [facility] and was discharged on 12/30 after treating healthcare associated pneumonia was brought to the ER for evaluation after fall.  I reviewed his prior admission and discharge notes in Epic, discussed with Dr. Martines, ED physician, who evaluated the patient in the ER and also discussed with the patient and his wife present at bedside for further information.  According to the patient's wife, the patient was walking this morning with the help of his walker and fell to the floor which was carpeted with multiple boxes and different stuff in the floor.  He did hit his head on those boxes, but did not pass out.  Wife was right there, who called the neighbor who came and helped patient to get up and as they were trying to help him go to bed he had another episode of fall.  This time they called EMS.  The patient was on the floor until they arrived.        The patient and his wife also report that he has worsening shortness of breath for the last few days without fever or chills.  He is shaking, but that is usual, given his Parkinson's disease.  No nausea, vomiting or diarrhea.  Denies any cough.  He is significantly kyphotic but also gives history of orthopnea.  Also, they have noticed somewhat increased wheezing.  He did have leg swelling and was followed up by the wound care nurse and has Ace wraps, but despite that swelling has worsened and is up to his waist.  The patient denies any dizziness.  No hematochezia or melena, but  he feels tired and as per wife falls to sleep easily.          Bhupinder Assessment and sub scores:   Bhupinder Score  Av.3  Min: 13  Max: 20     Labs:         Recent Labs   Lab Test  17   0515   16   0645   11/14/16   2025   09/29/15   0940   ALBUMIN  2.3*   < >   --    --   2.9*   < >   --    HGB  7.7*   < >  7.5*   < >  9.9*   < >  11.3*   RBC  2.75*   < >  2.61*   < >  3.30*   < >  3.94*   WBC  3.1*   < >  5.0   < >  5.0   < >  8.2   PLT  114*   < >  152   < >  199   < >  222   INR   --    --    --    --    --    --   1.08   A1C   --    --   7.0*   --    --    < >   --    CRP   --    --    --    --   28.7*   --    --     < > = values in this interval not displayed.          Wound Assessment :  Bilateral LE    Pt has 2 wounds to RLE caused by poor edema management. Spouse reports these areas were large blisters that have ruptured and are completely unroofed. Wounds are located to R lower calf measuring 3x5cm x3cm and R upper calf measuring [id number]. Pt also has 2 wounds to RLE. Pt has 2cmx0.3cm to shin and 0.3cmx0.3cm wound to calf. All wounds have partial thickness skin loss are both pearly pink, semidry and very painful painful to the touch. Wounds are draining mod amt of ss and elliot blood.     Pt has closed wound to L hallux. Wound is 1.0cmx1.5cm with no drainage and is dry, black necrotic tissue. Spouse reports he has had for some time and at home they perform daily betadine swabs.     Pt has blanchable redness to L medial ankle and reports discomfort with touch.     BL general condition: dry, scaly venous stasis scabbing        Intervention:     Patient's chart evaluated.      Wound(s) was assessed    Orders  Written    Discussed plan of care with Nurse, patient, spouse          Assessment:       Pt has multiple superficial open areas dt worsening edema. Pt spouse reports home care is managing wounds prior to hospital stay. Spouse also assisting with cares at home.          Plan:     Nursing to  notify the Provider(s) and re-consult the WO Nurse if wound(s) deteriorate(s) or if the wound care plan needs reevaluation.    Plan of care for wounds located on BLE: Daily and prn  1.  Remove old dressings and cleanse intact skin on legs and feet with bath wipes, then apply Sween 24 cream, avoiding between toes.    2.  Cleanse open wounds with MicroKlenz wound cleanser and gauze, pat dry.    3.  Cover open wounds to BLLE with xeroform   4.  Cover with gauze and/or ABDs.  5.  L medial ankle cover with mepilex gentle boarder  6.  Secure everything with Kerlix and Medipore tape.  Label with time/date/intials.    7.  Elevate legs as much as possible, float heels at all times.     WO Nurse will return: weekly     Face to face time: 16-30 Minutes    Master ADAMS

## 2017-01-23 NOTE — PLAN OF CARE
Problem: Goal Outcome Summary  Goal: Goal Outcome Summary  SLP: Patient was seen for swallow treatment with his family present. Patient was given trials of nectar thick liquids by spoon and tolerated 2 oz without overt Sx of aspiration. 4 oz of apple sauce was tolerated without overt Sx of aspiration despite reduced AP movement of the bolus and slight delay in his swallow response. Difficulty completing a dry swallow so liquids and solids were alternated. Recommend: 1. Advance diet to a Dysphagia Diet level with nectar thick liquids by spoon. 2. Up in a chair for all meals, liquids by spoon, 1/2 teaspoon amounts and alternate liquids/solids. Hold diet if respiratory status declines or Sx of aspiration present. 3. SLP will f/u for diet tolerance and advancement as tolerated. 4. Discharge to TCU when stable.

## 2017-01-23 NOTE — PROGRESS NOTES
Pt on bipap for a short period of time. Bipap off at 0400 and pt on nasal cannula at 5Lpm with Sp02 95%. Racheal Berrios  1/23/2017

## 2017-01-23 NOTE — PROGRESS NOTES
Palliative Care Inpatient Clinical Social Work Assessment    Patient Information:  Armand Smith is an 87 year old man with multiple medical problems including Parkinson's disease, CHF, Acute Kidney Injury on top of CKD, Hypertension, and Diabetes. Admitted to [facility] after he fell at home and wife called EMS. Palliative Care was consulted to initiate early goals of care conversations as he has had multiple hospitalizations in recent months (last d/c was on 12/30)     Visit Summary: Met with Armand, his wife, [place], and his daughter, Emmy in room. Introduced myself and the role of Palliative Care. Armand shared that he has been in and out of the hospital recently and has not been able to do things that he enjoys as much recently such as spending time with hobbies such as stamp collecting and overseeing his finances. He is hoping that he can return home with his wife and said he is not open to the idea of going to a TCU at this time. His wife has been his primary caregiver at home and has been able to assist with ADL's such as helping with getting dressed. THey both expressed some concern that after this hospitalization he might need more help than she can provide but they want to wait to see if that is the case. Their daughter has suggested getting more help in-home including possible 24-hour caregivers but it is not clear if they are open to this at this time. Armand said he does not want to go to a TCU, it seems that this is because he was at [facility] for rehab within the last few months and he did not find it to be helpful, but it was not entirely clear.     At this time, we agreed that I will continue to check in periodically but as there are no major medical decisions to be made at this time I will not see every day.     Relevant Symptoms/Concerns     Physical:  No symptoms noted during my visit.    Psychological/Emotional/Existential:     Family/Social/Caregiver:   Armand lives with his wife, [place], who is primary  caregiver though it seems she has mobility issues as well (per their daughter) and it is not clear if she will be able to care for him at home as his care needs increase. Their daughter, Emmy, is also involved and supportive.    Developmental:     Mental Health:     End of Life:     Cultural/Spiritism/Spiritual:     Grief/Loss:     Concurrent Stressors:       Comments:          Goals/Decision Making/Advance Care Planning   Preferences:  Goals are restorative at this time.    Concerns:  Daughter expresses concerns that Armand' care needs are getting to be more than [place] can handle as she has her own limitations on mobility.    Documents:  Armand has a POLST on file from 2013. [place] said they have health care directives completed and she will bring in a copy if she can find it.    Decision Making Issues:       Comments:      Resource Needs     Discharge Planning:  Per Unit/Program  and/or Care Coordinator   Other:  [facility] goals for returning home with home care and in-home caregivers.    Comments:      Sources of Information   Patient:  x   Family:  x   Staff:  x   Chart Review:  x   Other:      Intervention (Check all that apply)    x   Assessment of palliative specific issues      x   Introduction of Palliative clinical social work interventions    x   Adjustment to illness counseling       Advanced care planning       Attended/participated in care conference       Behavioral interventions for symptom management       Facilitation of processing of thoughts/feelings       Family communication facilitated       Grief counseling    x   Goals of care discussion/facilitation       Life legacy work       Life review facilitation       Psychoeducation       Re-framing       Resource referral       Other:       Comments:      Plan:  Will continue to be available as needed for goals of care support.     Malorie Hernandez, MSW, SW   Palliative Care     Pgr:[phone]  Ph: [phone]

## 2017-01-23 NOTE — CONSULTS
[facility] Referral received from Dr. Hood. Patient is currently established in the [facility]. [facility] education to be given/reinforced by hospital nurse. Nutrition consult noted. Hospital follow up appointments arranged. Of note, pt has been overdue for follow up in clinic. We have tried multiples to schedule this with patient and wife, and they declined.    [facility] appointment made for:   Thursday, February 2nd, 2017 at 2:10pm for non fasting labs, and 3:10pm to see Cathie Salas NP in [place]    Diana Robb RN  [facility] nurse  [phone]      [facility]: Cardiomyopathy, Optimization, Rehabilitation, Education   The [facility] is a heart failure specialty clinic within the [facility] where you will work with specialized nurse practioners, physician assistants, doctors and registered nurses. They are dedicated to helping patients with heart failure to carefully adjust medications, receive education, and learn who and when to call if symptoms develop. They specialize in helping you better understand your condition, slow the progression of your disease, improve the length and quality of your life, help you detect future heart problems before they become life threatening, and avoid hospitalizations.

## 2017-01-23 NOTE — PROGRESS NOTES
[facility]    Hospitalist Progress Note    Date of Service (when I saw the patient): 01/23/2017    Assessment and Plan     Armand Smith is an 87-year-old pleasant male with above-mentioned multiple medical problems, notably insulin-dependent diabetes mellitus, hypertension, left-sided diastolic and right-sided systolic dysfunction with 2+ tricuspid regurgitation, Parkinson's disease, chronic atrial fibrillation with history of stroke only on aspirin, chronic kidney disease stage IV, and chronic anemia who was brought to the ER after a mechanical fall and worsening shortness of breath for the past few days.      1.  Acute diastolic congestive heart failure exacerbation:  Patient on Bumex and metolazone PTA. weight reportedly fluctuating as outpatient. Ongoing/ worsening dyspnea, orthopnea and increased leg edema consistent with congestive heart failure exacerbation, also his proBNP was much higher.  -- Started on Lasix 40 mg IV BID- tolerated despite soft BP and diuresed well- 4.3 L neg but diuresis on hold, lasix on hold now (see below BALAJI)  -- Needed BiPAP on admission due to respiratory acidosis, and somnolence. Off since 1/21 am and briefly used it last night.  -- Follow intake and output and daily weight. 1.5L fluid restriction. Monitor on telemetry.   -- Cardiology consult appreciated. Echo from 12/25 shows preserved EF, repeat ECHO still with features c/w pressure/vol overload. Defer fluid/diuretic management to nephro/cardiology  -- PICC line was placed given hypotension at presentation in case vaso pressor required.    2.  Acute hypoxic and hypercarbic respiratory failure.  This is multifactorial including diastolic CHF exacerbation, possible PNA and reactive airway disease. He has leukopenia, but no fever or cough, but procalcitonin was elevated/remains elevated. Chest x-ray c/w pulmonary congestion vs left lower lobe infiltrate as per report. Pro calcitonin was elevated.Given episode of  hypotension in ER, presumed infection/sepsis, and started on broad spectrum abx-  IV Levaquin, meropenem and vancomycin for suspected healthcare-associated pneumonia.      -- Blood cultures negative so far, unable to collect sputum as not much sputum production.  -- Given his respiratory acidosis and somnolence, started on BiPAP on admission, off since 1/21 am (briefly used last night)  -- Wife reported cough when taking med with water, SLP eval done, no gross sign of aspiration, on Honey thick clear liquid diet. Given repeat episode of suspected PNA, ? Video swallow eval. Also ongoing wheezing for 2-3 days PTA, not better with diuresis and abx, and despite scheduled duo-nebs, no diagnosis of COPD but used to smoke pipe. Wheezing markedly improved (subsided) with one dose steroid 1/23.   -- Congestive heart failure management as above.    -- Scheduled DuoNeb and p.r.n. albuterol    -- Acapella and IS as able.  -- Culture negative so far, of vanco, continue levaquin and meropenem (will review with SLP, if video swallow not pursued, will treat with 5 days of meropenem for presumed aspiration as well).  -- Not on home O2, wean as able.    3.  Acute kidney injury on top of chronic kidney disease, stage IV.  The patient's baseline creatinine appears to be around 2.6 (was 3.1 with nephrology follow recently).  He presented with a creatinine of 3.5. His BUN is more than 100    -- I suspect this is likely due to cardiorenal syndrome from poor renal perfusion.        -- Renal ultrasound negative     -- Cortez catheter for strict in and out.      -- Follow daily labs. Cr has continued to worsen to 3.97, , IV lasix held and started on gentle IV hydration per nephro, monitor for respiratory status, BUN Cr improved 118/3.58 today.    4.  h/o Hypertension, chronic atrial fibrillation and stroke: Presented hypotensive/low normal BP. Prior to admission medications including simvastatin, hydralazine were held while he was  n.p.o.     -- p.r.n. metoprolol for rapid ventricular response, given his atrial fibrillation if blood pressure tolerates.      -- P.O. or rectal aspirin ordered, given history of stroke and permanent a fib.    -- He is not on any other anticoagulation due to history of GI bleed and epistaxis.      -- He is not on ARB due to renal failure.   -- statin resumed.     5.  Diabetes mellitus type 2: The patient takes Lantus 20 units subcutaneously daily. Since he was n.p.o., decreased the dose to half his regular dose - Lantus 10 units subcutaneously daily on admission.    -- BS was in 80s, and so further decreased to 5 units but back to 10 units as BS high now.  -- sliding scale changed to mod intensity.  -- A1C is 7.0     6.  Parkinson's disease and tremor: Prior to admission p.o. Sinemet, resumed    --  PTA Mirapex resumed.    7.  Anemia, chronic, likely due to ongoing illness and renal failure, and thrombocytoppenia   ---  Prior workup also shows iron deficiency and is on p.o. iron supplement      --- The patient has received 1 unit of PRBC transfusion in his last admission (dec 25)   --- His wife has signed consent for blood transfusion and will likely transfuse if hemoglobin less than 7.  --- dropped from 8.6 on admission to 7.2. Recheck Hb stable, conditional transfusion tranfusion order for <7. Will need additional lasix 40 mg IV for transfusion.  --- Platelet count dropped to 114, has not received heparin products. Likely due to ongoing illness. Monitor.    8.  Fall.  This seems mechanical.  He also is weak and deconditioned from congestive heart failure and possibly pneumonia.  Also, suspected uremic encephalopathy given his somnolence and drowsiness versus from CO2 narcosis. Patient was on narcotic medication as well, and his BP was improving and he became more alert with narcan in ER.  ---  PT, OT eval. Cardiac rehab.  ---  No narcotic     ---  Patient was discharged home with home health, but likely needs a  higher level of care, probably TCU at time of discharge at this time which was discussed with his wife.        9.  History of gout.  prior to admission Allopurinol 200 mg by mouth daily.    10. Elevated AST: Unclear etiology. ? Liver/muscle/cardiac origin. ?liver congestion but otherwise maria guadalupe ALP/ALT  - CK normal  - RUQ US unremarkable.  -Haptoglobin pending, total/direct bili normal, LDH mildly elevated  -AST improved.    DVT Prophylaxis: Pneumatic Compression Devices given significant anemia, unclear source for drop in Hb.    Code Status: Full Code    Disposition: Expected discharge in >2 days pending clinical course. Discussed with patient and his wife present in the room. Care plan reviewed with RN.  - palliative care consult     Iman Hood MD  Hospitalist    Interval History  Patient was seen in examined, dyspnea has improved since admission but unchanged since yesterday. Had to be placed on BiPAP briefly last night. Leg pain improved.  - cough and wheezing much improved    -Data reviewed today: I reviewed all new labs and imaging results over the last 24 hours. I personally reviewed the EKG tracing showing a fib, controlled rate on tele.    Physical Exam  Temp: 97.4  F (36.3  C) Temp src: Oral BP: 106/59 mmHg Pulse: 80 Heart Rate: 80 Resp: 16 SpO2: 92 % O2 Device: Nasal cannula Oxygen Delivery: 6 LPM  Filed Vitals:    01/22/17 0600 01/23/17 0500   Weight: 86.7 kg (191 lb 2.2 oz) 86.1 kg (189 lb 13.1 oz)     Vital Signs with Ranges  Temp:  [95.4  F (35.2  C)-97.8  F (36.6  C)] 97.4  F (36.3  C)  Pulse:  [80] 80  Heart Rate:  [66-91] 80  Resp:  [15-27] 16  BP: (103-122)/(55-75) 106/59 mmHg  FiO2 (%):  [40 %] 40 %  SpO2:  [87 %-97 %] 92 %  I/O last 3 completed shifts:  In: 1951.17 [P.O.:630; I.V.:1321.17]  Out: 3175 [Urine:3175]    Constitutional: Alert, awake today, oriented  HEENT: PERRLA, EOMI  Respiratory: Bilateral dim and coarse. Wheezing has subsided. On 5 L oxy mask.  Cardiovascular: Irregular  s1s2, no murmur, rub or gallop. No tachycardia. Leg edema much better, has lymphedema wraps.  GI: Soft, non distended, non tender, bowel tones active.  Skin/Integumen: chest with petechial non blanching rash, no blister.         Medications    - MEDICATION INSTRUCTIONS -       NaCl 50 mL/hr at 01/23/17 0600     Reason ACE/ARB order not selected       - MEDICATION INSTRUCTIONS -       - MEDICATION INSTRUCTIONS -         insulin glargine  10 Units Subcutaneous At Bedtime     insulin aspart  1-7 Units Subcutaneous TID AC     insulin aspart  1-5 Units Subcutaneous At Bedtime     [START ON 1/24/2017] pantoprazole  40 mg Oral QAM     carbidopa-levodopa  1 tablet Oral BID     co-enzyme Q-10  100 mg Oral Daily     pramipexole  0.5 mg Oral Daily     simvastatin  40 mg Oral At Bedtime     eucerin   Topical BID     ferrous sulfate  325 mg Oral Daily with lunch     guaiFENesin  600 mg Oral BID     ipratropium - albuterol 0.5 mg/2.5 mg/3 mL  3 mL Nebulization 4x daily     aspirin  300 mg Rectal Daily    Or     aspirin EC  325 mg Oral Daily     meropenem  500 mg Intravenous Q12H     sodium chloride (PF)  10 mL Intracatheter Q8H     levofloxacin  500 mg Intravenous Q48H       Data    Recent Labs  Lab 01/23/17  0515 01/22/17  0405 01/21/17  1755 01/21/17  0420  01/20/17  0940   WBC 3.1* 4.2  --  4.3  --  3.6*   HGB 7.7* 7.7* 7.7* 7.2*  --  8.6*   MCV 88 90  --  89  --  89   * 115*  --  125*  --  152    145*  --  143  --  141   POTASSIUM 4.1 4.6  --  5.3  --  5.1   CHLORIDE 101 104  --  104  --  102   CO2 31 32  --  31  --  27   * 122*  --  110*  --  105*   CR 3.58* 3.97*  --  3.71*  --  3.50*   ANIONGAP 10 9  --  8  --  12   JARETH 8.2* 8.1*  --  7.9*  --  8.6   * 93  --  88  --  185*   ALBUMIN 2.3* 2.4*  --  2.3*  < >  --    PROTTOTAL 6.3* 6.3*  --  6.0*  < >  --    BILITOTAL 0.6 0.6  --  0.6  < >  --    ALKPHOS 93 96  --  95  < >  --    ALT 54 56  --  32  < >  --    * 262*  --  287*  < >  --     TROPI  --  0.223*  --   --   --  0.110*   < > = values in this interval not displayed.    No results found for this or any previous visit (from the past 24 hour(s)).

## 2017-01-23 NOTE — PROGRESS NOTES
[facility]    Cardiology Progress Note    Date of Service (when I saw the patient): 01/23/2017     Assessment and Plan  Armand Smith is a 87 year old male with a PMH insulin-dependent diabetes mellitus, hypertension, diastolic heart failure, tricuspid valve regurgitation, Parkinson's disease, chronic atrial fibrillation, not on anticoagulation, chronic kidney disease and anemia who was admitted on 1/20/2017     1. Acute on chronic diastolic heart failure. - Difficult to know what his true dry weight is (weight range 180-220; likely ~ 190). Weight today is 189 lbs; negative 2.3L (3.5L over admission) with furosemide 40 IV BID. Diuretics held for the last 2 days. Creatine is improved overnight from 3.97 to 3.58. I agree that he will likely need dobutamine IV to keep fluid removal going without worsening renal failure.     2. Hypoxemic respiratory failure, multifactorial.  - There was likely a component of volume contributing to his respiratory failure as it did improve with diuretics but his LLL infiltrate is likely playing a role as well; being treated with broad spectrum ABX.     3. Chronic kidney disease, severe, stage IV.  - Creatinine up and now down a bit. Still holding diuretics per nephrology but urine output is still good.    4. Chronic Atrial Fibrillation - Rate controlled - not on anticoagulation due to GI bleed and epistaxis on A\C.    5. Mild troponinemia - likely represents demand in the setting of acute hypoxemic resp failure. No anginal symptoms or ischemic EKG changes. Peak - 0.22    Will see tomorrow and consider IV dobutamine.    Mich Ramsay MD  Cardiology    ------------------------------------------------------------------------------  Interval History  Feeling better from a respiratory standpoint; worsening renal function; wants to eat but failed swallow assessment.    Physical Exam  Temp: 98.5  F (36.9  C) Temp src: Oral BP: 106/58 mmHg Pulse: 80 Heart Rate: 81 Resp: 16 SpO2:  92 % O2 Device: Nasal cannula Oxygen Delivery: 6 LPM  Filed Vitals:    01/22/17 0600 01/23/17 0500   Weight: 86.7 kg (191 lb 2.2 oz) 86.1 kg (189 lb 13.1 oz)     Vital Signs with Ranges  Temp:  [96.5  F (35.8  C)-98.5  F (36.9  C)] 98.5  F (36.9  C)  Pulse:  [80] 80  Heart Rate:  [74-91] 81  Resp:  [16] 16  BP: (105-106)/(55-79) 106/58 mmHg  FiO2 (%):  [40 %] 40 %  SpO2:  [88 %-95 %] 92 %  I/O last 3 completed shifts:  In: 2177 [P.O.:990; I.V.:1187]  Out: 2825 [Urine:2825]    GENERAL:  The patient is awake, alert and conversant and quite pleasant.    HEENT:  No scleral icterus.    NECK:  Mildly elevated jugular venous pressure.    LUNGS:  Coarse breath sounds bilaterally with scattered expiratory wheeze.    CARDIOVASCULAR:  Regularly irregular.  There is a 2/6 holosystolic murmur heard along the left sternal border.    EXTREMITIES:  Improved LE edema, R foot with toe amp and multiple small dry ulcers with eschar.    SKIN:  Warm, dry and intact.    PSYCHIATRIC:  Calm and pleasant.      Medications    - MEDICATION INSTRUCTIONS -       Reason ACE/ARB order not selected       - MEDICATION INSTRUCTIONS -       - MEDICATION INSTRUCTIONS -         insulin glargine  10 Units Subcutaneous At Bedtime     insulin aspart  1-7 Units Subcutaneous TID AC     insulin aspart  1-5 Units Subcutaneous At Bedtime     [START ON 1/24/2017] pantoprazole  40 mg Oral QAM     [START ON 1/24/2017] levofloxacin  500 mg Oral Q48H     carbidopa-levodopa  1 tablet Oral BID     co-enzyme Q-10  100 mg Oral Daily     pramipexole  0.5 mg Oral Daily     simvastatin  40 mg Oral At Bedtime     eucerin   Topical BID     ferrous sulfate  325 mg Oral Daily with lunch     guaiFENesin  600 mg Oral BID     ipratropium - albuterol 0.5 mg/2.5 mg/3 mL  3 mL Nebulization 4x daily     aspirin  300 mg Rectal Daily    Or     aspirin EC  325 mg Oral Daily     meropenem  500 mg Intravenous Q12H     sodium chloride (PF)  10 mL Intracatheter Q8H       Data    Recent  Labs  Lab 01/23/17  0515 01/22/17  0405 01/21/17  1755 01/21/17  0420  01/20/17  0940   WBC 3.1* 4.2  --  4.3  --  3.6*   HGB 7.7* 7.7* 7.7* 7.2*  --  8.6*   MCV 88 90  --  89  --  89   * 115*  --  125*  --  152    145*  --  143  --  141   POTASSIUM 4.1 4.6  --  5.3  --  5.1   CHLORIDE 101 104  --  104  --  102   CO2 31 32  --  31  --  27   * 122*  --  110*  --  105*   CR 3.58* 3.97*  --  3.71*  --  3.50*   ANIONGAP 10 9  --  8  --  12   JARETH 8.2* 8.1*  --  7.9*  --  8.6   * 93  --  88  --  185*   ALBUMIN 2.3* 2.4*  --  2.3*  < >  --    PROTTOTAL 6.3* 6.3*  --  6.0*  < >  --    BILITOTAL 0.6 0.6  --  0.6  < >  --    ALKPHOS 93 96  --  95  < >  --    ALT 54 56  --  32  < >  --    * 262*  --  287*  < >  --    TROPI  --  0.223*  --   --   --  0.110*   < > = values in this interval not displayed.

## 2017-01-23 NOTE — CONSULTS
"REASON FOR ASSESSMENT:  CHF Consult for 2 gm NA Diet Education    NUTRITION HISTORY:  Information obtained from pt and his wife, [place]:   - Pt has follows a \"low-salt\" diet for 20+ years.   - Pt's wife does all the cooking and shopping. She states she is very mindful of sodium content and anything she can buy \"low-salt\" she does. Pt's wife is familiar with reading nutrition labels.  - Pt's wife reports reducing salt content of all her recipes to 1/8th the original amount.   - Pt's typical intake is as follows: (B) cornflakes with 1% milk or bagel or muffin; (L) Healthy choice canned soup; (D) spaghetti and meat sauce made from low-sodium canned red sauce or stir-bryant without soy sauce.  - Pt rarely dines out.    CURRENT DIET:  CL, Honey-thickened liquids and 1500 mL fluid restriction.     NUTRITION DIAGNOSIS:  No nutrition diagnosis at this time.     INTERVENTIONS:    Nutrition Prescription:  CL, honey-thickened and 1,500 mL fluid restriction.   Upon d/c, pt to follow 2 gm Na+ diet.     Implementation:    Nutrition Education (Content):  a) Wife declined handouts as she has received these in the past and reports she is familiar with their contents.  b) Discussed rational for limiting Na for CHF and stressed importance of following 2 gm Na guidelines     Nutrition Education (Application):  a) Discussed current eating habits and recommended alternative food choices - particularly, canned soup. Encouraged wife to read nutrition labels and compare sodium content of different soup brands.    Anticipated good compliance - Wife has experience shopping/cooking for a low sodium diet.    Diet Education - refer to Education Flowsheet    Goals:    Patient verbalizes understanding of diet by stating he is aware he needs to limit his sodium intake.    All of the above goals met during the education session    Follow Up:    Provided RD contact information for future questions.    Recommend Out-Patient Nutrition Referral, if further " diet instructions are needed.      Amayarenetta Weiss  Dietetic Intern

## 2017-01-23 NOTE — PLAN OF CARE
Problem: Discharge Planning  Goal: Discharge Planning (Adult, OB, Behavioral, Peds)  CM . . Anticipate goal for discharge to home with homecare versus tcu, date to be determined SJ

## 2017-01-24 NOTE — CONSULTS
[facility]  Antimicrobial Stewardship Team (AST) Note: Armand Smith  MRN:  [id number]            To:  Hospitalist  Unit:  [facility]  Allergies: PCN (turned red), codeine     Brief Summary: 87-year-old pleasant male with above-mentioned multiple medical problems, notably insulin-dependent diabetes mellitus, hypertension, left-sided diastolic and right-sided systolic dysfunction with 2+ tricuspid regurgitation, Parkinson's disease, chronic atrial fibrillation with history of stroke only on aspirin, chronic kidney disease stage IV, and chronic anemia who was brought to the ER after a mechanical fall and worsening shortness of breath for the past few days.  Acute hypoxic and hypercarbic respiratory failure.  This is multifactorial including diastolic CHF exacerbation, possible PNA and reactive airway disease. He has leukopenia, but no fever or cough, but procalcitonin was elevated/remains elevated. Chest x-ray c/w pulmonary congestion vs left lower lobe infiltrate as per report. Pro calcitonin was elevated.Given episode of hypotension in ER, presumed infection/sepsis, and started on broad spectrum abx-  IV Levaquin, meropenem and vancomycin for suspected healthcare-associated pneumonia.      -- Blood cultures negative so far, unable to collect sputum as not much sputum production.  -- Given his respiratory acidosis and somnolence, he was maintained on BiPAP on admission, off since 1/21 am.  -- Wife reported cough when taking med with water, SLP eval done, no gross sign of aspiration, on Honey thick clear liquid diet. Given repeat episode of suspected PNA, ? Video swallow eval. Also ongoing wheezing for 2-3 days PTA, not better with diuresis and abx, and despite scheduled duo-nebs, no diagnosis of COPD but wheezing markedly improved with one dose steroid this am, will re eval in am and will consider a short course of steroid .  -- Congestive heart failure management as above.    -- Culture negative so far, dc  vanco, continue levaquin and meropenem (will review with SLP, if video swallow not pursued, will treat with 5 days of meropenem for presumed aspiration as well).  -- Not on home O2, wean as able  Assessment: Recommend discontinue levofloxacin as no continued indication for double gram negative coverage.    Current Antibiotic therapy: Meropenem (- ) + Levaquin (-)    Clinical Features/Vital Signs (VS): WBC:  = 3.6,  = 4.3,  = 4.2,  = 3.1  Tmax: afebrile  Procal  = 0.25,  = 0.44    Culture Results:  Date Culture Site Organism    Blood x 2 -3d    Influenza negative    Sputum Not collected     Imagin/20 CXR: Marked cardiomegaly. There may be mild pulmonary vascular engorgement. Dense opacity at the posterior left lung base, consistent with pneumonia, likely worsened since the prior exam.     CXR: Marked cardiomegaly. There may be mild pulmonary vascular engorgement. Dense opacity at the posterior left lung base, consistent with pneumonia, likely worsened since the prior exam.    Recommendation/Interventions:   1).  D/c levofloxacin  2).    3).    Discussed w/ ID Staff-Dr. Noel Torres, PharmD, BCPS

## 2017-01-24 NOTE — PROGRESS NOTES
" Renal Medicine Progress Note            Assessment/Plan:     1. Pt patient with baseline CKD IV    2. BALAJI in the setting of heart failure. Improving.    3. HFpEF. Pt is hypervolemic. 3+ pitting edema at the thighs. Decompensated DD and right-sided heart failure    4. LLL PNA and pleural effusion    5. Anemia. + Fe def.     6. Mild hypernatremia. Encourage patient to increase oral fluid intake.     Plan.   1. On dopamine and Lasix 20 mg tid. Goal 1-2 liters net negative. Increase Lasix and add Diuril as needed.   2. Fe studies added. He may need more IV Fe. If Fe is acceptable, then I will give him KATEY.   3. Increase oral Fe to bid. Monitor for constipation.        Interval History:     Pt is more SOB particular after coughing spells. Dry cough mostly. He is getting 1 PRBC.          Medications and Allergies:       furosemide  20 mg Intravenous TID     insulin glargine  15 Units Subcutaneous At Bedtime     insulin aspart  1-7 Units Subcutaneous TID AC     insulin aspart  1-5 Units Subcutaneous At Bedtime     pantoprazole  40 mg Oral QAM     carbidopa-levodopa  1 tablet Oral BID     co-enzyme Q-10  100 mg Oral Daily     pramipexole  0.5 mg Oral Daily     simvastatin  40 mg Oral At Bedtime     eucerin   Topical BID     ferrous sulfate  325 mg Oral Daily with lunch     guaiFENesin  600 mg Oral BID     ipratropium - albuterol 0.5 mg/2.5 mg/3 mL  3 mL Nebulization 4x daily     aspirin  300 mg Rectal Daily    Or     aspirin EC  325 mg Oral Daily     meropenem  500 mg Intravenous Q12H     sodium chloride (PF)  10 mL Intracatheter Q8H        Allergies   Allergen Reactions     Codeine      nausea vomiting     Penicillins      \" turned red from head to foot\"            Physical Exam:   Vitals were reviewed  Heart Rate: 99, Blood pressure 115/57, pulse 80, temperature 97.5  F (36.4  C), temperature source Axillary, resp. rate 20, weight 86.1 kg (189 lb 13.1 oz), SpO2 95 %.    Wt Readings from Last 3 Encounters:   01/23/17 86.1 " kg (189 lb 13.1 oz)   12/30/16 90.5 kg (199 lb 8.3 oz)   12/20/16 83.235 kg (183 lb 8 oz)       Intake/Output Summary (Last 24 hours) at 01/24/17 1433  Last data filed at 01/24/17 1326   Gross per 24 hour   Intake    460 ml   Output   1200 ml   Net   -740 ml     GENERAL APPEARANCE: NAD. Frail  HEENT:  Eyes/ears/nose/neck grossly normal  RESP: Very tight airflow.  CV: irregular. + murmur    ABDOMEN: obese, soft, NT  EXTREMITIES/SKIN: 3+ edema at the thighs. Edema is mainly above the knee.   Neuro: awake, alert and oriented         Data:     CBC RESULTS:     Recent Labs  Lab 01/24/17  0935 01/23/17  0515 01/22/17  0405 01/21/17  1755 01/21/17  0420 01/20/17  0940   WBC 5.3 3.1* 4.2  --  4.3 3.6*   RBC 2.39* 2.75* 2.71*  --  2.54* 2.99*   HGB 6.8* 7.7* 7.7* 7.7* 7.2* 8.6*   HCT 21.1* 24.3* 24.3* 24.6* 22.7* 26.6*   * 114* 115*  --  125* 152       Basic Metabolic Panel:    Recent Labs  Lab 01/24/17  0545 01/23/17  0515 01/22/17  0405 01/21/17  0420 01/20/17  0940   * 142 145* 143 141   POTASSIUM 4.0 4.1 4.6 5.3 5.1   CHLORIDE 105 101 104 104 102   CO2 32 31 32 31 27   * 118* 122* 110* 105*   CR 3.27* 3.58* 3.97* 3.71* 3.50*   * 298* 93 88 185*   JARETH 8.4* 8.2* 8.1* 7.9* 8.6       INRNo lab results found in last 7 days.   Attestation:   I have reviewed today's relevant vital signs, notes, medications, labs and imaging.    Harley Beck MD  [facility] - Nephrology  Office phone :[phone]  Pager: [phone]

## 2017-01-24 NOTE — PROGRESS NOTES
[facility]  Patient is currently open to home care services with [facility].  The patient is currently receiving RN/PT/OT services.  [facility]  and team have been notified of patient admission.  [facility] liaison will continue to follow patient during stay.  If appropriate provide orders to resume home care at time of discharge.

## 2017-01-24 NOTE — PLAN OF CARE
Problem: Goal Outcome Summary  Goal: Goal Outcome Summary  Outcome: No Change  A&O to place and person, forgetful. Denies chest pain but SOB. Patient's O2 sat was 88% with 6L/min Oxygen. Started on oxymask at 8L/min with O2 sat 94-95%. TELE shows Afib with CVR with BBB. Up with ceiling lift. ATB for Pneumonia continue. Plan to start on dobutamine to continue diurese. Will continue to monitor.

## 2017-01-24 NOTE — PROGRESS NOTES
[facility]    Hospitalist Progress Note    Date of Service (when I saw the patient): 01/24/2017    Assessment and Plan     Armand Smith is an 87-year-old pleasant male with above-mentioned multiple medical problems, notably insulin-dependent diabetes mellitus, hypertension, left-sided diastolic and right-sided systolic dysfunction with 2+ tricuspid regurgitation, Parkinson's disease, chronic atrial fibrillation with history of stroke only on aspirin, chronic kidney disease stage IV, and chronic anemia who was brought to the ER after a mechanical fall and worsening shortness of breath for the past few days.      1.  Acute diastolic congestive heart failure exacerbation:  Patient on Bumex and metolazone PTA. weight reportedly fluctuating as outpatient. Ongoing/ worsening dyspnea, orthopnea and increased leg edema consistent with congestive heart failure exacerbation, also his proBNP was much higher.  -- Started on Lasix 40 mg IV BID on admission- tolerated despite soft BP and diuresed well but then held 1/22 (see below BALAJI)  -- Needed BiPAP on admission due to respiratory acidosis, and somnolence, intermittently on BiPAP since then due to dyspnea.  -- Follow intake and output and daily weight. 1.2 L fluid restriction. Monitor on telemetry, follow weight.  -- Echo from 12/25 - preserved EF, repeat ECHO still with features c/w pressure/vol overload. Discussed with Dr Ramsay, started on dobutamin drip today, has PICC line  -- Lasix 20 mg TID restarted 1/24    2.  Acute hypoxic and hypercarbic respiratory failure.  This is multifactorial including diastolic CHF exacerbation, possible PNA and reactive airway disease. He has leukopenia, but no fever or cough, but procalcitonin was elevated/remains elevated. Chest x-ray c/w pulmonary congestion vs left lower lobe infiltrate as per report. Pro calcitonin was elevated.Given episode of hypotension in ER, presumed infection/sepsis, and started on broad spectrum  abx-  IV Levaquin, meropenem and vancomycin for suspected healthcare-associated pneumonia.      -- Blood cultures negative so far, unable to collect sputum as not much sputum production.  -- Given his respiratory acidosis and somnolence, started on BiPAP on admission, off since 1/21 am (briefly used last night)  -- Wife reported cough when taking med with water, SLP eval done, no gross sign of aspiration, on Honey thick clear liquid diet. Given repeat episode of suspected PNA, ? Video swallow eval. Given concern for aspiration PNA, leave on meropenem, likely 5-7 days. Dc Levaquin.  -- Also ongoing wheezing for 2-3 days PTA, not better with diuresis and abx, and despite scheduled duo-nebs, no diagnosis of COPD but used to smoke pipe. Wheezing markedly improved (subsided) with one dose steroid 1/23, not repeated.   -- Congestive heart failure management as above.    -- Scheduled DuoNeb and p.r.n. albuterol    -- Acapella and IS as able.  -- Not on home O2, wean as able.    3.  Acute kidney injury on top of chronic kidney disease, stage IV.  The patient's baseline creatinine appears to be around 2.6 (was 3.1 with nephrology follow recently).  He presented with a creatinine of 3.5. His BUN is more than 100    -- I suspect this is likely due to cardiorenal syndrome from poor renal perfusion.        -- Renal ultrasound negative     -- Cortez catheter for strict in and out.    -- Follow daily labs. Cr has continued to worsen to 3.97, , IV lasix held after 1/22 am, and started on gentle IV hydration per nephro which was also discontinued 1/23, Cr down to 3.27 today but more dyspneic   -- dobutamine drip as above from today 1/24    4.  h/o Hypertension, chronic atrial fibrillation and stroke: Presented hypotensive/low normal BP. Prior to admission medications including simvastatin, hydralazine were held while he was n.p.o.     -- p.r.n. metoprolol for rapid ventricular response, given his atrial fibrillation if blood  pressure tolerates.      -- P.O. or rectal aspirin ordered, given history of stroke and permanent a fib.    -- He is not on any other anticoagulation due to history of GI bleed and epistaxis.      -- He is not on ARB due to renal failure.   -- statin resumed.     5.  Diabetes mellitus type 2: The patient takes Lantus 20 units subcutaneously daily. Since he was n.p.o., decreased the dose to half his regular dose - Lantus 10 units subcutaneously daily on admission (decreased to 5 units when BS was 80 then back up on 10 units after steroid and Bs of 300s)  -- increase to 15 units today as BS upto 200 this am  -- sliding scale changed to mod intensity.  -- A1C is 7.0     6.  Parkinson's disease and tremor: Prior to admission p.o. Sinemet, resumed    --  PTA Mirapex resumed.    7.  Anemia, chronic, likely due to ongoing illness and renal failure, and thrombocytoppenia   ---  Prior workup also shows iron deficiency and is on p.o. iron supplement      --- The patient has received 1 unit of PRBC transfusion in his last admission (dec 25)   --- His wife has signed consent for blood transfusion and will likely transfuse if hemoglobin less than 7.  --- dropped from 8.6 on admission to 6.7. Will transfusion 1 unit today, back on lasix IV, if dyspneic increase dose.    --- Platelet count dropped to 104, has not received heparin products. Does have some petechial lesions on chest stable.  --- Likely due to ongoing illness.  --- ? hemolytic uremic syndrome (LDH mildly elevated, Bili normal, haptoglobin normal), if platelet drops further, will ask hem to see.  --- peripheral smear ordered with repeat draw later today  --- Monitor counts.    8.  Fall.  This seems mechanical.  He also is weak and deconditioned from congestive heart failure and possibly pneumonia.  Also, suspected uremic encephalopathy given his somnolence and drowsiness versus from CO2 narcosis. Patient was on narcotic medication as well, and his BP was improving and  he became more alert with narcan in ER.  ---  PT, OT eval. Cardiac rehab.  ---  No narcotic     ---  Patient was discharged home with home health, but likely needs a higher level of care, probably TCU at time of discharge at this time which was discussed with his wife.        9.  History of gout.  prior to admission Allopurinol 200 mg by mouth daily.    10. Elevated AST: Unclear etiology. ? Liver/muscle/cardiac origin. ?liver congestion but otherwise maria guadalupe ALP/ALT  - CK normal  - RUQ US unremarkable.  -Haptoglobin pending, total/direct bili normal, LDH mildly elevated  -AST improved.    DVT Prophylaxis: Pneumatic Compression Devices given significant anemia, unclear source for drop in Hb.    Code Status: Full Code    Disposition: Expected discharge in >2 days pending clinical course. Discussed with patient and his wife,Care plan reviewed with RN.  - palliative care consult, if no improvement on clinical course over next few days, then will discuss about hospice.    Iman Hood MD  Hospitalist    Interval History     Patient was seen in examined, was more dyspneic overnight and needed BiPAP, off this am, and feels about the same as yesterday. Cough (+) but frustrated that he can't bring sputum.   - worsened anemia/thrombocytopenia noted, no acute bleeding source/evidence noted.  - requests that his bed be changed as it is not working.    -Data reviewed today: I reviewed all new labs and imaging results over the last 24 hours. I personally reviewed the EKG tracing showing a fib, controlled rate on tele.    Physical Exam  Temp: 97.4  F (36.3  C) Temp src: Oral BP: 114/62 mmHg   Heart Rate: 95 Resp: 20 SpO2: 94 % O2 Device: Nasal cannula with humidification Oxygen Delivery: 6 LPM  Filed Vitals:    01/22/17 0600 01/23/17 0500   Weight: 86.7 kg (191 lb 2.2 oz) 86.1 kg (189 lb 13.1 oz)     Vital Signs with Ranges  Temp:  [97.4  F (36.3  C)-98.7  F (37.1  C)] 97.4  F (36.3  C)  Heart Rate:  [81-95] 95  Resp:  [16-22]  20  BP: (101-114)/(52-62) 114/62 mmHg  FiO2 (%):  [50 %] 50 %  SpO2:  [82 %-98 %] 94 %  I/O last 3 completed shifts:  In: 600 [P.O.:600]  Out: 1200 [Urine:1200]    Constitutional: Alert, awake , oriented. Ill appearing but not in distress.  HEENT: PERRLA, EOMI  Respiratory: Bilateral dim and coarse. occ wheezing, mildly tachypneic in low 20s, no accessory muscle use. On 6 L oxy mask now.  Cardiovascular: Irregular s1s2, no murmur, rub or gallop. No tachycardia. Leg edema much better, has lymphedema wraps.  GI: Soft, non distended, non tender, bowel tones active.  Skin/Integumen: chest with petechial non blanching rash, no blister.        Medications    DOBUTamine       - MEDICATION INSTRUCTIONS -       Reason ACE/ARB order not selected       - MEDICATION INSTRUCTIONS -       - MEDICATION INSTRUCTIONS -         furosemide  20 mg Intravenous TID     insulin glargine  10 Units Subcutaneous At Bedtime     insulin aspart  1-7 Units Subcutaneous TID AC     insulin aspart  1-5 Units Subcutaneous At Bedtime     pantoprazole  40 mg Oral QAM     carbidopa-levodopa  1 tablet Oral BID     co-enzyme Q-10  100 mg Oral Daily     pramipexole  0.5 mg Oral Daily     simvastatin  40 mg Oral At Bedtime     eucerin   Topical BID     ferrous sulfate  325 mg Oral Daily with lunch     guaiFENesin  600 mg Oral BID     ipratropium - albuterol 0.5 mg/2.5 mg/3 mL  3 mL Nebulization 4x daily     aspirin  300 mg Rectal Daily    Or     aspirin EC  325 mg Oral Daily     meropenem  500 mg Intravenous Q12H     sodium chloride (PF)  10 mL Intracatheter Q8H       Data    Recent Labs  Lab 01/24/17  0935 01/24/17  0545 01/23/17  0515 01/22/17  0405  01/20/17  0940   WBC 5.3  --  3.1* 4.2  < > 3.6*   HGB 6.8*  --  7.7* 7.7*  < > 8.6*   MCV 88  --  88 90  < > 89   *  --  114* 115*  < > 152   NA  --  146* 142 145*  < > 141   POTASSIUM  --  4.0 4.1 4.6  < > 5.1   CHLORIDE  --  105 101 104  < > 102   CO2  --  32 31 32  < > 27   BUN  --  113* 118* 122*   < > 105*   CR  --  3.27* 3.58* 3.97*  < > 3.50*   ANIONGAP  --  9 10 9  < > 12   JARETH  --  8.4* 8.2* 8.1*  < > 8.6   GLC  --  136* 298* 93  < > 185*   ALBUMIN  --   --  2.3* 2.4*  < >  --    PROTTOTAL  --   --  6.3* 6.3*  < >  --    BILITOTAL  --   --  0.6 0.6  < >  --    ALKPHOS  --   --  93 96  < >  --    ALT  --   --  54 56  < >  --    AST  --   --  133* 262*  < >  --    TROPI  --   --   --  0.223*  --  0.110*   < > = values in this interval not displayed.    No results found for this or any previous visit (from the past 24 hour(s)).

## 2017-01-24 NOTE — PROGRESS NOTES
Patient on 6L humidified NC. LS diminished. SpO2 low 90's. BiPAP is in the room and standby  1/24/2017  Dinora Rangel

## 2017-01-24 NOTE — PROGRESS NOTES
[facility]    Cardiology Progress Note    Date of Service (when I saw the patient): 01/24/2017     Assessment and Plan  Armand Smith is a 87 year old male with a PMH insulin-dependent diabetes mellitus, hypertension, diastolic heart failure, tricuspid valve regurgitation, Parkinson's disease, chronic atrial fibrillation, not on anticoagulation, chronic kidney disease and anemia who was admitted on 1/20/2017     1. Acute on chronic diastolic heart failure. - Difficult to know what his true dry weight is (weight range 180-220; likely ~ 190). Weight today is not done yet; I/O not updated, but he has more dyspnea requiring Bipap again last night. Diuretics held for the last 2 days. Creatine has continued to improve, but only because he is getting volume overloaded again. He needs dobutamine IV to keep fluid removal going with any hope of preserving renal function.    2. Hypoxemic respiratory failure, multifactorial.  - There was likely a component of volume contributing to his respiratory failure as it did improve with diuretics but his LLL infiltrate is likely playing a role as well; being treated with broad spectrum ABX.     3. Chronic kidney disease, severe, stage IV.  - Creatinine up and now down a bit. Still holding diuretics per nephrology.    4. Chronic Atrial Fibrillation - Rate controlled - not on anticoagulation due to GI bleed and epistaxis on A\C.    5. Mild troponinemia - likely represents demand in the setting of acute hypoxemic resp failure. No anginal symptoms or ischemic EKG changes. Peak - 0.22    6. Severe anemia. Hgb is down to 6.8 today. Some change due to hemodilution, but he would do much better with higher Hgb with less diuretic resistance and probably better renal function.         Start dobutamine today 3mcg/Kg/min via Picc line and lasix 20mg IV TID. Increase lasix as needed to get diuresis.   Transfuse RBC's today. Goal Hgb 9.0. He probably needs epogen.   Agree with  palliative care consult for possible hospice referral. He is not interested in this now, but we may need to reconsider this if we are not able to make progress with CHF treatment.   Discussed with Dr. Hood.      Mich Ramsay MD  Cardiology    ------------------------------------------------------------------------------  Interval History  Breathing somewhat worse again. Bipap last night. BP up a little bit.    Physical Exam  Temp: 97.4  F (36.3  C) Temp src: Oral BP: 114/62 mmHg   Heart Rate: 95 Resp: 20 SpO2: 94 % O2 Device: Nasal cannula with humidification Oxygen Delivery: 6 LPM  Filed Vitals:    01/22/17 0600 01/23/17 0500   Weight: 86.7 kg (191 lb 2.2 oz) 86.1 kg (189 lb 13.1 oz)     Vital Signs with Ranges  Temp:  [96.5  F (35.8  C)-98.7  F (37.1  C)] 97.4  F (36.3  C)  Heart Rate:  [74-95] 95  Resp:  [16-22] 20  BP: (101-114)/(52-79) 114/62 mmHg  FiO2 (%):  [50 %] 50 %  SpO2:  [82 %-98 %] 94 %  I/O last 3 completed shifts:  In: 600 [P.O.:600]  Out: 1200 [Urine:1200]    GENERAL:  The patient is awake, alert and conversant and quite pleasant.    HEENT:  No scleral icterus.    NECK:  Mildly elevated jugular venous pressure.    LUNGS:  Coarse breath sounds bilaterally with scattered expiratory wheeze.    CARDIOVASCULAR:  Regularly irregular.  There is a 2/6 holosystolic murmur heard along the left sternal border.    EXTREMITIES:  Improved LE edema, R foot with toe amp and multiple small dry ulcers with eschar.    SKIN:  Warm, dry and intact.    PSYCHIATRIC:  Calm and pleasant.      Medications    DOBUTamine       - MEDICATION INSTRUCTIONS -       Reason ACE/ARB order not selected       - MEDICATION INSTRUCTIONS -       - MEDICATION INSTRUCTIONS -         furosemide  20 mg Intravenous TID     insulin glargine  10 Units Subcutaneous At Bedtime     insulin aspart  1-7 Units Subcutaneous TID AC     insulin aspart  1-5 Units Subcutaneous At Bedtime     pantoprazole  40 mg Oral QAM     levofloxacin  500 mg Oral Q48H      carbidopa-levodopa  1 tablet Oral BID     co-enzyme Q-10  100 mg Oral Daily     pramipexole  0.5 mg Oral Daily     simvastatin  40 mg Oral At Bedtime     eucerin   Topical BID     ferrous sulfate  325 mg Oral Daily with lunch     guaiFENesin  600 mg Oral BID     ipratropium - albuterol 0.5 mg/2.5 mg/3 mL  3 mL Nebulization 4x daily     aspirin  300 mg Rectal Daily    Or     aspirin EC  325 mg Oral Daily     meropenem  500 mg Intravenous Q12H     sodium chloride (PF)  10 mL Intracatheter Q8H       Data    Recent Labs  Lab 01/24/17  0935 01/24/17  0545 01/23/17  0515 01/22/17  0405  01/20/17  0940   WBC 5.3  --  3.1* 4.2  < > 3.6*   HGB 6.8*  --  7.7* 7.7*  < > 8.6*   MCV 88  --  88 90  < > 89   *  --  114* 115*  < > 152   NA  --  146* 142 145*  < > 141   POTASSIUM  --  4.0 4.1 4.6  < > 5.1   CHLORIDE  --  105 101 104  < > 102   CO2  --  32 31 32  < > 27   BUN  --  113* 118* 122*  < > 105*   CR  --  3.27* 3.58* 3.97*  < > 3.50*   ANIONGAP  --  9 10 9  < > 12   JARETH  --  8.4* 8.2* 8.1*  < > 8.6   GLC  --  136* 298* 93  < > 185*   ALBUMIN  --   --  2.3* 2.4*  < >  --    PROTTOTAL  --   --  6.3* 6.3*  < >  --    BILITOTAL  --   --  0.6 0.6  < >  --    ALKPHOS  --   --  93 96  < >  --    ALT  --   --  54 56  < >  --    AST  --   --  133* 262*  < >  --    TROPI  --   --   --  0.223*  --  0.110*   < > = values in this interval not displayed.

## 2017-01-24 NOTE — PROVIDER NOTIFICATION
MD Notification    Notified Person:  MD    Notified Persons Name:Dr Hood    Notification Date/Time:1/24/17 11:10    Notification Interaction:  Talked with Physician    Purpose of Notification: hgb 6.8    Orders Received: give conditional blood order - transfuse 1 unit RBC.Wait until Lasix gtt and dobutamine gtt starts    Comments:

## 2017-01-24 NOTE — PLAN OF CARE
Problem: Goal Outcome Summary  Goal: Goal Outcome Summary  OT: Per nursing PT Hbg 56,4 days will be getting bloods shortly.

## 2017-01-24 NOTE — PROGRESS NOTES
Patient on 6L humidified NC. LS diminished. SpO2 low 90's. Neb tx given as ordered. Will continue to follow.    Bruna [place]NA

## 2017-01-24 NOTE — PLAN OF CARE
Problem: Goal Outcome Summary  Goal: Goal Outcome Summary  Outcome: Improving  SLP: Pt seen for dysphagia therapy. Pt currently on Puree diet and nectar thick liquids. Trialed semi solids today while seated upright. Pt tolerated semi solid consistency with spoon without overt s/s of aspiration. Pt stated nothing feels stuck and no change in vocal quality noted. Noted  Appropriate mastication, a timely swallow without oral residue noted. Cued pt to drink liquids following a bite of food. Pt agreed to trial semi solids for lunch to assess his tolerance. Re-edcuated on safe swallow strategies. Recommend: Upgrade to Dysphagia Diet level 2, continue nectar thick liquids. Continue to take pills with puree/nectar liquids. Safe swallow strategies. Cont POC.

## 2017-01-24 NOTE — PLAN OF CARE
Problem: Goal Outcome Summary  Goal: Goal Outcome Summary  Outcome: No Change  Pt VSS, O2 demand increased during the night. Patient required BIPAP 80% of the night. Crackles in bases. Patient continues to have a coarse cough but unable to get sputum out. Plan: continue to monitor and possible start dobutamine IV today.

## 2017-01-24 NOTE — PLAN OF CARE
Problem: Goal Outcome Summary  Goal: Goal Outcome Summary  Outcome: No Change  Patient is alert and oriented to self and place, forgetful to time and situation.  No co pain. Bilat LE wounds, changed today, lymph wraps on. Patient is up with lift to chair.  pt voiding per Cortez. Afib CVR. On 6 L NC 92%.1200 cc fluids restriction.Lungs diminished, some crackles in bases. Tolerating nectar with pureed.

## 2017-01-25 NOTE — PLAN OF CARE
Problem: Goal Outcome Summary  Goal: Goal Outcome Summary  Outcome: No Change  Pt VSS, pt had c/o of back pain and received PO dilaudid- relieved the pain. Pt remained on 6L Oxymask during the night. Pt has reddened warm Left elbow area, have MD evaluate today. Pt had great UO during the night. Over 3000 mL output in the last 16 hours. Pt has poor PO intake and needs encouragement to drink fluids.  Plan: continue to monitor

## 2017-01-25 NOTE — PROGRESS NOTES
Patient is on 6 LPM bubbler cannula.  BBS decreased but otherwise clear  Cont to follow  1/25/2017  Mariano Rodriguez RRT

## 2017-01-25 NOTE — PLAN OF CARE
Problem: Goal Outcome Summary  Goal: Goal Outcome Summary  Outcome: No Change  Patient is alert and oriented to self and place, forgetful to time and situation.  No co pain. Bilat LE wounds, changed today, lymph wraps on. Patient is up with lift, low hgb today - got one unit of blood, two doses of IV lasix, one more tonight. Dobutamine gtt - good output in Cortez. Afib CVR some HR low 100 at times. Mostly with coughing spells. On 6 L NC 92% , 6 L oxymask at times mostly to give his nose a break. 1200 cc fluids restriction. .Lungs diminished, some crackles in bases. Tolerating nectar with mech soft food. VSS.

## 2017-01-25 NOTE — PROGRESS NOTES
[facility]    Hospitalist Progress Note    Date of Service (when I saw the patient): 01/25/2017    Assessment and Plan     Armand Smith is an 87-year-old pleasant male with above-mentioned multiple medical problems, notably insulin-dependent diabetes mellitus, hypertension, left-sided diastolic and right-sided systolic dysfunction with 2+ tricuspid regurgitation, Parkinson's disease, chronic atrial fibrillation with history of stroke only on aspirin, chronic kidney disease stage IV, and chronic anemia who was brought to the ER after a mechanical fall and worsening shortness of breath and wheezing of few days.      1.  Acute diastolic congestive heart failure exacerbation:  Patient on Bumex and metolazone PTA. Weight reportedly fluctuating as outpatient. Ongoing/ worsening dyspnea, orthopnea and increased leg edema consistent with congestive heart failure exacerbation, also his proBNP was much higher.  -- Started on Lasix 40 mg IV BID on admission- tolerated despite soft BP and diuresed well but then held 1/22 (see below BALAJI)  -- Needed BiPAP on admission due to respiratory acidosis and somnolence, intermittently on BiPAP since then due to dyspnea.  -- Follow intake and output and daily weight. Neg 3.7L in last 24 hours and >8L since admission.  -- 1.2 L fluid restriction. Monitor on telemetry, follow weight.  -- Echo from 12/25 - preserved EF, repeat ECHO still with features c/w pressure/vol overload.    -- Lasix 20 mg TID restarted 1/24 with dobutamine.    2.  Acute hypoxic and hypercarbic respiratory failure.  This is multifactorial including diastolic CHF exacerbation, possible PNA and reactive airway disease. He has leukopenia, but no fever or cough, but procalcitonin was elevated/remains elevated. Chest x-ray c/w pulmonary congestion vs left lower lobe infiltrate as per report. Pro calcitonin was elevated.Given episode of hypotension in ER, presumed infection/sepsis, and started on broad  spectrum abx-  IV Levaquin, meropenem and vancomycin for suspected healthcare-associated pneumonia.      -- Blood cultures negative so far, unable to collect sputum as not much sputum production.  -- Given his respiratory acidosis and somnolence, started on BiPAP on admission, still needing it at night but for shorter.  -- Wife reported cough when taking med with water, SLP eval done, no gross sign of aspiration, on Honey thick clear liquid diet. Given repeat episode of suspected PNA, ? Video swallow eval. Given concern for aspiration PNA, complete meropenem 7 days (unless exudative on pleural fluid). Levaquin discontinued 1/24.  -- Also ongoing wheezing for 2-3 days PTA, not better with diuresis and abx, and despite scheduled duo-nebs, no diagnosis of COPD but used to smoke pipe. Wheezing markedly improved (subsided) with one dose steroid 1/23, not repeated.   -- Congestive heart failure management as above. Thoracentesis Lt today, fluid for analysis.  -- Scheduled DuoNeb and p.r.n. albuterol    -- Acapella and IS as able.  -- Not on home O2, wean as able.    3.  Acute kidney injury on top of chronic kidney disease, stage IV.  The patient's baseline creatinine appears to be around 2.6 (was 3.1 with nephrology follow recently).  He presented with a creatinine of 3.5. His BUN is more than 100    -- Likely due to cardiorenal syndrome from poor renal perfusion.        -- Renal ultrasound negative  for hydronephrosis   -- Cortez catheter for strict in and out.    -- BUN/Cr has continued to worsen with initial diuresis to 122/3.97, IV lasix held after 1/22 am, and started on gentle IV hydration per nephro which was also discontinued 1/23 due to increased dyspnea again  -- dobutamine drip with Lasix started 1/24, after reviewing with cardiology, good diuresis and also improved renal function, Cr down to 2.88 today  -- Nephrology following.    4.  h/o Hypertension, chronic atrial fibrillation and stroke: Presented  hypotensive/low normal BP. Prior to admission medications including simvastatin, hydralazine were held while he was n.p.o.     -- p.r.n. metoprolol for rapid ventricular response, given his atrial fibrillation if blood pressure tolerates.      -- P.O. or rectal aspirin ordered, given history of stroke and permanent a fib.    -- He is not on any other anticoagulation due to history of GI bleed and epistaxis.      -- He is not on ARB due to renal failure.   -- PTA statin resumed.     5.  Diabetes mellitus type 2: The patient takes Lantus 20 units subcutaneously daily. Since he was n.p.o., decreased the dose to half his regular dose - Lantus 10 units subcutaneously daily on admission (decreased to 5 units when BS was 80 then back up on 10 units after steroid and Bs of 300s)  -- adjust Lantus per blood sugar, am BS was 80, decrease to 12 units today.  -- insulin sliding scale to mod intensity.  -- A1C is 7.0     6.  Parkinson's disease and tremor: Prior to admission p.o. Sinemet, resumed    --  PTA Mirapex resumed.    7.  Anemia, chronic, likely due to ongoing illness and renal failure, and thrombocytoppenia: The patient has received 1 unit of PRBC transfusion in his last admission (dec 25), baseline Hb around 8.  ---  Prior workup also shows iron deficiency and is on p.o. iron supplement      --- dropped to 6.8, s/p 1 unit PRBC 1/24, conditional transfusion order for Hb< 7.  --- plan for IV iron/epo per nephro  --- Platelet count dropped to 104, has not received heparin products. Does have some petechial lesions on chest stable. Likely due to ongoing illness. ? hemolytic uremic syndrome (but LDH mildly elevated, Bili normal, haptoglobin normal) and peripheral smear pending, and counts have improved now.  if platelet drops further, will ask hem to see.  --- Monitor counts.    8.  Fall.  This seems mechanical.  He also is weak and deconditioned from congestive heart failure and possibly pneumonia.  Also, suspected uremic  encephalopathy given his somnolence and drowsiness versus from CO2 narcosis. Patient was on narcotic medication as well, and his BP was improving and he became more alert with narcan in ER.  ---  PT, OT vanda. Cardiac rehab. TCU at discharge.  ---  No narcotic     ---  Patient was discharged home with home health, but likely needs a higher level of care, probably TCU at time of discharge at this time which was discussed with his wife.        9.  History of gout.  prior to admission Allopurinol 200 mg by mouth daily.    10. Elevated AST: Unclear etiology. ? Liver/muscle/cardiac origin. ?liver congestion but otherwise maria guadalupe ALP/ALT  - CK normal  - RUQ US unremarkable.  -Haptoglobin pending, total/direct bili normal, LDH mildly elevated  -AST improved.    DVT Prophylaxis: Pneumatic Compression Devices given significant anemia, unclear source for drop in Hb.    Code Status: Full Code    Disposition: Expected discharge in >2 days pending clinical course. Discussed with patient and his wife yesterday as well, Care plan reviewed with RN.  - palliative care consult, if no improvement on clinical course over next few days, then will discuss about hospice.    Iman Hood MD  Hospitalist    Interval History     Patient was seen in examined, had to be on BiPAP briefly last night as well due to increased dyspnea which he feels is better this morning and off BiPAP.   - denies chest pain.  - feels tired.  - afebrile  - good diuresis with dobutamine drip and lasix, renal failure improved.   - His main concern today again is that his bed be changed as it is not working.    -Data reviewed today: I reviewed all new labs and imaging results over the last 24 hours. I personally reviewed the EKG tracing showing a fib, controlled rate on tele.    Physical Exam  Temp: 97.5  F (36.4  C) Temp src: Oral BP: 115/54 mmHg   Heart Rate: 100 Resp: 20 SpO2: 95 % O2 Device: Oxymask Oxygen Delivery: 6 LPM  Filed Vitals:    01/23/17 0500 01/24/17  1129 01/25/17 0500   Weight: 86.1 kg (189 lb 13.1 oz) 86.8 kg (191 lb 5.8 oz) 87 kg (191 lb 12.8 oz)     Vital Signs with Ranges  Temp:  [97.5  F (36.4  C)] 97.5  F (36.4  C)  Heart Rate:  [] 100  Resp:  [20] 20  BP: (112-126)/(53-63) 115/54 mmHg  SpO2:  [94 %-96 %] 95 %  I/O last 3 completed shifts:  In: 720 [P.O.:720]  Out: 4500 [Urine:4500]    Constitutional: Alert, awake , oriented. Ill appearing but not in distress. Up in chair.  HEENT: PERRLA, EOMI  Respiratory: Bilateral dim and coarse. No wheezing, normal work of breathing, no accessory muscle use. On 6 L oxy mask now.  Cardiovascular: Irregular s1s2, no murmur, rub or gallop. No tachycardia. Leg edema much better, has lymphedema wraps.  GI: Soft, non distended, non tender, bowel tones active.  Skin/Integumen: chest with petechial non blanching rash, no blister. Lt elbow swollen skin from fluid, no erythema.  MSK: leg edema with sraps,       Medications    - MEDICATION INSTRUCTIONS -       DOBUTamine 3 mcg/kg/min (01/24/17 2033)     - MEDICATION INSTRUCTIONS -       Reason ACE/ARB order not selected       - MEDICATION INSTRUCTIONS -         sodium chloride (PF)  3 mL Intracatheter Q8H     insulin glargine  12 Units Subcutaneous At Bedtime     meropenem  500 mg Intravenous Q12H     ferrous sulfate  325 mg Oral BID     insulin aspart  1-7 Units Subcutaneous TID AC     insulin aspart  1-5 Units Subcutaneous At Bedtime     pantoprazole  40 mg Oral QAM     carbidopa-levodopa  1 tablet Oral BID     co-enzyme Q-10  100 mg Oral Daily     pramipexole  0.5 mg Oral Daily     simvastatin  40 mg Oral At Bedtime     eucerin   Topical BID     guaiFENesin  600 mg Oral BID     ipratropium - albuterol 0.5 mg/2.5 mg/3 mL  3 mL Nebulization 4x daily     aspirin  300 mg Rectal Daily    Or     aspirin EC  325 mg Oral Daily     sodium chloride (PF)  10 mL Intracatheter Q8H       Data    Recent Labs  Lab 01/25/17  1124 01/25/17  0543 01/24/17  2335 01/24/17  0935  01/24/17  0545 01/23/17  0515 01/22/17  0405  01/20/17  0940   WBC  --  4.2 4.8 5.3  --  3.1* 4.2  < > 3.6*   HGB  --  8.2* 8.3* 6.8*  --  7.7* 7.7*  < > 8.6*   MCV  --  88 89 88  --  88 90  < > 89   * 108* 108* 104*  --  114* 115*  < > 152   NA  --  146*  --   --  146* 142 145*  < > 141   POTASSIUM  --  3.9  --   --  4.0 4.1 4.6  < > 5.1   CHLORIDE  --  105  --   --  105 101 104  < > 102   CO2  --  39*  --   --  32 31 32  < > 27   BUN  --  106*  --   --  113* 118* 122*  < > 105*   CR  --  2.88*  --   --  3.27* 3.58* 3.97*  < > 3.50*   ANIONGAP  --  2*  --   --  9 10 9  < > 12   JARETH  --  8.0*  --   --  8.4* 8.2* 8.1*  < > 8.6   GLC  --  97  --   --  136* 298* 93  < > 185*   ALBUMIN  --   --   --   --   --  2.3* 2.4*  < >  --    PROTTOTAL  --   --   --   --   --  6.3* 6.3*  < >  --    BILITOTAL  --   --   --   --   --  0.6 0.6  < >  --    ALKPHOS  --   --   --   --   --  93 96  < >  --    ALT  --   --   --   --   --  54 56  < >  --    AST  --   --   --   --   --  133* 262*  < >  --    TROPI  --   --   --   --   --   --  0.223*  --  0.110*   < > = values in this interval not displayed.    No results found for this or any previous visit (from the past 24 hour(s)).

## 2017-01-25 NOTE — PLAN OF CARE
Problem: Goal Outcome Summary  Goal: Goal Outcome Summary  OT/cardiac rehab: attempted to see pt in pm and he was eating and requested to reschedule. OT unable this PM but will reschedule for tomorrow.

## 2017-01-25 NOTE — PROGRESS NOTES
" Renal Medicine Progress Note            Assessment/Plan:     1. Pt patient with baseline CKD IV    2. BALAJI in the setting of heart failure. Improving.    3. HFpEF. Pt is hypervolemic. 3+ pitting edema at the thighs. Decompensated DD and right-sided heart failure.    4. LLL PNA and pleural effusion    5. Anemia. + severe Fe def.     6. Mild hypernatremia. Encourage patient to increase oral fluid intake.     Plan  1. Continue dopamine gtt.   2. Star Venofer        Interval History:     \"I am breathing better,\" he says. Diuresing quite well. Scr is improving.          Medications and Allergies:       insulin glargine  12 Units Subcutaneous At Bedtime     meropenem  500 mg Intravenous Q12H     ferrous sulfate  325 mg Oral BID     insulin aspart  1-7 Units Subcutaneous TID AC     insulin aspart  1-5 Units Subcutaneous At Bedtime     pantoprazole  40 mg Oral QAM     carbidopa-levodopa  1 tablet Oral BID     co-enzyme Q-10  100 mg Oral Daily     pramipexole  0.5 mg Oral Daily     simvastatin  40 mg Oral At Bedtime     eucerin   Topical BID     guaiFENesin  600 mg Oral BID     ipratropium - albuterol 0.5 mg/2.5 mg/3 mL  3 mL Nebulization 4x daily     aspirin  300 mg Rectal Daily    Or     aspirin EC  325 mg Oral Daily     sodium chloride (PF)  10 mL Intracatheter Q8H        Allergies   Allergen Reactions     Codeine      nausea vomiting     Penicillins      \" turned red from head to foot\"            Physical Exam:   Vitals were reviewed  Heart Rate: 92, Blood pressure 120/61, pulse 80, temperature 97.3  F (36.3  C), temperature source Oral, resp. rate 20, weight 87 kg (191 lb 12.8 oz), SpO2 94 %.    Wt Readings from Last 3 Encounters:   01/25/17 87 kg (191 lb 12.8 oz)   12/30/16 90.5 kg (199 lb 8.3 oz)   12/20/16 83.235 kg (183 lb 8 oz)       Intake/Output Summary (Last 24 hours) at 01/25/17 1635  Last data filed at 01/25/17 1200   Gross per 24 hour   Intake    460 ml   Output   4100 ml   Net  -3640 ml     GENERAL " APPEARANCE: NAD. Frail  HEENT:  Eyes/ears/nose/neck grossly normal  RESP: Very tight airflow.  CV: irregular. + murmur    ABDOMEN: obese, soft, NT  EXTREMITIES/SKIN: 3+ edema at the thighs. Edema is mainly above the knee.   Neuro: awake, alert and oriented           Data:     CBC RESULTS:     Recent Labs  Lab 01/25/17  1124 01/25/17  0543 01/24/17  2335 01/24/17  0935 01/23/17  0515 01/22/17  0405 01/21/17  1755 01/21/17  0420   WBC  --  4.2 4.8 5.3 3.1* 4.2  --  4.3   RBC  --  2.92* 2.92* 2.39* 2.75* 2.71*  --  2.54*   HGB  --  8.2* 8.3* 6.8* 7.7* 7.7* 7.7* 7.2*   HCT  --  25.8* 25.9* 21.1* 24.3* 24.3* 24.6* 22.7*   * 108* 108* 104* 114* 115*  --  125*       Basic Metabolic Panel:    Recent Labs  Lab 01/25/17  0543 01/24/17  0545 01/23/17  0515 01/22/17  0405 01/21/17  0420 01/20/17  0940   * 146* 142 145* 143 141   POTASSIUM 3.9 4.0 4.1 4.6 5.3 5.1   CHLORIDE 105 105 101 104 104 102   CO2 39* 32 31 32 31 27   * 113* 118* 122* 110* 105*   CR 2.88* 3.27* 3.58* 3.97* 3.71* 3.50*   GLC 97 136* 298* 93 88 185*   JARETH 8.0* 8.4* 8.2* 8.1* 7.9* 8.6       INR  Recent Labs  Lab 01/25/17  1124   INR 1.20*      Attestation:   I have reviewed today's relevant vital signs, notes, medications, labs and imaging.    Harley Beck MD  [facility] - Nephrology  Office phone :[phone]  Pager: [phone]

## 2017-01-25 NOTE — PLAN OF CARE
Problem: Goal Outcome Summary  Goal: Goal Outcome Summary  Outcome: Therapy, progress toward functional goals as expected  SLP: Pt seen for dysphagia f/u. Pt tolerated nectar thick liquids and semisolid textures with no overt s/sx of aspiration. Thin liquids via cup resulted in throat clear x1; however no other s/sx of aspiration noted with cues to take small single sips. Recommend continue dysphagia diet 2 and thin liquids. Pt should be fully upright for all PO, take small single sips/bites, alternate consistencies, and pace self. ST to continue to follow for diet tolerance and advanced trials as appropriate.

## 2017-01-25 NOTE — PROGRESS NOTES
[facility]  Cardiology Progress Note  Date of Service: 01/25/2017  Primary Cardiologist:    Assessment and Plan   Armand Smith is a 87 year old male with past medical history significant for insulin-dependent diabetes mellitus, hypertension, diastolic heart failure, tricuspid valve regurgitation, Parkinson's disease, chronic atrial fibrillation, not on anticoagulation, chronic kidney disease and anemia who was admitted on 1/20/2017.     Assessment:  1. Acute on chronic diastolic heart failure.    - Difficult to know what his true dry weight is (weight range 180-220; likely ~ 190).    - admit wt 191, current wt 191   - I/o -3.8L the last 24h, dobutamine has helped with diuresis and renal function      2. Hypoxemic respiratory failure, multifactorial chf and ? Of pna, on abx    - remains on high levels of O2   - L pleural effusion seen on echo, cxr unclear- consider thoracentesis     3. Chronic kidney disease, severe, stage IV   - Cr improving- peak 3.9>> 2.9 - Creatinine up and now down a bit. Still holding diuretics per nephrology.    4. Chronic Atrial Fibrillation - Rate controlled - not on anticoagulation due to GI bleed and epistaxis on A\C.    5. Mild troponinemia - likely type II demand infarct   -No anginal symptoms or ischemic EKG changes. Peak - 0.22    6. Severe anemia. Would do much better with higher Hgb with less diuretic resistance and probably better renal function.    - 1 u prbc 1/24, increase po iron and possible epo per nephrology    7  Mod- severe MR and elev pulm pressures with D shaped septum- unclear if underlying pulm disease, elevated L pressures, chronic pe- long standing noted in 2013   - will continue with diuresis and consider reassessment      Plan:   1. Continue dobutamine and lasix 20 tid, suspect he has ~10# left to diurese  2. Encourage cough, consider repeat cxr vs ct of chest if LLL does not improve likely effusion and possible benefit to thoracentesis    Yadi  More, PA-C  [facility]  Pager: [phone]     Interval History  Finally coughing up sputum- yellow.  Feels a bit better, thighs and belly still swollen- scrotum are softball size.  Not near baseline.      Physical Exam  Temp: 97.5  F (36.4  C) Temp src: Oral BP: 115/54 mmHg   Heart Rate: 100 Resp: 20 SpO2: 95 % O2 Device: Oxymask Oxygen Delivery: 6 LPM  Filed Vitals:    01/23/17 0500 01/24/17 1129 01/25/17 0500   Weight: 86.1 kg (189 lb 13.1 oz) 86.8 kg (191 lb 5.8 oz) 87 kg (191 lb 12.8 oz)       Constitutional   elderly, frail, chronically ill appearing  Skin   warm and dry to touch  ENT   no pallor or cyanosis  Neck  +JVP  Lungs absent LL to middle lobe, R clear but diminished in bases  Cardiac irregularly irregular, II/VI systolic murmur  Abdomen   abdomen soft, mildly distented  Extremities and Back   Multiple toe amputations, ecchymosis, pitting edema to his upper thighs, abdomen  Neurological   no gross motor deficits noted, affect appropriate, slow to respond to questions.      Medications    DOBUTamine 3 mcg/kg/min (01/24/17 2033)     - MEDICATION INSTRUCTIONS -       Reason ACE/ARB order not selected       - MEDICATION INSTRUCTIONS -         insulin glargine  15 Units Subcutaneous At Bedtime     ferrous sulfate  325 mg Oral BID     insulin aspart  1-7 Units Subcutaneous TID AC     insulin aspart  1-5 Units Subcutaneous At Bedtime     pantoprazole  40 mg Oral QAM     carbidopa-levodopa  1 tablet Oral BID     co-enzyme Q-10  100 mg Oral Daily     pramipexole  0.5 mg Oral Daily     simvastatin  40 mg Oral At Bedtime     eucerin   Topical BID     guaiFENesin  600 mg Oral BID     ipratropium - albuterol 0.5 mg/2.5 mg/3 mL  3 mL Nebulization 4x daily     aspirin  300 mg Rectal Daily    Or     aspirin EC  325 mg Oral Daily     meropenem  500 mg Intravenous Q12H     sodium chloride (PF)  10 mL Intracatheter Q8H       Data  Most Recent 3 CBC's:  Recent Labs   Lab Test  01/25/17   0543  01/24/17   2335  01/24/17    0935   WBC  4.2  4.8  5.3   HGB  8.2*  8.3*  6.8*   MCV  88  89  88   PLT  108*  108*  104*     Most Recent 3 BMP's:  Recent Labs   Lab Test  01/25/17   0543  01/24/17   0545  01/23/17   0515   NA  146*  146*  142   POTASSIUM  3.9  4.0  4.1   CHLORIDE  105  105  101   CO2  39*  32  31   BUN  106*  113*  118*   CR  2.88*  3.27*  3.58*   ANIONGAP  2*  9  10   JARETH  8.0*  8.4*  8.2*   GLC  97  136*  298*     Most Recent 3 Troponin's:  Recent Labs   Lab Test  01/22/17   0405  01/20/17   0940  12/24/16   0229   TROPI  0.223*  0.110*  0.041     Last 24 hours labs reviewed     Echo 1/20/17  The right ventricle is severely dilated and the right ventricular systolic function is moderate to severely reduced.  There is moderate to mod-severe (2-3+) tricuspid regurgitation. The right ventricular systolic pressure is approximated at 41mmHg plus the right atrial pressure is elevated, consistent with moderate to severe pulmonary  hypertension.  Flattened septum is consistent with RV pressure/volume overload.  Moderate left pleural effusion is also seen.  Compared to previous echos, the signifcant pulmonary HTN persists with secondary effects on the right heart.

## 2017-01-26 NOTE — PROGRESS NOTES
Palliative Care Inpatient Clinical Social Work Visit    Patient Information:  Armand Smith is an 87 year old man with multiple medical problems including Parkinson's disease, CHF, Acute Kidney Injury on top of CKD, Hypertension, and Diabetes. Admitted to [facility] after he fell at home and wife called EMS. Palliative Care was consulted to initiate early goals of care conversations as he has had multiple hospitalizations in recent months (last d/c was on 12/30)      Relevant Symptoms/Concerns/Strengths  - New information since last visit   Physical:  Feels he is improving since last visit. Speech is clearer and he feels better. Hoping he can eat again soon.    Psychological/Emotional/Existential:     Family/Social/Caregiver:      Developmental:     Mental Health:     End of Life:     Cultural/Presybeterian/Spiritual:     Grief/Loss:     Concurrent Stressors:       Comments:  No new cocners     Goals/Decision Making/Advance Care Planning - New information since last visit   Preferences:  restorative   Concerns:     Documents: [facility]ST from [facility] 2013 on file. No HCD.     Decision Making Issues:       Comments:      Resource Needs     Discharge Planning:  Per Unit/Program  and/or Care Coordinator   Other:     Comments:      Intervention (Check all that apply)    x   Assessment of palliative specific issues         Introduction of Palliative clinical social work interventions       Adjustment to illness counseling       Advanced care planning       Attended/participated in care conference       Behavioral interventions for symptom management       Facilitation of processing of thoughts/feelings       Family communication facilitated       Grief counseling       Goals of care discussion/facilitation       Life legacy work       Life review facilitation       Psychoeducation       Re-framing       Resource referral           Other     Comments:      Plan:  Will continue to be available for goals of care support as  needed.    GABRIELA Kothari, [facility]   Palliative Care    Pgr:[phone]  Ph: [phone]

## 2017-01-26 NOTE — PLAN OF CARE
Problem: Goal Outcome Summary  Goal: Goal Outcome Summary  Outcome: No Change  VSS on 4LNC. Tele afib/cvr. Patient A&O with no CP/SOB. Dobutimine drip continues into right PICC line @ 7.7ml/hr. Patient with lymp wraps to lower legs bilaterally. +3 edema to lower extremities. Air mattress bed in use. Patient reluctant to lay on sides, prefers supine position. Multiple skin issues noted. Patient with low blood glucose this am of 38, has since rebounded to 88. Patient awake on/off throughout night. 1200FR continues. PRN dialudid x1 for left leg pain. Continue to monitor.

## 2017-01-26 NOTE — PLAN OF CARE
Problem: Goal Outcome Summary  Goal: Goal Outcome Summary    Pt is alert and oriented X 4. VSS /56 mmHg  Pulse 80  Temp(Src) 96  F (35.6  C) (Oral)  Resp 20  Wt 87 kg (191 lb 12.8 oz)  SpO2 94% on 4 L NC. Denies pain. Lymph wraps applied this evening. Turned Q 2 hours. Cortez in place -- patent. Pt tolerated the iron sucrose. Fair appetite this evening. He is in bed resting at this time. Rounded on often and encouraged to call for all help and assistance. Will cont to monitor.

## 2017-01-26 NOTE — PLAN OF CARE
Problem: Goal Outcome Summary  Goal: Goal Outcome Summary  OT/cR: Pt limited due to decreased strength,activity tolerance, balance, safety and able to complete sit <> stand with MIN A x 2 with ww and cues for tech, pt able to stand and perform 1-2 LE calisthenics standing and seated with fatigue and slight SOB, O2 sats dropping to 87% on 4 L O2 cues for PLB. Recommend TCU at discharge.

## 2017-01-26 NOTE — PROGRESS NOTES
Pt alert, oriented. NO complaints of pain. Afib on tele with CVR. Denies feeling lightheaded. Lungs with crackles through lung fields, O2 titrated to 4 L NC. States breathing feels better after thoracentesis this afternoon-700 mls fluid removed. Site CDI. No crepitus noted. Urine output adequate. Fair appetite today. Up in chair for 2 hrs. Wound care per plan of care and lymph wraps to be applied by evening RN. Able to make needs known.

## 2017-01-26 NOTE — PLAN OF CARE
Problem: Goal Outcome Summary  Goal: Goal Outcome Summary  Outcome: Therapy, progress toward functional goals as expected  SLP: Pt seen for dysphagia tx. Pt tolerated thin liquids via cup, pureed textures, and semisolid textures with no overt s/sx of aspiration. Pt independently taking small single sips/bites. ST session limited by pts fatigue and poor appetite. Recommend advance to dysphagia diet 3 and thin liquids. Pt should be fully alert and upright for all PO, take small single sips/bites, alternate consistencies, and pace self. Caregivers to encourage PO intake. ST to continue to follow for diet tolerance and advanced trials as appropriate.

## 2017-01-26 NOTE — PROGRESS NOTES
[facility]    Hospitalist Progress Note    Date of Service (when I saw the patient): 01/26/2017    Assessment and Plan  Armand Smith is a 87 year old male with complex PMHx including hypertension, left sided diastolic HF and right sided systolic dysfunction with 2+ TR, chronic afib with hx of stroke presently on aspirin, insulin-dependent diabetes, Parkinson's disease, stage IV CKD and chronic anemia who was admitted on 1/20/2017 after a mechanical fall with worsening sob and wheezing in the days preceding admission, c/w CHF exacerbation.     Acute diastolic congestive heart failure exacerbation:    PTA meds: [Bumex, metolazone ].  Weight reportedly fluctuating as outpatient. Ongoing/ worsening dyspnea, orthopnea and increased leg edema consistent with congestive heart failure exacerbation, proBNP elevated. Required BiPAP on admission given respiratory acidosis and somnolence.     Echo from 12/25 showed preserved EF. Repeat echo on 1/22 showed EF 55-60%, severely dilated RV with severely reduced RVSF and mod-severe TR with mod-severe pulmonary HTN    Started on Lasix 40 mg IV BID on admission- tolerated despite soft BP and diuresed well but then held 1/22-1/23 given development of BALAJI. Has intermittently required BiPAP dt dyspnea.     -- given Lasix 20mg IV TID x3 doses on 1/24 -> no diuretics given since 1/24  -- dobutamine gtt initiated 1/24  -- follow Is/Os, daily wts ->   -- cont 1.2L fluid restriction  -- appreciate assistance from cardiology this stay      Acute hypoxic and hypercarbic respiratory failure.    Likely multifactorial including diastolic CHF exacerbation, possible PNA and reactive airway disease. He has leukopenia, but no fever or cough, but procalcitonin was elevated/remains elevated. CXR c/w pulmonary congestion vs left lower lobe infiltrate as per report. Pro-calcitonin was elevated. Given episode of hypotension in ER, presumed infection/sepsis, and was initiailly started on  broad spectrum abx with IV Levaquin, meropenem and vancomycin for suspected healthcare-associated pneumonia.        Required BiPAP on admission given underlying respiratory acidosis and somnolence. Has continued to require intermittently.     Started on diuretics on admission as above.    Blood cultures remain neg. Minimal sputum production. Levaquin dc'd 1/24. Wife endorsed some coughing after taking meds with water -> seen by SLP, no overt signs of aspiration. Completed 7d course of Meropenem on 1/26 for treatment of possible aspiration pneumonia.    Endorsed some wheezing as well, which did not improve with diuresis and antibiotics. Does not carry diagnosis of COPD but used to smoke a pipe and has noted pulmonary HTN. Wheezing subsided following dose of steroid x1 on 1/23. Steroids not continued.     Underwent left sided thoracentesis on 1/25 with removal of 700ml pleural fluid. Studies c/w transudative effusion.    -- mgmt of CHF as above  -- cont pulmonary toilet with acapella and IS  -- cont sched duonebs and prn albuterol neb  -- wean supplemental O2 as able (not on O2 prior to admission) -> presently on 3L NC      Acute kidney injury on top of chronic kidney disease, stage IV.    Baseline Cr  appears to be around 2.6 (was 3.1 with nephrology follow recently).  He presented with Cr of 3.5. His BUN is more than 100. Likely due to cardiorenal syndrome from poor renal perfusion.  Renal ultrasound negative for hydronephrosis    BUN/Cr continued to worsen with initial diuresis, peaked at 3.97 on 1/22 ->  IV lasix held after 1/22 am, and started on gentle IV hydration per nephrology. IVFs dc'd on 1/23 due to increased dyspnea.     Started on dobutamine gtt on 1/24. Also given 3 doses on Lasix on 1/24 -> none since.     -- chan catheter remains in place for strict Is/Os  -- nephrology following  -- Cr conts trending down: 2.88 -> 2.33 today    Hypertension  Chronic atrial fibrillation and hx of stroke:   [PTA meds:  metoprolol XL, hydralazine, simvastatin, full dose aspirin].   Not on any other anticoagulation due to history of GI bleed and epistaxis. Not on ARB due to renal failure.   Presented hypotensive/low normal BP. PTA meds held while pressures soft and initially unable to take po given need for BiPAP.  Have been resuming meds as able.     -- cont aspirin and statin  -- has prn Lopressor for RVR -> none needed this stay     DM II, insulin-dependent.   [PTA regimen: Lantus 20U HS]. A1C 7.0 this stay.   Insulin dose initially lowered given his NPO status.       Recent Labs  Lab 01/26/17  0650 01/26/17  0616 01/26/17  0540 01/26/17  0202 01/25/17  2117 01/25/17  1718 01/25/17  1106  01/25/17  0543  01/24/17  0545  01/23/17  0515  01/22/17  0405  01/21/17  0420   GLC  --   --  38*  --   --   --   --   --  97  --  136*  --  298*  --  93  --  88   BGM 88 87  --  140* 171* 150* 143*  < >  --   < >  --   < >  --   < >  --   < >  --    < > = values in this interval not displayed.    -- BG low overnight -> will decrease Lantus from 12U HS to 10U HS today  -- cont med dose SSI     Parkinson's disease and tremor.  Chronic and stable on PTA meds including Sinemet and Mirapex    Chronic Anemia likely dt ongoing illness and renal failure, and thrombocytopenia.   Baseline hgb around 8. Did require transfusion of 1U PRBCs during hospitalization in 12/2016. Prior workup c/w iron deficiency. Is now on iron supplement.     Hgb down to 6.8 on 1/24 -> transfused 1U PRBCs. Platelet count down to low 100s with findings of petechial lesions on chest, lesions remain stable. No heparin products given this stay. Suspect drop secondary to ongoing illness. Some question of HUS -> mildly elevated LDH, though nl bili and haptoglobin; peripheral smear c/w normochromic normocytic anemia; platelets morphologically unremarkable.    -- started on Venofer per nephrology  -- platelet counts remain stable -> no need for heme consult at this  time    Fall.  Prompted ED visit. Likely mechanical.  He is weak and deconditioned from congestive heart failure and possibly pneumonia.  Also, suspected uremic encephalopathy given his somnolence and drowsiness versus from CO2 narcosis. Patient was on narcotic medication as well, and his BP was improving and he became more alert with narcan in ER.  -- narcotics dc'd -> will not resume at discharge  -- had dc'd home from hospital stay in 12/2016 with home care -> will need higher level of care at discharge  -- PT/OT/cardiac rehab following -> TCU at discharge     Gout.  Chronic and stable on allopurinol.    Elevated AST: Improving  Unclear etiology. ? Liver/muscle/cardiac origin. ?liver congestion but otherwise maria guadalupe ALP/ALT. CK normal. RUQ US unremarkable.    FEN: no IVFs, Na trending up (148) but lytes otherwise remain stable, DD2 w/thin liquids per SLP  DVT Prophylaxis: PCDs  Code Status: Full Code    Disposition: Remains on dobutamine gtt. Discharge pending clinical course - plan for diuretics, response to therapy. Consider palliative care consult (?hospice) if he fails to make significant improvement, sounds as though per initial visit per [facility] SW on 1/23 family was favoring restorative cares, unclear to what extent code status was discussed.    Winnie Saha    Interval History  Hypoglycemic overnight with BG down to 30s. Likely secondary to decreased po intake. Complaining of mouth sores. Breathing better following thoracentesis yesterday. Down to 3L NC. Tells me that his cough is better. Denies abd pain/n/v. Good urine output in chan.    -Data reviewed today: I reviewed all new labs and imaging results over the last 24 hours. I personally reviewed no images or EKG's today.    Physical Exam  Temp: 97.6  F (36.4  C) Temp src: Oral BP: 133/68 mmHg   Heart Rate: 90 Resp: 18 SpO2: 97 % O2 Device: Nasal cannula with humidification Oxygen Delivery: 4 LPM  Filed Vitals:    01/23/17 0500 01/24/17 1129  01/25/17 0500   Weight: 86.1 kg (189 lb 13.1 oz) 86.8 kg (191 lb 5.8 oz) 87 kg (191 lb 12.8 oz)     Vital Signs with Ranges  Temp:  [96  F (35.6  C)-97.6  F (36.4  C)] 97.6  F (36.4  C)  Heart Rate:  [87-98] 90  Resp:  [18-20] 18  BP: (101-133)/(51-68) 133/68 mmHg  SpO2:  [93 %-100 %] 97 %  I/O last 3 completed shifts:  In: 780 [P.O.:780]  Out: 2400 [Urine:2400]    Constitutional: Resting comfortably, alert, answering questions appropriately, NAD  Respiratory: diminished towards bases but otherwise clear through ant fields, no wheezes/rales/rhonchi  Cardiovascular: HR/rhythm irregular, no MGR   GI: S, NT, ND, +BS  Skin/Integumen: warm/dry  Other: +LE edema with compression stockings on    Medications    - MEDICATION INSTRUCTIONS -       DOBUTamine 3 mcg/kg/min (01/26/17 0111)     - MEDICATION INSTRUCTIONS -       Reason ACE/ARB order not selected       - MEDICATION INSTRUCTIONS -         insulin glargine  12 Units Subcutaneous At Bedtime     meropenem  500 mg Intravenous Q12H     iron sucrose (VENOFER) intermittent infusion  200 mg Intravenous Q24H     ferrous sulfate  325 mg Oral BID     insulin aspart  1-7 Units Subcutaneous TID AC     insulin aspart  1-5 Units Subcutaneous At Bedtime     pantoprazole  40 mg Oral QAM     carbidopa-levodopa  1 tablet Oral BID     co-enzyme Q-10  100 mg Oral Daily     pramipexole  0.5 mg Oral Daily     simvastatin  40 mg Oral At Bedtime     eucerin   Topical BID     guaiFENesin  600 mg Oral BID     ipratropium - albuterol 0.5 mg/2.5 mg/3 mL  3 mL Nebulization 4x daily     aspirin  300 mg Rectal Daily    Or     aspirin EC  325 mg Oral Daily     sodium chloride (PF)  10 mL Intracatheter Q8H       Data    Recent Labs  Lab 01/26/17  0540 01/25/17  1124 01/25/17  0543 01/24/17  2335  01/24/17  0545 01/23/17  0515 01/22/17  0405  01/20/17  0940   WBC 4.8  --  4.2 4.8  < >  --  3.1* 4.2  < > 3.6*   HGB 8.4*  --  8.2* 8.3*  < >  --  7.7* 7.7*  < > 8.6*   MCV 88  --  88 89  < >  --  88 90  <  > 89   * 112* 108* 108*  < >  --  114* 115*  < > 152   INR  --  1.20*  --   --   --   --   --   --   --   --    *  --  146*  --   --  146* 142 145*  < > 141   POTASSIUM 3.6  --  3.9  --   --  4.0 4.1 4.6  < > 5.1   CHLORIDE 106  --  105  --   --  105 101 104  < > 102   CO2 39*  --  39*  --   --  32 31 32  < > 27   BUN 81*  --  106*  --   --  113* 118* 122*  < > 105*   CR 2.33*  --  2.88*  --   --  3.27* 3.58* 3.97*  < > 3.50*   ANIONGAP 3  --  2*  --   --  9 10 9  < > 12   JARETH 7.9*  --  8.0*  --   --  8.4* 8.2* 8.1*  < > 8.6   GLC 38*  --  97  --   --  136* 298* 93  < > 185*   ALBUMIN  --   --   --   --   --   --  2.3* 2.4*  < >  --    PROTTOTAL  --   --   --   --   --   --  6.3* 6.3*  < >  --    BILITOTAL  --   --   --   --   --   --  0.6 0.6  < >  --    ALKPHOS  --   --   --   --   --   --  93 96  < >  --    ALT  --   --   --   --   --   --  54 56  < >  --    AST  --   --   --   --   --   --  133* 262*  < >  --    TROPI  --   --   --   --   --   --   --  0.223*  --  0.110*   < > = values in this interval not displayed.    Recent Results (from the past 24 hour(s))   US Thoracentesis    Narrative    ULTRASONIC GUIDED THORACENTESIS 1/25/2017 1:54 PM    HISTORY:  Left pleural effusion.    PROCEDURE AND FINDINGS:  Using ultrasonic guidance, permanent image  documentation, and 10 mL of 1% Xylocaine, approximately 700 mL of  reddish fluid was obtained from the left pleural space. No  complications.      Impression    IMPRESSION:  Successful ultrasonically guided left thoracentesis.    ERIC CARO MD   XR Chest 1 View    Narrative    XR CHEST 1 VW  1/25/2017 1:57 PM     HISTORY:  Postthoracentesis.      Impression    IMPRESSION: No pneumothorax. Right PICC line with tip in the SVC.  Cardiomegaly. Pulmonary venous congestion.    ERIC CARO MD

## 2017-01-26 NOTE — PLAN OF CARE
Problem: Goal Outcome Summary  Goal: Goal Outcome Summary  Outcome: No Change  Patient is alert and oriented times 3. Baseline tremors, gave prn parkinson med for increase in tremors. LE +3 in thighs and hips, lymph wraps on.  No co pain. Patient is up with lift to chair, stood with PT today.  pt voiding per chan good output. Encouraging water intake, pt wants juice only. Hard impacted stool today, suppository given. On 3 L O2. Nephrology starting IV fluids for high Na+ this evening.

## 2017-01-26 NOTE — PROGRESS NOTES
[facility]  Cardiology Progress Note  Date of Service: 01/26/2017  Primary Cardiologist:    Assessment and Plan   Armand Smith is a 87 year old male with past medical history significant for insulin-dependent diabetes mellitus, hypertension, diastolic heart failure, tricuspid valve regurgitation, Parkinson's disease, chronic atrial fibrillation, not on anticoagulation, chronic kidney disease and anemia who was admitted on 1/20/2017.     Assessment:  1. Acute on chronic diastolic heart failure, primarily R sided heart failure manifestations.    - Difficult to know what his true dry weight is (weight range 180-220; likely ~ 190).    - hospital weights are completely inaccurate (no change in weight with I/O's -10 Liters negative with brisk diuresis)   - 1500cc output per shift with IV dobutamine. Off IV lasix for 36 hours.      2. Hypoxemic respiratory failure, multifactorial chf and ? Of pna, on abx    - remains on high levels of O2   - L pleural effusion seen on echo, cxr unclear- consider thoracentesis if unchanged with diuresis and pt remains symptomatic.     3. Acute on Chronic kidney disease, stage IV   - Cr improving- peak 3.9>> 2.9 > 2.3  with dobutamine and diuresis.    4. Chronic Atrial Fibrillation - Rate controlled - not on anticoagulation due to GI bleed and epistaxis on A\C.    5. Mild troponinemia - likely type II demand infarct   -No anginal symptoms or ischemic EKG changes. Peak - 0.22    6. Severe anemia. Would do much better with higher Hgb with less diuretic resistance and probably better renal function.    - 1 u prbc 1/24 resulted in increased Hgb from 6.8 to 8.3 (now stable). Anemia still significant.    - Agree with IV Iron replacment.  Would Aranesp help? Has he been getting this?    7.  Moderate TR and Moderate pulmonary hypertension with D shaped septum (RV volume overload)- unclear if underlying pulm disease, elevated L pressures, chronic pe- long standing noted in 2013   -  will continue with diuresis and consider reassessment      Plan:   1. Continue dobutamine. I will add back IV lasix, suspect he has more than 20# left to diurese  2. If L pleural effusion does not improve, consider thoracentesis      Mich Ramsay MD  Cardiology         Interval History  Slow improvement. Feeling a little better. Less swelling.      Physical Exam  Temp: 98  F (36.7  C) Temp src: Oral BP: 96/54 mmHg   Heart Rate: 95 Resp: 18 SpO2: 96 % O2 Device: Nasal cannula Oxygen Delivery: 3 LPM  Filed Vitals:    01/23/17 0500 01/24/17 1129 01/25/17 0500   Weight: 86.1 kg (189 lb 13.1 oz) 86.8 kg (191 lb 5.8 oz) 87 kg (191 lb 12.8 oz)       Constitutional   elderly, frail, chronically ill appearing  Skin   warm and dry to touch  ENT   no pallor or cyanosis  Neck  +JVP  Lungs absent LL to middle lobe, R clear but diminished in bases  Cardiac irregularly irregular, II/VI systolic murmur  Abdomen   abdomen soft, mildly distented  Extremities and Back   Multiple toe amputations, ecchymosis, pitting edema to his upper thighs, abdomen  Neurological   no gross motor deficits noted, affect appropriate, slow to respond to questions.      Medications    - MEDICATION INSTRUCTIONS -       DOBUTamine 3 mcg/kg/min (01/26/17 1040)     - MEDICATION INSTRUCTIONS -       Reason ACE/ARB order not selected       - MEDICATION INSTRUCTIONS -         insulin glargine  10 Units Subcutaneous At Bedtime     meropenem  500 mg Intravenous Q12H     iron sucrose (VENOFER) intermittent infusion  200 mg Intravenous Q24H     ferrous sulfate  325 mg Oral BID     insulin aspart  1-7 Units Subcutaneous TID AC     insulin aspart  1-5 Units Subcutaneous At Bedtime     pantoprazole  40 mg Oral QAM     carbidopa-levodopa  1 tablet Oral BID     co-enzyme Q-10  100 mg Oral Daily     pramipexole  0.5 mg Oral Daily     simvastatin  40 mg Oral At Bedtime     eucerin   Topical BID     guaiFENesin  600 mg Oral BID     ipratropium - albuterol 0.5 mg/2.5 mg/3 mL   3 mL Nebulization 4x daily     aspirin  300 mg Rectal Daily    Or     aspirin EC  325 mg Oral Daily     sodium chloride (PF)  10 mL Intracatheter Q8H       Data  Most Recent 3 CBC's:  Recent Labs   Lab Test  01/26/17   0540  01/25/17   1124  01/25/17   0543  01/24/17   2335   WBC  4.8   --   4.2  4.8   HGB  8.4*   --   8.2*  8.3*   MCV  88   --   88  89   PLT  106*  112*  108*  108*     Most Recent 3 BMP's:  Recent Labs   Lab Test  01/26/17   0540  01/25/17   0543  01/24/17   0545   NA  148*  146*  146*   POTASSIUM  3.6  3.9  4.0   CHLORIDE  106  105  105   CO2  39*  39*  32   BUN  81*  106*  113*   CR  2.33*  2.88*  3.27*   ANIONGAP  3  2*  9   JARETH  7.9*  8.0*  8.4*   GLC  38*  97  136*     Most Recent 3 Troponin's:  Recent Labs   Lab Test  01/22/17   0405  01/20/17   0940  12/24/16   0229   TROPI  0.223*  0.110*  0.041     Last 24 hours labs reviewed     Echo 1/20/17  The right ventricle is severely dilated and the right ventricular systolic function is moderate to severely reduced.  There is moderate to mod-severe (2-3+) tricuspid regurgitation. The right ventricular systolic pressure is approximated at 41mmHg plus the right atrial pressure is elevated, consistent with moderate to severe pulmonary  hypertension.  Flattened septum is consistent with RV pressure/volume overload.  Moderate left pleural effusion is also seen.  Compared to previous echos, the signifcant pulmonary HTN persists with secondary effects on the right heart.

## 2017-01-27 NOTE — PLAN OF CARE
Problem: Goal Outcome Summary  Goal: Goal Outcome Summary  SLP: Pt seen to f/u for swallowing. Pt noted to be alert but confused.  Pt tolerated thin liquids and dry solid today w/o outward s/sx of aspiration.  Did take slight extra time for mastication but adequate.  Recommend advance to regular diet and thin liquids. Pt should be fully alert and upright for all PO, take small single sips/bites, alternate consistencies, and pace self. Encourage PO intake. ST to f/u for diet tolerance.  Will likely meet goals soon.

## 2017-01-27 NOTE — PROGRESS NOTES
"BRIEF NUTRITION ASSESSMENT      REASON FOR ASSESSMENT:  LOS    NUTRITION HISTORY:  CHF education completed (1/23) - see note for diet history details    CURRENT DIET AND INTAKE:  Diet:  DD3, thin liquids, 1200 mL fluid restriction (600 mL from dietary)           Room Service with Assist           Glucerna shake between meals              Visited with pt this morning  States he didn't like his breakfast (Cheerios, banana) and thinks the milk is thickened  Looked at pt's tray and he ate 100% of the cereal and banana - the milk is 1% and not thickened  He agreed to drink a Glucerna supplement instead  Note that pt has a Glucerna shake ordered for 10am and 2pm (240 mL each)  ---> he is on a tight fluid restriction and so will send the Glucerna with his meals as a beverage  Flowsheets reflect pt has been eating % of his meals (ordering a few items)  Tells me that he still has some trouble eating breads      1/21: SLP ---> NPO due to overt signs of aspiration with trials of ice chips  1/23: SLP ---> DD1, NTL  1/24: SLP ---> DD2, NTL  1/25: SLP ---> DD2, thin liquids  1/26: SLP ---> DD3, thin liquids       ANTHROPOMETRICS:  Height: 5'11\"  Weight: (1/27) 77 kg  BMI: 23.5 kg/m2  IBW:  78.2 kg  Weight Status: Normal BMI  %IBW: 98%  Weight History:   Filed Vitals:    01/22/17 0600 01/23/17 0500 01/24/17 1129 01/25/17 0500   Weight: 86.7 kg (191 lb 2.2 oz) 86.1 kg (189 lb 13.1 oz) 86.8 kg (191 lb 5.8 oz) 87 kg (191 lb 12.8 oz)    01/26/17 1600 01/27/17 0500   Weight: 84 kg (185 lb 3 oz) 77 kg (169 lb 12.1 oz)       Wt Readings from Last 10 Encounters:   01/27/17 77 kg (169 lb 12.1 oz)   12/30/16 90.5 kg (199 lb 8.3 oz)   12/20/16 83.235 kg (183 lb 8 oz)   12/16/16 82.328 kg (181 lb 8 oz)   12/12/16 81.194 kg (179 lb)   12/06/16 83.462 kg (184 lb)   12/02/16 85.322 kg (188 lb 1.6 oz)   11/29/16 88.089 kg (194 lb 3.2 oz)   11/18/16 88.043 kg (194 lb 1.6 oz)   11/17/16 94.3 kg (207 lb 14.3 oz)     Per MD - Difficult to know " what his true dry weight is (weight range 180-220; likely ~ 190).                - hospital weights are completely inaccurate (no change in weight with I/O's -10 Liters negative with brisk diuresis)    LABS:  Labs noted    MALNUTRITION:  Patient does not meet two of the following criteria necessary for diagnosing malnutrition: significant weight loss, reduced intake, subcutaneous fat loss, muscle loss or fluid retention    NUTRITION INTERVENTION:  Nutrition Diagnosis:  No nutrition diagnosis at this time.    Implementation:  Will send a Glucerna supplement with meals for nutrition push    FOLLOW UP/MONITORING:   Will re-evaluate in 7 days, or sooner, if re-consulted.

## 2017-01-27 NOTE — PROGRESS NOTES
[facility]  Cardiology Progress Note  Date of Service: 01/27/2017  Primary Cardiologist:    Assessment and Plan   Armand Smith is a 87 year old male with past medical history significant for insulin-dependent diabetes mellitus, hypertension, diastolic heart failure, tricuspid valve regurgitation, Parkinson's disease, chronic atrial fibrillation, not on anticoagulation, chronic kidney disease and anemia who was admitted on 1/20/2017.     Assessment:  1. Acute on chronic diastolic heart failure.    - Difficult to know what his true dry weight is (weight range 180-220; likely ~ 190).    - admit wt 191, current wt 191   - I/o -11L the last 24h, dobutamine has helped with diuresis and renal function   - improving, but long term prognosis is poor, apparently uninterested in palliatve, long hx of non compliance      2. Hypoxemic respiratory failure, multifactorial chf and ? Of pna, on abx    - remains on high levels of O2   - L pleural effusion seen on admission, thoracentesis for 700 cc 1/25, bloody effusion with cytology pending   -  good results on cxr- although still congestion    3. Chronic kidney disease, severe, stage IV   - Cr improving- peak 3.9>> 2.9 >> 2.3>>2.1   4. Chronic Atrial Fibrillation - Rate controlled - not on anticoagulation due to GI bleed and epistaxis on A\C.    5. Mild troponinemia - likely type II demand infarct   -No anginal symptoms or ischemic EKG changes. Peak - 0.22    6. Severe anemia. Would do much better with higher Hgb with less diuretic resistance and probably better renal function.    - 1 u prbc 1/24, increase po iron and possible epo per nephrology    7  Mod- severe MR and elev pulm pressures with D shaped septum- unclear if underlying pulm disease, elevated L pressures, chronic pe- long standing noted in 2013   - will continue with diuresis and consider reassessment      Plan:   1. Continue dobutamine gtt and IV lasix until cr worsens or at least until congestion  is resolved on cxr  2. Daily bmp  3. Has followed with Cathie Salas NP as outpt- will arrange f/u    Yadi Looney PA-C  [facility]  Pager: [phone]     Interval History  Still feels poorly.  Frustrated with thickened liquids.  Breathing may be a bit better.  Does not feel back to baseline.  No cp.      Physical Exam  Temp: 98.3  F (36.8  C) Temp src: Oral BP: 105/60 mmHg   Heart Rate: 97 Resp: 18 SpO2: 91 % O2 Device: Nasal cannula Oxygen Delivery: 4 LPM  Filed Vitals:    01/25/17 0500 01/26/17 1600 01/27/17 0500   Weight: 87 kg (191 lb 12.8 oz) 84 kg (185 lb 3 oz) 77 kg (169 lb 12.1 oz)       Constitutional   elderly, frail, chronically ill appearing  Skin   warm and dry to touch  ENT   no pallor or cyanosis  Neck  +JVP at 90d  Lungs absent LLL- improved, R clear but diminished in bases  Cardiac irregularly irregular, II/VI systolic murmur  Abdomen   abdomen soft, mildly distented  Extremities and Back   Multiple toe amputations, ecchymosis, pitting edema to his upper thighs, abdomen but improving  Neurological   no gross motor deficits noted, affect appropriate, slow to respond to questions.      Medications    IV fluid REPLACEMENT ONLY Stopped (01/27/17 0103)     - MEDICATION INSTRUCTIONS -       DOBUTamine 3 mcg/kg/min (01/27/17 0938)     - MEDICATION INSTRUCTIONS -       Reason ACE/ARB order not selected       - MEDICATION INSTRUCTIONS -         insulin glargine  8 Units Subcutaneous At Bedtime     furosemide  20 mg Intravenous BID     meropenem  500 mg Intravenous Q12H     iron sucrose (VENOFER) intermittent infusion  200 mg Intravenous Q24H     ferrous sulfate  325 mg Oral BID     insulin aspart  1-7 Units Subcutaneous TID AC     insulin aspart  1-5 Units Subcutaneous At Bedtime     pantoprazole  40 mg Oral QAM     carbidopa-levodopa  1 tablet Oral BID     co-enzyme Q-10  100 mg Oral Daily     pramipexole  0.5 mg Oral Daily     simvastatin  40 mg Oral At Bedtime     eucerin   Topical BID     guaiFENesin  600  mg Oral BID     ipratropium - albuterol 0.5 mg/2.5 mg/3 mL  3 mL Nebulization 4x daily     aspirin  300 mg Rectal Daily    Or     aspirin EC  325 mg Oral Daily     sodium chloride (PF)  10 mL Intracatheter Q8H       Data  Most Recent 3 CBC's:  Recent Labs   Lab Test  01/27/17   0600  01/26/17   0540  01/25/17   1124  01/25/17   0543   WBC  5.8  4.8   --   4.2   HGB  8.3*  8.4*   --   8.2*   MCV  88  88   --   88   PLT  98*  106*  112*  108*     Most Recent 3 BMP's:  Recent Labs   Lab Test  01/27/17   0600  01/26/17   0540  01/25/17   0543   NA  148*  148*  146*   POTASSIUM  3.9  3.6  3.9   CHLORIDE  105  106  105   CO2  37*  39*  39*   BUN  67*  81*  106*   CR  2.01*  2.33*  2.88*   ANIONGAP  6  3  2*   JARETH  7.9*  7.9*  8.0*   GLC  51*  38*  97     Most Recent 3 Troponin's:  Recent Labs   Lab Test  01/22/17   0405  01/20/17   0940  12/24/16   0229   TROPI  0.223*  0.110*  0.041     Last 24 hours labs reviewed     Echo 1/20/17  The right ventricle is severely dilated and the right ventricular systolic function is moderate to severely reduced.  There is moderate to mod-severe (2-3+) tricuspid regurgitation. The right ventricular systolic pressure is approximated at 41mmHg plus the right atrial pressure is elevated, consistent with moderate to severe pulmonary  hypertension.  Flattened septum is consistent with RV pressure/volume overload.  Moderate left pleural effusion is also seen.  Compared to previous echos, the signifcant pulmonary HTN persists with secondary effects on the right heart.

## 2017-01-27 NOTE — PROGRESS NOTES
[facility]    Hospitalist Progress Note    Date of Service (when I saw the patient): 01/27/2017    Assessment and Plan  Armand Smith is a 87 year old male with complex PMHx including hypertension, left sided diastolic HF and right sided systolic dysfunction with 2+ TR, chronic afib with hx of stroke presently on aspirin, insulin-dependent diabetes, Parkinson's disease, stage IV CKD and chronic anemia who was admitted on 1/20/2017 after a mechanical fall with worsening sob and wheezing in the days preceding admission, c/w CHF exacerbation.     Acute diastolic congestive heart failure exacerbation:    PTA meds: [Bumex 2mg daily, metolazone 2.5mg twice weekly].  Weight reportedly fluctuating as outpatient. Ongoing/ worsening dyspnea, orthopnea and increased leg edema consistent with congestive heart failure exacerbation, proBNP elevated. Required BiPAP on admission given respiratory acidosis and somnolence.     Echo from 12/25 showed preserved EF. Repeat echo on 1/22 showed EF 55-60%, severely dilated RV with severely reduced RVSF and mod-severe TR with mod-severe pulmonary HTN    Started on Lasix 40 mg IV BID on admission - tolerated despite soft BP and diuresed well but then held 1/22-1/23 given development of BALAJI. Has intermittently required BiPAP dt dyspnea.     -- given Lasix 20mg IV TID x3 doses on 1/24, Lasix 20mg IV BID resumed 1/26 for total of 3 doses  -- dobutamine gtt initiated 1/24  -- follow Is/Os, daily wts -> net neg 2.8L in past 24h, net neg 11.7L this stay; wts inaccurate though overall trending down  -- cont 1.2L fluid restriction  -- appreciate assistance from cardiology this stay      Acute hypoxic and hypercarbic respiratory failure.    Likely multifactorial including diastolic CHF exacerbation, possible PNA and reactive airway disease. He has leukopenia, but no fever or cough, but procalcitonin was elevated/remains elevated. CXR c/w pulmonary congestion vs left lower lobe  infiltrate as per report. Pro-calcitonin was elevated. Given episode of hypotension in ER, presumed infection/sepsis, and was initiailly started on broad spectrum abx with IV Levaquin, meropenem and vancomycin for suspected healthcare-associated pneumonia.        Required BiPAP on admission given underlying respiratory acidosis and somnolence. Has continued to require intermittently.     Started on diuretics on admission as above.    Blood cultures remain neg. Minimal sputum production. Levaquin dc'd 1/24. Wife endorsed some coughing after taking meds with water -> seen by SLP, no overt signs of aspiration. Completed 7d course of Meropenem on 1/27 for treatment of possible aspiration pneumonia.    Endorsed some wheezing as well, which did not improve with diuresis and antibiotics. Does not carry diagnosis of COPD but used to smoke a pipe and has noted pulmonary HTN. Wheezing subsided following dose of steroid x1 on 1/23. Steroids not continued.     Underwent left sided thoracentesis on 1/25 with removal of 700ml pleural fluid. Studies c/w transudative effusion.    -- mgmt of CHF as above  -- cont pulmonary toilet with acapella and IS  -- cont sched duonebs and prn albuterol neb  -- wean supplemental O2 as able (not on O2 prior to admission) -> presently on 4L NC      Acute kidney injury on top of chronic kidney disease, stage IV.    Baseline Cr  appears to be around 2.6 (was 3.1 with nephrology follow recently).  He presented with Cr of 3.5. His BUN is more than 100. Likely due to cardiorenal syndrome from poor renal perfusion.  Renal ultrasound negative for hydronephrosis    BUN/Cr continued to worsen with initial diuresis, peaked at 3.97 on 1/22 ->  IV lasix held after 1/22 am, and started on gentle IV hydration per nephrology. IVFs dc'd on 1/23 due to increased dyspnea.     Started on dobutamine gtt on 1/24. Also given 3 doses on Lasix on 1/24. BID IV Lasix resumed 1/26.     -- chan catheter remains in place for  strict Is/Os  -- nephrology following  -- Cr conts trending down: 2.88 -> 2.33 -> 2.01 today    Hypertension  Chronic atrial fibrillation and hx of stroke:   [PTA meds: metoprolol XL, hydralazine, simvastatin, full dose aspirin].   Not on any other anticoagulation due to history of GI bleed and epistaxis. Not on ARB due to renal failure.   Presented hypotensive/low normal BP. PTA meds held while pressures soft and initially unable to take po given need for BiPAP.  Have been resuming meds as able.     -- cont aspirin and statin  -- has prn Lopressor for RVR -> none needed this stay     DM II, insulin-dependent.   [PTA regimen: Lantus 20U HS]. A1C 7.0 this stay.   Insulin dose initially lowered given his NPO status. Needs have been variable this stay given poor po intake.      Recent Labs  Lab 01/27/17  0723 01/27/17  0647 01/27/17  0600 01/27/17  0154 01/26/17  2103 01/26/17  1645 01/26/17  1152  01/26/17  0540  01/25/17  0543  01/24/17  0545  01/23/17  0515  01/22/17  0405   GLC  --   --  51*  --   --   --   --   --  38*  --  97  --  136*  --  298*  --  93   * 61*  --  226* 208* 170* 128*  < >  --   < >  --   < >  --   < >  --   < >  --    < > = values in this interval not displayed.    -- BG again low overnight despite decrease in Lantus to 10U yesterday -> will decrease Lantus from 10U HS to 8U HS today  -- cont med dose SSI     Parkinson's disease and tremor.  Chronic and stable on PTA meds including Sinemet and Mirapex    Chronic Anemia likely dt ongoing illness and renal failure, and thrombocytopenia.   Baseline hgb around 8. Did require transfusion of 1U PRBCs during hospitalization in 12/2016. Prior workup c/w iron deficiency. Is now on iron supplement.     Hgb down to 6.8 on 1/24 -> transfused 1U PRBCs. Platelet count down to low 100s with findings of petechial lesions on chest, lesions remain stable. No heparin products given this stay. Suspect drop secondary to ongoing illness. Some question of HUS  -> mildly elevated LDH, though nl bili and haptoglobin; peripheral smear c/w normochromic normocytic anemia; platelets morphologically unremarkable.    -- started on Venofer per nephrology on 1/25, plan for 5 doses  -- platelet counts remain relatively stable -> no need for heme consult at this time    Fall.  Prompted ED visit. Likely mechanical.  He is weak and deconditioned from congestive heart failure and possibly pneumonia.  Also, suspected uremic encephalopathy given his somnolence and drowsiness versus from CO2 narcosis. Patient was on narcotic medication as well, and his BP was improving and he became more alert with narcan in ER.  -- narcotics dc'd -> will not resume at discharge  -- had dc'd home from hospital stay in 12/2016 with home care -> will need higher level of care at discharge  -- PT/OT/cardiac rehab following -> TCU at discharge     Gout.  Chronic and stable on allopurinol.    Elevated AST: Improving  Unclear etiology. ? Liver/muscle/cardiac origin. ?liver congestion but otherwise maria guadalupe ALP/ALT. CK normal. RUQ US unremarkable.    Hypernatremia:  Na with slow upward trend in context of diuresis as above. Na 148 on 1/26 and 1/27.  -- given 500cc free water per nephrology on 1/26 -> ongoing mgmt per nephrolgoy    FEN: no IVFs, Na trending up (148) but lytes otherwise remain stable, DD2 w/thin liquids per SLP  DVT Prophylaxis: PCDs  Code Status: Full Code    Disposition: Remains on dobutamine gtt. Discharge pending clinical course - plan for diuretics, response to therapy. Palliative care to meet with patient/family in the coming days to clarify goals of care and code status - suspect they have poor insight into patient's multiple complex medical problems.     Appreciate help of subspecialists this stay.    Winnie Saha    Interval History  Hypoglycemic overnight again with BG down to 51. Likely secondary to decreased po intake. Patient seen this morning, about to eat breakfast - cereal  and a banana. Denies complaints including cp/sob/cough, abd pain/n/v. No family at bedside     -Data reviewed today: I reviewed all new labs and imaging results over the last 24 hours. I personally reviewed no images or EKG's today.    Physical Exam  Temp: 96.4  F (35.8  C) Temp src: Oral BP: 113/56 mmHg   Heart Rate: 100 Resp: 18 SpO2: 95 % O2 Device: Nasal cannula Oxygen Delivery: 4 LPM  Filed Vitals:    01/25/17 0500 01/26/17 1600 01/27/17 0500   Weight: 87 kg (191 lb 12.8 oz) 84 kg (185 lb 3 oz) 77 kg (169 lb 12.1 oz)     Vital Signs with Ranges  Temp:  [96.4  F (35.8  C)-98  F (36.7  C)] 96.4  F (35.8  C)  Heart Rate:  [] 100  Resp:  [16-18] 18  BP: ()/(46-70) 113/56 mmHg  SpO2:  [91 %-96 %] 95 %  I/O last 3 completed shifts:  In: 1046.67 [P.O.:620; I.V.:426.67]  Out: 2650 [Urine:2650]    Constitutional: Resting comfortably, alert, answering questions appropriately, NAD  Respiratory: diminished towards bases but otherwise clear through anterior fields, no wheezes/rales/rhonchi  Cardiovascular: HR/rhythm irregular, no MGR   GI: S, NT, ND, +BS  Skin/Integumen: warm/dry  Other: +LE edema with compression stockings on    Medications    IV fluid REPLACEMENT ONLY Stopped (01/27/17 0103)     - MEDICATION INSTRUCTIONS -       DOBUTamine 3 mcg/kg/min (01/26/17 2032)     - MEDICATION INSTRUCTIONS -       Reason ACE/ARB order not selected       - MEDICATION INSTRUCTIONS -         insulin glargine  10 Units Subcutaneous At Bedtime     furosemide  20 mg Intravenous BID     meropenem  500 mg Intravenous Q12H     iron sucrose (VENOFER) intermittent infusion  200 mg Intravenous Q24H     ferrous sulfate  325 mg Oral BID     insulin aspart  1-7 Units Subcutaneous TID AC     insulin aspart  1-5 Units Subcutaneous At Bedtime     pantoprazole  40 mg Oral QAM     carbidopa-levodopa  1 tablet Oral BID     co-enzyme Q-10  100 mg Oral Daily     pramipexole  0.5 mg Oral Daily     simvastatin  40 mg Oral At Bedtime      eucerin   Topical BID     guaiFENesin  600 mg Oral BID     ipratropium - albuterol 0.5 mg/2.5 mg/3 mL  3 mL Nebulization 4x daily     aspirin  300 mg Rectal Daily    Or     aspirin EC  325 mg Oral Daily     sodium chloride (PF)  10 mL Intracatheter Q8H       Data    Recent Labs  Lab 01/27/17  0600 01/26/17  0540 01/25/17  1124 01/25/17  0543  01/23/17  0515 01/22/17  0405  01/20/17  0940   WBC 5.8 4.8  --  4.2  < > 3.1* 4.2  < > 3.6*   HGB 8.3* 8.4*  --  8.2*  < > 7.7* 7.7*  < > 8.6*   MCV 88 88  --  88  < > 88 90  < > 89   PLT 98* 106* 112* 108*  < > 114* 115*  < > 152   INR  --   --  1.20*  --   --   --   --   --   --    * 148*  --  146*  < > 142 145*  < > 141   POTASSIUM 3.9 3.6  --  3.9  < > 4.1 4.6  < > 5.1   CHLORIDE 105 106  --  105  < > 101 104  < > 102   CO2 37* 39*  --  39*  < > 31 32  < > 27   BUN 67* 81*  --  106*  < > 118* 122*  < > 105*   CR 2.01* 2.33*  --  2.88*  < > 3.58* 3.97*  < > 3.50*   ANIONGAP 6 3  --  2*  < > 10 9  < > 12   JARETH 7.9* 7.9*  --  8.0*  < > 8.2* 8.1*  < > 8.6   GLC 51* 38*  --  97  < > 298* 93  < > 185*   ALBUMIN  --   --   --   --   --  2.3* 2.4*  < >  --    PROTTOTAL  --   --   --   --   --  6.3* 6.3*  < >  --    BILITOTAL  --   --   --   --   --  0.6 0.6  < >  --    ALKPHOS  --   --   --   --   --  93 96  < >  --    ALT  --   --   --   --   --  54 56  < >  --    AST  --   --   --   --   --  133* 262*  < >  --    TROPI  --   --   --   --   --   --  0.223*  --  0.110*   < > = values in this interval not displayed.    No results found for this or any previous visit (from the past 24 hour(s)).

## 2017-01-27 NOTE — PLAN OF CARE
Problem: Goal Outcome Summary  Goal: Goal Outcome Summary  OT/CR: Pt making good progress, pt sit <> stand with CGA/THOM x2 with cues for handplacement and ww ambulated to doorway and back approx 25 ft with ww and CGAx2 with fatigue and SOB, O2 sats dropped 87% on 4 L 2, with rest and cues for PLB rebounded to 92% quickly and BP dropped hypotension BP 77/41 and with rest increased to 99/52. Recommend TCU at discharge.

## 2017-01-27 NOTE — PLAN OF CARE
Problem: Goal Outcome Summary  Goal: Goal Outcome Summary  Outcome: No Change  A&Ox3. VSS on 3 L O2. Pain In LLE reduced with PRN PO Dilaudid x1. LS dim. DISLA. Tele Afib CVR+BBB. 3+ edema to BLE. Two small BMs this shift. Dobutamine infusing at 7.7ml/hr. Diuresing well. , 226. Up with Lift. DD3, thin liquids. 500 ml D5W bolus completed this shift. Na+ to be rechecked this AM. Will continue to monitor.

## 2017-01-27 NOTE — PLAN OF CARE
Up in chair since am.  Lift used to put pt on BSC and then into chair, but pt later walked with PT (assist of 2, gait belt and walker) to the door.  Lift should be discontinued.  Diet upgraded from DD-3 to regular, low NA.  Palliative care saw pt with family in attendance.  Has agreed to go to [facility] [place].

## 2017-01-27 NOTE — PROGRESS NOTES
" Renal Medicine Progress Note            Assessment/Plan:     1. Pt patient with baseline CKD IV    2. BALAJI in the setting of heart failure. Kidney function continues to improve.    3. HFpEF. Pt is hypervolemic. Decompensated DD and right-sided heart failure. Good diuresis with dopamine and low dose lasix    4. LLL PNA and pleural effusion    5. Anemia. + severe Fe def.                -on IV Fe    6. Mild hypernatremia. Encourage patient to increase oral fluid intake.     Plan  1. Continue dopamine gtt and Lasix.         Interval History:     Pt continues to have good diuresis with dopamine gtt and low dose lasix. He continues feeling better each day.          Medications and Allergies:       insulin glargine  8 Units Subcutaneous At Bedtime     alteplase  2 mg Intravenous Q2H     furosemide  20 mg Intravenous Daily     iron sucrose (VENOFER) intermittent infusion  200 mg Intravenous Q24H     ferrous sulfate  325 mg Oral BID     insulin aspart  1-7 Units Subcutaneous TID AC     insulin aspart  1-5 Units Subcutaneous At Bedtime     pantoprazole  40 mg Oral QAM     carbidopa-levodopa  1 tablet Oral BID     co-enzyme Q-10  100 mg Oral Daily     pramipexole  0.5 mg Oral Daily     simvastatin  40 mg Oral At Bedtime     eucerin   Topical BID     guaiFENesin  600 mg Oral BID     ipratropium - albuterol 0.5 mg/2.5 mg/3 mL  3 mL Nebulization 4x daily     aspirin  300 mg Rectal Daily    Or     aspirin EC  325 mg Oral Daily     sodium chloride (PF)  10 mL Intracatheter Q8H        Allergies   Allergen Reactions     Codeine      nausea vomiting     Penicillins      \" turned red from head to foot\"            Physical Exam:   Vitals were reviewed  Heart Rate: 89, Blood pressure 109/59, pulse 80, temperature 97.4  F (36.3  C), temperature source Oral, resp. rate 16, weight 77 kg (169 lb 12.1 oz), SpO2 97 %.    Wt Readings from Last 3 Encounters:   01/27/17 77 kg (169 lb 12.1 oz)   12/30/16 90.5 kg (199 lb 8.3 oz)   12/20/16 83.235 " kg (183 lb 8 oz)       Intake/Output Summary (Last 24 hours) at 01/27/17 1758  Last data filed at 01/27/17 1600   Gross per 24 hour   Intake 1070.67 ml   Output   2300 ml   Net -1229.33 ml       GENERAL APPEARANCE: NAD. Frail  HEENT:  Eyes/ears/nose/neck grossly normal  RESP: Diminish airflow left base. Minimal crackles right base.  CV: irregular. + murmur    ABDOMEN: obese, soft, NT  EXTREMITIES/SKIN: edema is improving  Neuro: awake, alert and oriented         Data:     CBC RESULTS:     Recent Labs  Lab 01/27/17  0600 01/26/17  0540 01/25/17  1124 01/25/17  0543 01/24/17  2335 01/24/17  0935 01/23/17  0515   WBC 5.8 4.8  --  4.2 4.8 5.3 3.1*   RBC 2.96* 2.99*  --  2.92* 2.92* 2.39* 2.75*   HGB 8.3* 8.4*  --  8.2* 8.3* 6.8* 7.7*   HCT 26.0* 26.4*  --  25.8* 25.9* 21.1* 24.3*   PLT 98* 106* 112* 108* 108* 104* 114*       Basic Metabolic Panel:    Recent Labs  Lab 01/27/17  0600 01/26/17  0540 01/25/17  0543 01/24/17  0545 01/23/17  0515 01/22/17  0405   * 148* 146* 146* 142 145*   POTASSIUM 3.9 3.6 3.9 4.0 4.1 4.6   CHLORIDE 105 106 105 105 101 104   CO2 37* 39* 39* 32 31 32   BUN 67* 81* 106* 113* 118* 122*   CR 2.01* 2.33* 2.88* 3.27* 3.58* 3.97*   GLC 51* 38* 97 136* 298* 93   JARETH 7.9* 7.9* 8.0* 8.4* 8.2* 8.1*       INR  Recent Labs  Lab 01/25/17  1124   INR 1.20*      Attestation:   I have reviewed today's relevant vital signs, notes, medications, labs and imaging.    Harley Beck MD  [facility] - Nephrology  Office phone :[phone]  Pager: [phone]

## 2017-01-27 NOTE — CONSULTS
"[facility]  Palliative Care Consultation         Armand Smith MRN# [id number]   Age: 87 year old YOB: 1929   Date of Admission: 1/20/2017    Reason for consult: [facility]       Requesting physician/service: Hospitalist/Dr Ortiz       Recommendations        He demonstrates consistency and capacity to make decisions about his code status.  He wants to pursue restorative cares short of attempts at resuscitation; he requests code status change to DNR/DNI.  His wife and daughter were present, and they agree.    There is apparently an old ACD somewhere at home that the wife is going to bring in.  Not sure if she actually will.  We discussed that current code status request supercedes previous ones, and that unit SW can help fill out current ACD.    POLST form is on file, dated 4/13, requesting full aggressive measures.  This needs to be updated to new preferences.    He has insight into the fact that he's \"taken a few steps back\" in his functional status since his recent TCU stay at [facility].  Family agrees with this.  He's willing (at least at this time) to return to [facility] TCU to build strength after this hospitalization, but hopes to eventually return home. (He had been home for only 4 days after recent TCU discharge before getting re-admitted here for this hospitalization).    His only active symptom issue is pain L calf with dressing changes.  We agree with current order for po dilaudid 2 mg 30'-60' prior to wound care/dressing changes.     Disease Process/es & Symptoms   87-year-old man with PMHx notable for diabetes mellitus type 2, Parkinson disease, hypertension, CHF, atrial fibrillation, stroke history, gout, dyslipidemia, chronic anemia, and chronic kidney disease, who presented with shortness of breath and found to have evidence of left-sided pneumonia.     Support/Coping   Wife and adult daughter involved    Decision-Making & Goals of Care     Discussion/counseling today " about prognosis/goals of care/decisions:   See above  Patient has a completed health care directive available in the chart (Y/N): No  Health care agent (only if patient has an available, complete HCD): NA  Physician orders for life-sustaining treatment (POLST) form is on file, dated 4/13, requesting full aggressive measures.  This needs to be updated to new preferences  Code Status: DNR/DNI   Patient has decision-making capacity (Y/N): Yes    Coordination of Care     Findings & plan of care discussed with: Primary team  Follow-up plan from palliative team: Will follow  Thank you for involving us in the patient's care.   Vadim Guan MD  Palliative Medicine Consult Team  Pager: [phone]   TT: 75 minutes, with > 50% spent in C/C/E patient/family/care teams re: GOC, POC, Sx management. [id number]            Chief Complaint     GOC planning         History of Present Illness     87-year-old man with PMHx notable for diabetes mellitus type 2, Parkinson disease, hypertension, CHF, atrial fibrillation, stroke history, gout, dyslipidemia, chronic anemia, and chronic kidney disease, who presented with shortness of breath and found to have evidence of left-sided pneumonia.    Was hospitalized 11/14/16-11/17/16, then dc'd to [facility] TCU.  He was discharged from TCU to home on 12/20/16, then on 12/24 re-admitted to [facility] with SOB, PNA, weakness, BALAJI on CKD.          Past Medical History:   Past Medical History   Diagnosis Date     Type II or unspecified type diabetes mellitus without mention of complication, not stated as uncontrolled      Essential hypertension, benign      Other testicular hypofunction      Lumbosacral spondylosis without myelopathy      Personal history of diseases of blood and blood-forming organs      Unspecified psychosexual disorder      Basal cell Ca      lg. excision abd. yrs ago; recurrence 12/03 (exc. by shave, DSJ)     Nodular lymphoma, unspecified site, extranodal and solid organ sites 1970      Non-Hodgkins (NHL)     Unspecified cerebral artery occlusion with cerebral infarction 2013     Hyperlipidaemia      Atrial fibrillation (H)      Chronic diastolic congestive heart failure (H)      EF=55-60% per echo 3/30/15     Pulmonary HTN (H)      Great toe amputation status (H)      Aortic regurgitation      mild, 1+ per echo 3/30/15     Tricuspid regurgitation      1-2+ per echo 3/30/15     CKD (chronic kidney disease)      Parkinson's disease (H)      Poor circulation of extremity 1/24/2014              Past Surgical History:   Past Surgical History   Procedure Laterality Date     Colonoscopy       Splenectomy  1970     Phacoemulsification clear cornea with standard intraocular lens implant  12/12/2013     Procedure: PHACOEMULSIFICATION CLEAR CORNEA WITH STANDARD INTRAOCULAR LENS IMPLANT;  RIGHT PHACOEMULSIFICATION CLEAR CORNEA WITH STANDARD INTRAOCULAR LENS IMPLANT ;  Surgeon: Dwayne Carver MD;  Location: [facility]     Phacoemulsification clear cornea with standard intraocular lens implant  12/30/2013     Procedure: PHACOEMULSIFICATION CLEAR CORNEA WITH STANDARD INTRAOCULAR LENS IMPLANT;  LEFT PHACOEMULSIFICATION CLEAR CORNEA WITH STANDARD INTRAOCULAR LENS IMPLANT ;  Surgeon: Dwayne Carver MD;  Location: [facility]     Biopsy  skin bx for cancer     Amputate toe(s) Right 4/7/2015     Procedure: AMPUTATE TOE(S);  Surgeon: Leroy Bright DPM;  Location: [facility] [place]     Hc right heart catheterization  9/29/2015     Severely elevated right-sided filling pressures and moderately elevated left-sided filling pressures; mild to moderate mixed pulmonaty HTN     Amputate toe(s)       Amputate toe(s) Right 10/20/2015     Procedure: AMPUTATE TOE(S);  Surgeon: Leroy Bright DPM;  Location: [facility] [place]     Biopsy bone toe Right 10/20/2015     Procedure: BIOPSY BONE TOE;  Surgeon: Leroy Bright DPM;  Location: [facility] [place]               Social/Spiritual History:     Living situation: Home with wife, adult daughter  "involved, recent long-term TCU stay.  Family system: Wife, daughter  Functional status (needs help with ADLs or IADLs): Assist of 2; lift  Employment/education: Retired x 30 years from             Family History:   Both parents are            Allergies:   Allergies   Allergen Reactions     Codeine      nausea vomiting     Penicillins      \" turned red from head to foot\"              Medications:   I have reviewed this patient's medication profile and medications during this hospitalization  Dilaudid 2 mg po prn; 2 doses yesterday, prior to dressing changes  Dopamine gtt  Insulin           Review of Systems:   The comprehensive review of systems is negative other than noted here and in the HPI.    Palliative Symptom Review (0=no symptom/no concern, 1=mild, 2=moderate, 3=severe):      Pain: 1-2 at rest, up to 3 with calf dressing changes      Fatigue: 1      Nausea: 0      Constipation: 0      Diarrhea: 0      Depressive Symptoms: 0      Anxiety: 0      Drowsiness: 0      Poor Appetite: 0      Shortness of Breath: 1      Insomnia: 0      Other: 0      Overall (0 good/no concerns, 3 very poor):  2       Physical Exam:  VITALS:  Blood pressure 99/56, pulse 80, temperature 98.1  F (36.7  C), temperature source Oral, resp. rate 18, weight 77 kg (169 lb 12.1 oz), SpO2 96 %.   GEN: Awake, alert, interactive, chronically ill appearing, up in chair  EYES: Sclera non-icteric  EARS: Eumorphic bilaterally  NOSE: No significant nasal drainage  MOUTH: Oral mucosa moist, no evidence of thrush  LUNGS: No increased WOB or accessory muscle use; bibasilar crackles  CV: Irregular radial pulse 84  EXTR:1+ BLE edema, venous stasis changes, dressed wounds calves  SKIN: Warm, dry  NEUROPSYCH: Engaged, coherent thought process          Data Reviewed:     ROUTINE ICU LABS (Last four results)  CMP  Recent Labs  Lab 17  0600 17  0540 17  0543 17  0545 17  0515 17  0405 " 01/21/17  0420   * 148* 146* 146* 142 145* 143   POTASSIUM 3.9 3.6 3.9 4.0 4.1 4.6 5.3   CHLORIDE 105 106 105 105 101 104 104   CO2 37* 39* 39* 32 31 32 31   ANIONGAP 6 3 2* 9 10 9 8   GLC 51* 38* 97 136* 298* 93 88   BUN 67* 81* 106* 113* 118* 122* 110*   CR 2.01* 2.33* 2.88* 3.27* 3.58* 3.97* 3.71*   GFRESTIMATED 32* 27* 21* 18* 16* 14* 16*   GFRESTBLACK 38* 32* 25* 22* 20* 17* 19*   JARETH 7.9* 7.9* 8.0* 8.4* 8.2* 8.1* 7.9*   MAG  --   --   --   --  2.8* 2.9*  --    PHOS  --   --   --   --  6.3* 8.3*  --    PROTTOTAL  --   --   --   --  6.3* 6.3* 6.0*   ALBUMIN  --   --   --   --  2.3* 2.4* 2.3*   BILITOTAL  --   --   --   --  0.6 0.6 0.6   ALKPHOS  --   --   --   --  93 96 95   AST  --   --   --   --  133* 262* 287*   ALT  --   --   --   --  54 56 32     CBC  Recent Labs  Lab 01/27/17  0600 01/26/17  0540 01/25/17  1124 01/25/17  0543 01/24/17  2335   WBC 5.8 4.8  --  4.2 4.8   RBC 2.96* 2.99*  --  2.92* 2.92*   HGB 8.3* 8.4*  --  8.2* 8.3*   HCT 26.0* 26.4*  --  25.8* 25.9*   MCV 88 88  --  88 89   MCH 28.0 28.1  --  28.1 28.4   MCHC 31.9 31.8  --  31.8 32.0   RDW 16.9* 16.8*  --  16.6* 16.7*   PLT 98* 106* 112* 108* 108*     INR  Recent Labs  Lab 01/25/17  1124   INR 1.20*     Arterial Blood Gas  Recent Labs  Lab 01/20/17  1750   PH 7.31*   PCO2 57*   PO2 82   HCO3 29*

## 2017-01-27 NOTE — PROGRESS NOTES
SW:  D:  Spoke with patient's daughter regarding discharge plans.  Patient's daughter states that patient has now agreed to go to tcu on discharge.  Patient has been to [facility] in the past and this is the only facility he will agree to go to,  Explained that we may need other choices if [facility] doesn't have a bed.  Patient's daughter states that [facility] is close to home for patient's family.  Explained that we will make the referral, but we do need a back up plan.  Patient's daughter agreed to accept the tcu list.  Referral sent to [facility], via discharge on the double, to check bed availability.  P:  Will continue to follow.    Addendum:  D:  Received a call back from [facility].  They are hopeful they will have a bed available for patient on discharge.  P:  Will continue to follow.

## 2017-01-28 NOTE — PROGRESS NOTES
[facility]  Cardiology Progress Note  Date of Service: 01/28/2017  Primary Cardiologist:    Assessment and Plan    Armand Smith is a 87 year old male with past medical history significant for insulin-dependent diabetes mellitus, hypertension, diastolic heart failure, tricuspid valve regurgitation, Parkinson's disease, chronic atrial fibrillation, not on anticoagulation, chronic kidney disease and anemia who was admitted on 1/20/2017.     Assessment:  1. Acute on chronic diastolic heart failure.    - Difficult to know what his true dry weight is (weight range 180-220; likely ~ 190).    - admit wt 191, current wt 191   - I/o -11L the last 24h, dobutamine has helped with diuresis and renal function   - improving, but long term prognosis is poor, apparently uninterested in palliatve, long hx of non compliance      2. Hypoxemic respiratory failure, multifactorial chf and ? Of pna, on abx    - remains on high levels of O2   - L pleural effusion seen on admission, thoracentesis for 700 cc 1/25, bloody effusion with cytology pending   -  good results on cxr- although still congestion    3. Chronic kidney disease, severe, stage IV   - Cr improving- peak 3.9>> 2.9 >> 2.3>>2.1   4. Chronic Atrial Fibrillation - Rate controlled - not on anticoagulation due to GI bleed and epistaxis on A\C.    5. Mild troponinemia - likely type II demand infarct   -No anginal symptoms or ischemic EKG changes. Peak - 0.22    6. Severe anemia. Would do much better with higher Hgb with less diuretic resistance and probably better renal function.    - 1 u prbc 1/24, increase po iron and possible epo per nephrology    7  Mod- severe MR and elev pulm pressures with D shaped septum- unclear if underlying pulm disease, elevated L pressures, chronic pe- long standing noted in 2013   - will continue with diuresis and consider reassessment      Plan:   1. Continue dobutamine gtt and IV lasix until cr worsens or at least until congestion  is resolved on cxr  2. Daily bmp  3. Has followed with Cathie Salas NP as outpt- will arrange f/u        Interval History  Still feels poorly.  Frustrated with thickened liquids.  Breathing may be a bit better.  Does not feel back to baseline.  No cp.      Physical Exam  Temp: 97.5  F (36.4  C) Temp src: Oral BP: 106/59 mmHg   Heart Rate: 94 Resp: 16 SpO2: 94 % O2 Device: Nasal cannula with humidification Oxygen Delivery: 4 LPM  Filed Vitals:    01/26/17 1600 01/27/17 0500 01/28/17 0500   Weight: 84 kg (185 lb 3 oz) 77 kg (169 lb 12.1 oz) 80 kg (176 lb 5.9 oz)       Constitutional   elderly, frail, chronically ill appearing  Skin   warm and dry to touch  ENT   no pallor or cyanosis  Neck  +JVP at 90d  Lungs absent LLL- improved, R clear but diminished in bases  Cardiac irregularly irregular, II/VI systolic murmur  Abdomen   abdomen soft, mildly distented  Extremities and Back   Multiple toe amputations, ecchymosis, pitting edema to his upper thighs, abdomen but improving  Neurological   no gross motor deficits noted, affect appropriate, slow to respond to questions.      Medications    - MEDICATION INSTRUCTIONS -       DOBUTamine 3 mcg/kg/min (01/27/17 2135)     Reason ACE/ARB order not selected       - MEDICATION INSTRUCTIONS -         insulin glargine  8 Units Subcutaneous At Bedtime     furosemide  20 mg Intravenous Daily     iron sucrose (VENOFER) intermittent infusion  200 mg Intravenous Q24H     ferrous sulfate  325 mg Oral BID     insulin aspart  1-7 Units Subcutaneous TID AC     insulin aspart  1-5 Units Subcutaneous At Bedtime     pantoprazole  40 mg Oral QAM     carbidopa-levodopa  1 tablet Oral BID     co-enzyme Q-10  100 mg Oral Daily     pramipexole  0.5 mg Oral Daily     simvastatin  40 mg Oral At Bedtime     eucerin   Topical BID     guaiFENesin  600 mg Oral BID     ipratropium - albuterol 0.5 mg/2.5 mg/3 mL  3 mL Nebulization 4x daily     aspirin  300 mg Rectal Daily    Or     aspirin EC  325 mg Oral  Daily     sodium chloride (PF)  10 mL Intracatheter Q8H       Data  Most Recent 3 CBC's:  Recent Labs   Lab Test  01/28/17   0625  01/27/17   0600  01/26/17   0540   WBC  6.7  5.8  4.8   HGB  8.3*  8.3*  8.4*   MCV  88  88  88   PLT  114*  98*  106*     Most Recent 3 BMP's:  Recent Labs   Lab Test  01/28/17   0625  01/27/17   0600  01/26/17   0540   NA  144  148*  148*   POTASSIUM  3.7  3.9  3.6   CHLORIDE  102  105  106   CO2  39*  37*  39*   BUN  58*  67*  81*   CR  1.92*  2.01*  2.33*   ANIONGAP  3  6  3   JARETH  8.0*  7.9*  7.9*   GLC  71  51*  38*     Most Recent 3 Troponin's:  Recent Labs   Lab Test  01/22/17   0405  01/20/17   0940  12/24/16   0229   TROPI  0.223*  0.110*  0.041     Last 24 hours labs reviewed     Echo 1/20/17  The right ventricle is severely dilated and the right ventricular systolic function is moderate to severely reduced.  There is moderate to mod-severe (2-3+) tricuspid regurgitation. The right ventricular systolic pressure is approximated at 41mmHg plus the right atrial pressure is elevated, consistent with moderate to severe pulmonary  hypertension.  Flattened septum is consistent with RV pressure/volume overload.  Moderate left pleural effusion is also seen.  Compared to previous echos, the signifcant pulmonary HTN persists with secondary effects on the right heart.

## 2017-01-28 NOTE — PROGRESS NOTES
" Renal Medicine Progress Note            Assessment/Plan:     1. Pt patient with baseline CKD IV    2. BALAJI in the setting of heart failure. Kidney function continues to improve.    3. HFpEF. Pt is hypervolemic. Decompensated DD and right-sided heart failure. Good diuresis with dopamine and low dose lasix    4. LLL PNA and pleural effusion    5. Anemia. + severe Fe def.                -on IV Fe    6. Mild hypernatremia. Encourage patient to increase oral fluid intake.     Plan  1. Since pt is doing well with dopamine and Lasix. I think we should continue for a couple more days to get as much fluid off him as possible. I am giving him dose of diamox.           Interval History:     Pt is doing well. No new complaints.          Medications and Allergies:       insulin glargine  8 Units Subcutaneous At Bedtime     furosemide  20 mg Intravenous Daily     iron sucrose (VENOFER) intermittent infusion  200 mg Intravenous Q24H     ferrous sulfate  325 mg Oral BID     insulin aspart  1-7 Units Subcutaneous TID AC     insulin aspart  1-5 Units Subcutaneous At Bedtime     pantoprazole  40 mg Oral QAM     carbidopa-levodopa  1 tablet Oral BID     co-enzyme Q-10  100 mg Oral Daily     pramipexole  0.5 mg Oral Daily     simvastatin  40 mg Oral At Bedtime     eucerin   Topical BID     guaiFENesin  600 mg Oral BID     ipratropium - albuterol 0.5 mg/2.5 mg/3 mL  3 mL Nebulization 4x daily     aspirin  300 mg Rectal Daily    Or     aspirin EC  325 mg Oral Daily     sodium chloride (PF)  10 mL Intracatheter Q8H        Allergies   Allergen Reactions     Codeine      nausea vomiting     Penicillins      \" turned red from head to foot\"            Physical Exam:   Vitals were reviewed  Heart Rate: 87, Blood pressure 104/63, pulse 80, temperature 97.6  F (36.4  C), temperature source Oral, resp. rate 16, weight 80 kg (176 lb 5.9 oz), SpO2 99 %.    Wt Readings from Last 3 Encounters:   01/28/17 80 kg (176 lb 5.9 oz)   12/30/16 90.5 kg (199 " lb 8.3 oz)   12/20/16 83.235 kg (183 lb 8 oz)       Intake/Output Summary (Last 24 hours) at 01/28/17 1337  Last data filed at 01/28/17 0830   Gross per 24 hour   Intake    813 ml   Output   2550 ml   Net  -1737 ml     GENERAL APPEARANCE: NAD. Frail  HEENT:  Eyes/ears/nose/neck grossly normal  RESP: Diminish airflow left base. Minimal crackles right base.  CV: irregular. + murmur    ABDOMEN: obese, soft, NT  EXTREMITIES/SKIN: edema is improved  Neuro: awake, alert and oriented           Data:     CBC RESULTS:     Recent Labs  Lab 01/28/17  0625 01/27/17  0600 01/26/17  0540 01/25/17  1124 01/25/17  0543 01/24/17  2335 01/24/17  0935   WBC 6.7 5.8 4.8  --  4.2 4.8 5.3   RBC 3.00* 2.96* 2.99*  --  2.92* 2.92* 2.39*   HGB 8.3* 8.3* 8.4*  --  8.2* 8.3* 6.8*   HCT 26.4* 26.0* 26.4*  --  25.8* 25.9* 21.1*   * 98* 106* 112* 108* 108* 104*       Basic Metabolic Panel:    Recent Labs  Lab 01/28/17  0625 01/27/17  0600 01/26/17  0540 01/25/17  0543 01/24/17  0545 01/23/17  0515    148* 148* 146* 146* 142   POTASSIUM 3.7 3.9 3.6 3.9 4.0 4.1   CHLORIDE 102 105 106 105 105 101   CO2 39* 37* 39* 39* 32 31   BUN 58* 67* 81* 106* 113* 118*   CR 1.92* 2.01* 2.33* 2.88* 3.27* 3.58*   GLC 71 51* 38* 97 136* 298*   JARETH 8.0* 7.9* 7.9* 8.0* 8.4* 8.2*       INR  Recent Labs  Lab 01/25/17  1124   INR 1.20*      Attestation:   I have reviewed today's relevant vital signs, notes, medications, labs and imaging.    Harley Beck MD  [facility] - Nephrology  Office phone :[phone]  Pager: [phone]

## 2017-01-28 NOTE — PLAN OF CARE
Problem: Goal Outcome Summary  Goal: Goal Outcome Summary  OT/CR: Pt able to ambulate with CGA x 2 and ww to doorway and back with fatigue and SOB, pt with  Hypotensive response to walking and denies dizziness. Pt requires THOM for bed mobility, limited due to LE pain, decreased balance, activity tolerance, strength and safety/cognition. Recommend TCU at discharge.

## 2017-01-28 NOTE — PROGRESS NOTES
[facility]    Hospitalist Progress Note    Date of Service (when I saw the patient): 01/28/2017    Assessment and Plan  Armand Smith is a 87 year old male with complex PMHx including hypertension, left sided diastolic HF and right sided systolic dysfunction with 2+ TR, chronic afib with hx of stroke presently on aspirin, insulin-dependent diabetes, Parkinson's disease, stage IV CKD and chronic anemia who was admitted on 1/20/2017 after a mechanical fall with worsening sob and wheezing in the days preceding admission, c/w CHF exacerbation.     Acute diastolic congestive heart failure exacerbation:    PTA meds: [Bumex 2mg daily, metolazone 2.5mg twice weekly].  Weight reportedly fluctuating as outpatient. Ongoing/ worsening dyspnea, orthopnea and increased leg edema consistent with congestive heart failure exacerbation, proBNP elevated. Required BiPAP on admission given respiratory acidosis and somnolence.     Echo from 12/25 showed preserved EF. Repeat echo on 1/22 showed EF 55-60%, severely dilated RV with severely reduced RVSF and mod-severe TR with mod-severe pulmonary HTN    Started on Lasix 40 mg IV BID on admission - tolerated despite soft BP and diuresed well but then held 1/22-1/23 given development of BALAJI. Has intermittently required BiPAP dt dyspnea.     -- given Lasix 20mg IV TID x3 doses on 1/24, Lasix 20mg IV BID resumed 1/26 for total of 3 doses -> ongoing diuresis per cards  -- dobutamine gtt initiated 1/24  -- follow Is/Os, daily wts -> net neg 1.1L in past 24h, net neg 13.6L this stay; wts inaccurate though overall trending down  -- cont 1.2L fluid restriction  -- appreciate assistance from cardiology this stay      Acute hypoxic and hypercarbic respiratory failure.    Likely multifactorial including diastolic CHF exacerbation, possible PNA and reactive airway disease. He has leukopenia, but no fever or cough, but procalcitonin was elevated/remains elevated. CXR c/w pulmonary  congestion vs left lower lobe infiltrate as per report. Pro-calcitonin was elevated. Given episode of hypotension in ER, presumed infection/sepsis, and was initiailly started on broad spectrum abx with IV Levaquin, meropenem and vancomycin for suspected healthcare-associated pneumonia.        Required BiPAP on admission given underlying respiratory acidosis and somnolence. Has continued to require intermittently.     Started on diuretics on admission as above.    Blood cultures remain neg. Minimal sputum production. Levaquin dc'd 1/24. Wife endorsed some coughing after taking meds with water -> seen by SLP, no overt signs of aspiration. Completed 7d course of Meropenem on 1/27 for treatment of possible aspiration pneumonia.    Endorsed some wheezing as well, which did not improve with diuresis and antibiotics. Does not carry diagnosis of COPD but used to smoke a pipe and has noted pulmonary HTN. Wheezing subsided following dose of steroid x1 on 1/23. Steroids not continued.     Underwent left sided thoracentesis on 1/25 with removal of 700ml pleural fluid. Studies c/w transudative effusion.    -- mgmt of CHF as above  -- cont pulmonary toilet with acapella and IS  -- cont sched duonebs and prn albuterol neb  -- wean supplemental O2 as able (not on O2 prior to admission) -> remains on 4L NC      Acute kidney injury on stage IV chronic kidney disease.    Baseline Cr  appears to be around 2.6 (was 3.1 with nephrology follow recently).  He presented with Cr of 3.5. His BUN is more than 100. Likely due to cardiorenal syndrome from poor renal perfusion.  Renal ultrasound negative for hydronephrosis    BUN/Cr continued to worsen with initial diuresis, peaked at 3.97 on 1/22 ->  IV lasix held after 1/22 am, and started on gentle IV hydration per nephrology. IVFs dc'd on 1/23 due to increased dyspnea.     Started on dobutamine gtt on 1/24. Also given 3 doses on Lasix on 1/24. BID IV Lasix resumed 1/26 as above.     -- rocio  catheter remains in place for strict Is/Os  -- nephrology following  -- Cr conts trending down: 2.88 -> 2.33 -> 2.01 -> 1.92 today    Hypertension  Chronic atrial fibrillation and hx of stroke:   [PTA meds: metoprolol XL, hydralazine, simvastatin, full dose aspirin].   Not on any other anticoagulation due to history of GI bleed and epistaxis. Not on ARB due to renal failure.   Presented hypotensive/low normal BP. PTA meds held while pressures soft and initially unable to take po given need for BiPAP.  Have been resuming meds as able.     -- cont aspirin and statin  -- has prn Lopressor for RVR -> none needed this stay     DM II, insulin-dependent.   [PTA regimen: Lantus 20U HS]. A1C 7.0 this stay.   Insulin dose initially lowered given his NPO status. Needs have been variable this stay given poor po intake.      Recent Labs  Lab 01/28/17  0719 01/28/17  0625 01/27/17  2112 01/27/17  1655 01/27/17  1147 01/27/17  0723 01/27/17  0647 01/27/17  0600  01/26/17  0540  01/25/17  0543  01/24/17  0545  01/23/17  0515   GLC  --  71  --   --   --   --   --  51*  --  38*  --  97  --  136*  --  298*   BGM 73  --  152* 104* 160* 140* 61*  --   < >  --   < >  --   < >  --   < >  --    < > = values in this interval not displayed.    -- no issues with hypoglycemia overnight, cont Lantus at 8U HS and med dose SSI     Parkinson's disease and tremor.  Chronic and stable on PTA meds including Sinemet and Mirapex    Chronic Anemia likely dt ongoing illness and renal failure, and thrombocytopenia.   Baseline hgb around 8. Did require transfusion of 1U PRBCs during hospitalization in 12/2016. Prior workup c/w iron deficiency. Is now on iron supplement.     Hgb down to 6.8 on 1/24 -> transfused 1U PRBCs. Platelet count down to low 100s with findings of petechial lesions on chest, lesions remain stable. No heparin products given this stay. Suspect drop secondary to ongoing illness. Some question of HUS -> mildly elevated LDH, though nl bili  "and haptoglobin; peripheral smear c/w normochromic normocytic anemia; platelets morphologically unremarkable.    -- started on Venofer per nephrology on 1/25, plan is for 5 doses  -- conts on oral iron as well  -- platelet counts remain relatively stable -> no need for heme consult at this time    Fall.  Prompted ED visit. Likely mechanical.  He is weak and deconditioned from congestive heart failure and possibly pneumonia.  Also, suspected uremic encephalopathy given his somnolence and drowsiness versus from CO2 narcosis. Patient was on narcotic medication as well, and his BP was improving and he became more alert with narcan in ER.  -- narcotics dc'd -> will not resume at discharge  -- had dc'd home from hospital stay in 12/2016 with home care -> will need higher level of care at discharge  -- PT/OT/cardiac rehab following -> TCU at discharge     Gout.  Chronic and stable on allopurinol.    Elevated AST: Improving  Unclear etiology. ? Liver/muscle/cardiac origin. ?liver congestion but otherwise maria guadalupe ALP/ALT. CK normal. RUQ US unremarkable.    Hypernatremia: Resolved  Na with slow upward trend in context of diuresis as above. Na 148 on 1/26 and 1/27.  -- given 500cc free water per nephrology on 1/26   -- encouraged oral intake  -- Na normalized on 1/28    FEN: no IVFs, lytes otherwise stable, regular diet w/thin liquids per SLP  DVT Prophylaxis: PCDs  Code Status: DNR/DNI    Disposition: Remains on dobutamine gtt. Discharge pending clinical course - plan for diuretics, response to therapy.    Palliative care met with patient/family on 1/27. Patient clearly stated his goal was restorative care - \"to get to TCU at [facility] to get stronger. Clearly stated he would not want resuscitative measures thus code status was changed to DNR/DNI.     Appreciate help of subspecialists this stay.    Winnie Saha    Interval History  Met with palliative care yesterday - said his goal is to get stronger and go to [facility] " for more therapy, would not want resuscitative measures. Uneventful night. No recurrent hypoglycemia. Remains on 4L NC. Sitting up in chair today - bright and alert. Denies sob/cough/chest pain. Appetite marginal but denies n/v.     -Data reviewed today: I reviewed all new labs and imaging results over the last 24 hours. I personally reviewed no images or EKG's today.    Physical Exam  Temp: 97.5  F (36.4  C) Temp src: Oral BP: 106/59 mmHg   Heart Rate: 94 Resp: 16 SpO2: 92 % O2 Device: Nasal cannula Oxygen Delivery: 4 LPM  Filed Vitals:    01/26/17 1600 01/27/17 0500 01/28/17 0500   Weight: 84 kg (185 lb 3 oz) 77 kg (169 lb 12.1 oz) 80 kg (176 lb 5.9 oz)     Vital Signs with Ranges  Temp:  [97.4  F (36.3  C)-98.1  F (36.7  C)] 97.5  F (36.4  C)  Heart Rate:  [87-94] 94  Resp:  [16-18] 16  BP: ()/(44-62) 106/59 mmHg  SpO2:  [92 %-99 %] 92 %  I/O last 3 completed shifts:  In: 767 [P.O.:480; I.V.:287]  Out: 2550 [Urine:2550]    Constitutional: Resting comfortably, alert, answering questions appropriately, NAD  Respiratory: +bibasilar crackles, no wheezing, no increased work of breathing  Cardiovascular: HR/rhythm irregular, no MGR   GI: S, NT, ND, +BS  Skin/Integumen: warm/dry  Other: Trace bilateral LE edema    Medications    IV fluid REPLACEMENT ONLY Stopped (01/27/17 0103)     - MEDICATION INSTRUCTIONS -       DOBUTamine 3 mcg/kg/min (01/27/17 2135)     Reason ACE/ARB order not selected       - MEDICATION INSTRUCTIONS -         insulin glargine  8 Units Subcutaneous At Bedtime     furosemide  20 mg Intravenous Daily     iron sucrose (VENOFER) intermittent infusion  200 mg Intravenous Q24H     ferrous sulfate  325 mg Oral BID     insulin aspart  1-7 Units Subcutaneous TID AC     insulin aspart  1-5 Units Subcutaneous At Bedtime     pantoprazole  40 mg Oral QAM     carbidopa-levodopa  1 tablet Oral BID     co-enzyme Q-10  100 mg Oral Daily     pramipexole  0.5 mg Oral Daily     simvastatin  40 mg Oral At  Bedtime     eucerin   Topical BID     guaiFENesin  600 mg Oral BID     ipratropium - albuterol 0.5 mg/2.5 mg/3 mL  3 mL Nebulization 4x daily     aspirin  300 mg Rectal Daily    Or     aspirin EC  325 mg Oral Daily     sodium chloride (PF)  10 mL Intracatheter Q8H       Data    Recent Labs  Lab 01/28/17  0625 01/27/17  0600 01/26/17  0540 01/25/17  1124  01/23/17  0515 01/22/17  0405   WBC 6.7 5.8 4.8  --   < > 3.1* 4.2   HGB 8.3* 8.3* 8.4*  --   < > 7.7* 7.7*   MCV 88 88 88  --   < > 88 90   * 98* 106* 112*  < > 114* 115*   INR  --   --   --  1.20*  --   --   --     148* 148*  --   < > 142 145*   POTASSIUM 3.7 3.9 3.6  --   < > 4.1 4.6   CHLORIDE 102 105 106  --   < > 101 104   CO2 39* 37* 39*  --   < > 31 32   BUN 58* 67* 81*  --   < > 118* 122*   CR 1.92* 2.01* 2.33*  --   < > 3.58* 3.97*   ANIONGAP 3 6 3  --   < > 10 9   JARETH 8.0* 7.9* 7.9*  --   < > 8.2* 8.1*   GLC 71 51* 38*  --   < > 298* 93   ALBUMIN  --   --   --   --   --  2.3* 2.4*   PROTTOTAL  --   --   --   --   --  6.3* 6.3*   BILITOTAL  --   --   --   --   --  0.6 0.6   ALKPHOS  --   --   --   --   --  93 96   ALT  --   --   --   --   --  54 56   AST  --   --   --   --   --  133* 262*   TROPI  --   --   --   --   --   --  0.223*   < > = values in this interval not displayed.    No results found for this or any previous visit (from the past 24 hour(s)).

## 2017-01-28 NOTE — PLAN OF CARE
Problem: Goal Outcome Summary  Goal: Goal Outcome Summary  SLP: Swallow tx completed this AM to assess tolerance of diet and educate pt on swallowing strategies. Pt assessed with small snack of dry crumbly food and thin liquids by cup. Mastication slow but adequate. No overt s/sx of aspiration. He independently utilized small bites/sips to ensure safe swallow. He participated in written/verbal education on additional safe swallowing strategies and verbalized use/understanding of strategies. No further dysphagia tx is warranted at this time. Recommend continue regular diet and thin liquids. Pt should be fully alert and upright for all PO, take small single sips/bites, alternate consistencies, pace self, and complete regular oral cares at least 2x/day. Will complete orders and discharge from SLP services. Defer discharge recommendations to OT/PT.       Speech Language Therapy Discharge Summary    Reason for therapy discharge:    All goals and outcomes met, no further needs identified.    Progress towards therapy goal(s). See goals on Care Plan in [facility] electronic health record for goal details.  Goals met    Therapy recommendation(s):    No further therapy is recommended. Recommend continue regular diet and thin liquids with use of swallowing precautions at discharge.

## 2017-01-28 NOTE — PLAN OF CARE
Problem: Goal Outcome Summary  Goal: Goal Outcome Summary  Outcome: Improving  VSS, doutamine drip @ 7.7, tele Afib controlled, doing well

## 2017-01-28 NOTE — PLAN OF CARE
Problem: Goal Outcome Summary  Goal: Goal Outcome Summary  Outcome: No Change  A/O. VSS on 4LPM, soft BPs-asymptomatic. Dobutamine gtt at 7mL/hr. R picc patent, white lumen cleared with alteplase per IV team. Cortez patent, adequate output. /152, no SSI given. IV iron given. Up with 2/walker. BLEE, lymph wraps reapplied. C/o pain in L calf, decreased with tylenol/po dilaudid. Two hard stools today, senna given. Nrsg will continue to monitor.

## 2017-01-29 NOTE — PROGRESS NOTES
[facility]  Cardiology Progress Note  Date of Service: 01/29/2017  Primary Cardiologist:    Assessment and Plan    Armand Smith is a 87 year old male with past medical history significant for insulin-dependent diabetes mellitus, hypertension, diastolic heart failure, tricuspid valve regurgitation, Parkinson's disease, chronic atrial fibrillation, not on anticoagulation, chronic kidney disease and anemia who was admitted on 1/20/2017.     Assessment:  1. Acute on chronic diastolic heart failure.    - Difficult to know what his true dry weight is (weight range 180-220; likely ~ 190).    - admit wt 191, current wt 182   - I/o -13 L net    - improving, but long term prognosis is poor, apparently uninterested in palliatve, long hx of non compliance      2. Hypoxemic respiratory failure, multifactorial chf and ? Of pna, on abx    - remains on high levels of O2   - L pleural effusion seen on admission, thoracentesis for 700 cc 1/25, bloody effusion with cytology pending   -  good results on cxr- although still congestion    3. Chronic kidney disease, severe, stage IV   - Cr improving- peak 3.9>> 2.9 >> 2.3>>2.1   4. Chronic Atrial Fibrillation - Rate controlled - not on anticoagulation due to GI bleed and epistaxis on A\C.    5. Mild troponinemia - likely type II demand infarct   -No anginal symptoms or ischemic EKG changes. Peak - 0.22    6. Severe anemia. Would do much better with higher Hgb with less diuretic resistance and probably better renal function.    - 1 u prbc 1/24, increase po iron and possible epo per nephrology    7  Mod- severe MR and elev pulm pressures with D shaped septum- unclear if underlying pulm disease, elevated L pressures, chronic pe- long standing noted in 2013   - will continue with diuresis and consider reassessment      Plan:   1. Continue dobutamine gtt and IV lasix until cr worsens or at least until congestion is resolved on cxr  2. Daily bmp  3. Has followed with Cathie  KAROLYN Salas as outpt- will arrange f/u      Interval History  No acute events overnight. Denies any chest pain, exertional chest pain, shortness of breath, dyspnea on exertion    Physical Exam  Temp: 96.8  F (36  C) Temp src: Oral BP: 91/42 mmHg   Heart Rate: 83 Resp: 18 SpO2: 99 % O2 Device: Nasal cannula with humidification Oxygen Delivery: 3 LPM  Filed Vitals:    01/27/17 0500 01/28/17 0500 01/29/17 0500   Weight: 77 kg (169 lb 12.1 oz) 80 kg (176 lb 5.9 oz) 83 kg (182 lb 15.7 oz)       Constitutional   elderly, frail, chronically ill appearing  Skin   warm and dry to touch  ENT   no pallor or cyanosis  Neck  +JVP at 90d  Lungs absent LLL- improved, R clear but diminished in bases  Cardiac irregularly irregular, II/VI systolic murmur  Abdomen   abdomen soft, mildly distented  Extremities and Back   Multiple toe amputations, ecchymosis, pitting edema to his upper thighs, abdomen but improving  Neurological   no gross motor deficits noted, affect appropriate, slow to respond to questions.      Medications    - MEDICATION INSTRUCTIONS -       DOBUTamine 3 mcg/kg/min (01/29/17 0105)     Reason ACE/ARB order not selected       - MEDICATION INSTRUCTIONS -         insulin glargine  8 Units Subcutaneous At Bedtime     iron sucrose (VENOFER) intermittent infusion  200 mg Intravenous Q24H     ferrous sulfate  325 mg Oral BID     insulin aspart  1-7 Units Subcutaneous TID AC     insulin aspart  1-5 Units Subcutaneous At Bedtime     pantoprazole  40 mg Oral QAM     carbidopa-levodopa  1 tablet Oral BID     co-enzyme Q-10  100 mg Oral Daily     pramipexole  0.5 mg Oral Daily     simvastatin  40 mg Oral At Bedtime     eucerin   Topical BID     guaiFENesin  600 mg Oral BID     ipratropium - albuterol 0.5 mg/2.5 mg/3 mL  3 mL Nebulization 4x daily     aspirin  300 mg Rectal Daily    Or     aspirin EC  325 mg Oral Daily     sodium chloride (PF)  10 mL Intracatheter Q8H       Data  Most Recent 3 CBC's:  Recent Labs   Lab Test   01/29/17   0615  01/28/17   0625  01/27/17   0600   WBC  7.4  6.7  5.8   HGB  8.2*  8.3*  8.3*   MCV  90  88  88   PLT  120*  114*  98*     Most Recent 3 BMP's:  Recent Labs   Lab Test  01/29/17   0615  01/28/17   0625  01/27/17   0600   NA  141  144  148*   POTASSIUM  4.8  3.7  3.9   CHLORIDE  103  102  105   CO2  35*  39*  37*   BUN  50*  58*  67*   CR  1.81*  1.92*  2.01*   ANIONGAP  3  3  6   JARETH  8.0*  8.0*  7.9*   GLC  77  71  51*     Most Recent 3 Troponin's:  Recent Labs   Lab Test  01/22/17   0405  01/20/17   0940  12/24/16   0229   TROPI  0.223*  0.110*  0.041     Last 24 hours labs reviewed     Echo 1/20/17  The right ventricle is severely dilated and the right ventricular systolic function is moderate to severely reduced.  There is moderate to mod-severe (2-3+) tricuspid regurgitation. The right ventricular systolic pressure is approximated at 41mmHg plus the right atrial pressure is elevated, consistent with moderate to severe pulmonary  hypertension.  Flattened septum is consistent with RV pressure/volume overload.  Moderate left pleural effusion is also seen.  Compared to previous echos, the signifcant pulmonary HTN persists with secondary effects on the right heart.

## 2017-01-29 NOTE — PROGRESS NOTES
[facility]    Hospitalist Progress Note    Date of Service (when I saw the patient): 01/29/2017    Assessment and Plan  Armand Smith is a 87 year old male with complex PMHx including hypertension, left sided diastolic HF and right sided systolic dysfunction with 2+ TR, chronic afib with hx of stroke presently on aspirin, insulin-dependent diabetes, Parkinson's disease, stage IV CKD and chronic anemia who was admitted on 1/20/2017 after a mechanical fall with worsening sob and wheezing in the days preceding admission, c/w CHF exacerbation.     Acute diastolic congestive heart failure exacerbation:    PTA meds: [Bumex 2mg daily, metolazone 2.5mg twice weekly].  Weight reportedly fluctuating as outpatient. Ongoing/ worsening dyspnea, orthopnea and increased leg edema consistent with congestive heart failure exacerbation, proBNP elevated. Required BiPAP on admission given respiratory acidosis and somnolence.     Echo from 12/25 showed preserved EF. Repeat echo on 1/22 showed EF 55-60%, severely dilated RV with severely reduced RVSF and mod-severe TR with mod-severe pulmonary HTN    Started on Lasix 40 mg IV BID on admission - tolerated despite soft BP and diuresed well but then held 1/22-1/23 given development of BALAJI. Has intermittently required BiPAP dt dyspnea.     -- given Lasix 20mg IV TID x3 doses on 1/24, diuretics held on 1/25, resumed 1/26 and was given Lasix 20mg IV x2 doses, on 1/27 was given Lasix 20mg IV x1 and Diamox 250mg IV x1 -> ongoing diuresis per cards and nephrology  -- dobutamine gtt initiated 1/24  -- follow Is/Os, daily wts -> net neg 690mL in past 24h, net neg 14.1L this stay; wts inaccurate though overall trending down  -- cont 1.2L fluid restriction  -- appreciate assistance from cardiology this stay    Acute hypoxic and hypercarbic respiratory failure.    Likely multifactorial including diastolic CHF exacerbation, possible PNA and reactive airway disease. He has leukopenia,  but no fever or cough, but procalcitonin was elevated/remains elevated. CXR c/w pulmonary congestion vs left lower lobe infiltrate as per report. Pro-calcitonin was elevated. Given episode of hypotension in ER, presumed infection/sepsis, and was initiailly started on broad spectrum abx with IV Levaquin, meropenem and vancomycin for suspected healthcare-associated pneumonia.        Required BiPAP on admission given underlying respiratory acidosis and somnolence. With treatment as above has been weaned off BiPAP. Continues on supplemental O2 via NC.    Started on diuretics on admission as above.    Blood cultures remain neg. Minimal sputum production. Levaquin dc'd 1/24. Wife endorsed some coughing after taking meds with water -> seen by SLP, no overt signs of aspiration. Completed 7d course of Meropenem on 1/27 for treatment of possible aspiration pneumonia.    Endorsed some wheezing as well, which did not improve with diuresis and antibiotics. Does not carry diagnosis of COPD but used to smoke a pipe and has noted pulmonary HTN. Wheezing subsided following dose of steroid x1 on 1/23. Steroids not continued.     Underwent left sided thoracentesis on 1/25 with removal of 700ml pleural fluid. Studies c/w transudative effusion.    -- mgmt of CHF as above  -- cont pulmonary toilet with acapella and IS  -- cont sched duonebs and prn albuterol neb  -- wean supplemental O2 as able (not on O2 prior to admission) -> down to 3L NC    Acute kidney injury on stage IV chronic kidney disease.    Baseline Cr appears to be around 2.6 (was 3.1 with nephrology follow recently).  He presented with Cr of 3.5. His BUN is more than 100. Likely due to cardiorenal syndrome from poor renal perfusion.  Renal ultrasound negative for hydronephrosis    BUN/Cr continued to worsen with initial diuresis, peaked at 3.97 on 1/22 ->  IV lasix held after 1/22 am, and started on gentle IV hydration per nephrology. IVFs dc'd on 1/23 due to increased  dyspnea.     Started on dobutamine gtt on 1/24. Also given 3 doses on Lasix on 1/24. BID IV Lasix resumed 1/26 as above.     -- chan catheter remains in place for strict Is/Os  -- nephrology following  -- BALAJI now resolved, Cr conts trending down: 2.88 -> 2.33 -> 2.01 -> 1.92 ->1.81 today    Hypertension  Chronic atrial fibrillation and hx of stroke:   [PTA meds: metoprolol XL, hydralazine, simvastatin, full dose aspirin].   Not on any other anticoagulation due to history of GI bleed and epistaxis. Not on ARB due to renal failure.   Presented hypotensive/low normal BP. PTA meds held while pressures soft and initially unable to take po given need for BiPAP.  Have been resuming meds as able.     -- cont aspirin and statin  -- has prn Lopressor for RVR -> none needed this stay     DM II, insulin-dependent.   [PTA regimen: Lantus 20U HS]. A1C 7.0 this stay.   Insulin dose initially lowered given his NPO status. Needs have been variable this stay given poor po intake.      Recent Labs  Lab 01/29/17  0749 01/29/17  0615 01/29/17  0156 01/28/17  2125 01/28/17  1756 01/28/17  1140 01/28/17  0719 01/28/17  0625  01/27/17  0600  01/26/17  0540  01/25/17  0543  01/24/17  0545   GLC  --  77  --   --   --   --   --  71  --  51*  --  38*  --  97  --  136*   BGM 70  --  90 193* 138* 125* 73  --   < >  --   < >  --   < >  --   < >  --    < > = values in this interval not displayed.    -- no issues with hypoglycemia overnight, cont Lantus at 8U HS and med dose SSI     Parkinson's disease and tremor.  Chronic and stable on PTA meds including Sinemet and Mirapex    Chronic Anemia likely dt ongoing illness and renal failure, and thrombocytopenia.   Baseline hgb around 8. Did require transfusion of 1U PRBCs during hospitalization in 12/2016. Prior workup c/w iron deficiency. Is now on iron supplement.     Hgb down to 6.8 on 1/24 -> transfused 1U PRBCs. Platelet count down to low 100s with findings of petechial lesions on chest, lesions  "remain stable. No heparin products given this stay. Suspect drop secondary to ongoing illness. Some question of HUS -> mildly elevated LDH, though nl bili and haptoglobin; peripheral smear c/w normochromic normocytic anemia; platelets morphologically unremarkable.    -- started on Venofer per nephrology on 1/25, plan is for 5 doses  -- conts on oral iron as well  -- platelet counts remain relatively stable -> no need for heme consult at this time    Fall, now deconditioned:  Prompted ED visit. Likely mechanical.  He is weak and deconditioned from congestive heart failure and possibly pneumonia.  Also, suspected uremic encephalopathy given his somnolence and drowsiness versus from CO2 narcosis. Patient was on narcotic medication as well, and his BP was improving and he became more alert with narcan in ER.  -- narcotics dc'd -> will not resume at discharge  -- had dc'd home from hospital stay in 12/2016 with home care -> will need higher level of care at discharge  -- PT/OT/cardiac rehab following -> TCU at discharge     Gout.  Chronic and stable on allopurinol.    Elevated AST: Improving  Unclear etiology. ? Liver/muscle/cardiac origin. ?liver congestion but otherwise normal ALP/ALT. CK normal. RUQ US this stay unremarkable.    Hypernatremia: Resolved  Na with slow upward trend in context of diuresis as above. Na 148 on 1/26 and 1/27.  -- given 500cc free water per nephrology on 1/26 and encouraged oral intake  -- Na normalized on 1/28    FEN: no IVFs, lytes otherwise stable, regular diet w/thin liquids per SLP  DVT Prophylaxis: PCDs  Code Status: DNR/DNI    Disposition: Remains on dobutamine gtt. Discharge pending clinical course - plan for diuretics, response to therapy. Plan to discharge to TCU.    Palliative care met with patient/family on 1/27. Patient clearly stated his goal was restorative care - to get to TCU \"at [facility] to get stronger\". Clearly stated he would not want resuscitative measures thus code " status was changed to DNR/DNI.     Appreciate help of subspecialists this stay.    Winnie Saha    Interval History  Uneventful night. No recurrent hypoglycemia. Feeling okay today. Denies sob/cough. Appetite okay. Complaining of some pain in his back which appears to be over a bony prominence. Not the best historian but does indicate pain gets better when out of bed and in chair.     -Data reviewed today: I reviewed all new labs and imaging results over the last 24 hours. I personally reviewed no images or EKG's today.    Physical Exam  Temp: 96.8  F (36  C) Temp src: Oral BP: 91/42 mmHg   Heart Rate: 83 Resp: 18 SpO2: 99 % O2 Device: Nasal cannula with humidification Oxygen Delivery: 3 LPM  Filed Vitals:    01/27/17 0500 01/28/17 0500 01/29/17 0500   Weight: 77 kg (169 lb 12.1 oz) 80 kg (176 lb 5.9 oz) 83 kg (182 lb 15.7 oz)     Vital Signs with Ranges  Temp:  [96.4  F (35.8  C)-97.6  F (36.4  C)] 96.8  F (36  C)  Heart Rate:  [83-94] 83  Resp:  [16-18] 18  BP: ()/(42-63) 91/42 mmHg  SpO2:  [92 %-99 %] 99 %  I/O last 3 completed shifts:  In: 1315.6 [P.O.:450; I.V.:865.6]  Out: 1900 [Urine:1900]    Constitutional: Resting comfortably, alert, answering questions appropriately, NAD  Respiratory: +bibasilar crackles mostly over the L base, no wheezing, no increased work of breathing  Cardiovascular: HR/rhythm irregular, no MGR   GI: S, NT, ND, +BS  Skin/Integumen: warm/dry  Other: trace bilateral LE edema, has compression wraps on    Medications    - MEDICATION INSTRUCTIONS -       DOBUTamine 3 mcg/kg/min (01/29/17 0105)     Reason ACE/ARB order not selected       - MEDICATION INSTRUCTIONS -         insulin glargine  8 Units Subcutaneous At Bedtime     furosemide  20 mg Intravenous Daily     iron sucrose (VENOFER) intermittent infusion  200 mg Intravenous Q24H     ferrous sulfate  325 mg Oral BID     insulin aspart  1-7 Units Subcutaneous TID AC     insulin aspart  1-5 Units Subcutaneous At Bedtime      pantoprazole  40 mg Oral QAM     carbidopa-levodopa  1 tablet Oral BID     co-enzyme Q-10  100 mg Oral Daily     pramipexole  0.5 mg Oral Daily     simvastatin  40 mg Oral At Bedtime     eucerin   Topical BID     guaiFENesin  600 mg Oral BID     ipratropium - albuterol 0.5 mg/2.5 mg/3 mL  3 mL Nebulization 4x daily     aspirin  300 mg Rectal Daily    Or     aspirin EC  325 mg Oral Daily     sodium chloride (PF)  10 mL Intracatheter Q8H       Data    Recent Labs  Lab 01/29/17  0615 01/28/17  0625 01/27/17  0600  01/25/17  1124  01/23/17  0515   WBC 7.4 6.7 5.8  < >  --   < > 3.1*   HGB 8.2* 8.3* 8.3*  < >  --   < > 7.7*   MCV 90 88 88  < >  --   < > 88   * 114* 98*  < > 112*  < > 114*   INR  --   --   --   --  1.20*  --   --     144 148*  < >  --   < > 142   POTASSIUM 4.8 3.7 3.9  < >  --   < > 4.1   CHLORIDE 103 102 105  < >  --   < > 101   CO2 35* 39* 37*  < >  --   < > 31   BUN 50* 58* 67*  < >  --   < > 118*   CR 1.81* 1.92* 2.01*  < >  --   < > 3.58*   ANIONGAP 3 3 6  < >  --   < > 10   JARETH 8.0* 8.0* 7.9*  < >  --   < > 8.2*   GLC 77 71 51*  < >  --   < > 298*   ALBUMIN  --   --   --   --   --   --  2.3*   PROTTOTAL  --   --   --   --   --   --  6.3*   BILITOTAL  --   --   --   --   --   --  0.6   ALKPHOS  --   --   --   --   --   --  93   ALT  --   --   --   --   --   --  54   AST  --   --   --   --   --   --  133*   < > = values in this interval not displayed.    No results found for this or any previous visit (from the past 24 hour(s)).

## 2017-01-29 NOTE — PROGRESS NOTES
" Renal Medicine Progress Note            Assessment/Plan:     1. Pt patient with baseline CKD III-IV    2. BALAJI in the setting of heart failure. Kidney function continues to improve.    3. HFpEF. Pt is hypervolemic. Decompensated DD and right-sided heart failure. Good diuresis with dobuatmine and low dose lasix    4. LLL PNA and pleural effusion    5. Anemia. + severe Fe def.                -on IV Fe    Plan  1. Still diuresing quite well. Likely can stop dobutamine in 1-2 days. Cont Lasix 20 mg bid.         Interval History:     No new issues. No new cardiopulmonary complaints.            Medications and Allergies:       insulin glargine  8 Units Subcutaneous At Bedtime     iron sucrose (VENOFER) intermittent infusion  200 mg Intravenous Q24H     ferrous sulfate  325 mg Oral BID     insulin aspart  1-7 Units Subcutaneous TID AC     insulin aspart  1-5 Units Subcutaneous At Bedtime     pantoprazole  40 mg Oral QAM     carbidopa-levodopa  1 tablet Oral BID     co-enzyme Q-10  100 mg Oral Daily     pramipexole  0.5 mg Oral Daily     simvastatin  40 mg Oral At Bedtime     eucerin   Topical BID     guaiFENesin  600 mg Oral BID     ipratropium - albuterol 0.5 mg/2.5 mg/3 mL  3 mL Nebulization 4x daily     aspirin  300 mg Rectal Daily    Or     aspirin EC  325 mg Oral Daily     sodium chloride (PF)  10 mL Intracatheter Q8H        Allergies   Allergen Reactions     Codeine      nausea vomiting     Penicillins      \" turned red from head to foot\"            Physical Exam:   Vitals were reviewed  Heart Rate: 85, Blood pressure 105/52, pulse 80, temperature 97.1  F (36.2  C), temperature source Oral, resp. rate 18, weight 83 kg (182 lb 15.7 oz), SpO2 97 %.    Wt Readings from Last 3 Encounters:   01/29/17 83 kg (182 lb 15.7 oz)   12/30/16 90.5 kg (199 lb 8.3 oz)   12/20/16 83.235 kg (183 lb 8 oz)       Intake/Output Summary (Last 24 hours) at 01/29/17 1255  Last data filed at 01/29/17 0930   Gross per 24 hour   Intake 1205.6 " ml   Output   1900 ml   Net -694.4 ml     GENERAL APPEARANCE: NAD. Frail  HEENT:  Eyes/ears/nose/neck grossly normal  RESP: Diminish airflow left base. Minimal crackles right base.  CV: irregular. + murmur    ABDOMEN: obese, soft, NT  EXTREMITIES/SKIN: edema is improved  Neuro: awake, alert and oriented           Data:     CBC RESULTS:     Recent Labs  Lab 01/29/17  0615 01/28/17  0625 01/27/17  0600 01/26/17  0540 01/25/17  1124 01/25/17  0543 01/24/17  2335   WBC 7.4 6.7 5.8 4.8  --  4.2 4.8   RBC 2.93* 3.00* 2.96* 2.99*  --  2.92* 2.92*   HGB 8.2* 8.3* 8.3* 8.4*  --  8.2* 8.3*   HCT 26.3* 26.4* 26.0* 26.4*  --  25.8* 25.9*   * 114* 98* 106* 112* 108* 108*       Basic Metabolic Panel:    Recent Labs  Lab 01/29/17  0615 01/28/17  0625 01/27/17  0600 01/26/17  0540 01/25/17  0543 01/24/17  0545    144 148* 148* 146* 146*   POTASSIUM 4.8 3.7 3.9 3.6 3.9 4.0   CHLORIDE 103 102 105 106 105 105   CO2 35* 39* 37* 39* 39* 32   BUN 50* 58* 67* 81* 106* 113*   CR 1.81* 1.92* 2.01* 2.33* 2.88* 3.27*   GLC 77 71 51* 38* 97 136*   JARETH 8.0* 8.0* 7.9* 7.9* 8.0* 8.4*       INR  Recent Labs  Lab 01/25/17  1124   INR 1.20*      Attestation:   I have reviewed today's relevant vital signs, notes, medications, labs and imaging.    Harley Beck MD  [facility]phrology  Office phone :[phone]  Pager: [phone]

## 2017-01-29 NOTE — PLAN OF CARE
Problem: Goal Outcome Summary  Goal: Goal Outcome Summary  OT/cR: Pt ambulated approx 90 ft with ww and CGA and standing rest breaks due to fatigue and SOB. Upon return O2 sats 94-95% on 4 L O2 and BP slightly hypotensive BP at rest 104/54 and after walk 103/48. MIN Ax2 for sit <> stand.  Pt limited due to decreased activity tolerance, overall strength, balance and safety. Recommend TCU at discharge.

## 2017-01-29 NOTE — PROGRESS NOTES
SW:  D:  Call placed to update [facility] as to patient's status and potential for discharge on Wednesday.  They are hopeful they will have a bed available for patient on discharge  P:  Will continue to follow.

## 2017-01-29 NOTE — PLAN OF CARE
Up in chair twice the past 12 hours.  Moves slowly and is short-of-breath.  Sats 83% while walking to the door of the room on oxygen 4L/nc.  Dilaudid po given twice in past 12 hours for c/o pain from ulcer on underside of left calf.

## 2017-01-30 NOTE — PROGRESS NOTES
Assessment and Plan:   CKD/BALAJI: Cr improved over last week. Likely Cardiorenal syndrome. Has baseline CKD-4. He is on lasix 20 mg IV BID. No need for fluid restriction with nl sodium level. Follow labs             Interval History:   CHF: remains on dobutamine.   Pneumonia LLL  Anemia: on oral and IV iron.     DM               Review of Systems:   C/O thirst and doesn't like fluid restriction. Comfortable in the chair with no SOB as rest.           Medications:       insulin glargine  6 Units Subcutaneous At Bedtime     furosemide  20 mg Intravenous BID     ferrous sulfate  325 mg Oral BID     insulin aspart  1-7 Units Subcutaneous TID AC     insulin aspart  1-5 Units Subcutaneous At Bedtime     pantoprazole  40 mg Oral QAM     carbidopa-levodopa  1 tablet Oral BID     co-enzyme Q-10  100 mg Oral Daily     pramipexole  0.5 mg Oral Daily     simvastatin  40 mg Oral At Bedtime     eucerin   Topical BID     guaiFENesin  600 mg Oral BID     ipratropium - albuterol 0.5 mg/2.5 mg/3 mL  3 mL Nebulization 4x daily     aspirin EC  325 mg Oral Daily     sodium chloride (PF)  10 mL Intracatheter Q8H       - MEDICATION INSTRUCTIONS -       DOBUTamine 3 mcg/kg/min (01/30/17 0000)     Reason ACE/ARB order not selected       - MEDICATION INSTRUCTIONS -       Current active medications and PTA medications reviewed, see medication list for details.            Physical Exam:   Vitals were reviewed  Patient Vitals for the past 24 hrs:   BP Temp Temp src Heart Rate Resp SpO2 Weight   01/30/17 1246 - - - - - 96 % -   01/30/17 1145 108/51 mmHg 98.3  F (36.8  C) Oral 73 16 97 % -   01/30/17 1128 - - - - 16 - -   01/30/17 1043 - - - - 16 - -   01/30/17 0726 106/53 mmHg 97.9  F (36.6  C) Oral 87 18 94 % -   01/30/17 0523 - - - - - - 84 kg (185 lb 3 oz)   01/30/17 0118 113/61 mmHg 97.7  F (36.5  C) Oral 84 18 96 % -   01/29/17 1630 114/55 mmHg 96  F (35.6  C) Axillary 81 18 95 % -       Temp:  [96  F (35.6  C)-98.3  F (36.8  C)]  98.3  F (36.8  C)  Heart Rate:  [73-87] 73  Resp:  [16-18] 16  BP: (106-114)/(51-61) 108/51 mmHg  SpO2:  [94 %-97 %] 96 %    Temperatures:  Current - Temp: 98.3  F (36.8  C); Max - Temp  Av.5  F (36.4  C)  Min: 96  F (35.6  C)  Max: 98.3  F (36.8  C)  Respiration range: Resp  Av  Min: 16  Max: 18  Pulse range: No Data Recorded  Blood pressure range: Systolic (24hrs), Av mmHg, Min:106 mmHg, Max:114 mmHg  ; Diastolic (24hrs), Av mmHg, Min:51 mmHg, Max:61 mmHg    Pulse oximetry range: SpO2  Av.6 %  Min: 94 %  Max: 97 %    I/O last 3 completed shifts:  In: 1209 [P.O.:1020; I.V.:189]  Out: 1650 [Urine:1650]      Intake/Output Summary (Last 24 hours) at 17 1512  Last data filed at 17 1400   Gross per 24 hour   Intake   1209 ml   Output   1650 ml   Net   -441 ml       Alert and responsive  Lungs with egophony left base and rales in R base  Cor RRR nl S1 S2 no M, No JVD  LE wrapped.        Wt Readings from Last 4 Encounters:   17 84 kg (185 lb 3 oz)   16 90.5 kg (199 lb 8.3 oz)   16 83.235 kg (183 lb 8 oz)   16 82.328 kg (181 lb 8 oz)          Data:          NA      140   2017  NA      141   2017  NA      144   2017 CHLORIDE      103   2017  CHLORIDE      103   2017  CHLORIDE      102   2017 BUN       45   2017  BUN       50   2017  BUN       58   2017   POTASSIUM      3.8   2017  POTASSIUM      4.8   2017  POTASSIUM      3.7   2017 CO2       31   2017  CO2       35   2017  CO2       39   2017 CR     1.84   2017  CR     1.81   2017  CR     1.92   2017     Recent Labs   Lab Test  17   0615  17   0625  17   0600   WBC  7.4  6.7  5.8   HGB  8.2*  8.3*  8.3*   HCT  26.3*  26.4*  26.0*   MCV  90  88  88   PLT  120*  114*  98*     Recent Labs   Lab Test  17   0515  17   0405  17   0420   AST  133*  262*  287*   ALT  54  56  32   ALKPHOS  93  96   95   BILITOTAL  0.6  0.6  0.6       Recent Labs   Lab Test  01/23/17   0515  01/22/17   0405  08/13/16   0515   MAG  2.8*  2.9*  2.2     Recent Labs   Lab Test  01/23/17   0515  01/22/17   0405  08/13/16   0515   PHOS  6.3*  8.3*  3.6     Recent Labs   Lab Test  01/30/17   0900  01/29/17   0615  01/28/17   0625   JARETH  8.0*  8.0*  8.0*       Lab Results   Component Value Date    JARETH 8.0* 01/30/2017     Lab Results   Component Value Date    WBC 7.4 01/29/2017    HGB 8.2* 01/29/2017    HCT 26.3* 01/29/2017    MCV 90 01/29/2017    * 01/29/2017     Lab Results   Component Value Date     01/30/2017    POTASSIUM 3.8 01/30/2017    CHLORIDE 103 01/30/2017    CO2 31 01/30/2017    * 01/30/2017     Lab Results   Component Value Date    BUN 45* 01/30/2017    CR 1.84* 01/30/2017     Lab Results   Component Value Date    MAG 2.8* 01/23/2017     Lab Results   Component Value Date    PHOS 6.3* 01/23/2017       CREATININE   Date Value Ref Range Status   01/30/2017 1.84* 0.66 - 1.25 mg/dL Final   01/29/2017 1.81* 0.66 - 1.25 mg/dL Final   01/28/2017 1.92* 0.66 - 1.25 mg/dL Final   01/27/2017 2.01* 0.66 - 1.25 mg/dL Final   01/26/2017 2.33* 0.66 - 1.25 mg/dL Final   01/25/2017 2.88* 0.66 - 1.25 mg/dL Final       Attestation:  I have reviewed today's vital signs, notes, medications, labs and imaging.     Tenzin Tyler MD

## 2017-01-30 NOTE — PROGRESS NOTES
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     Assessment:  1. Acute on chronic diastolic heart failure.    - Difficult to know what his true dry weight is (weight range 180-220; likely ~ 190).    - admit wt 191, current wt 185   - I/o -14.6 L net    - improving, but long term prognosis is poor   -Now DNR/DNI      2. Hypoxemic respiratory failure, multifactorial chf and ? Of pna, on abx    - remains on O2 per NC   - L pleural effusion seen on admission, thoracentesis for 700 cc 1/25, bloody effusion with cytology pending   -  good results on cxr- although still congestion    3. Chronic kidney disease, severe, stage IV   - Cr improving- peak 3.9>> 1.74    4. Chronic Atrial Fibrillation - Rate controlled - not on anticoagulation due to GI bleed and epistaxis on A\C.    5. Mild troponinemia - likely type II demand infarct   -No anginal symptoms or ischemic EKG changes. Peak - 0.22    6. Severe anemia. Would do much better with higher Hgb with less diuretic resistance and probably better renal function.    - 1 u prbc 1/24, increase po iron and possible epo per nephrology    7  Mod- severe TR and elev pulm pressures with D shaped septum      Plan:   1. Continue dobutamine gtt, to po IV lasix  2. Daily bmp  3. Has followup with Cathie Salas NP  2/2, this will need to be rescheduled.    ATTESTATION:  Mr. Smith was seen and examined. Agree with note and plan of care.  Weight up a bit but I/Os show net  negative. Creatinine stable. On 20mg IV BID Lasix at this time, in addition to Dobutamine. Reports feeling better ; up and walking intermittently. Hgb stable.  Hopefully will be able to transition to PO tomorrow and see how he does.  FELISA Rodríguez MD       Interval History  No acute events overnight. Denies any chest pain, exertional chest pain, shortness of breath, dyspnea on exertion. Still on dobutamine drip    Physical Exam  Temp: 97.9  F (36.6  C) Temp src: Oral BP: 106/53 mmHg   Heart Rate: 87 Resp: 16 SpO2: 94 % O2 Device: Nasal cannula Oxygen Delivery: 3 LPM  Filed Vitals:    01/28/17 0500 01/29/17 0500 01/30/17 0523   Weight: 80 kg (176 lb 5.9 oz) 83 kg (182 lb 15.7 oz) 84 kg (185 lb 3 oz)       Constitutional   elderly, frail, chronically ill appearing  Skin   warm and dry to touch  ENT   no pallor or cyanosis  Neck  +JVP at 90d  Lungs coarse throughout  Cardiac irregularly irregular, II/VI systolic murmur  Abdomen   distented  Extremities and Back   Multiple toe amputations, legs wrapped to knees, pitting edema to his upper thighs, abdomen but improving  Neurological   no gross motor deficits noted, affect appropriate, slow to respond to questions.      Medications    - MEDICATION INSTRUCTIONS -       DOBUTamine 3 mcg/kg/min (01/30/17 0000)     Reason ACE/ARB order not selected       - MEDICATION INSTRUCTIONS -         furosemide  20 mg Intravenous BID     insulin glargine  8 Units Subcutaneous At Bedtime     ferrous sulfate  325 mg Oral BID     insulin aspart  1-7 Units Subcutaneous TID AC     insulin aspart  1-5 Units Subcutaneous At Bedtime     pantoprazole  40 mg Oral QAM     carbidopa-levodopa  1 tablet Oral BID     co-enzyme Q-10  100 mg Oral Daily     pramipexole  0.5 mg Oral Daily     simvastatin  40 mg Oral At Bedtime     eucerin   Topical BID     guaiFENesin  600 mg Oral BID     ipratropium - albuterol 0.5 mg/2.5 mg/3 mL  3 mL Nebulization 4x daily     aspirin  300 mg Rectal Daily    Or      aspirin EC  325 mg Oral Daily     sodium chloride (PF)  10 mL Intracatheter Q8H       Data  Most Recent 3 CBC's:  Recent Labs   Lab Test  01/29/17   0615  01/28/17   0625  01/27/17   0600   WBC  7.4  6.7  5.8   HGB  8.2*  8.3*  8.3*   MCV  90  88  88   PLT  120*  114*  98*     Most Recent 3 BMP's:  Recent Labs   Lab Test  01/30/17   0900  01/29/17   0615  01/28/17   0625   NA  140  141  144   POTASSIUM  3.8  4.8  3.7   CHLORIDE  103  103  102   CO2  31  35*  39*   BUN  45*  50*  58*   CR  1.84*  1.81*  1.92*   ANIONGAP  6  3  3   JARETH  8.0*  8.0*  8.0*   GLC  105*  77  71     Most Recent 3 Troponin's:  Recent Labs   Lab Test  01/22/17   0405  01/20/17   0940  12/24/16   0229   TROPI  0.223*  0.110*  0.041     Last 24 hours labs reviewed     Echo 1/20/17  The right ventricle is severely dilated and the right ventricular systolic function is moderate to severely reduced.  There is moderate to mod-severe (2-3+) tricuspid regurgitation. The right ventricular systolic pressure is approximated at 41mmHg plus the right atrial pressure is elevated, consistent with moderate to severe pulmonary  hypertension.  Flattened septum is consistent with RV pressure/volume overload.  Moderate left pleural effusion is also seen.  Compared to previous echos, the signifcant pulmonary HTN persists with secondary effects on the right heart.

## 2017-01-30 NOTE — PLAN OF CARE
Problem: Goal Outcome Summary  Goal: Goal Outcome Summary  Outcome: No Change  A/O, VSS, Ax2 with walker and gait belt, [place], dobutamine 7.7 ml/hr, chan WDL, 3L O2 wean as tolerated, eye drops given, 2+ edema BLE, lymphedema wraps in place, BS, flatus, right wrist mepilex CDI, tele afib BBB. Hopefully d/c to TCU 2/1.

## 2017-01-30 NOTE — PLAN OF CARE
Alert and oriented x4. VSS on 3L. Up with two and walker. Dressings changed to LE and right wrist. Tele A fib with BBB. 1200 fluid restriction had 480 on day shift, for a total of 780 for the day. Dobutamine drip at 7.7 per hour. Plan to d/c to TCU. Will continue to monitor.

## 2017-01-30 NOTE — PLAN OF CARE
Problem: Goal Outcome Summary  Goal: Goal Outcome Summary  Outcome: No Change  A & O.  On 3L NC, other VSS.  Dobutamine @ 7.7.  [place] patent, adequate output.  Up w/ 2, walker and gait belt.  BS was 187.  C/O left leg pain, relief with PO dilaudid.  [place] when hearing aids are out.  Call light within reach and calls appropriately.

## 2017-01-30 NOTE — INTERIM SUMMARY
Palliative Care Interim Summary  GOC are delineated, as is code status. No active symptom management issues.  Per d/w primary team, will sign off now.  Please re-consult if we can be of assistance in the future; thanks for asking us to be involved.    Vadim Guan MD  Palliative Medicine Consult Team  Pager: [phone]

## 2017-01-30 NOTE — PLAN OF CARE
Problem: Goal Outcome Summary  Goal: Goal Outcome Summary  OT/CR: pt ambulated approx 120 ft with ww and CGA and another to A with IV pole and O2 tank, pt requiring several rest breaks standing due to slight SOB and fatigue, O2 sats upon return WFL and hypotensive with BP at rest 108/51 and after walk standing 99/49. Pt able to stand at chair for approx 10 mins with SBA and ww with decreased fatigue. Pt requires THOM for sit<>stand. Recommend TCU at discharge.

## 2017-01-30 NOTE — PLAN OF CARE
Left calf ulcer looks improved since yesterday. Only scant blood on dressing today.  Able to walk out into hallway twice today with assist of 2 and gait belt and walker.  Appetite good.  Still requiring 3L oxygen.  Desats to 83% on RA.  Has had po Dilaudid once today.

## 2017-01-30 NOTE — PROGRESS NOTES
[facility]    Hospitalist Progress Note    Date of Service (when I saw the patient): 01/30/2017    Assessment and Plan  Armand Smith is a 87 year old male with complex PMHx including hypertension, left sided diastolic HF and right sided systolic dysfunction with 2+ TR, chronic afib with hx of stroke presently on aspirin, insulin-dependent diabetes, Parkinson's disease, stage IV CKD and chronic anemia who was admitted on 1/20/2017 after a mechanical fall with worsening sob and wheezing in the days preceding admission, c/w CHF exacerbation.     Acute diastolic congestive heart failure exacerbation:    PTA meds: [Bumex 2mg daily, metolazone 2.5mg twice weekly].  Weight reportedly fluctuating as outpatient. Ongoing/ worsening dyspnea, orthopnea and increased leg edema consistent with congestive heart failure exacerbation, proBNP elevated. Required BiPAP on admission given respiratory acidosis and somnolence.     Echo from 12/25 showed preserved EF. Repeat echo on 1/22 showed EF 55-60%, severely dilated RV with severely reduced RVSF and mod-severe TR with mod-severe pulmonary HTN    Started on Lasix 40 mg IV BID on admission - tolerated despite soft BP and diuresed well but then held 1/22-1/23 given development of BALAJI. Has intermittently required BiPAP dt dyspnea.     -- intermittently diuresing this stay, Lasix 40mg IV BID resumed 1/29 -> ongoing diuresis per cards and nephrology  -- dobutamine gtt initiated 1/24 -> defer to cards as to when this will be dc'd  -- follow Is/Os, daily wts -> net neg 1.3L in past 24h, net neg 14.6L this stay; wts inaccurate   -- cont 1.2L fluid restriction  -- appreciate assistance from cardiology and nephrology this stay    Acute hypoxic and hypercarbic respiratory failure.    Likely multifactorial including diastolic CHF exacerbation, possible PNA and reactive airway disease. He has leukopenia, but no fever or cough, but procalcitonin was elevated/remains elevated. CXR  c/w pulmonary congestion vs left lower lobe infiltrate as per report. Pro-calcitonin was elevated. Given episode of hypotension in ER, presumed infection/sepsis, and was initiailly started on broad spectrum abx with IV Levaquin, meropenem and vancomycin for suspected healthcare-associated pneumonia.        Required BiPAP on admission given underlying respiratory acidosis and somnolence. With treatment as above has been weaned off BiPAP. Continues on supplemental O2 via NC.    Started on diuretics on admission as above.    Blood cultures remain neg. Minimal sputum production. Levaquin dc'd 1/24. Wife endorsed some coughing after taking meds with water -> seen by SLP, no overt signs of aspiration. Completed 7d course of Meropenem on 1/27 for treatment of possible aspiration pneumonia.    Endorsed some wheezing as well, which did not improve with diuresis and antibiotics. Does not carry diagnosis of COPD but used to smoke a pipe and has noted pulmonary HTN. Wheezing subsided following dose of steroid x1 on 1/23. Steroids not continued.     Underwent left sided thoracentesis on 1/25 with removal of 700ml pleural fluid. Studies c/w transudative effusion.    -- mgmt of CHF as above  -- cont pulmonary toilet with acapella and IS  -- cont sched duonebs and prn albuterol neb  -- wean supplemental O2 as able (not on O2 prior to admission) -> remains on 3L NC, haven't had success in weaning O2 in recent days    Acute kidney injury on stage IV chronic kidney disease.    Baseline Cr appears to be around 2.6 (was 3.1 with nephrology follow recently).  He presented with Cr of 3.5. His BUN is more than 100. Likely due to cardiorenal syndrome from poor renal perfusion.  Renal ultrasound negative for hydronephrosis    BUN/Cr continued to worsen with initial diuresis, peaked at 3.97 on 1/22 ->  IV lasix held after 1/22 am, and started on gentle IV hydration per nephrology. IVFs dc'd on 1/23 due to increased dyspnea.     Started on  dobutamine gtt on 1/24. Intermittently diuresing with IV Lasix this stay.     -- chan catheter remains in place for strict Is/Os  -- Cr slowly trending down: 2.88 -> 2.33 -> 2.01 -> 1.92 -> 1.81 -> 1.84 today  -- nephrology following    Hypertension  Chronic atrial fibrillation and hx of stroke:   [PTA meds: metoprolol XL 25mg daily, hydralazine, simvastatin, full dose aspirin].   Not on any other anticoagulation due to history of GI bleed and epistaxis. Not on ARB due to renal failure.   Presented hypotensive/low normal BP. PTA meds held while pressures soft and initially unable to take po given need for BiPAP.  Have been resuming meds as able.     -- cont aspirin and statin  -- resume Toprol XL once off dobutamine gtt  -- has prn Lopressor for RVR -> none needed this stay     DM II, insulin-dependent.   [PTA regimen: Lantus 20U HS]. A1C 7.0 this stay.   Insulin dose initially lowered given his NPO status. Needs have been variable this stay given poor po intake. Had been on as much as 15U HS on 1/24, needs have greatly decreased since then.      Recent Labs  Lab 01/30/17  0910 01/30/17  0900 01/30/17  0722 01/30/17  0204 01/29/17  2152 01/29/17  1718 01/29/17  1143  01/29/17  0615  01/28/17  0625  01/27/17  0600  01/26/17  0540  01/25/17  0543   GLC  --  105*  --   --   --   --   --   --  77  --  71  --  51*  --  38*  --  97   *  --  48* 87 187* 112* 166*  < >  --   < >  --   < >  --   < >  --   < >  --    < > = values in this interval not displayed.    -- hypoglycemic again overnight -> will decrease Lantus from 8U HS to 6U HS  -- cont med dose SSI  -- ?need to wake for snack overnight     Parkinson's disease and tremor.  Chronic and stable on PTA meds including Sinemet and Mirapex    Chronic Anemia likely dt ongoing illness and renal failure, and thrombocytopenia.   Baseline hgb around 8. Did require transfusion of 1U PRBCs during hospitalization in 12/2016. Prior workup c/w iron deficiency. Is now on  iron supplement.     Hgb down to 6.8 on 1/24 -> transfused 1U PRBCs. Platelet count down to low 100s with findings of petechial lesions on chest, lesions remain stable. No heparin products given this stay. Suspect drop secondary to ongoing illness. Some question of HUS -> mildly elevated LDH, though nl bili and haptoglobin; peripheral smear c/w normochromic normocytic anemia; platelets morphologically unremarkable.    Given Venofer this stay (x5 doses, 1/25-1/29).    -- conts on oral iron as well  -- platelet counts remain stable -> no need for heme consult at this time    Fall, now deconditioned:  Prompted ED visit. Likely mechanical.  He is weak and deconditioned from congestive heart failure and possibly pneumonia.  Also, suspected uremic encephalopathy given his somnolence and drowsiness versus from CO2 narcosis. Patient was on narcotic medication as well, and his BP was improving and he became more alert with narcan in ER.  -- narcotics dc'd -> will not resume at discharge  -- had dc'd home from hospital stay in 12/2016 with home care -> will need higher level of care at discharge  -- PT/OT/cardiac rehab following -> TCU at discharge     Gout.  Chronic and stable on allopurinol.    Elevated AST: Improving  Unclear etiology. ? Liver/muscle/cardiac origin. ?liver congestion but otherwise normal ALP/ALT. CK normal. RUQ US this stay unremarkable.    Hypernatremia: Resolved  Na with slow upward trend in context of diuresis as above. Na 148 on 1/26 and 1/27.  -- given 500cc free water per nephrology on 1/26 and encouraged oral intake  -- Na normalized on 1/28    FEN: no IVFs, lytes otherwise stable, regular diet w/thin liquids per SLP  DVT Prophylaxis: PCDs  Code Status: DNR/DNI    Disposition: Remains on dobutamine gtt. Discharge pending clinical course - plan for diuretics, response to therapy. Plan to discharge to TCU.    Palliative care met with patient/family on 1/27. Patient clearly stated his goal was  "restorative care - to get to TCU \"at [facility] to get stronger\". Clearly stated he would not want resuscitative measures thus code status was changed to DNR/DNI.     Appreciate help of subspecialists this stay.    Winnie Saha    Interval History  Uneventful night. Hypoglycemic again this morning with BG 43, improved to 118 after some juice. Feeling okay today, didn't eat much for breakfast, complains the food isn't good and it's too dry. Denies cp/sob/cough. Daily wts inaccurate.    -Data reviewed today: I reviewed all new labs and imaging results over the last 24 hours. I personally reviewed no images or EKG's today.    Physical Exam  Temp: 97.9  F (36.6  C) Temp src: Oral BP: 106/53 mmHg   Heart Rate: 87 Resp: 16 SpO2: 94 % O2 Device: Nasal cannula Oxygen Delivery: 3 LPM  Filed Vitals:    01/28/17 0500 01/29/17 0500 01/30/17 0523   Weight: 80 kg (176 lb 5.9 oz) 83 kg (182 lb 15.7 oz) 84 kg (185 lb 3 oz)     Vital Signs with Ranges  Temp:  [96  F (35.6  C)-97.9  F (36.6  C)] 97.9  F (36.6  C)  Heart Rate:  [81-87] 87  Resp:  [16-18] 16  BP: (105-114)/(52-61) 106/53 mmHg  SpO2:  [94 %-97 %] 94 %  I/O last 3 completed shifts:  In: 1209 [P.O.:1020; I.V.:189]  Out: 2050 [Urine:2050]    Constitutional: Resting comfortably, alert, answering questions appropriately, NAD  Respiratory: +bibasilar crackles mostly over the L base, no wheezing, no increased work of breathing  Cardiovascular: HR/rhythm irregular, no MGR   GI: S, NT, ND, +BS  Skin/Integumen: warm/dry  Other: has compression wraps on bilateral LEs    Medications    - MEDICATION INSTRUCTIONS -       DOBUTamine 3 mcg/kg/min (01/30/17 0000)     Reason ACE/ARB order not selected       - MEDICATION INSTRUCTIONS -         furosemide  20 mg Intravenous BID     insulin glargine  8 Units Subcutaneous At Bedtime     ferrous sulfate  325 mg Oral BID     insulin aspart  1-7 Units Subcutaneous TID AC     insulin aspart  1-5 Units Subcutaneous At Bedtime     " pantoprazole  40 mg Oral QAM     carbidopa-levodopa  1 tablet Oral BID     co-enzyme Q-10  100 mg Oral Daily     pramipexole  0.5 mg Oral Daily     simvastatin  40 mg Oral At Bedtime     eucerin   Topical BID     guaiFENesin  600 mg Oral BID     ipratropium - albuterol 0.5 mg/2.5 mg/3 mL  3 mL Nebulization 4x daily     aspirin  300 mg Rectal Daily    Or     aspirin EC  325 mg Oral Daily     sodium chloride (PF)  10 mL Intracatheter Q8H       Data    Recent Labs  Lab 01/30/17  0900 01/29/17  0615 01/28/17  0625 01/27/17  0600  01/25/17  1124   WBC  --  7.4 6.7 5.8  < >  --    HGB  --  8.2* 8.3* 8.3*  < >  --    MCV  --  90 88 88  < >  --    PLT  --  120* 114* 98*  < > 112*   INR  --   --   --   --   --  1.20*    141 144 148*  < >  --    POTASSIUM 3.8 4.8 3.7 3.9  < >  --    CHLORIDE 103 103 102 105  < >  --    CO2 31 35* 39* 37*  < >  --    BUN 45* 50* 58* 67*  < >  --    CR 1.84* 1.81* 1.92* 2.01*  < >  --    ANIONGAP 6 3 3 6  < >  --    JARETH 8.0* 8.0* 8.0* 7.9*  < >  --    * 77 71 51*  < >  --    < > = values in this interval not displayed.    No results found for this or any previous visit (from the past 24 hour(s)).

## 2017-01-30 NOTE — PROGRESS NOTES
Pt stable on 3 LPM nc, BBS diminished. All tx given as ordered, will continue to follow.     Jamir De Souza RT

## 2017-01-31 NOTE — PLAN OF CARE
Problem: Goal Outcome Summary  Goal: Goal Outcome Summary  OT:  was in room when attempting to see Pt.

## 2017-01-31 NOTE — PROGRESS NOTES
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      Assessment:  1. Acute on chronic diastolic heart failure.                - Difficult to know what his true dry weight is (weight range 180-220; likely ~ 190).                - admit wt 191, current wt recorded at 167              - improving, but long term prognosis is poor              -Now DNR/DNI                 2. Hypoxemic respiratory failure, multifactorial chf and ? Of pna, on abx                   - remains on O2 per NC              - L pleural effusion seen on admission, thoracentesis for 700 cc 1/25    3. Chronic kidney disease, severe, stage IV              - Cr at baseline    4. Chronic Atrial Fibrillation - Rate controlled - not on anticoagulation due to GI bleed and epistaxis on A\C.    5. Mild troponinemia - likely type II demand infarct              -No anginal symptoms or ischemic EKG changes. Peak - 0.22    6. Severe anemia.               - 1 u prbc 1/24, increase po iron and possible epo per nephrology    7  Mod- severe TR and elev pulm pressures with D shaped septum      Plan:   1. D/C dobutamine today  2. Switching to oral lasix today.  3. Has followup with Cathie Salas NP  2/2    From CV standpoint ok to D/C tomorrow, so long as stable with changes made today.     FELISA Rodríguez MD              Interval History:   Reports feeling well.              Medications:       furosemide  40 mg Oral Daily     insulin glargine  3 Units Subcutaneous At Bedtime      ferrous sulfate  325 mg Oral Daily     insulin aspart  1-7 Units Subcutaneous TID AC     insulin aspart  1-5 Units Subcutaneous At Bedtime     pantoprazole  40 mg Oral QAM     carbidopa-levodopa  1 tablet Oral BID     co-enzyme Q-10  100 mg Oral Daily     pramipexole  0.5 mg Oral Daily     simvastatin  40 mg Oral At Bedtime     eucerin   Topical BID     guaiFENesin  600 mg Oral BID     ipratropium - albuterol 0.5 mg/2.5 mg/3 mL  3 mL Nebulization 4x daily     aspirin EC  325 mg Oral Daily     sodium chloride (PF)  10 mL Intracatheter Q8H     hypromellose-dextran, lidocaine visc 2% 2.5mL/5mL & maalox/mylanta w/ simeth 2.5mL/5mL & diphenhydrAMINE 5mg/5mL, senna-docusate, polyethylene glycol, lidocaine, lidocaine 4%, sodium chloride (PF), - MEDICATION INSTRUCTIONS -, HYDROmorphone, HOLD MEDICATION, carbidopa-levodopa, carboxymethylcellulose, metoprolol, glucose **OR** dextrose **OR** glucagon, naloxone, acetaminophen, acetaminophen, bisacodyl, ondansetron **OR** ondansetron, Reason ACE/ARB order not selected, - MEDICATION INSTRUCTIONS -, albuterol, nitroglycerin, sodium chloride (PF)               Physical Exam:   Blood pressure 107/53, pulse 80, temperature 97.1  F (36.2  C), temperature source Oral, resp. rate 18, weight 75.8 kg (167 lb 1.7 oz), SpO2 96 %.  Wt Readings from Last 4 Encounters:   17 75.8 kg (167 lb 1.7 oz)   16 90.5 kg (199 lb 8.3 oz)   16 83.235 kg (183 lb 8 oz)   16 82.328 kg (181 lb 8 oz)         Vital Sign Ranges  Temperature Temp  Av.3  F (36.3  C)  Min: 96.7  F (35.9  C)  Max: 97.6  F (36.4  C)   Blood pressure Systolic (24hrs), Av mmHg, Min:104 mmHg, Max:114 mmHg       Diastolic (24hrs), Av mmHg, Min:37 mmHg, Max:76 mmHg      Pulse No Data Recorded   Respirations Resp  Av  Min: 16  Max: 18   Pulse oximetry SpO2  Av %  Min: 96 %  Max: 100 %         Intake/Output Summary (Last 24 hours) at 17 1223  Last data filed at 17 1156   Gross  per 24 hour   Intake    120 ml   Output   2600 ml   Net  -2480 ml       Constitutional: Awake, alert, cooperative, no apparent distress   Lungs: Clear to auscultation bilaterally, no crackles or wheezing   Cardiovascular: Distant heart sounds   Abdomen: Normal bowel sounds, soft, non-distended, non-tender   Skin: No rashes, no cyanosis, trace edema   Other:           Data:     Recent Labs   Lab Test  01/31/17   0525  01/29/17   0615  01/28/17   0625   01/25/17   1124   09/29/15   0940   04/09/15   0538   WBC  5.3  7.4  6.7   < >   --    < >  8.2   < >   --    HGB  7.9*  8.2*  8.3*   < >   --    < >  11.3*   < >   --    MCV  88  90  88   < >   --    < >  85   < >   --    PLT  158  120*  114*   < >  112*   < >  222   < >   --    INR   --    --    --    --   1.20*   --   1.08   --   1.06    < > = values in this interval not displayed.      Recent Labs   Lab Test  01/31/17   0525  01/30/17   0900  01/29/17   0615   NA  141  140  141   POTASSIUM  3.8  3.8  4.8   CHLORIDE  104  103  103   CO2  34*  31  35*   BUN  44*  45*  50*   CR  1.85*  1.84*  1.81*   ANIONGAP  3  6  3   JARETH  7.9*  8.0*  8.0*   GLC  73  105*  77         Brittany Rodríguez MD

## 2017-01-31 NOTE — PROGRESS NOTES
Assessment and Plan:   CKD: Cr stable at around 1.8. Getting lasix 20 mg IV bid. I/O 969/1750. Wt adm 86.7, wt today 75.8. Will change to oral diuretics. Ok for discharge per IM and Cards. F/U in our office 2 weeks with Janet Small NP.             Interval History:   Anemia: on FeSO4.   CHF: on dobutamine.   DM                 Review of Systems:   Feels well, notes marked decrease in LE edema. Taking po well.           Medications:       insulin glargine  6 Units Subcutaneous At Bedtime     ferrous sulfate  325 mg Oral Daily     furosemide  20 mg Intravenous BID     insulin aspart  1-7 Units Subcutaneous TID AC     insulin aspart  1-5 Units Subcutaneous At Bedtime     pantoprazole  40 mg Oral QAM     carbidopa-levodopa  1 tablet Oral BID     co-enzyme Q-10  100 mg Oral Daily     pramipexole  0.5 mg Oral Daily     simvastatin  40 mg Oral At Bedtime     eucerin   Topical BID     guaiFENesin  600 mg Oral BID     ipratropium - albuterol 0.5 mg/2.5 mg/3 mL  3 mL Nebulization 4x daily     aspirin EC  325 mg Oral Daily     sodium chloride (PF)  10 mL Intracatheter Q8H       - MEDICATION INSTRUCTIONS -       DOBUTamine 3 mcg/kg/min (01/31/17 0927)     Reason ACE/ARB order not selected       - MEDICATION INSTRUCTIONS -       Current active medications and PTA medications reviewed, see medication list for details.            Physical Exam:   Vitals were reviewed  Patient Vitals for the past 24 hrs:   BP Temp Temp src Heart Rate Resp SpO2 Weight   01/31/17 1133 - - - - - 96 % -   01/31/17 1104 107/53 mmHg 97.1  F (36.2  C) Oral 81 18 98 % -   01/31/17 0815 - - - - - 96 % -   01/31/17 0745 113/56 mmHg 97.6  F (36.4  C) Oral 78 18 97 % -   01/31/17 0525 114/60 mmHg 97.4  F (36.3  C) Oral 84 16 96 % 75.8 kg (167 lb 1.7 oz)   01/31/17 0110 - 97.5  F (36.4  C) Oral - - - -   01/31/17 0059 (!) 104/37 mmHg - - 86 18 96 % -   01/30/17 2007 - - - - - 98 % -   01/30/17 1926 109/76 mmHg 97.5  F (36.4  C) Oral 86 16 97 % -    17 1623 107/55 mmHg 96.7  F (35.9  C) Oral 78 16 100 % -   17 1246 - - - - - 96 % -       Temp:  [96.7  F (35.9  C)-97.6  F (36.4  C)] 97.1  F (36.2  C)  Heart Rate:  [78-86] 81  Resp:  [16-18] 18  BP: (104-114)/(37-76) 107/53 mmHg  SpO2:  [96 %-100 %] 96 %    Temperatures:  Current - Temp: 97.1  F (36.2  C); Max - Temp  Av.3  F (36.3  C)  Min: 96.7  F (35.9  C)  Max: 97.6  F (36.4  C)  Respiration range: Resp  Av  Min: 16  Max: 18  Pulse range: No Data Recorded  Blood pressure range: Systolic (24hrs), Av mmHg, Min:104 mmHg, Max:114 mmHg  ; Diastolic (24hrs), Av mmHg, Min:37 mmHg, Max:76 mmHg    Pulse oximetry range: SpO2  Av %  Min: 96 %  Max: 100 %    I/O last 3 completed shifts:  In: 480 [P.O.:480]  Out: 2150 [Urine:2150]      Intake/Output Summary (Last 24 hours) at 17 1155  Last data filed at 17 0500   Gross per 24 hour   Intake    120 ml   Output   2150 ml   Net  -2030 ml       Alert, up in chair  LE wrapped  Lungs with bibasilar rales and egophony  Cor RRR nl S1 S2 no M       Wt Readings from Last 4 Encounters:   17 75.8 kg (167 lb 1.7 oz)   16 90.5 kg (199 lb 8.3 oz)   16 83.235 kg (183 lb 8 oz)   16 82.328 kg (181 lb 8 oz)          Data:          NA      141   2017  NA      140   2017  NA      141   2017 CHLORIDE      104   2017  CHLORIDE      103   2017  CHLORIDE      103   2017 BUN       44   2017  BUN       45   2017  BUN       50   2017   POTASSIUM      3.8   2017  POTASSIUM      3.8   2017  POTASSIUM      4.8   2017 CO2       34   2017  CO2       31   2017  CO2       35   2017 CR     1.85   2017  CR     1.84   2017  CR     1.81   2017     Recent Labs   Lab Test  17   0525  17   0615  17   0625   WBC  5.3  7.4  6.7   HGB  7.9*  8.2*  8.3*   HCT  24.5*  26.3*  26.4*   MCV  88  90  88   PLT  158  120*  114*     Recent Labs    Lab Test  01/23/17   0515  01/22/17   0405  01/21/17   0420   AST  133*  262*  287*   ALT  54  56  32   ALKPHOS  93  96  95   BILITOTAL  0.6  0.6  0.6       Recent Labs   Lab Test  01/23/17   0515  01/22/17   0405  08/13/16   0515   MAG  2.8*  2.9*  2.2     Recent Labs   Lab Test  01/23/17   0515  01/22/17   0405  08/13/16   0515   PHOS  6.3*  8.3*  3.6     Recent Labs   Lab Test  01/31/17   0525  01/30/17   0900  01/29/17   0615   JARETH  7.9*  8.0*  8.0*       Lab Results   Component Value Date    JARETH 7.9* 01/31/2017     Lab Results   Component Value Date    WBC 5.3 01/31/2017    HGB 7.9* 01/31/2017    HCT 24.5* 01/31/2017    MCV 88 01/31/2017     01/31/2017     Lab Results   Component Value Date     01/31/2017    POTASSIUM 3.8 01/31/2017    CHLORIDE 104 01/31/2017    CO2 34* 01/31/2017    GLC 73 01/31/2017     Lab Results   Component Value Date    BUN 44* 01/31/2017    CR 1.85* 01/31/2017     Lab Results   Component Value Date    MAG 2.8* 01/23/2017     Lab Results   Component Value Date    PHOS 6.3* 01/23/2017       CREATININE   Date Value Ref Range Status   01/31/2017 1.85* 0.66 - 1.25 mg/dL Final   01/30/2017 1.84* 0.66 - 1.25 mg/dL Final   01/29/2017 1.81* 0.66 - 1.25 mg/dL Final   01/28/2017 1.92* 0.66 - 1.25 mg/dL Final   01/27/2017 2.01* 0.66 - 1.25 mg/dL Final   01/26/2017 2.33* 0.66 - 1.25 mg/dL Final       Attestation:  I have reviewed today's vital signs, notes, medications, labs and imaging.     Tenzin Tyler MD

## 2017-01-31 NOTE — PLAN OF CARE
Problem: Goal Outcome Summary  Goal: Goal Outcome Summary  Outcome: No Change  VSS. No c/o pain.  Tele AFib with CVR with BBB. Cortez in place with good u/o. Pt continues to diurese. Pt on pulsate mattress. Dobutamine at 7.7ml/hr. 97% on 3L NC. Pt with productive cough. Will  Continue to monitor.

## 2017-01-31 NOTE — PLAN OF CARE
Problem: Goal Outcome Summary  Goal: Goal Outcome Summary  Outcome: Therapy, progress towards functional goals is fair  CR: Pt requires assist of 2 to transfer out of bed and stand at EOB with WW. Pt needs VC's not to push back when attempting to stand. Pt ambulated 110 ft behind walker with 2 rest breaks. Good effort blunted vitals  108/55 HR 93 Sats 95% on RA. Pt needs encouragement states he is more fatigue in PM. Per plan established by the Occupational Therapist, the recommended discharge location is TCU.

## 2017-01-31 NOTE — PLAN OF CARE
Problem: Goal Outcome Summary  Goal: Goal Outcome Summary    Pt is alert and oriented X 4. VSS /76 mmHg  Pulse 80  Temp(Src) 97.5  F (36.4  C) (Oral)  Resp 16  Wt 84 kg (185 lb 3 oz)  SpO2 98% on 3 L NC. Was up in the chair for a while, then back to bed with 2 assist, a t-belt and a walker. Tele is Afib with BBB. Eric LE dressing is C/D/I. Dobutamine gtt continues per order. Fluid restriction increased to 1500 cc/day -- which made patient very happy, he said he hates fluid restrictions. Cortez is patent. Blood sugar covered per sliding scale. Eye drops per PRN order. Family was at bedside this shift -- supportive. Pt is rounded on often and encouraged to call for all help and assistance. Will cont to monitor.

## 2017-01-31 NOTE — PROGRESS NOTES
[facility]    Hospitalist Progress Note    Date of Service (when I saw the patient): 01/31/2017    Assessment and Plan     Armand Smith is a 87 year old male with complex PMHx including hypertension, left sided diastolic HF and right sided systolic dysfunction with 2+ TR, chronic afib with hx of stroke presently on aspirin, insulin-dependent diabetes, Parkinson's disease, stage IV CKD and chronic anemia who was admitted on 1/20/2017 after a mechanical fall with worsening sob and wheezing in the days preceding admission, c/w CHF exacerbation.     Acute diastolic congestive heart failure exacerbation:    PTA meds: [Bumex 2mg daily, metolazone 2.5mg twice weekly].  Weight reportedly fluctuating as outpatient. Ongoing/ worsening dyspnea, orthopnea and increased leg edema consistent with congestive heart failure exacerbation, proBNP elevated. Required BiPAP on admission given respiratory acidosis and somnolence.     Echo from 12/25 showed preserved EF. Repeat echo on 1/22 showed EF 55-60%, severely dilated RV with severely reduced RVSF and mod-severe TR with mod-severe pulmonary HTN    Started on Lasix 40 mg IV BID on admission - tolerated despite soft BP and diuresed well but then held 1/22-1/23 given development of BALAJI. Resumed at lower dose then maintained as below. Has intermittently required BiPAP dt dyspnea.     -- Lasix 40mg IV BID resumed 1/29 -> ongoing diuresis per cards and nephrology, changed to lasix 40 mg PO daily today 1/31.  -- dobutamine gtt initiated 1/24 with lasix -> defer to cards as to when this will be dc'd  -- follow Is/Os, daily wts -> net neg 1.6L in past 24h, net neg 16.6 L this stay; wts inaccurate   -- No fluid restriction per nephro  -- appreciate assistance from cardiology and nephrology this stay    Acute hypoxic and hypercarbic respiratory failure.    Likely multifactorial including diastolic CHF exacerbation, possible PNA and reactive airway disease. He has leukopenia,  but no fever or cough, but procalcitonin was elevated/remains elevated. CXR c/w pulmonary congestion vs left lower lobe infiltrate as per report. Pro-calcitonin was elevated. Given episode of hypotension in ER, presumed infection/sepsis, and was initiailly started on broad spectrum abx with IV Levaquin, meropenem and vancomycin for suspected healthcare-associated pneumonia.        Required BiPAP on admission given underlying respiratory acidosis and somnolence. With treatment as above has been weaned off BiPAP. Continues on supplemental O2 via NC.    Started on diuretics on admission as above.    Blood cultures remain neg. Minimal sputum production. Levaquin dc'd 1/24. Wife endorsed some coughing after taking meds with water -> seen by SLP, no overt signs of aspiration. Completed 7d course of Meropenem on 1/27 for treatment of possible aspiration pneumonia.    Endorsed some wheezing as well, which did not improve with diuresis and antibiotics. Does not carry diagnosis of COPD but used to smoke a pipe and has noted pulmonary HTN. Wheezing subsided following dose of steroid x1 on 1/23. Steroids not continued.     Underwent left sided thoracentesis on 1/25 with removal of 700ml pleural fluid. Studies c/w transudative effusion.    -- mgmt of CHF as above  -- cont pulmonary toilet with acapella and IS  -- cont sched duonebs and prn albuterol neb  -- wean supplemental O2 as able (not on O2 prior to admission) -> remains on 3L NC, haven't had success in weaning O2 in recent days    Acute kidney injury on stage IV chronic kidney disease.    Baseline Cr appears to be around 2.6 (was 3.1 with nephrology follow recently).  He presented with Cr of 3.5. His BUN is more than 100. Likely due to cardiorenal syndrome from poor renal perfusion.  Renal ultrasound negative for hydronephrosis    BUN/Cr continued to worsen with initial diuresis, peaked at 3.97 on 1/22 ->  IV lasix held after 1/22 am, and started on gentle IV hydration  per nephrology. IVFs dc'd on 1/23 due to increased dyspnea.     Started on dobutamine gtt on 1/24. Intermittently diuresing with IV Lasix this stay.     -- chan catheter remains in place for strict Is/Os  -- Cr slowly trending down: 2.88 -> 2.33 -> 2.01 -> 1.92 -> 1.81 -> 1.84->1.85 today  -- nephrology following    Hypertension  Chronic atrial fibrillation and hx of stroke:   [PTA meds: metoprolol XL 25mg daily, hydralazine, simvastatin, full dose aspirin].   Not on any other anticoagulation due to history of GI bleed and epistaxis. Not on ARB due to renal failure.   Presented hypotensive/low normal BP. PTA meds held while pressures soft and initially unable to take po given need for BiPAP.  Have been resuming meds as able.     -- cont aspirin and statin  -- resume Toprol XL once off dobutamine gtt  -- has prn Lopressor for RVR -> none needed this stay     DM II, insulin-dependent.   [PTA regimen: Lantus 20U HS]. A1C 7.0 this stay.   Insulin dose initially lowered given his NPO status. Needs have been variable this stay given poor po intake. Had been on as much as 15U HS on 1/24, needs have greatly decreased since then.    Recent Labs  Lab 01/31/17  0740 01/31/17  0525 01/31/17  0224 01/30/17  2108 01/30/17  1658 01/30/17  1140 01/30/17  0910 01/30/17  0900  01/29/17  0615  01/28/17  0625  01/27/17  0600  01/26/17  0540   GLC  --  73  --   --   --   --   --  105*  --  77  --  71  --  51*  --  38*   BGM 60*  --  116* 203* 131* 155* 118*  --   < >  --   < >  --   < >  --   < >  --    < > = values in this interval not displayed.    -- hypoglycemic again overnight -> will decrease Lantus from 6U HS to 3U HS  -- cont med dose SSI  -- ?need to wake for snack overnight     Parkinson's disease and tremor.  Chronic and stable on PTA meds including Sinemet and Mirapex    Chronic Anemia likely dt ongoing illness and renal failure, and thrombocytopenia.   Baseline hgb around 8. Did require transfusion of 1U PRBCs during  hospitalization in 12/2016. Prior workup c/w iron deficiency. Is now on iron supplement.     Hgb down to 6.8 on 1/24 -> transfused 1U PRBCs. Platelet count down to low 100s with findings of petechial lesions on chest, lesions remain stable. No heparin products given this stay. Suspect drop secondary to ongoing illness. Some question of HUS -> mildly elevated LDH, though nl bili and haptoglobin; peripheral smear c/w normochromic normocytic anemia; platelets morphologically unremarkable.    Given Venofer this stay (x5 doses, 1/25-1/29).    -- conts on oral iron as well  -- platelet counts normal today    Fall, now deconditioned:  Prompted ED visit. Likely mechanical.  He is weak and deconditioned from congestive heart failure and possibly pneumonia.  Also, suspected uremic encephalopathy given his somnolence and drowsiness versus from CO2 narcosis. Patient was on narcotic medication as well, and his BP was improving and he became more alert with narcan in ER.  -- narcotics dc'd -> will not resume at discharge  -- had dc'd home from hospital stay in 12/2016 with home care -> will need higher level of care at discharge  -- PT/OT/cardiac rehab following -> TCU at discharge     Gout.  Chronic and stable on allopurinol.    Elevated AST: Improving  Unclear etiology. ? Liver/muscle/cardiac origin. ?liver congestion but otherwise normal ALP/ALT. CK normal. RUQ US this stay unremarkable.    Hypernatremia: Resolved  Na with slow upward trend in context of diuresis as above. Na 148 on 1/26 and 1/27.  -- given 500cc free water per nephrology on 1/26 and encouraged oral intake  -- Na normalized on 1/28    FEN: no IVFs, lytes otherwise stable, regular diet w/thin liquids per SLP  DVT Prophylaxis: PCDs  Code Status: DNR/DNI    Disposition: Remains on dobutamine gtt. Discharge pending clinical course - plan for diuretics, response to therapy. Plan to discharge to TCU.    Palliative care met with patient/family on 1/27. Patient  "clearly stated his goal was restorative care - to get to TCU \"at [facility] to get stronger\". Clearly stated he would not want resuscitative measures thus code status was changed to DNR/DNI.     Appreciate help of subspecialists this stay.    Iman Hood MD  Hospitalist    Interval History     Uneventful night. Hypoglycemic again this morning but BS 60, asymptomatic.  - Requesting to lift off the fluid restriction as he is not getting the foods he desires while on restriction.  - dyspnea and fatigue much better overall, walked twich as much yesterday per patient.     -Data reviewed today: I reviewed all new labs and imaging results over the last 24 hours. I personally reviewed no images or EKG's today.    Physical Exam  Temp: 97.1  F (36.2  C) Temp src: Oral BP: 107/53 mmHg   Heart Rate: 81 Resp: 18 SpO2: 96 % O2 Device: Nasal cannula Oxygen Delivery: 1 LPM  Filed Vitals:    01/29/17 0500 01/30/17 0523 01/31/17 0525   Weight: 83 kg (182 lb 15.7 oz) 84 kg (185 lb 3 oz) 75.8 kg (167 lb 1.7 oz)     Vital Signs with Ranges  Temp:  [96.7  F (35.9  C)-97.6  F (36.4  C)] 97.1  F (36.2  C)  Heart Rate:  [78-86] 81  Resp:  [16-18] 18  BP: (104-114)/(37-76) 107/53 mmHg  SpO2:  [96 %-100 %] 96 %  I/O last 3 completed shifts:  In: 480 [P.O.:480]  Out: 2150 [Urine:2150]    Constitutional: Resting in chair comfortably, alert, answering questions appropriately, NAD  Respiratory: +bibasilar crackles, somewhat diminished L base, no wheezing, no increased work of breathing.  Cardiovascular: s1s2 irregular, no MGR   GI: S, NT, ND, +BS  Skin/Integumen: warm/dry  Other: has compression wraps on bilateral LEs    Medications    - MEDICATION INSTRUCTIONS -       DOBUTamine 3 mcg/kg/min (01/31/17 0927)     Reason ACE/ARB order not selected       - MEDICATION INSTRUCTIONS -         insulin glargine  6 Units Subcutaneous At Bedtime     ferrous sulfate  325 mg Oral Daily     furosemide  20 mg Intravenous BID     insulin aspart  1-7 Units " Subcutaneous TID AC     insulin aspart  1-5 Units Subcutaneous At Bedtime     pantoprazole  40 mg Oral QAM     carbidopa-levodopa  1 tablet Oral BID     co-enzyme Q-10  100 mg Oral Daily     pramipexole  0.5 mg Oral Daily     simvastatin  40 mg Oral At Bedtime     eucerin   Topical BID     guaiFENesin  600 mg Oral BID     ipratropium - albuterol 0.5 mg/2.5 mg/3 mL  3 mL Nebulization 4x daily     aspirin EC  325 mg Oral Daily     sodium chloride (PF)  10 mL Intracatheter Q8H       Data    Recent Labs  Lab 01/31/17  0525 01/30/17  0900 01/29/17  0615 01/28/17  0625  01/25/17  1124   WBC 5.3  --  7.4 6.7  < >  --    HGB 7.9*  --  8.2* 8.3*  < >  --    MCV 88  --  90 88  < >  --      --  120* 114*  < > 112*   INR  --   --   --   --   --  1.20*    140 141 144  < >  --    POTASSIUM 3.8 3.8 4.8 3.7  < >  --    CHLORIDE 104 103 103 102  < >  --    CO2 34* 31 35* 39*  < >  --    BUN 44* 45* 50* 58*  < >  --    CR 1.85* 1.84* 1.81* 1.92*  < >  --    ANIONGAP 3 6 3 3  < >  --    JARETH 7.9* 8.0* 8.0* 8.0*  < >  --    GLC 73 105* 77 71  < >  --    < > = values in this interval not displayed.    No results found for this or any previous visit (from the past 24 hour(s)).

## 2017-02-01 NOTE — DISCHARGE SUMMARY
[facility]    Discharge Summary  Hospitalist    Date of Admission:  1/20/2017  Date of Discharge:  2/1/2017  Discharging Provider: Iman Hood MD  Date of Service (when I saw the patient): 02/01/2017    Discharge Diagnoses       Acute diastolic congestive heart failure exacerbation    Acute hypoxic and hypercarbic respiratory failure, multifactorial    Acute kidney injury on stage IV chronic kidney disease    Hypertension, essential    Chronic atrial fibrillation and hx of stroke    DM II, insulin-dependent     Parkinson's disease and tremor    Chronic Anemia likely dt ongoing illness and renal failure, and thrombocytopenia    Fall, multifactorial, decontioning    Gout.    Elevated AST     Hypernatremia     History of Present Illness    Armand Smith is a 87 year old male with complex PMHx including hypertension, left sided diastolic HF and right sided systolic dysfunction with 2+ TR, chronic afib with hx of stroke presently on aspirin, insulin-dependent diabetes, Parkinson's disease, stage IV CKD and chronic anemia who was admitted on 1/20/2017 after a mechanical fall with worsening sob and wheezing in the days preceding admission, c/w CHF exacerbation.     Hospital Course  Armand Smith was admitted on 1/20/2017.  The following problems were addressed during his hospitalization:    Acute diastolic congestive heart failure exacerbation:     PTA meds: [Bumex 2mg daily, metolazone 2.5mg twice weekly].  Weight reportedly fluctuating as outpatient. Ongoing/ worsening dyspnea, orthopnea and increased leg edema consistent with congestive heart failure exacerbation, proBNP elevated. Required BiPAP on admission given respiratory acidosis and somnolence.     Echo from 12/25 showed preserved EF. Repeat echo on 1/22 showed EF 55-60%, severely dilated RV with severely reduced RVSF and mod-severe TR with mod-severe pulmonary HTN    Started on Lasix 40 mg IV BID on admission - tolerated despite soft BP  initially and diuresed well but then held 1/22-1/23 given development of worsening BALAJI on CKD then resumed at lower dose then maintained as below. Intermittently required BiPAP dt dyspnea.     -- Lasix 40mg IV BID resumed 1/29 -> ongoing diuresis per cards and nephrology, changed to lasix 40 mg PO daily 1/31 (discharge dose).  -- dobutamine gtt initiated 1/24 with lasix as diuresis with lasix alone causing BALAJI, and he was sill overloaded.   -- dobutamine drip discontinued 1/31. BP stable. Diuresing on PO lasix alone.   -- Is/Os and daily weight followed. Net neg 0.8 L in past 24h, net neg 17.7 L this stay  -- No fluid restriction needed per nephro.  -- appreciate assistance from cardiology and nephrology this stay.    Acute hypoxic and hypercarbic respiratory failure: multifactorial- CHF, possible asp PNA   Likely multifactorial including diastolic CHF exacerbation, possible PNA and reactive airway disease. He has leukopenia, but no fever or cough, but procalcitonin was elevated/remains elevated. CXR c/w pulmonary congestion vs left lower lobe infiltrate as per report. Pro-calcitonin was elevated. Given episode of hypotension in ER, presumed infection/sepsis, and was initiailly started on broad spectrum abx with IV Levaquin, meropenem and vancomycin for suspected healthcare-associated pneumonia.        Required BiPAP on admission given underlying respiratory acidosis and somnolence. With treatment as above has been weaned off BiPAP. Continues on supplemental O2 via NC.     Started on diuretics on admission as above.    Blood cultures remain neg. Minimal sputum production. Levaquin dc'd 1/24. Wife endorsed some coughing after taking meds with water -> seen by SLP, no overt signs of aspiration. Completed 7d course of Meropenem on 1/27 for treatment of possible aspiration pneumonia.    Endorsed some wheezing as well, which did not improve with diuresis and antibiotics. Does not carry diagnosis of COPD but used to smoke  a pipe and has noted pulmonary HTN. Wheezing subsided following dose of steroid x1 on 1/23. Steroids not continued.     Underwent left sided thoracentesis on 1/25 with removal of 700ml pleural fluid. Studies c/w transudative effusion.    -- management of CHF/PNA as above  -- cont pulmonary toilet with acapella and IS  -- cont sched duonebs and prn albuterol neb  -- Weaned off O2 Today.    Acute kidney injury on stage IV chronic kidney disease.     Baseline Cr appears to be around 2.6 (was 3.1 with nephrology follow up recently).  He presented with Cr of 3.5. His BUN is more than 100. Likely due to cardiorenal syndrome from poor renal perfusion.  Renal ultrasound negative for hydronephrosis    BUN/Cr continued to worsen with initial diuresis, peaked at 3.97 on 1/22 ->  IV lasix held after 1/22 am, and started on gentle IV hydration per nephrology. IVFs dc'd on 1/23 due to increased dyspnea.     Started on dobutamine gtt on 1/24. Intermittently diuresing with IV Lasix this stay changed to PO as above.     -- chan catheter  for strict Is/Os, removed before discharge  -- Cr slowly trending down: 2.88 -> 2.33 -> 2.01 -> 1.92 -> 1.81 -> 1.84->1.85->2.04 today  -- follow up with nephrology as outpatient.    Hypertension  Chronic atrial fibrillation and hx of stroke:    [PTA meds: metoprolol XL 25mg daily, hydralazine, simvastatin, full dose aspirin].    Not on any other anticoagulation due to history of GI bleed and epistaxis. Not on ARB due to renal failure.    Presented hypotensive/low normal BP. PTA meds held while pressures soft and initially unable to take po given need for BiPAP.       -- continued on aspirin and statin, BP  soft off dobutamine so BB could not be continued.  -- Consider resuming Toprol XL at follow up if BP stable     DM II, insulin-dependent.    [PTA regimen: Lantus 20U HS]. A1C 7.0 this stay.    Insulin dose initially lowered given his NPO status. Needs have been variable this stay given poor po  intake. Had been on as much as 15U HS on 1/24, needs have greatly decreased since then.    -- hypoglycemic again overnight ->so now on 3Units HS  -- cont med dose SSI  -- ?need to wake for snack overnight     Parkinson's disease and tremor.  Chronic and stable on PTA meds including Sinemet and Mirapex    Chronic Anemia likely dt ongoing illness and renal failure, and thrombocytopenia.    Baseline hgb around 8. Did require transfusion of 1U PRBCs during hospitalization in 12/2016. Prior workup c/w iron deficiency. Is now on iron supplement.     Hgb down to 6.8 on 1/24 -> transfused 1U PRBCs. Platelet count down to low 100s with findings of petechial lesions on chest, lesions remain stable. No heparin products given this stay. Suspect drop secondary to ongoing illness. Some question of HUS -> mildly elevated LDH, though nl bili and haptoglobin; peripheral smear c/w normochromic normocytic anemia; platelets morphologically unremarkable.  Given Venofer this stay (x5 doses, 1/25-1/29).    -- continues on oral iron as well  -- platelet counts normal, Hb stable in low 8 and is 7.9 today.    Fall, now deconditioned:  Prompted ED visit. Likely mechanical.  He is weak and deconditioned from congestive heart failure and possibly pneumonia.  Also, suspected uremic encephalopathy given his somnolence and drowsiness versus from CO2 narcosis. Patient was on narcotic medication as well, and his BP was improving and he became more alert with narcan in ER.    -- narcotics dc'd initially but is on PRN dilaudid, and is tolerating it.  -- PT/OT/cardiac rehab following -> TCU at discharge     Gout.  Chronic and stable on allopurinol.    Elevated AST: Improving  Unclear etiology. ? Liver/muscle/cardiac origin. ?liver congestion but otherwise normal ALP/ALT. CK normal. RUQ US this stay unremarkable.    Hypernatremia: Resolved  Na with slow upward trend in context of diuresis as above. Na 148 on 1/26 and 1/27.  -- given 500cc free water per  nephrology on 1/26 and encouraged oral intake. No fluid restriction per nephro.  -- Na normalized on 1/28    CODE status: DNR/DNI: discussion with patient and his wife. Palliative care as well consulted. He clearly did not want to be resuscitated. This is change as of this admission.    Iman Hood MD  Hospitalist    Significant Results and Procedures  Imaging reports attached for ultrasounds - Leg, Abd, thoracentesis and CXR  -CT head report attached.  -ECHO result attachced.    Pending Results  These results will be followed up by TCU physician     Unresulted Labs Ordered in the Past 30 Days of this Admission     Date and Time Order Name Status Description    1/20/2017 1718 Sputum Culture Aerobic Bacterial Preliminary           Code Status  DNR / DNI       Primary Care Physician  Aniyah Magana    Constitutional: Resting in chair comfortably, alert, answering questions appropriately, NAD  Respiratory: +bibasilar crackles, somewhat diminished L base, no wheezing, no increased work of breathing.  Cardiovascular: s1s2 irregular, no MGR    GI: S, NT, ND, +BS  Skin/Integumen: warm/dry  Other:  has compression wraps on bilateral LEs    Discharge Disposition  Discharged to nursing home  Condition at discharge: Stable    Consultations This Hospital Stay  VASCULAR ACCESS ADULT IP CONSULT  CORE CLINIC EVALUATION IP CONSULT  CARDIAC REHAB IP CONSULT  CARE COORDINATOR IP CONSULT  NUTRITION SERVICES ADULT IP CONSULT  CARDIOLOGY IP CONSULT  SOCIAL WORK IP CONSULT  LYMPHEDEMA THERAPY IP CONSULT  NEPHROLOGY IP CONSULT  PHARMACY TO DOSE VANCO  INTENSIVIST IP CONSULT  PHARMACY IP CONSULT  SPEECH LANGUAGE PATH ADULT IP CONSULT  WOUND OSTOMY CONTINENCE NURSE  IP CONSULT  PALLIATIVE CARE ADULT IP CONSULT  PHYSICAL THERAPY ADULT IP CONSULT  OCCUPATIONAL THERAPY ADULT IP CONSULT    Time Spent on This Encounter  IIman, personally saw the patient today and spent greater than 30 minutes discharging this  patient.    Discharge Orders    General info for SNF   Length of Stay Estimate: Short Term Care: Estimated # of Days <30  Condition at Discharge: Stable  Level of care:skilled   Rehabilitation Potential: Fair  Admission H&P remains valid and up-to-date: Yes  Recent Chemotherapy: N/A  Use Nursing Home Standing Orders: Yes     Mantoux instructions   Give two-step Mantoux (PPD) Per Facility Policy Yes     Reason for your hospital stay   Acute hypoxic respiratory failure due to CHF exacerbation and Pneumonia. Acute renal failure on CKD.     Glucose monitor nursing POCT   Before meals and at bedtime     Intake and output   Every shift     Daily weights   Call Provider for weight gain of more than 2 pounds per day or 5 pounds per week.     Wound care (specify)   Site:   legs  Instructions:  Per wound care     Additional Discharge Instructions   To continue lymphedema treatment per instruction     Follow Up and recommended labs and tests   Follow up with [facility] physician.  The following labs/tests are recommended: BMP/H&H in 3-5 days.    Cardiology and nephrology follow up as recommended.     Activity - Up with assistive device     DNR/DNI     Physical Therapy Adult Consult   Evaluate and treat as clinically indicated.    Reason:  CHF, weakness     Occupational Therapy Adult Consult   Evaluate and treat as clinically indicated.    Reason:  CHF. weakness     Oxygen - Nasal cannula   3 Lpm by nasal cannula, titrate to keep O2 sats 90% or greater.     Fall precautions     Advance Diet as Tolerated   Follow this diet upon discharge: Orders Placed This Encounter    Snacks/Supplements Adult: Glucerna (Adult); With Meals  Combination Diet Regular Diet Adult; Thin Liquids (water, ice chips, juice, milk, gelatin, ice cream, etc); 2 gm NA Diet       Discharge Medications  Current Discharge Medication List      START taking these medications    Details   ipratropium - albuterol 0.5 mg/2.5 mg/3 mL (DUONEB) 0.5-2.5 (3) MG/3ML neb  solution Take 1 vial (3 mLs) by nebulization 4 times daily  Qty: 360 mL    Associated Diagnoses: Pneumonia of left lower lobe due to infectious organism      albuterol (2.5 MG/3ML) 0.083% neb solution Take 1 vial (2.5 mg) by nebulization every 2 hours as needed for wheezing or shortness of breath / dyspnea  Qty: 360 mL    Associated Diagnoses: Pneumonia of left lower lobe due to infectious organism; Hypoxia      guaiFENesin (MUCINEX) 600 MG 12 hr tablet Take 1 tablet (600 mg) by mouth 2 times daily for 7 days  Qty: 14 tablet    Associated Diagnoses: Pneumonia of left lower lobe due to infectious organism      furosemide (LASIX) 40 MG tablet Take 1 tablet (40 mg) by mouth daily  Qty: 30 tablet    Associated Diagnoses: Chronic diastolic congestive heart failure (H)      senna-docusate (SENOKOT-S;PERICOLACE) 8.6-50 MG per tablet Take 1 tablet by mouth 2 times daily as needed for constipation  Qty: 100 tablet    Associated Diagnoses: Drug-induced constipation      bisacodyl (DULCOLAX) 10 MG Suppository Place 1 suppository (10 mg) rectally every 72 hours as needed for constipation (If no BM for 3 days)  Qty: 30 suppository    Associated Diagnoses: Drug-induced constipation      ciprofloxacin (CILOXAN) 0.3 % ophthalmic solution Place 1 drop into both eyes 4 times daily for 7 days  Qty: 1 Bottle    Associated Diagnoses: Acute bacterial conjunctivitis of both eyes         CONTINUE these medications which have CHANGED    Details   melatonin 1 MG CAPS Take 1 mg by mouth nightly as needed    Associated Diagnoses: Insomnia, unspecified type      HYDROmorphone (DILAUDID) 2 MG tablet Take 1 tablet (2 mg) by mouth every 8 hours as needed for moderate to severe pain  Qty: 60 tablet, Refills: 0    Associated Diagnoses: Wound, open, foot, left, subsequent encounter      insulin glargine (LANTUS) 100 UNIT/ML injection Inject 4 Units Subcutaneous At Bedtime    Associated Diagnoses: IDDM (insulin dependent diabetes mellitus) (H)          CONTINUE these medications which have NOT CHANGED    Details   carboxymethylcellulose (REFRESH PLUS) 0.5 % SOLN ophthalmic solution Place 1 drop into both eyes every 4 hours as needed for dry eyes      acetaminophen (TYLENOL) 325 MG tablet Take 650 mg by mouth every 4 hours as needed for mild pain      Nutritional Supplements (GLUCERNA PO) Take 8 oz by mouth daily      carbidopa-levodopa (SINEMET)  MG per tablet Take 1 tablet by mouth 1 tablet by mouth every 5 hours as needed for tremors      polyethylene glycol (MIRALAX/GLYCOLAX) powder Take 17 g by mouth daily as needed for constipation      Skin Protectants, Misc. 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      NOVOFINE 30 30G X 8 MM insulin pen needle Use three times daily or as directed  Qty: 100 each, Refills: PRN    Associated Diagnoses: Type 2 diabetes mellitus with diabetic chronic kidney disease, unspecified CKD stage      pramipexole (MIRAPEX) 0.5 MG tablet Take 1 tablet by mouth once daily (with dinner)  Qty: 90 tablet, Refills: 2    Associated Diagnoses: Restless legs syndrome (RLS)      allopurinol (ZYLOPRIM) 100 MG tablet Take 2 tablets (200 mg) by mouth daily  Qty: 180 tablet, Refills: 3    Associated Diagnoses: Elevated uric acid in blood      simvastatin (ZOCOR) 40 MG tablet Take 1 tablet (40 mg) by mouth At Bedtime  Qty: 90 tablet, Refills: 3    Associated Diagnoses: Hyperlipidemia LDL goal <100      ferrous sulfate (IRON) 325 (65 FE) MG tablet Take 325 mg by mouth daily (with lunch)       Loperamide HCl (IMODIUM A-D PO) Take 2  "mg by mouth 4 times daily as needed      aspirin  MG EC tablet Take 1 tablet (325 mg) by mouth daily      co-enzyme Q-10 100 MG CAPS Take 1 capsule by mouth daily.  Qty: 30 capsule, Refills: 0    Associated Diagnoses: Brainstem stroke (H)      cholecalciferol 5000 UNITS TABS Take 5,000 Units by mouth daily.  Qty: 30 tablet, Refills: 0    Associated Diagnoses: Brainstem stroke (H)      neomycin-bacitracin-polymyxin (NEOSPORIN) 5-400-5000 ointment Apply topically daily  Refills: 1    Associated Diagnoses: Diabetic ulcer of left foot associated with type 2 diabetes mellitus (H)      blood glucose monitoring (ONE TOUCH ULTRA) test strip Use to test blood sugar 2 times daily or as directed.  Qty: 100 each, Refills: PRN    Associated Diagnoses: Type 2 diabetes mellitus with diabetic chronic kidney disease, unspecified CKD stage      blood glucose monitoring (FREESTYLE) lancets Use to test blood sugars 2 times daily or as directed.  Qty: 1 Box, Refills: 5    Associated Diagnoses: Type II or unspecified type diabetes mellitus with renal manifestations, not stated as uncontrolled         STOP taking these medications       bumetanide (BUMEX) 2 MG tablet Comments:   Reason for Stopping:         METOLAZONE PO Comments:   Reason for Stopping:         metoprolol (TOPROL-XL) 25 MG 24 hr tablet Comments:   Reason for Stopping:         hydrALAZINE (APRESOLINE) 25 MG tablet Comments:   Reason for Stopping:         ACE/ARB NOT PRESCRIBED, INTENTIONAL, Comments:   Reason for Stopping:             Allergies  Allergies   Allergen Reactions     Codeine      nausea vomiting     Penicillins      \" turned red from head to foot\"     Data  Most Recent 3 CBC's:  Recent Labs   Lab Test  01/31/17   0525  01/29/17   0615  01/28/17   0625   WBC  5.3  7.4  6.7   HGB  7.9*  8.2*  8.3*   MCV  88  90  88   PLT  158  120*  114*      Most Recent 3 BMP's:  Recent Labs   Lab Test  02/01/17   0854  01/31/17   0525  01/30/17   0900   NA  139  141  140 "   POTASSIUM  4.2  3.8  3.8   CHLORIDE  103  104  103   CO2  31  34*  31   BUN  42*  44*  45*   CR  2.04*  1.85*  1.84*   ANIONGAP  5  3  6   JARETH  7.8*  7.9*  8.0*   GLC  178*  73  105*     Most Recent 2 LFT's:  Recent Labs   Lab Test  01/23/17   0515  01/22/17   0405   AST  133*  262*   ALT  54  56   ALKPHOS  93  96   BILITOTAL  0.6  0.6     Most Recent INR's and Anticoagulation Dosing History:  Anticoagulation Dose History     Recent Dosing and Labs Latest Ref Rng 3/30/2015 4/8/2015 4/8/2015 4/8/2015 4/9/2015 9/29/2015 1/25/2017    Warfarin 5 mg - - - - 5 mg - - -    INR 0.86 - 1.14 1.11 Unsatisfactory specimen - tube underfilled  Charge credited  [place] [facility] [place] 1.05 - 1.06 1.08 1.20(H)        Most Recent 3 Troponin's:  Recent Labs   Lab Test  01/22/17   0405  01/20/17   0940  12/24/16   0229   TROPI  0.223*  0.110*  0.041     Most Recent Cholesterol Panel:  Recent Labs   Lab Test  04/21/16   1036   CHOL  89   LDL  30   HDL  50   TRIG  45     Most Recent 6 Bacteria Isolates From Any Culture (See EPIC Reports for Culture Details):  Recent Labs   Lab Test  01/31/17   0925  01/25/17   1340  01/20/17   1311  12/24/16   0352  12/24/16   0321  11/16/16   0826   CULT  Light growth Normal mike to date  No growth  No growth  No growth  No growth  No growth  No growth     Most Recent TSH, T4 and A1c Labs:  Recent Labs   Lab Test  12/25/16   0645   04/21/16   1036   TSH   --    --   2.76   A1C  7.0*   < >  7.7*    < > = values in this interval not displayed.     Results for orders placed or performed during the hospital encounter of 01/20/17   XR Chest 2 Views    Narrative    XR CHEST 2 VW  1/20/2017 10:12 AM    HISTORY:  hypoxia and cough    COMPARISON:  12/24/2016      Impression    IMPRESSION:  Marked cardiomegaly. There may be mild pulmonary vascular  engorgement. Dense opacity at the posterior left lung base, consistent  with pneumonia, likely worsened since the prior exam.    KATIE RIDER MD   CT Head w/o  Contrast    Narrative    CT SCAN OF THE HEAD WITHOUT CONTRAST   1/20/2017 12:19 PM     HISTORY: Altered mental status, recent falls.    TECHNIQUE: Axial images of the head and coronal reformations without  IV contrast material. Radiation dose for this scan was reduced using  automated exposure control, adjustment of the mA and/or kV according  to patient size, or iterative reconstruction technique.    COMPARISON: 8/12/2013.    FINDINGS: Moderate cerebral atrophy is present along with some mild  cerebellar atrophy. Patchy white matter changes are seen in both  hemispheres without mass effect. There is no evidence for intracranial  hemorrhage, mass effect, acute infarct, or a skull fracture.      Impression    IMPRESSION: Chronic changes. No evidence for intracranial hemorrhage  or any acute process.    BURAK MATOS MD   US Lower Extremity Venous Duplex Bilateral    Narrative    US LOWER EXTREMITY VENOUS DUPLEX BILATERAL  1/20/2017 2:44 PM    HISTORY: Bilateral pain and swelling.    TECHNIQUE: Color flow and spectral Doppler with waveform analysis  performed.    FINDINGS: Scan is somewhat limited due to edema and bandaging. Both  common femoral, superficial femoral, and popliteal veins are well seen  and appear normal. They have normal patency and compressibility. Deep  veins of the calves are not well visualized but where seen appear  patent. No definite evidence for deep venous thrombosis in either  lower extremity.      Impression    IMPRESSION: Normal bilateral lower extremity venous Doppler.         ERIC CARO MD   XR Chest Port 1 View    Narrative    PORTABLE CHEST ONE VIEW  1/20/2017  7:13 PM     COMPARISON: Two-view chest x-ray same day.    HISTORY: RN placed PICC - verify tip placement.      Impression    IMPRESSION: There has been interval placement of a right PICC line  with its tip in the right atrium. Withdrawal of the PICC line by at  least 3 cm is recommended to place the tip in the low superior  vena  cava.    Dense opacification of the left lower chest possibly representing  pleural fluid, atelectasis or pneumonia again noted. The cardiac  silhouette is difficult to evaluate but is at least mildly enlarged.  The left upper lung is clear. The right lung is clear. There is no  pneumothorax.    ERIC HANNON MD   US Renal Complete    Narrative    US RENAL COMPLETE   1/21/2017 6:06 PM     HISTORY: Evaluate for hydronephrosis.    COMPARISON: Renal ultrasound 8/6/2015.    FINDINGS:  No hydronephrosis. Right kidney measures 11.2 x 4.3 x 4.8  cm. Right cortical thickness is 0.9 cm. Left kidney measures 10.4 x  3.9 x 5.5 cm. Left cortical thickness is 0.8 cm. Simple cyst left  kidney is 2.5 cm at the upper kidney. This is stable in size. There is  free fluid at the right lower quadrant.      Impression    IMPRESSION:  1. No hydronephrosis.  2. Free fluid at the right lower quadrant.  3. Left renal cyst.         RADHA WHITAKER MD   US Abdomen Limited    Narrative    RIGHT UPPER QUADRANT ULTRASOUND 1/22/2017 8:55 AM    HISTORY: Elevated liver function tests.    COMPARISON: None    FINDINGS:   Gallbladder: Normal with no cholelithiasis, wall thickening or focal  tenderness.     Bile ducts: CHD is normal diameter. No intrahepatic biliary  dilatation.    Liver: Hepatomegaly with liver measuring 19 cm craniocaudal length in  the right midclavicular line. Normal echogenicity and no focal  lesions.    Pancreas: Completely obscured by gas.    Right kidney: Diffusely thinned cortex and increased echogenicity. No  masses or hydronephrosis.    Small amount of free fluid is seen adjacent to the gallbladder.      Impression    IMPRESSION:   1. Hepatomegaly, but no focal hepatic lesion. No biliary dilatation.  2. Right renal atrophy with no hydronephrosis.  3. Small amount of free fluid in the peritoneum.  4. Pancreas obscured by gas.    BRYON WILLETT MD   US Thoracentesis    Narrative    ULTRASONIC GUIDED THORACENTESIS 1/25/2017  1:54 PM    HISTORY:  Left pleural effusion.    PROCEDURE AND FINDINGS:  Using ultrasonic guidance, permanent image  documentation, and 10 mL of 1% Xylocaine, approximately 700 mL of  reddish fluid was obtained from the left pleural space. No  complications.      Impression    IMPRESSION:  Successful ultrasonically guided left thoracentesis.    ERIC CARO MD   XR Chest 1 View    Narrative    XR CHEST 1 VW  1/25/2017 1:57 PM     HISTORY:  Postthoracentesis.      Impression    IMPRESSION: No pneumothorax. Right PICC line with tip in the SVC.  Cardiomegaly. Pulmonary venous congestion.    ERIC CARO MD     2D ECHO    Interpretation Summary     The left ventricle is normal in size.  The visual ejection fraction is estimated at 60-65%.  No regional wall motion abnormalities noted.  The right ventricle is moderately dilated.  Mildly decreased right ventricular systolic function  Mild (35-45mmHg) pulmonary hypertension is present.  There is moderate (2+) tricuspid regurgitation.  Moderate aortic root dilatation.  The ascending aorta is Moderately dilated.  Compared to previous study 8/12/2016, the aorta is similar in size, but the right heart and pulmonary pressures appear to have improved slightly.

## 2017-02-01 NOTE — PROGRESS NOTES
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      Assessment:  1. Acute on chronic diastolic heart failure.                - Difficult to know what his true dry weight is (weight range 180-220; likely ~ 190).                - admit wt 191, current wt recorded at 167              -net negative 17.6L              - improving, but long term prognosis is poor              -switched to oral lasix yesterday, weight stable              -Now DNR/DNI                 2. Hypoxemic respiratory failure, multifactorial chf and ? Of pna, on abx                   - remains on O2 per NC              - L pleural effusion seen on admission, thoracentesis for 700 cc 1/25    3. Chronic kidney disease, severe, stage IV              - Cr at baseline    4. Chronic Atrial Fibrillation - Rate controlled - not on anticoagulation due to GI bleed and epistaxis on A\C.    5. Mild troponinemia - likely type II demand infarct              -No anginal symptoms or ischemic EKG changes. Peak - 0.22    6. Severe anemia.               - 1 u prbc 1/24, increase po iron and possible epo per nephrology    7  Mod- severe TR and elev pulm pressures with D shaped septum    8   Hypotension   - BP looks okay off dobutamine      Has followup with Cathie Salas NP  2/2 in [facility]    Okay to d/c from cardiac perspective.    AMARJIT Nelson, CNP    ATTESTATION:  Mr. Smith was seen and examined. Agree with note and plan of care.   Stable cardiac regimen at this time.  Follow up arranged.  Will sign off.  Please call with questions.  FELISA Rodríguez MD              Interval History:   Reports feeling well, sitting in chiar on O2 per NC              Medications:       furosemide  40 mg Oral Daily     insulin glargine  3 Units Subcutaneous At Bedtime     ferrous sulfate  325 mg Oral Daily     insulin aspart  1-7 Units Subcutaneous TID AC     insulin aspart  1-5 Units Subcutaneous At Bedtime     pantoprazole  40 mg Oral QAM     carbidopa-levodopa  1 tablet Oral BID     co-enzyme Q-10  100 mg Oral Daily     pramipexole  0.5 mg Oral Daily     simvastatin  40 mg Oral At Bedtime     eucerin   Topical BID     guaiFENesin  600 mg Oral BID     ipratropium - albuterol 0.5 mg/2.5 mg/3 mL  3 mL Nebulization 4x daily     aspirin EC  325 mg Oral Daily     sodium chloride (PF)  10 mL Intracatheter Q8H     hypromellose-dextran, lidocaine visc 2% 2.5mL/5mL & maalox/mylanta w/ simeth 2.5mL/5mL & diphenhydrAMINE 5mg/5mL, senna-docusate, polyethylene glycol, lidocaine, lidocaine 4%, sodium chloride (PF), - MEDICATION INSTRUCTIONS -, HYDROmorphone, HOLD MEDICATION, carbidopa-levodopa, carboxymethylcellulose, metoprolol, glucose **OR** dextrose **OR** glucagon, naloxone, acetaminophen, acetaminophen, bisacodyl, ondansetron **OR** ondansetron, Reason ACE/ARB order not selected, - MEDICATION INSTRUCTIONS -, albuterol, nitroglycerin, sodium chloride (PF)               Physical Exam:   Blood pressure 91/50, pulse 80, temperature 96.3  F (35.7  C), temperature source Oral, resp. rate 18, weight 76.2 kg (167 lb 15.9 oz), SpO2 97 %.  Wt Readings from Last 4 Encounters:   17 76.2 kg (167 lb 15.9 oz)   16 90.5 kg (199 lb 8.3 oz)   16 83.235 kg (183 lb 8 oz)   16 82.328 kg (181 lb 8 oz)         Vital Sign Ranges  Temperature Temp  Av.3  F (36.3  C)  Min: 96.7  F (35.9  C)  Max: 97.6  F (36.4  C)   Blood pressure Systolic (24hrs), Av mmHg, Min:104 mmHg, Max:114 mmHg        Diastolic (24hrs), Av mmHg, Min:37 mmHg, Max:76 mmHg      Pulse No Data Recorded   Respirations Resp  Av  Min: 16  Max: 18   Pulse oximetry SpO2  Av %  Min: 96 %  Max: 100 %         Intake/Output Summary (Last 24 hours) at 17 1223  Last data filed at 17 1156   Gross per 24 hour   Intake    120 ml   Output   2600 ml   Net  -2480 ml       Constitutional: Awake, alert, cooperative, no apparent distress   Lungs: Clear to auscultation bilaterally, no crackles or wheezing   Cardiovascular: Distant heart sounds   Abdomen: Normal bowel sounds, soft, non-distended, non-tender   Skin: No rashes   Other: LE wrapped, mild thigh edema and abdominal edema          Data:     Recent Labs   Lab Test  17   0525  17   0615  17   0625   17   1124   09/29/15   0940   04/09/15   0538   WBC  5.3  7.4  6.7   < >   --    < >  8.2   < >   --    HGB  7.9*  8.2*  8.3*   < >   --    < >  11.3*   < >   --    MCV  88  90  88   < >   --    < >  85   < >   --    PLT  158  120*  114*   < >  112*   < >  222   < >   --    INR   --    --    --    --   1.20*   --   1.08   --   1.06    < > = values in this interval not displayed.      Recent Labs   Lab Test  17   0854  17   0525  17   0900   NA  139  141  140   POTASSIUM  4.2  3.8  3.8   CHLORIDE  103  104  103   CO2  31  34*  31   BUN  42*  44*  45*   CR  2.04*  1.85*  1.84*   ANIONGAP  5  3  6   JARETH  7.8*  7.9*  8.0*   GLC  178*  73  105*         AMARJIT Davila CNP

## 2017-02-01 NOTE — PROGRESS NOTES
SW:  D:  Received discharge orders for patient.  Bed available at [facility] for today.  Patient is asking for transport to be arranged and he understands that this will be private pay.  Call placed to [facility] to arrange for wheelchair transport at 18:15 today.  Patient and wife informed of the plan and in agreement to the plan.  Updated [facility] and faxed the orders and the PAS.    PAS-RR    D: Per DHS regulation, SW completed and submitted PAS-RR to [place] [employer] via the [employer].  PAS-RR confirmation # is : [id number].    I: SW spoke with patient and family and they are aware a PAS-RR has been submitted.  SW reviewed with patient and family that they may be contacted for a follow up appointment within 10 days of hospital discharge if their SNF stay is < 30 days.  Contact information for [employer] was also provided.    A: Patient and family verbalized understanding.    P: Further questions may be directed to [employer] at #[phone], option #4 for PAS-RR staff.

## 2017-02-01 NOTE — PROGRESS NOTES
Pt given scheduled neb as ordered. BS diminished. No change post bronchodilator. Currently on 2 lpm via NC and tolerating. Sats 97%.    RT will continue to follow and monitor/assess pt respiratory status.     Yanet Rao  RRT.  2/1/2017

## 2017-02-01 NOTE — PLAN OF CARE
Problem: Goal Outcome Summary  Goal: Goal Outcome Summary  Outcome: No Change  VSS. [place] is dc. Pt is a&o. Pt is up w/assist x1 with gait belt and walker. O2 is turned off. O2 remains at 93-94%. Tele shows A fib. Plan is for discharge, waiting for orders. Will continue to monitor.

## 2017-02-01 NOTE — PLAN OF CARE
Problem: Goal Outcome Summary  Goal: Goal Outcome Summary  Outcome: Improving  Pt A/O, VSS on 1L NC. Tele: A.harriet CVR c BBB. Pt received PRN Tylenol for back pain with reported relief. Rejiies RAIMUNDO. Diego patent. Bilateral lymphedema wraps in place.

## 2017-02-01 NOTE — DISCHARGE INSTRUCTIONS
Patient will discharge to [facility] today via [facility] wheelchair at 18:15.  [facility]'s phone number is [phone].

## 2017-02-01 NOTE — PLAN OF CARE
Problem: Goal Outcome Summary  Goal: Goal Outcome Summary  Pt declines participation in OT at this time due to potential d/c as well as RT arrived for treatment.

## 2017-02-02 NOTE — PROGRESS NOTES
[facility]  PRIMARY CARE PROVIDER AND CLINIC:  Aniyah Magana [facility] [address] [place] [address]  / [facility]  Chief Complaint   Patient presents with     Hospital F/U       HPI:    Armand Smith is a 87 year old  (11/7/1929),admitted to the [facility] from [facility].  Hospital stay 1/20/17 through 2/1/17.  Admitted to this facility for  rehab, medical management and nursing care. Current issues are:         Acute diastolic heart failure (H)  Physical deconditioning   87 year old male with complex PMHx including hypertension, left sided diastolic HF and right sided systolic dysfunction with 2+ TR, chronic afib with hx of stroke presently on aspirin, insulin-dependent diabetes, Parkinson's disease, stage IV CKD and chronic anemia who was admitted on 1/20/2017 after a mechanical fall with worsening sob and wheezing in the days preceding admission, c/w CHF exacerbation. PTA meds: [Bumex 2mg daily, metolazone 2.5mg twice weekly].  Weight reportedly fluctuating as outpatient. Ongoing/ worsening dyspnea, orthopnea and increased leg edema consistent with congestive heart failure exacerbation, proBNP elevated. Required BiPAP on admission given respiratory acidosis and somnolence. Echo from 12/25 showed preserved EF. Repeat echo on 1/22 showed EF 55-60%, severely dilated RV with severely reduced RVSF and mod-severe TR with mod-severe pulmonary HTN. Started on Lasix 40 mg IV BID on admission - tolerated despite soft BP initially and diuresed well but then held 1/22-1/23 given development of worsening BALAJI on CKD then resumed at lower dose then maintained as below. Intermittently required BiPAP dt dyspnea.   -- Lasix 40 mg PO daily 1/31 (discharge dose).  -- Is/Os and daily weight followed. Net neg 0.8 L in past 24h, net neg 17.7 L this stay  -- No fluid restriction needed per nephro.  --Since TCU admission Last 3 BPs: 98/58, 105/60, 103/58 and Admission  Weight: 182.8lbs vs Current Weight: 180.4lbs. Continues Lasix 40 daily, monitoring BPs per facility routine. Will ask therapies to see for lymphedema wraps per facility routine.     Acute and chronic respiratory failure with hypoxia (H)  Likely multifactorial including diastolic CHF exacerbation, possible PNA and reactive airway disease. He has leukopenia, but no fever or cough, but procalcitonin was elevated/remains elevated. CXR c/w pulmonary congestion vs left lower lobe infiltrate as per report. Given episode of hypotension in ER, presumed infection/sepsis, and was initially started on broad spectrum abx with IV Levaquin, meropenem and vancomycin for suspected healthcare-associated pneumonia.  Required BiPAP on admission given underlying respiratory acidosis and somnolence. With treatment as above has been weaned off BiPAP. Continued on supplemental O2 via NC. Started on diuretics on admission as above. Blood cultures remain neg. Minimal sputum production. Levaquin dc'd 1/24. Wife endorsed some coughing after taking meds with water -> seen by SLP, no overt signs of aspiration. Completed 7d course of Meropenem on 1/27 for treatment of possible aspiration pneumonia. Endorsed some wheezing as well, which did not improve with diuresis and antibiotics. Does not carry diagnosis of COPD but used to smoke a pipe and has noted pulmonary HTN. Wheezing subsided following dose of steroid x1 on 1/23. Steroids not continued. Underwent left sided thoracentesis on 1/25 with removal of 700ml pleural fluid. Studies c/w transudative effusion.  -- cont pulmonary toilet with acapella and IS  -- cont sched duonebs and prn albuterol neb  --Since admission remains on O2, scheduled DuoNebs QID, Mucinex x 14 days and PRN albuterol. Feels breathing stable since admission. Occasional moist cough, no fevers.     Hypertension  Chronic atrial fibrillation and hx of stroke:    [PTA meds: metoprolol XL 25mg daily, hydralazine, simvastatin, full  dose aspirin].    Not on any other anticoagulation due to history of GI bleed and epistaxis. Not on ARB due to renal failure. Presented hypotensive/low normal BP. PTA meds held while pressures soft and initially unable to take po given need for BiPAP. Continued on aspirin and statin, BP  soft off dobutamine so BB could not be continued. Consider resuming Toprol XL at follow up if BP stable.   --BPs remain decreased since TCU admission.     Type 2 diabetes mellitus with stage 4 chronic kidney disease, without long-term current use of insulin (H)  [PTA regimen: Lantus 20U HS]. A1C 7.0 this stay.  Insulin dose initially lowered given his NPO status. Needs have been variable this stay given poor po intake. Had been on as much as 15U HS on 1/24, needs have greatly decreased since then.  --since admission Blood Sugar Range: 170 - 218, will monitor.     CKD (chronic kidney disease) stage 4, GFR 15-29 ml/min (H)  Baseline Cr appears to be around 2.6 (was 3.1 with nephrology follow up recently).  He presented with Cr of 3.5. His BUN is more than 100. Likely due to cardiorenal syndrome from poor renal perfusion.  Renal ultrasound negative for hydronephrosis. BUN/Cr continued to worsen with initial diuresis, peaked at 3.97 on 1/22 ->  IV lasix held after 1/22 am, and started on gentle IV hydration per nephrology. IVFs dc'd on 1/23 due to increased dyspnea. Started on dobutamine gtt on 1/24. Intermittently diuresing with IV Lasix this stay changed to PO as above. Cortez catheter  for strict Is/Os, removed before discharge.  Cr slowly trending down: 2.88 -> 2.33 -> 2.01 -> 1.92 -> 1.81 -> 1.84->1.85->2.04 today and follow up with nephrology as outpatient.  --last CR     2.04   2/1/2017 and CR     1.85   1/31/2017     Wound of left leg, sequela  New wounds left posterior leg in the setting of edema, CHF. Managed with daily Xeroform dressing, lymphedema wraps. Right leg no open areas.     Paralysis agitans (H)  Chronic and stable  with Sinemet and Mirapex.     Iron deficiency anemia, unspecified iron deficiency anemia type  Baseline hgb around 8. Did require transfusion of 1U PRBCs during hospitalization in 12/2016. Prior workup c/w iron deficiency. Is now on iron supplement. Hgb down to 6.8 on 1/24 -> transfused 1U PRBCs. Platelet count down to low 100s with findings of petechial lesions on chest, lesions remain stable. No heparin products given this stay. Suspect drop secondary to ongoing illness. Some question of HUS -> mildly elevated LDH, though nl bili and haptoglobin; peripheral smear c/w normochromic normocytic anemia; platelets morphologically unremarkable.  Given Venofer this stay (x5 doses, 1/25-1/29). Continues on oral iron as well, platelet counts normal, Hb stable in low 8.   --last check HGB      7.9   1/31/2017, will f/u tomorrow.     Chronic gout, unspecified cause, unspecified site  Chronic and stable on allopurinol.      CODE STATUS/ADVANCE DIRECTIVES DISCUSSION:   DNR / DNI  Patient's living condition: lives with spouse    ALLERGIES:Codeine and Penicillins  PAST MEDICAL HISTORY:  has a past medical history of Type II or unspecified type diabetes mellitus without mention of complication, not stated as uncontrolled; Essential hypertension, benign; Other testicular hypofunction; Lumbosacral spondylosis without myelopathy; Personal history of diseases of blood and blood-forming organs; Unspecified psychosexual disorder; Basal cell Ca; Nodular lymphoma, unspecified site, extranodal and solid organ sites (1970); Unspecified cerebral artery occlusion with cerebral infarction (2013); Hyperlipidaemia; Atrial fibrillation (H); Chronic diastolic congestive heart failure (H); Pulmonary HTN (H); Great toe amputation status (H); Aortic regurgitation; Tricuspid regurgitation; CKD (chronic kidney disease); Parkinson's disease (H); and Poor circulation of extremity (1/24/2014).  PAST SURGICAL HISTORY:  has past surgical history that  includes colonoscopy; splenectomy (1970); Phacoemulsification clear cornea with standard intraocular lens implant (12/12/2013); Phacoemulsification clear cornea with standard intraocular lens implant (12/30/2013); biopsy (skin bx for cancer); Amputate toe(s) (Right, 4/7/2015); RIGHT HEART CATHETERIZATION (9/29/2015); Amputate toe(s); Amputate toe(s) (Right, 10/20/2015); and Biopsy Bone Toe (Right, 10/20/2015).  FAMILY HISTORY: family history includes CEREBROVASCULAR DISEASE in his father.  SOCIAL HISTORY:  reports that he quit smoking about 6 years ago. His smoking use included Pipe. He has never used smokeless tobacco. He reports that he drinks alcohol. He reports that he does not use illicit drugs.    Post Discharge Medication Reconciliation Status: discharge medications reconciled and changed, per note/orders (see AVS).  Current Outpatient Prescriptions   Medication Sig Dispense Refill     melatonin 1 MG CAPS Take 1 mg by mouth nightly as needed       HYDROmorphone (DILAUDID) 2 MG tablet Take 1 tablet (2 mg) by mouth every 8 hours as needed for moderate to severe pain 60 tablet 0     ipratropium - albuterol 0.5 mg/2.5 mg/3 mL (DUONEB) 0.5-2.5 (3) MG/3ML neb solution Take 1 vial (3 mLs) by nebulization 4 times daily 360 mL      albuterol (2.5 MG/3ML) 0.083% neb solution Take 1 vial (2.5 mg) by nebulization every 2 hours as needed for wheezing or shortness of breath / dyspnea 360 mL      insulin glargine (LANTUS) 100 UNIT/ML injection Inject 4 Units Subcutaneous At Bedtime       guaiFENesin (MUCINEX) 600 MG 12 hr tablet Take 1 tablet (600 mg) by mouth 2 times daily for 7 days 14 tablet      furosemide (LASIX) 40 MG tablet Take 1 tablet (40 mg) by mouth daily 30 tablet      senna-docusate (SENOKOT-S;PERICOLACE) 8.6-50 MG per tablet Take 1 tablet by mouth 2 times daily as needed for constipation 100 tablet      bisacodyl (DULCOLAX) 10 MG Suppository Place 1 suppository (10 mg) rectally every 72 hours as needed for  constipation (If no BM for 3 days) 30 suppository      ciprofloxacin (CILOXAN) 0.3 % ophthalmic solution Place 1 drop into both eyes 4 times daily for 7 days 1 Bottle      carboxymethylcellulose (REFRESH PLUS) 0.5 % SOLN ophthalmic solution Place 1 drop into both eyes every 4 hours as needed for dry eyes       acetaminophen (TYLENOL) 325 MG tablet Take 650 mg by mouth every 4 hours as needed for mild pain       Nutritional Supplements (GLUCERNA PO) Take 8 oz by mouth daily       carbidopa-levodopa (SINEMET)  MG per tablet Take 1 tablet by mouth 1 tablet by mouth every 5 hours as needed for tremors       polyethylene glycol (MIRALAX/GLYCOLAX) powder Take 17 g by mouth daily as needed for constipation       Skin Protectants, Misc. (EUCERIN) cream Apply topically 2 times daily 120 g      neomycin-bacitracin-polymyxin (NEOSPORIN) 5-400-5000 ointment Apply topically daily  1     carbidopa-levodopa (SINEMET CR)  MG per tablet Take 1 tablet by mouth 2 times daily 60 tablet 3     omeprazole (PRILOSEC) 20 MG capsule Take 1 capsule (20mg) by mouth once daily 90 capsule 2     blood glucose monitoring (ONE TOUCH ULTRA) test strip Use to test blood sugar 2 times daily or as directed. 100 each PRN     NOVOFINE 30 30G X 8 MM insulin pen needle Use three times daily or as directed 100 each PRN     pramipexole (MIRAPEX) 0.5 MG tablet Take 1 tablet by mouth once daily (with dinner) 90 tablet 2     allopurinol (ZYLOPRIM) 100 MG tablet Take 2 tablets (200 mg) by mouth daily 180 tablet 3     simvastatin (ZOCOR) 40 MG tablet Take 1 tablet (40 mg) by mouth At Bedtime 90 tablet 3     ferrous sulfate (IRON) 325 (65 FE) MG tablet Take 325 mg by mouth daily (with lunch)        Loperamide HCl (IMODIUM A-D PO) Take 2 mg by mouth 4 times daily as needed       aspirin  MG EC tablet Take 1 tablet (325 mg) by mouth daily       blood glucose monitoring (FREESTYLE) lancets Use to test blood sugars 2 times daily or as directed. 1 Box  "5     co-enzyme Q-10 100 MG CAPS Take 1 capsule by mouth daily. 30 capsule 0     cholecalciferol 5000 UNITS TABS Take 5,000 Units by mouth daily. 30 tablet 0       ROS:  10 point ROS of systems including Constitutional, Eyes, Respiratory, Cardiovascular, Gastroenterology, Genitourinary, Integumentary, Musculoskeletal, Psychiatric were all negative except for pertinent positives noted in my HPI.    Exam:  BP 97/53 mmHg  Pulse 79  Temp(Src) 97.5  F (36.4  C)  Resp 18  Ht 5' 10.5\" (1.791 m)  Wt 180 lb 6.4 oz (81.829 kg)  BMI 25.51 kg/m2  SpO2 92%  GENERAL APPEARANCE:  Alert, in no distress, pleasant, cooperative, oriented x self, place and recent events. Frail appearing.   EYES:  EOM, lids, pupils and irises normal, sclera clear and conjunctiva normal, no discharge or mattering on lids or lashes noted  ENT:  Mouth normal, moist mucous membranes, nose normal without drainage or crusting, external ears without lesions, hearing acuity impaired wears hearing aids.   NECK:  Nontender, supple, symmetrical; no adenopathy, masses or thyromegaly, trachea midline  RESP:  respiratory effort and palpation of chest normal, no chest wall tenderness, no respiratory distress, Lung sounds diminished with crackles at bases R>L, patient is on oxygen per NC  CV:  Palpation and auscultation of heart done, rate and rhythm controlled and sounds regular at this time, no murmur, no rub or gallop. Edema +2 pitting bilateral lower extremities. VASCULAR: open areas left posterior lower leg shallow wounds with bloody drainage, also pressure ulcer left medial ankle stage 2.   ABDOMEN:  normal bowel sounds, soft, nontender, no grimacing or guarding with palpation, no hepatosplenomegaly or other masses  M/S:   Gait and station wheelchair bound and unsafe without assistance, Digits and nails toenail left foot with avulsion, needs reduction - yellow and crumbling; no tenderness or swelling of the joints; able to move all extremities but " generalized weakness  SKIN:  Inspection and palpation of skin and subcutaneous tissue: irregular shallow wounds x 2 back of left lower leg and pressure area stage 2 left medial ankle.   NEURO: cranial nerves 2-12 grossly intact, no facial asymmetry, no speech deficits and able to follow directions, moves all extremities symmetrically  PSYCH:  insight and judgement appears intact, memory at baseline, affect and mood normal     Lab/Diagnostic data:     CBC RESULTS:   Recent Labs   Lab Test  01/31/17   0525  01/29/17   0615   WBC  5.3  7.4   RBC  2.78*  2.93*   HGB  7.9*  8.2*   HCT  24.5*  26.3*   MCV  88  90   MCH  28.4  28.0   MCHC  32.2  31.2*   RDW  17.8*  17.8*   PLT  158  120*       Last Basic Metabolic Panel:  Recent Labs   Lab Test  02/01/17   0854  01/31/17   0525   NA  139  141   POTASSIUM  4.2  3.8   CHLORIDE  103  104   JARETH  7.8*  7.9*   CO2  31  34*   BUN  42*  44*   CR  2.04*  1.85*   GLC  178*  73       Liver Function Studies -   Recent Labs   Lab Test  01/23/17   0515  01/22/17   0405   PROTTOTAL  6.3*  6.3*   ALBUMIN  2.3*  2.4*   BILITOTAL  0.6  0.6   ALKPHOS  93  96   AST  133*  262*   ALT  54  56     A1C      7.0   12/25/2016    ASSESSMENT/PLAN:  Acute diastolic heart failure (H)  Stable since admission, wt down. Monitor wt daily, meds as above, [facility] clinic f/u as scheduled. Lymphedema wraps daily per therapies.     Acute and chronic respiratory failure with hypoxia (H)  Stable respiratory status since admission, remains on O2 and nebs. Monitor, if remains stable - attempt to wean     Essential hypertension, benign  Chronic, bps lower at this time. VS per routine; continue Lasix. No restart of Toprol at this time - f/u next week.     Chronic atrial fibrillation (H)  Chronic, rate controlled, on ASA. Monitor.     Type 2 diabetes mellitus with stage 4 chronic kidney disease, without long-term current use of insulin (H)  Chronic. Meds as above and BG QID. HS snack. F/U PRN.     CKD (chronic kidney  disease) stage 4, GFR 15-29 ml/min (H)  Chronic. F/U BMP tomorrow.     Wound of left leg, sequela  Recent onset per patient, will continue wound care per hospital routine change daily.     Paralysis agitans (H)  Chronic. Stable. No change in meds.     Iron deficiency anemia, unspecified iron deficiency anemia type  Chronic. Meds as above. F/U with Hgb tomorrow.     Chronic gout, unspecified cause, unspecified site  Asymptomatic. Monitor.     Physical deconditioning  Here for therapies. F/U with progress next week.      Orders:  1. Open areas left calf: cleanse with wound spray, pat dry. Cover with Xeroform, then gauze and/or ABD pad if drainage. Change daily. Left medial ankle ulcer cleanse and cover with foam dressing, change daily. Lymphedema wraps per therapies daily.   2. CBC, BMP on 2/3/17 diagnosis CKD, CHF, anemia. CMP on 2/6 due to elevated AST while hospitalized.   3. Change Tylenol to 1000 mg PO BID and daily PRN due to back, leg pain.   4. Daily weights, f/u with [facility] as scheduled.   5. Podiatry visit ASAP due to toenails need reduction, diabetic.     Information reviewed:  Medications, vital signs, orders, nursing notes, problem list, hospital information. Total time spent with patient visit was 35 min including patient visit, review of past records and discussion of patient status and POC with staff. Greater than 50% of total time spent with counseling and coordinating care.    Electronically signed by:  AMARJIT Marin CNP

## 2017-02-02 NOTE — PROGRESS NOTES
[facility] DISCHARGE NOTE    Patient discharged to transitional care unit at 7:11 PM via wheel chair. Accompanied by spouse and  and Discharge instructions sent with  to TCU [facility],  opportunity offered to ask questions. Prescriptions sent with pt to fill at TCU. All belongings sent with patient.    Jim Will

## 2017-02-02 NOTE — PLAN OF CARE
Problem: Goal Outcome Summary  Goal: Goal Outcome Summary  Occupational Therapy Discharge Summary    Reason for therapy discharge:    Discharged to transitional care facility.    Progress towards therapy goal(s). See goals on Care Plan in [facility] electronic health record for goal details.  Goals not met.  Barriers to achieving goals:   discharge from facility.    Therapy recommendation(s):    Continued therapy is recommended.  Rationale/Recommendations:  to maximize safety and independence in all ADLs, IADLs and functional mobility tasks and continued strengthening.

## 2017-02-02 NOTE — PROGRESS NOTES
RN called [facility] TCU and spoke with JOSE Ribera who was caring for patient. RN advised Mounika that patient has a scheduled f/u apt on 2/16/17 with NELSY Cathie Salas. RN confirmed with Mounika that patient has transportation arranged for apt. RN provided Mounika with clinic phone to call with any cardiac related questions or concerns. Mounika verbalized understanding and agreed with plan.

## 2017-02-07 NOTE — PROGRESS NOTES
[facility]    Chief Complaint   Patient presents with     RECHECK       HPI:    Armand Smith is a 87 year old  (11/7/1929), who is being seen today for an episodic care visit at [facility]. Today's concern is:  .   CHF: respiratory failure: off o2, creat stable at 2.09, denies SOB, cough, congestion,   Deconditioning: needing assist with everything, walking up to 100 feet CTG assist, assist with ADL's  HTN: Last 3 BPs: 109/57, 89/50, 105/60 denies CP, palpitations  Admission Weight: 182.8lbs  Current Weight: 187.2lbs, 186.7lbs, 184.1lbs, 180.1lbs, 180.4lbs 182.8lbs starting to trend up, patient denies SOB  Blood Sugar Range:   AM: 84 - 170  Noon: 127 - 197  PM: 109 - 174  HS: 191 - 275    ALLERGIES: Codeine and Penicillins  Past Medical, Surgical, Family and Social History reviewed and updated in EPIC.    Current Outpatient Prescriptions   Medication Sig Dispense Refill     furosemide (LASIX) 20 MG tablet Take 20 mg by mouth daily At noon       furosemide (LASIX) 40 MG tablet Take 40 mg by mouth every morning       melatonin 1 MG CAPS Take 1 mg by mouth nightly as needed       HYDROmorphone (DILAUDID) 2 MG tablet Take 1 tablet (2 mg) by mouth every 8 hours as needed for moderate to severe pain 60 tablet 0     ipratropium - albuterol 0.5 mg/2.5 mg/3 mL (DUONEB) 0.5-2.5 (3) MG/3ML neb solution Take 1 vial (3 mLs) by nebulization 4 times daily 360 mL      albuterol (2.5 MG/3ML) 0.083% neb solution Take 1 vial (2.5 mg) by nebulization every 2 hours as needed for wheezing or shortness of breath / dyspnea 360 mL      insulin glargine (LANTUS) 100 UNIT/ML injection Inject 4 Units Subcutaneous At Bedtime       guaiFENesin (MUCINEX) 600 MG 12 hr tablet Take 1 tablet (600 mg) by mouth 2 times daily for 7 days 14 tablet      senna-docusate (SENOKOT-S;PERICOLACE) 8.6-50 MG per tablet Take 1 tablet by mouth 2 times daily as needed for constipation 100 tablet      bisacodyl (DULCOLAX) 10 MG Suppository  Place 1 suppository (10 mg) rectally every 72 hours as needed for constipation (If no BM for 3 days) 30 suppository      ciprofloxacin (CILOXAN) 0.3 % ophthalmic solution Place 1 drop into both eyes 4 times daily for 7 days 1 Bottle      carboxymethylcellulose (REFRESH PLUS) 0.5 % SOLN ophthalmic solution Place 1 drop into both eyes every 4 hours as needed for dry eyes       acetaminophen (TYLENOL) 325 MG tablet Take 1,000 mg by mouth 2 times daily And daily PRN       Nutritional Supplements (GLUCERNA PO) Take 8 oz by mouth daily       carbidopa-levodopa (SINEMET)  MG per tablet Take 1 tablet by mouth 1 tablet by mouth every 5 hours as needed for tremors       polyethylene glycol (MIRALAX/GLYCOLAX) powder Take 17 g by mouth daily as needed for constipation       Skin Protectants, Misc. (EUCERIN) cream Apply topically 2 times daily 120 g      neomycin-bacitracin-polymyxin (NEOSPORIN) 5-400-5000 ointment Apply topically daily  1     carbidopa-levodopa (SINEMET CR)  MG per tablet Take 1 tablet by mouth 2 times daily 60 tablet 3     omeprazole (PRILOSEC) 20 MG capsule Take 1 capsule (20mg) by mouth once daily 90 capsule 2     blood glucose monitoring (ONE TOUCH ULTRA) test strip Use to test blood sugar 2 times daily or as directed. 100 each PRN     NOVOFINE 30 30G X 8 MM insulin pen needle Use three times daily or as directed 100 each PRN     pramipexole (MIRAPEX) 0.5 MG tablet Take 1 tablet by mouth once daily (with dinner) 90 tablet 2     allopurinol (ZYLOPRIM) 100 MG tablet Take 2 tablets (200 mg) by mouth daily 180 tablet 3     simvastatin (ZOCOR) 40 MG tablet Take 1 tablet (40 mg) by mouth At Bedtime 90 tablet 3     ferrous sulfate (IRON) 325 (65 FE) MG tablet Take 325 mg by mouth daily (with lunch)        Loperamide HCl (IMODIUM A-D PO) Take 2 mg by mouth 4 times daily as needed       aspirin  MG EC tablet Take 1 tablet (325 mg) by mouth daily       blood glucose monitoring (FREESTYLE) lancets  "Use to test blood sugars 2 times daily or as directed. 1 Box 5     co-enzyme Q-10 100 MG CAPS Take 1 capsule by mouth daily. 30 capsule 0     cholecalciferol 5000 UNITS TABS Take 5,000 Units by mouth daily. 30 tablet 0     Medications reviewed:  Medications reconciled to facility chart and changes were made to reflect current medications as identified as above med list. Below are the changes that were made:   Medications stopped since last EPIC medication reconciliation:   Medications Discontinued During This Encounter   Medication Reason     furosemide (LASIX) 40 MG tablet Dose adjustment       Medications started since last [facility] medication reconciliation:  Orders Placed This Encounter   Medications     furosemide (LASIX) 20 MG tablet     Sig: Take 20 mg by mouth daily At noon     furosemide (LASIX) 40 MG tablet     Sig: Take 40 mg by mouth every morning         REVIEW OF SYSTEMS:  10 point ROS of systems including Constitutional, Eyes, Respiratory, Cardiovascular, Gastroenterology, Genitourinary, Integumentary, Muscularskeletal, Psychiatric were all negative except for pertinent positives noted in my HPI.    Physical Exam:  /54 mmHg  Pulse 68  Temp(Src) 97.1  F (36.2  C)  Resp 18  Ht 5' 10.5\" (1.791 m)  Wt 187 lb 3.2 oz (84.913 kg)  BMI 26.47 kg/m2  SpO2 96%  GENERAL APPEARANCE:  Alert, in no distress  ENT:  Mouth and posterior oropharynx normal, moist mucous membranes, [place]  EYES:  EOM, conjunctivae, lids, pupils and irises normal, PERRL  RESP:  respiratory effort and palpation of chest normal, no respiratory distress, diminished breath sounds bases bilaterally, crackles bases bilaterally  CV:  Palpation and auscultation of heart done , regular rate and rhythm, no murmur, rub, or gallop, peripheral edema 2+ in LE bilaterally  ABDOMEN:  normal bowel sounds, soft, nontender, no hepatosplenomegaly or other masses  M/S:   Examination of:   right upper extremity, left upper extremity, right lower extremity " and left lower extremity  Inspection, ROM, stability and muscle strength normal and generalized weakness noted  SKIN:  Inspection of skin and subcutaneous tissue baseline  NEURO:   Cranial nerves 2-12 are normal tested and grossly at patient's baseline, speech WNL    Recent Labs:    CBC RESULTS:   Recent Labs   Lab Test 02/05/17 01/31/17   0525  01/29/17   0615   WBC   --   5.3  7.4   RBC   --   2.78*  2.93*   HGB  8.3*  7.9*  8.2*   HCT  26.3*  24.5*  26.3*   MCV   --   88  90   MCH   --   28.4  28.0   MCHC   --   32.2  31.2*   RDW   --   17.8*  17.8*   PLT   --   158  120*       Last Basic Metabolic Panel:  Recent Labs   Lab Test 02/06/17 02/05/17   NA  139  139   POTASSIUM  4.7  4.7   CHLORIDE  105  103   JARETH  8.4  8.7   CO2  27  27   BUN  48*  49*   CR  2.22*  2.08*   GLC  97  156*       Liver Function Studies -   Recent Labs   Lab Test 02/06/17 01/23/17   0515   PROTTOTAL  6.0*  6.3*   ALBUMIN  2.4*  2.3*   BILITOTAL  0.5  0.6   ALKPHOS  117  93   AST  17  133*   ALT  <10  54     A1C      7.0   12/25/2016    Assessment/Plan:  Acute diastolic congestive heart failure (H)  Acute respiratory failure with hypoxia (H)  Acute: daily weights, vitals daily and prn, BMP follow twice weekly, monitor SaO2 at rest and with activity, lasix increase to 40mg QAM and 20mg at noon,     Chronic atrial fibrillation (H)  Chronic: vitals daily and prn, BMP follow, continue to follow off Rx    CKD (chronic kidney disease) stage 4, GFR 15-29 ml/min (H)  Chronic: creat 2.0-2.1 BMP twice weekly, monitor    Essential hypertension, benign  Chronic: vitals daily and prn, BMP follow, no Rx , follow off Rx, on lasix as above    Physical deconditioning  Acute: PT and OT for strengthening      Orders:  BMP twice weekly  Lasix 40mg QAM and 20mg at noone    Electronically signed by  Tonya Lynn Haase, APRN CNP

## 2017-02-08 PROBLEM — I50.31 ACUTE DIASTOLIC CONGESTIVE HEART FAILURE (H): Status: ACTIVE | Noted: 2017-01-01

## 2017-02-11 NOTE — PROGRESS NOTES
[facility]  PRIMARY CARE PROVIDER AND CLINIC:  [facility] [address] [place] [address]  / TER[facility]      Pt seen by Dr Deng on 2/7/17 for an extended hospital f/u visit    Pt known to me from TCU stay 2 months ago.    HPI:    Armand Smith is a 87 year old  (11/7/1929),admitted to the [facility] from [facility].     Hospital stay 1/20/17 through 2/1/17 for the treatment of acute hypoxic respiratory failure secondary to acute diastolic heart failure.    Hospital course reviewed by me, is as per the hospital d/c summary and NP note.         Pt has a complex PMHx including hypertension, left sided diastolic HF and right sided systolic dysfunction with 2+ TR, chronic afib with hx of stroke presently on aspirin, insulin-dependent diabetes, Parkinson's disease, stage IV CKD and chronic anemia who was admitted on 1/20/2017 after a mechanical fall with worsening sob and wheezing in the days preceding admission, c/w CHF exacerbation. PTA meds: [Bumex 2mg daily, metolazone 2.5mg twice weekly].  Weight reportedly fluctuating as outpatient. Ongoing/ worsening dyspnea, orthopnea and increased leg edema consistent with congestive heart failure exacerbation, proBNP elevated. Required BiPAP on admission given respiratory acidosis and somnolence. Echo from 12/25 showed preserved EF.     Repeat echo on 1/22 showed EF 55-60%, severely dilated RV with severely reduced RVSF and mod-severe TR with mod-severe pulmonary HTN. Started on Lasix 40 mg IV BID on admission and Dobutamine - tolerated despite soft BP initially and diuresed well but then held 1/22-1/23 given development of worsening BALAJI on CKD then resumed at lower dose then maintained as below. Intermittently required BiPAP dt dyspnea.Pt was treated with Meropenem for possible aspiration pneumonia. Pt underwent a L sided thoracentesis with removal of 700 cc fluid c/w transudate. Is receiving tx for a new  LLE wound. Acute on chronic anemia was treated with IV followed by oral Fe. He received one unit of PRBC.    Pt was discharged on Lasix 40 mg daily    .  Since TCU admission, wt up approx 5 pounds  BP stable  Cr 2.1-2.2  BG generally in 100s    Pt reports feeling weak, though improved  He denies SOB at rest, cough, fevers or chills  He is walking short distances in therapy, but continues to require assistance with all cares      CODE STATUS/ADVANCE DIRECTIVES DISCUSSION:   DNR / DNI  Patient's living condition: lives with spouse    ALLERGIES:Codeine and Penicillins  PAST MEDICAL HISTORY:  has a past medical history of Type II or unspecified type diabetes mellitus without mention of complication, not stated as uncontrolled; Essential hypertension, benign; Other testicular hypofunction; Lumbosacral spondylosis without myelopathy; Personal history of diseases of blood and blood-forming organs; Unspecified psychosexual disorder; Basal cell Ca; Nodular lymphoma, unspecified site, extranodal and solid organ sites (1970); Unspecified cerebral artery occlusion with cerebral infarction (2013); Hyperlipidaemia; Atrial fibrillation (H); Chronic diastolic congestive heart failure (H); Pulmonary HTN (H); Great toe amputation status (H); Aortic regurgitation; Tricuspid regurgitation; CKD (chronic kidney disease); Parkinson's disease (H); and Poor circulation of extremity (1/24/2014).  PAST SURGICAL HISTORY:  has past surgical history that includes colonoscopy; splenectomy (1970); Phacoemulsification clear cornea with standard intraocular lens implant (12/12/2013); Phacoemulsification clear cornea with standard intraocular lens implant (12/30/2013); biopsy (skin bx for cancer); Amputate toe(s) (Right, 4/7/2015); RIGHT HEART CATHETERIZATION (9/29/2015); Amputate toe(s); Amputate toe(s) (Right, 10/20/2015); and Biopsy Bone Toe (Right, 10/20/2015).  FAMILY HISTORY: family history includes CEREBROVASCULAR DISEASE in his  father.  SOCIAL HISTORY:  reports that he quit smoking about 6 years ago. His smoking use included Pipe. He has never used smokeless tobacco. He reports that he drinks alcohol. He reports that he does not use illicit drugs.        Post Discharge Medication Reconciliation Status:   .  Current Outpatient Prescriptions   Medication Sig Dispense Refill     furosemide (LASIX) 20 MG tablet Take 20 mg by mouth daily At noon       furosemide (LASIX) 40 MG tablet Take 40 mg by mouth every morning       melatonin 1 MG CAPS Take 1 mg by mouth nightly as needed       HYDROmorphone (DILAUDID) 2 MG tablet Take 1 tablet (2 mg) by mouth every 8 hours as needed for moderate to severe pain 60 tablet 0     ipratropium - albuterol 0.5 mg/2.5 mg/3 mL (DUONEB) 0.5-2.5 (3) MG/3ML neb solution Take 1 vial (3 mLs) by nebulization 4 times daily 360 mL      albuterol (2.5 MG/3ML) 0.083% neb solution Take 1 vial (2.5 mg) by nebulization every 2 hours as needed for wheezing or shortness of breath / dyspnea 360 mL      insulin glargine (LANTUS) 100 UNIT/ML injection Inject 4 Units Subcutaneous At Bedtime       senna-docusate (SENOKOT-S;PERICOLACE) 8.6-50 MG per tablet Take 1 tablet by mouth 2 times daily as needed for constipation 100 tablet      bisacodyl (DULCOLAX) 10 MG Suppository Place 1 suppository (10 mg) rectally every 72 hours as needed for constipation (If no BM for 3 days) 30 suppository      carboxymethylcellulose (REFRESH PLUS) 0.5 % SOLN ophthalmic solution Place 1 drop into both eyes every 4 hours as needed for dry eyes       acetaminophen (TYLENOL) 325 MG tablet Take 1,000 mg by mouth 2 times daily And daily PRN       Nutritional Supplements (GLUCERNA PO) Take 8 oz by mouth daily       carbidopa-levodopa (SINEMET)  MG per tablet Take 1 tablet by mouth 1 tablet by mouth every 5 hours as needed for tremors       polyethylene glycol (MIRALAX/GLYCOLAX) powder Take 17 g by mouth daily as needed for constipation       Skin  Protectants, Misc. (EUCERIN) cream Apply topically 2 times daily 120 g      neomycin-bacitracin-polymyxin (NEOSPORIN) 5-400-5000 ointment Apply topically daily  1     carbidopa-levodopa (SINEMET CR)  MG per tablet Take 1 tablet by mouth 2 times daily 60 tablet 3     omeprazole (PRILOSEC) 20 MG capsule Take 1 capsule (20mg) by mouth once daily 90 capsule 2     blood glucose monitoring (ONE TOUCH ULTRA) test strip Use to test blood sugar 2 times daily or as directed. 100 each PRN     NOVOFINE 30 30G X 8 MM insulin pen needle Use three times daily or as directed 100 each PRN     pramipexole (MIRAPEX) 0.5 MG tablet Take 1 tablet by mouth once daily (with dinner) 90 tablet 2     allopurinol (ZYLOPRIM) 100 MG tablet Take 2 tablets (200 mg) by mouth daily 180 tablet 3     simvastatin (ZOCOR) 40 MG tablet Take 1 tablet (40 mg) by mouth At Bedtime 90 tablet 3     ferrous sulfate (IRON) 325 (65 FE) MG tablet Take 325 mg by mouth daily (with lunch)        Loperamide HCl (IMODIUM A-D PO) Take 2 mg by mouth 4 times daily as needed       aspirin  MG EC tablet Take 1 tablet (325 mg) by mouth daily       blood glucose monitoring (FREESTYLE) lancets Use to test blood sugars 2 times daily or as directed. 1 Box 5     co-enzyme Q-10 100 MG CAPS Take 1 capsule by mouth daily. 30 capsule 0     cholecalciferol 5000 UNITS TABS Take 5,000 Units by mouth daily. 30 tablet 0       ROS:  10 point ROS neg except as note above      Exam:    GENERAL APPEARANCE:  Alert, chronically ill appearing male, lying in bed, in NAD, very pleasant. 02 in place  Neck supple  RESP: RR 14, unlabored, decreased BS B  CV: irregular, HR 70s  4 + presacral and thigh edema B  L LE ulcers were dressed  ABDOMEN:  Soft, non-distended, non-tender  M/S: + generalized weakness  NEURO: cranial nerves 2-12 grossly intact, no facial asymmetry, no speech deficits and able to follow directions, moves all extremities symmetrically  PSYCH:  insight and judgement  appears intact, memory at baseline, affect and mood normal     Lab/Diagnostic data:     CBC RESULTS:   Recent Labs   Lab Test  01/31/17   0525  01/29/17   0615   WBC  5.3  7.4   RBC  2.78*  2.93*   HGB  7.9*  8.2*   HCT  24.5*  26.3*   MCV  88  90   MCH  28.4  28.0   MCHC  32.2  31.2*   RDW  17.8*  17.8*   PLT  158  120*       Last Basic Metabolic Panel:  Recent Labs   Lab Test  02/01/17   0854  01/31/17   0525   NA  139  141   POTASSIUM  4.2  3.8   CHLORIDE  103  104   JARETH  7.8*  7.9*   CO2  31  34*   BUN  42*  44*   CR  2.04*  1.85*   GLC  178*  73       Liver Function Studies -   Recent Labs   Lab Test  01/23/17   0515  01/22/17   0405   PROTTOTAL  6.3*  6.3*   ALBUMIN  2.3*  2.4*   BILITOTAL  0.6  0.6   ALKPHOS  93  96   AST  133*  262*   ALT  54  56     A1C      7.0   12/25/2016      ASSESSMENT/PLAN:    R sided HF, with severe pulmonary HTN, unclear if secondary to lung disease or L sided HF  Required IV Lasix and Dobutamine in hospital. Pt remains extremely edematous--unclear if status will significantly improve with oral meds  Plan continue po Lasix, lymphedema tx, monitor wt, BMP.  F/u with [facility]. 02 to keep sats over 90s %.  Keep Hgb over 8.0 Pt was seen by Palliative Care during hospitalization, in view of multiple recent admissions      Chronic atrial fibrillation (H)  Chronic, rate controlled, on ASA.    Type 2 diabetes mellitus with stage 4 chronic kidney disease, without long-term current use of insulin (H)  Stable since admission  Plan monitor BG, adjust insulin as clinically indicated.      Wound of left leg, sequela  Continue wound cares.  Will likely be difficult to heal in view of significant edema    Paralysis agitans (H)  Chronic. Stable.     Iron deficiency anemia, unspecified iron deficiency anemia type  Plan continue po Fe, monitor Hgb.       Ed Deng MD

## 2017-02-14 NOTE — TELEPHONE ENCOUNTER
Pt's wife India LVM requesting call back to discuss f/u appt.    Called her back LVM requesting return call.

## 2017-02-14 NOTE — PROGRESS NOTES
[facility]    Chief Complaint   Patient presents with     RECHECK       HPI:    Armand Smith is a 87 year old  (11/7/1929), who is being seen today for an episodic care visit at [facility]. Today's concern is:  .   CHF: respiratory failure:SaO2 is stable denies SOB, denies DISLA, PND, cough, congestion,   Deconditioning: needing assist with everything, walking up to 100 feet CTG assist, assist with ADL's  HTN: Last 3 BPs: 112/60, 119/76, 117/56 denies CP, palpitations  Admission Weight: 182.8lbs  Current Weight: 189.1lbs, 190.6lbs, 186lbs, 185.6lbs, 186.2lbs, 185.3lbs starting to trend up, patient denies SOB, states he is eating well here in TCU.   Blood Sugar Range:   AM: 88 - 138  Noon: 108 - 205  PM: 94 - 153  HS: 153 - 242    ALLERGIES: Codeine and Penicillins  Past Medical, Surgical, Family and Social History reviewed and updated in [facility].    Current Outpatient Prescriptions   Medication Sig Dispense Refill     furosemide (LASIX) 20 MG tablet Take 20 mg by mouth daily At noon       furosemide (LASIX) 40 MG tablet Take 40 mg by mouth every morning       melatonin 1 MG CAPS Take 1 mg by mouth nightly as needed       HYDROmorphone (DILAUDID) 2 MG tablet Take 1 tablet (2 mg) by mouth every 8 hours as needed for moderate to severe pain 60 tablet 0     ipratropium - albuterol 0.5 mg/2.5 mg/3 mL (DUONEB) 0.5-2.5 (3) MG/3ML neb solution Take 1 vial (3 mLs) by nebulization 4 times daily 360 mL      albuterol (2.5 MG/3ML) 0.083% neb solution Take 1 vial (2.5 mg) by nebulization every 2 hours as needed for wheezing or shortness of breath / dyspnea 360 mL      insulin glargine (LANTUS) 100 UNIT/ML injection Inject 4 Units Subcutaneous At Bedtime       senna-docusate (SENOKOT-S;PERICOLACE) 8.6-50 MG per tablet Take 1 tablet by mouth 2 times daily as needed for constipation 100 tablet      bisacodyl (DULCOLAX) 10 MG Suppository Place 1 suppository (10 mg) rectally every 72 hours as needed for  constipation (If no BM for 3 days) 30 suppository      carboxymethylcellulose (REFRESH PLUS) 0.5 % SOLN ophthalmic solution Place 1 drop into both eyes every 4 hours as needed for dry eyes       acetaminophen (TYLENOL) 325 MG tablet Take 1,000 mg by mouth 2 times daily And daily PRN       Nutritional Supplements (GLUCERNA PO) Take 8 oz by mouth daily       carbidopa-levodopa (SINEMET)  MG per tablet Take 1 tablet by mouth 1 tablet by mouth every 5 hours as needed for tremors       polyethylene glycol (MIRALAX/GLYCOLAX) powder Take 17 g by mouth daily as needed for constipation       Skin Protectants, Misc. (EUCERIN) cream Apply topically 2 times daily 120 g      neomycin-bacitracin-polymyxin (NEOSPORIN) 5-400-5000 ointment Apply topically daily  1     carbidopa-levodopa (SINEMET CR)  MG per tablet Take 1 tablet by mouth 2 times daily 60 tablet 3     omeprazole (PRILOSEC) 20 MG capsule Take 1 capsule (20mg) by mouth once daily 90 capsule 2     blood glucose monitoring (ONE TOUCH ULTRA) test strip Use to test blood sugar 2 times daily or as directed. 100 each PRN     NOVOFINE 30 30G X 8 MM insulin pen needle Use three times daily or as directed 100 each PRN     pramipexole (MIRAPEX) 0.5 MG tablet Take 1 tablet by mouth once daily (with dinner) 90 tablet 2     allopurinol (ZYLOPRIM) 100 MG tablet Take 2 tablets (200 mg) by mouth daily 180 tablet 3     simvastatin (ZOCOR) 40 MG tablet Take 1 tablet (40 mg) by mouth At Bedtime 90 tablet 3     ferrous sulfate (IRON) 325 (65 FE) MG tablet Take 325 mg by mouth daily (with lunch)        Loperamide HCl (IMODIUM A-D PO) Take 2 mg by mouth 4 times daily as needed       aspirin  MG EC tablet Take 1 tablet (325 mg) by mouth daily       blood glucose monitoring (FREESTYLE) lancets Use to test blood sugars 2 times daily or as directed. 1 Box 5     co-enzyme Q-10 100 MG CAPS Take 1 capsule by mouth daily. 30 capsule 0     cholecalciferol 5000 UNITS TABS Take 5,000  "Units by mouth daily. 30 tablet 0     Medications reviewed:  Medications reconciled to facility chart and changes were made to reflect current medications as identified as above med list. Below are the changes that were made:   Medications stopped since last EPIC medication reconciliation:   There are no discontinued medications.    Medications started since last [facility] medication reconciliation:  No orders of the defined types were placed in this encounter.        REVIEW OF SYSTEMS:  10 point ROS of systems including Constitutional, Eyes, Respiratory, Cardiovascular, Gastroenterology, Genitourinary, Integumentary, Muscularskeletal, Psychiatric were all negative except for pertinent positives noted in my HPI.    Physical Exam:  /62  Pulse 79  Temp 97.4  F (36.3  C)  Resp 16  Ht 5' 10.5\" (1.791 m)  Wt 189 lb 1.6 oz (85.8 kg)  SpO2 93%  BMI 26.75 kg/m2  GENERAL APPEARANCE:  Alert, in no distress  ENT:  Mouth and posterior oropharynx normal, moist mucous membranes, [place]  EYES:  EOM, conjunctivae, lids, pupils and irises normal, PERRL  RESP:  respiratory effort and palpation of chest normal, no respiratory distress, diminished breath sounds bases bilaterally, crackles bases bilaterally  CV:  Palpation and auscultation of heart done , regular rate and rhythm, no murmur, rub, or gallop, peripheral edema 2+ in LE bilaterally  ABDOMEN:  normal bowel sounds, soft, nontender, no hepatosplenomegaly or other masses  M/S:   Examination of:   right upper extremity, left upper extremity, right lower extremity and left lower extremity  Inspection, ROM, stability and muscle strength normal and generalized weakness noted  SKIN:  Inspection of skin and subcutaneous tissue baseline  NEURO:   Cranial nerves 2-12 are normal tested and grossly at patient's baseline, speech WNL    Recent Labs:    CBC RESULTS:   Recent Labs   Lab Test 02/05/17 01/31/17   0525  01/29/17   0615   WBC   --   5.3  7.4   RBC   --   2.78*  2.93*   HGB  8.3*  " 7.9*  8.2*   HCT  26.3*  24.5*  26.3*   MCV   --   88  90   MCH   --   28.4  28.0   MCHC   --   32.2  31.2*   RDW   --   17.8*  17.8*   PLT   --   158  120*       Last Basic Metabolic Panel:  2/9/17 Na 141, k+ 4.7, BUN 38, creat 2.16  Recent Labs   Lab Test 02/06/17 02/05/17   NA  139  139   POTASSIUM  4.7  4.7   CHLORIDE  105  103   JARETH  8.4  8.7   CO2  27  27   BUN  48*  49*   CR  2.22*  2.08*   GLC  97  156*       Liver Function Studies -   Recent Labs   Lab Test 02/06/17 01/23/17   0515   PROTTOTAL  6.0*  6.3*   ALBUMIN  2.4*  2.3*   BILITOTAL  0.5  0.6   ALKPHOS  117  93   AST  17  133*   ALT  <10  54     Lab Results   Component Value Date    A1C 7.0 12/25/2016           Assessment/Plan:  Acute diastolic congestive heart failure (H)  Acute: daily weights, vitals daily and prn, BMP follow twice weekly, monitor SaO2 at rest and with activity, continue lasix  40mg QAM and 20mg at noon,     Chronic atrial fibrillation (H)  Chronic: vitals daily and prn, BMP follow, continue to follow off Rx    CKD (chronic kidney disease) stage 4, GFR 15-29 ml/min (H)  Chronic: creat 2.0-2.1 BMP twice weekly, monitor    Essential hypertension, benign  Chronic: vitals daily and prn, BMP follow, no Rx , follow off Rx, on lasix as above    Physical deconditioning  Acute: PT and OT for strengthening      Orders:   No new orders today    Electronically signed by  Tonya Lynn Haase, APRN CNP

## 2017-02-16 NOTE — TELEPHONE ENCOUNTER
Received VM from pt's wife, [place], requesting return call to discuss f/u with Jeannette.      Called [place], no answer, LVM requesting return call.

## 2017-02-17 NOTE — TELEPHONE ENCOUNTER
Spoke with wife [place], pt will still be at TCU for at least another week or two. Wife states pt is being followed closely there by nursing and NP/MD. They cannot bring pt in for appt until he is out of TCU, as pt still not strong enough to get in and out of the car alone, and they cannot afford to pay $100 for a ride to clinic from TCU. Told wife we can schedule follow up with Jeannette for 3 weeks from now, if he is out sooner or later, we can always adjust. She agreed, scheduled pt to see AK with labs on 3/9. Did explain importance of close follow up with us, as well as TCU, but wife does not want to bring pt until after TCU stay. JOSE Higgins

## 2017-02-22 NOTE — PROGRESS NOTES
[facility]    Chief Complaint   Patient presents with     RECHECK       HPI:    Armand Smith is a 87 year old  (11/7/1929), who is being seen today for an episodic care visit at [facility]. Today's concern is: Patient sitting up in chair in no acute distress. Verbalizes some pain in BLE L>R with therapies but otherwise no complaints. States BLE edema has improved. Denies being SOB at rest and/or with activity.  .   CHF: respiratory failure:SaO2 is stable denies SOB, denies DISLA, PND, cough, congestion,   Deconditioning: needing assist with everything, walking up to 100 feet CTG assist, assist with ADL's  HTN: Last 3 BPs:119/59, 124/64, 111/60 denies CP, palpitations  Admission Weight: 182.8lbs  Current Weight: 178.2lbs, 179.7lbs, 185.6lbs, 182.5lbs, 176.4lbs, 177.9lbs starting to trend up, patient denies SOB, states he is eating well here in TCU.   Blood Sugar Range:   AM: 80 - 143  Noon: 108 - 179  PM: 106 - 203  HS: 166 - 314    ALLERGIES: Codeine and Penicillins  Past Medical, Surgical, Family and Social History reviewed and updated in [facility].    Current Outpatient Prescriptions   Medication Sig Dispense Refill     carboxymethylcellulose (REFRESH PLUS) 0.5 % SOLN ophthalmic solution Place 1 drop into both eyes 4 times daily       furosemide (LASIX) 20 MG tablet Take 20 mg by mouth daily At noon       furosemide (LASIX) 40 MG tablet Take 40 mg by mouth every morning       melatonin 1 MG CAPS Take 1 mg by mouth nightly as needed       HYDROmorphone (DILAUDID) 2 MG tablet Take 1 tablet (2 mg) by mouth every 8 hours as needed for moderate to severe pain 60 tablet 0     ipratropium - albuterol 0.5 mg/2.5 mg/3 mL (DUONEB) 0.5-2.5 (3) MG/3ML neb solution Take 1 vial (3 mLs) by nebulization 4 times daily 360 mL      albuterol (2.5 MG/3ML) 0.083% neb solution Take 1 vial (2.5 mg) by nebulization every 2 hours as needed for wheezing or shortness of breath / dyspnea 360 mL      insulin glargine (LANTUS)  100 UNIT/ML injection Inject 4 Units Subcutaneous At Bedtime       senna-docusate (SENOKOT-S;PERICOLACE) 8.6-50 MG per tablet Take 1 tablet by mouth 2 times daily as needed for constipation 100 tablet      bisacodyl (DULCOLAX) 10 MG Suppository Place 1 suppository (10 mg) rectally every 72 hours as needed for constipation (If no BM for 3 days) 30 suppository      acetaminophen (TYLENOL) 325 MG tablet Take 1,000 mg by mouth 2 times daily And daily PRN       Nutritional Supplements (GLUCERNA PO) Take 8 oz by mouth daily       carbidopa-levodopa (SINEMET)  MG per tablet Take 1 tablet by mouth 1 tablet by mouth every 5 hours as needed for tremors       polyethylene glycol (MIRALAX/GLYCOLAX) powder Take 17 g by mouth daily as needed for constipation       Skin Protectants, Misc. (EUCERIN) cream Apply topically 2 times daily 120 g      neomycin-bacitracin-polymyxin (NEOSPORIN) 5-400-5000 ointment Apply topically daily  1     carbidopa-levodopa (SINEMET CR)  MG per tablet Take 1 tablet by mouth 2 times daily 60 tablet 3     omeprazole (PRILOSEC) 20 MG capsule Take 1 capsule (20mg) by mouth once daily 90 capsule 2     blood glucose monitoring (ONE TOUCH ULTRA) test strip Use to test blood sugar 2 times daily or as directed. 100 each PRN     NOVOFINE 30 30G X 8 MM insulin pen needle Use three times daily or as directed 100 each PRN     pramipexole (MIRAPEX) 0.5 MG tablet Take 1 tablet by mouth once daily (with dinner) 90 tablet 2     allopurinol (ZYLOPRIM) 100 MG tablet Take 2 tablets (200 mg) by mouth daily 180 tablet 3     simvastatin (ZOCOR) 40 MG tablet Take 1 tablet (40 mg) by mouth At Bedtime 90 tablet 3     ferrous sulfate (IRON) 325 (65 FE) MG tablet Take 325 mg by mouth daily (with lunch)        Loperamide HCl (IMODIUM A-D PO) Take 2 mg by mouth 4 times daily as needed       aspirin  MG EC tablet Take 1 tablet (325 mg) by mouth daily       blood glucose monitoring (FREESTYLE) lancets Use to test  "blood sugars 2 times daily or as directed. 1 Box 5     co-enzyme Q-10 100 MG CAPS Take 1 capsule by mouth daily. 30 capsule 0     cholecalciferol 5000 UNITS TABS Take 5,000 Units by mouth daily. 30 tablet 0     Medications reviewed:  Medications reconciled to facility chart and changes were made to reflect current medications as identified as above med list. Below are the changes that were made:   Medications stopped since last EPIC medication reconciliation:   Medications Discontinued During This Encounter   Medication Reason     carboxymethylcellulose (REFRESH PLUS) 0.5 % SOLN ophthalmic solution Dose adjustment       Medications started since last [facility] medication reconciliation:  Orders Placed This Encounter   Medications     carboxymethylcellulose (REFRESH PLUS) 0.5 % SOLN ophthalmic solution     Sig: Place 1 drop into both eyes 4 times daily       REVIEW OF SYSTEMS:  10 point ROS of systems including Constitutional, Eyes, Respiratory, Cardiovascular, Gastroenterology, Genitourinary, Integumentary, Muscularskeletal, Psychiatric were all negative except for pertinent positives noted in my HPI.    Physical Exam:  /65  Pulse 83  Temp 96.8  F (36  C)  Resp 17  Ht 5' 10.5\" (1.791 m)  Wt 178 lb 3.2 oz (80.8 kg)  SpO2 92%  BMI 25.21 kg/m2  GENERAL APPEARANCE:  Alert, in no distress  ENT:  Mouth and posterior oropharynx normal, moist mucous membranes, [place]  EYES:  EOM, conjunctivae, lids, pupils and irises normal, PERRL  RESP:  respiratory effort and palpation of chest normal, no respiratory distress, diminished breath sounds bases bilaterally, crackles bases bilaterally have imrpoved. RLL with some crackles on auscultation but LLL sounds clear.  CV:  Palpation and auscultation of heart done , irregular rate and rhythm per baseline Atrial fibriatlation, no murmur, rub, or gallop, peripheral edema has improved to bilateral lower extremities  ABDOMEN:  normal bowel sounds, soft, nontender, no hepatosplenomegaly or " other masses  M/S:   Examination of:   right upper extremity, left upper extremity, right lower extremity and left lower extremity  Inspection, ROM, stability and muscle strength normal and generalized weakness noted  SKIN:  Inspection of skin and subcutaneous tissue baseline  NEURO:   Cranial nerves 2-12 are normal tested and grossly at patient's baseline, speech WNL    Recent Labs:    2/9/17 Na 141, k+ 4.7, BUN 38, creat 2.16  CBC RESULTS:   Recent Labs   Lab Test 02/05/17 01/31/17   0525  01/29/17   0615   WBC   --   5.3  7.4   RBC   --   2.78*  2.93*   HGB  8.3*  7.9*  8.2*   HCT  26.3*  24.5*  26.3*   MCV   --   88  90   MCH   --   28.4  28.0   MCHC   --   32.2  31.2*   RDW   --   17.8*  17.8*   PLT   --   158  120*       Last Basic Metabolic Panel:  Recent Labs   Lab Test 02/06/17 02/05/17   NA  139  139   POTASSIUM  4.7  4.7   CHLORIDE  105  103   JARETH  8.4  8.7   CO2  27  27   BUN  48*  49*   CR  2.22*  2.08*   GLC  97  156*       Liver Function Studies -   Recent Labs   Lab Test 02/06/17 01/23/17   0515   PROTTOTAL  6.0*  6.3*   ALBUMIN  2.4*  2.3*   BILITOTAL  0.5  0.6   ALKPHOS  117  93   AST  17  133*   ALT  <10  54     Lab Results   Component Value Date    A1C 7.0 12/25/2016     Assessment/Plan:  Acute diastolic congestive heart failure (H)  Acute: daily weights, vitals daily and prn, BMP follow, monitor SaO2 at rest and with activity, continue lasix  40mg QAM   DC lasix 20mg at noon    Chronic atrial fibrillation (H)  Chronic: vitals daily and prn, BMP follow, continue to follow off Rx    CKD (chronic kidney disease) stage 4, GFR 15-29 ml/min (H)  Chronic: creat 2.0-2.1 BMP follow, monitor  BMP in AM and again on monday    Essential hypertension, benign  Chronic: vitals daily and prn, BMP follow, no Rx , follow off Rx, on lasix as above    Physical deconditioning  Acute: PT and OT for strengthening      Orders:   Discontinue Lasix 20mg PO at noon  BMP Thursday 2/23/17 and Monday  2/27/17    Electronically signed by  Tonya Lynn Haase, APRN CNP

## 2017-02-24 NOTE — PROGRESS NOTES
[facility]    Chief Complaint   Patient presents with     RECHECK       HPI:    Armand Smith is a 87 year old  (11/7/1929), who is being seen today for an episodic care visit at [facility].   .CHF: respiratory failure:SaO2 is stable denies SOB, denies DISLA, PND, cough, congestion, weight is stable   Deconditioning: needing assist with everything, walking up to 100 feet CTG assist, assist with ADL's  HTN: Last 3 BPs: 131/66, 126/65, 119/59 denies CP, palpitations  Admission Weight: 182.8lbs  Current Weight: 178.4lbs, 177.2lbs, 178.2lbs, 179.7lbs, 185.6lbs, 182.5lbs   Blood Sugar Range:   AM: 80 - 143  Noon: 128 - 179  PM: 106 - 156  HS: 161 - 210    ALLERGIES: Codeine and Penicillins  Past Medical, Surgical, Family and Social History reviewed and updated in EPIC.    Current Outpatient Prescriptions   Medication Sig Dispense Refill     carboxymethylcellulose (REFRESH PLUS) 0.5 % SOLN ophthalmic solution Place 1 drop into both eyes 4 times daily       furosemide (LASIX) 40 MG tablet Take 40 mg by mouth every morning       melatonin 1 MG CAPS Take 1 mg by mouth nightly as needed       HYDROmorphone (DILAUDID) 2 MG tablet Take 1 tablet (2 mg) by mouth every 8 hours as needed for moderate to severe pain 60 tablet 0     ipratropium - albuterol 0.5 mg/2.5 mg/3 mL (DUONEB) 0.5-2.5 (3) MG/3ML neb solution Take 1 vial (3 mLs) by nebulization 4 times daily 360 mL      albuterol (2.5 MG/3ML) 0.083% neb solution Take 1 vial (2.5 mg) by nebulization every 2 hours as needed for wheezing or shortness of breath / dyspnea 360 mL      insulin glargine (LANTUS) 100 UNIT/ML injection Inject 4 Units Subcutaneous At Bedtime       senna-docusate (SENOKOT-S;PERICOLACE) 8.6-50 MG per tablet Take 1 tablet by mouth 2 times daily as needed for constipation 100 tablet      bisacodyl (DULCOLAX) 10 MG Suppository Place 1 suppository (10 mg) rectally every 72 hours as needed for constipation (If no BM for 3 days) 30  suppository      acetaminophen (TYLENOL) 325 MG tablet Take 1,000 mg by mouth 2 times daily And daily PRN       Nutritional Supplements (GLUCERNA PO) Take 8 oz by mouth daily       carbidopa-levodopa (SINEMET)  MG per tablet Take 1 tablet by mouth 1 tablet by mouth every 5 hours as needed for tremors       polyethylene glycol (MIRALAX/GLYCOLAX) powder Take 17 g by mouth daily as needed for constipation       Skin Protectants, Misc. (EUCERIN) cream Apply topically 2 times daily 120 g      neomycin-bacitracin-polymyxin (NEOSPORIN) 5-400-5000 ointment Apply topically daily  1     carbidopa-levodopa (SINEMET CR)  MG per tablet Take 1 tablet by mouth 2 times daily 60 tablet 3     omeprazole (PRILOSEC) 20 MG capsule Take 1 capsule (20mg) by mouth once daily 90 capsule 2     blood glucose monitoring (ONE TOUCH ULTRA) test strip Use to test blood sugar 2 times daily or as directed. 100 each PRN     NOVOFINE 30 30G X 8 MM insulin pen needle Use three times daily or as directed 100 each PRN     pramipexole (MIRAPEX) 0.5 MG tablet Take 1 tablet by mouth once daily (with dinner) 90 tablet 2     allopurinol (ZYLOPRIM) 100 MG tablet Take 2 tablets (200 mg) by mouth daily 180 tablet 3     simvastatin (ZOCOR) 40 MG tablet Take 1 tablet (40 mg) by mouth At Bedtime 90 tablet 3     ferrous sulfate (IRON) 325 (65 FE) MG tablet Take 325 mg by mouth daily (with lunch)        Loperamide HCl (IMODIUM A-D PO) Take 2 mg by mouth 4 times daily as needed       aspirin  MG EC tablet Take 1 tablet (325 mg) by mouth daily       blood glucose monitoring (FREESTYLE) lancets Use to test blood sugars 2 times daily or as directed. 1 Box 5     co-enzyme Q-10 100 MG CAPS Take 1 capsule by mouth daily. 30 capsule 0     cholecalciferol 5000 UNITS TABS Take 5,000 Units by mouth daily. 30 tablet 0     Medications reviewed:  Medications reconciled to facility chart and changes were made to reflect current medications as identified as above  "med list. Below are the changes that were made:   Medications stopped since last EPIC medication reconciliation:   Medications Discontinued During This Encounter   Medication Reason     furosemide (LASIX) 20 MG tablet Medication Reconciliation Clean Up       Medications started since last [facility] medication reconciliation:  No orders of the defined types were placed in this encounter.      REVIEW OF SYSTEMS:  10 point ROS of systems including Constitutional, Eyes, Respiratory, Cardiovascular, Gastroenterology, Genitourinary, Integumentary, Muscularskeletal, Psychiatric were all negative except for pertinent positives noted in my HPI.    Physical Exam:  /62  Pulse 83  Temp 96.8  F (36  C)  Resp 18  Ht 5' 10.5\" (1.791 m)  Wt 178 lb 6.4 oz (80.9 kg)  SpO2 92%  BMI 25.24 kg/m2  GENERAL APPEARANCE:  Alert, in no distress  ENT:  Mouth and posterior oropharynx normal, moist mucous membranes, [place]  EYES:  EOM, conjunctivae, lids, pupils and irises normal, PERRL  RESP:  respiratory effort and palpation of chest normal, no respiratory distress, diminished breath sounds bases bilaterally, crackles bases bilaterally have imrpoved. RLL with some crackles on auscultation but LLL sounds clear.  CV:  Palpation and auscultation of heart done , irregular rate and rhythm per baseline Atrial fibriatlation, no murmur, rub, or gallop, LE edema 1+ bilaterally, improved, weight continues to trend down  ABDOMEN:  normal bowel sounds, soft, nontender, no hepatosplenomegaly or other masses  M/S:   Examination of:   right upper extremity, left upper extremity, right lower extremity and left lower extremity  Inspection, ROM, stability and muscle strength normal and generalized weakness noted  SKIN:  Inspection of skin and subcutaneous tissue baseline  NEURO:   Cranial nerves 2-12 are normal tested and grossly at patient's baseline, speech WNL    Recent Labs:    2/9/17 Na 141, k+ 4.7, BUN 38, creat 2.16  CBC RESULTS:   Recent Labs   Lab Test " 02/05/17 01/31/17   0525  01/29/17   0615   WBC   --   5.3  7.4   RBC   --   2.78*  2.93*   HGB  8.3*  7.9*  8.2*   HCT  26.3*  24.5*  26.3*   MCV   --   88  90   MCH   --   28.4  28.0   MCHC   --   32.2  31.2*   RDW   --   17.8*  17.8*   PLT   --   158  120*       Last Basic Metabolic Panel:  2/23/17 Na 139, K+ 4.3, BUN 31, Creat 1.93  2.9/17 Na 141, K+ 4.7, BUN 38, creat 2.16  Recent Labs   Lab Test 02/06/17 02/05/17   NA  139  139   POTASSIUM  4.7  4.7   CHLORIDE  105  103   JARETH  8.4  8.7   CO2  27  27   BUN  48*  49*   CR  2.22*  2.08*   GLC  97  156*       Liver Function Studies -   Recent Labs   Lab Test 02/06/17 01/23/17   0515   PROTTOTAL  6.0*  6.3*   ALBUMIN  2.4*  2.3*   BILITOTAL  0.5  0.6   ALKPHOS  117  93   AST  17  133*   ALT  <10  54     Lab Results   Component Value Date    A1C 7.0 12/25/2016     Assessment/Plan:  Acute diastolic congestive heart failure (H)  Acute: daily weights, vitals daily and prn, BMP again on monday, monitor SaO2 at rest and with activity, continue lasix  40mg QAM  only    Chronic atrial fibrillation (H)  Chronic: vitals daily and prn, BMP follow, continue to follow off Rx    CKD (chronic kidney disease) stage 4, GFR 15-29 ml/min (H)  Chronic: creat 2.0-2.1 BMP follow, monitor  BMP in AM and again on monday    Essential hypertension, benign  Chronic: vitals daily and prn, BMP follow, no Rx , follow off Rx, on lasix as above    Physical deconditioning  Acute: PT and OT for strengthening    Orders:   No new orders today    Electronically signed by  Tonya Lynn Haase, APRN CNP

## 2017-02-28 NOTE — PROGRESS NOTES
[facility]    Chief Complaint   Patient presents with     RECHECK       HPI:    Armand Smith is a 87 year old  (11/7/1929), who is being seen today for an episodic care visit at [facility]   Acute Resp failure/cough: patient develped new cough, coughing up yellow sputum, small amt, fever over the last couple of days, SaO2 < 90% on room air, started on O2, maintaining SaO2 > 90% with 2 liters of O2, on exam today states he has been a little more SOB, denies fever or chills at time of exam,.   .CHF: respiratory failure:SaO2 is stable denies SOB, denies DISLA, PND, cough, congestion, weight is stable   Deconditioning: needing assist with everything, walking >  100 feet CTG assist, assist with ADL's  HTN: Last 3 BPs: 131/66, 126/65, 119/59 denies CP, palpitations  Admission Weight: 182.8lbs  Current Weight: 177.3lbs, 176.6lbs, 178.4lbs, 177.2lbs, 178.2lbs, 179.7lbs   Blood Sugar Range:   AM: 82 - 145  Noon: 109 - 174  PM: 106 - 147  HS: 118 - 210    ALLERGIES: Codeine and Penicillins  Past Medical, Surgical, Family and Social History reviewed and updated in [facility].    Current Outpatient Prescriptions   Medication Sig Dispense Refill     Polyethylene Glycol 1450 POWD 17 g as needed For constipation       ACETAMINOPHEN PO Take 1,000 mg by mouth 2 times daily AND Give 2 tablet by mouth as needed for pain daily       carboxymethylcellulose (REFRESH PLUS) 0.5 % SOLN ophthalmic solution Place 1 drop into both eyes 4 times daily       furosemide (LASIX) 40 MG tablet Take 40 mg by mouth every morning       melatonin 1 MG CAPS Take 1 mg by mouth nightly as needed       HYDROmorphone (DILAUDID) 2 MG tablet Take 1 tablet (2 mg) by mouth every 8 hours as needed for moderate to severe pain 60 tablet 0     ipratropium - albuterol 0.5 mg/2.5 mg/3 mL (DUONEB) 0.5-2.5 (3) MG/3ML neb solution Take 1 vial (3 mLs) by nebulization 4 times daily 360 mL      albuterol (2.5 MG/3ML) 0.083% neb solution Take 1 vial (2.5 mg)  by nebulization every 2 hours as needed for wheezing or shortness of breath / dyspnea 360 mL      insulin glargine (LANTUS) 100 UNIT/ML injection Inject 4 Units Subcutaneous At Bedtime       senna-docusate (SENOKOT-S;PERICOLACE) 8.6-50 MG per tablet Take 1 tablet by mouth 2 times daily as needed for constipation 100 tablet      bisacodyl (DULCOLAX) 10 MG Suppository Place 1 suppository (10 mg) rectally every 72 hours as needed for constipation (If no BM for 3 days) 30 suppository      Nutritional Supplements (GLUCERNA PO) Take 8 oz by mouth daily       carbidopa-levodopa (SINEMET)  MG per tablet Take 1 tablet by mouth 1 tablet by mouth every 5 hours as needed for tremors       polyethylene glycol (MIRALAX/GLYCOLAX) powder Take 17 g by mouth daily as needed for constipation       Skin Protectants, Misc. (EUCERIN) cream Apply topically 2 times daily 120 g      neomycin-bacitracin-polymyxin (NEOSPORIN) 5-400-5000 ointment Apply topically daily  1     carbidopa-levodopa (SINEMET CR)  MG per tablet Take 1 tablet by mouth 2 times daily 60 tablet 3     omeprazole (PRILOSEC) 20 MG capsule Take 1 capsule (20mg) by mouth once daily 90 capsule 2     blood glucose monitoring (ONE TOUCH ULTRA) test strip Use to test blood sugar 2 times daily or as directed. 100 each PRN     NOVOFINE 30 30G X 8 MM insulin pen needle Use three times daily or as directed 100 each PRN     pramipexole (MIRAPEX) 0.5 MG tablet Take 1 tablet by mouth once daily (with dinner) 90 tablet 2     allopurinol (ZYLOPRIM) 100 MG tablet Take 2 tablets (200 mg) by mouth daily 180 tablet 3     simvastatin (ZOCOR) 40 MG tablet Take 1 tablet (40 mg) by mouth At Bedtime 90 tablet 3     ferrous sulfate (IRON) 325 (65 FE) MG tablet Take 325 mg by mouth daily (with lunch)        Loperamide HCl (IMODIUM A-D PO) Take 2 mg by mouth 4 times daily as needed       aspirin  MG EC tablet Take 1 tablet (325 mg) by mouth daily       blood glucose monitoring  "(FREESTYLE) lancets Use to test blood sugars 2 times daily or as directed. 1 Box 5     co-enzyme Q-10 100 MG CAPS Take 1 capsule by mouth daily. 30 capsule 0     cholecalciferol 5000 UNITS TABS Take 5,000 Units by mouth daily. 30 tablet 0     Medications reviewed:  Medications reconciled to facility chart and changes were made to reflect current medications as identified as above med list. Below are the changes that were made:   Medications stopped since last EPIC medication reconciliation:   Medications Discontinued During This Encounter   Medication Reason     furosemide (LASIX) 20 MG tablet Medication Reconciliation Clean Up       Medications started since last [facility] medication reconciliation:  No orders of the defined types were placed in this encounter.      REVIEW OF SYSTEMS:  10 point ROS of systems including Constitutional, Eyes, Respiratory, Cardiovascular, Gastroenterology, Genitourinary, Integumentary, Muscularskeletal, Psychiatric were all negative except for pertinent positives noted in my HPI.    Physical Exam:  /59  Pulse 85  Temp 97.5  F (36.4  C)  Resp 18  Ht 5' 10.5\" (1.791 m)  Wt 177 lb 4.8 oz (80.4 kg)  SpO2 92%  BMI 25.08 kg/m2  GENERAL APPEARANCE:  Alert, in no distress  ENT:  Mouth and posterior oropharynx normal, moist mucous membranes, [place]  EYES:  EOM, conjunctivae, lids, pupils and irises normal, PERRL  RESP:  respiratory effort and palpation of chest normal, no respiratory distress, diminished breath sounds bases bilaterally, crackles left base noted otherwise CTa  CV:  Palpation and auscultation of heart done , irregular rate and rhythm per baseline Atrial fibriatlation, no murmur, rub, or gallop, LE edema 1+ bilaterally, improved, weight continues to trend down  ABDOMEN:  normal bowel sounds, soft, nontender, no hepatosplenomegaly or other masses  M/S:   Examination of:   right upper extremity, left upper extremity, right lower extremity and left lower extremity  Inspection, ROM, " stability and muscle strength normal and generalized weakness noted  SKIN:  Inspection of skin and subcutaneous tissue baseline  NEURO:   Cranial nerves 2-12 are normal tested and grossly at patient's baseline, speech WNL    Recent Labs:    CBC RESULTS:   Recent Labs   Lab Test 02/05/17 01/31/17   0525  01/29/17   0615   WBC   --   5.3  7.4   RBC   --   2.78*  2.93*   HGB  8.3*  7.9*  8.2*   HCT  26.3*  24.5*  26.3*   MCV   --   88  90   MCH   --   28.4  28.0   MCHC   --   32.2  31.2*   RDW   --   17.8*  17.8*   PLT   --   158  120*       Last Basic Metabolic Panel:  Recent Labs   Lab Test 02/06/17 02/05/17   NA  139  139   POTASSIUM  4.7  4.7   CHLORIDE  105  103   JARETH  8.4  8.7   CO2  27  27   BUN  48*  49*   CR  2.22*  2.08*   GLC  97  156*       Liver Function Studies -   Recent Labs   Lab Test 02/06/17 01/23/17   0515   PROTTOTAL  6.0*  6.3*   ALBUMIN  2.4*  2.3*   BILITOTAL  0.5  0.6   ALKPHOS  117  93   AST  17  133*   ALT  <10  54       TSH   Date Value Ref Range Status   04/21/2016 2.76 0.40 - 4.00 mU/L Final   04/17/2015 1.18 0.40 - 4.00 mU/L Final   ]    Lab Results   Component Value Date    A1C 7.0 12/25/2016    A1C 7.8 08/12/2016           Assessment/Plan:  Cute respiratory failure/ Pneumonia:  Acute: CXR stat, duonebs TID scheduled for 10 days, levaquin 250mg QD for 10 days    Acute diastolic congestive heart failure (H)  Acute: daily weights, vitals daily and prn, BMP again on monday, monitor SaO2 at rest and with activity, continue lasix  40mg QAM  only    Chronic atrial fibrillation (H)  Chronic: vitals daily and prn, BMP follow, continue to follow off Rx    CKD (chronic kidney disease) stage 4, GFR 15-29 ml/min (H)  Chronic: creat 2.0-2.1 BMP follow, monitor    Essential hypertension, benign  Chronic: vitals daily and prn, BMP follow, no Rx , follow off Rx, on lasix as above    Physical deconditioning  Acute: PT and OT for strengthening    Orders:   CXR stat  duonebs TID scheduled for 10  days  levaquin 250mg QD for 10 days      Electronically signed by  Tonya Lynn Haase, APRN CNP

## 2017-03-01 NOTE — PROGRESS NOTES
"[place]    No chief complaint on file.      HPI:    Armand Smith is a 87 year old  (11/7/1929), who is being seen today for an episodic care visit at [facility]   Acute Resp failure/cough: patient states continues to have SOB throughout the night, coughing up yellow sputum, small amt, afebrile over past 48 hours, 92% on 2 liters this AM, on exam today states he has been a little more SOB, denies fever or chills at time of exam,. See x-ray results   .CHF: SOB as above, see weights which are stable   Deconditioning: needing assist with everything, walking >  100 feet CTG assist, assist with ADL's  HTN: Last 3 BPs: 131/66, 126/65, 119/59 denies CP, palpitations  Advanced directives discussion: discussed goals of care with patient he is currently a full code status, continues to decline due to chronic conditions, discussed ongoing chronic conditions and decline, discussed patient ability to care for himself at home he states \" I was feeling very good when I went home until I blacked out and woke up in the hospital\" patient states he does not agree with me that his chronic conditions are nearing end stage and that a safer living environment is needed.   Admission Weight: 182.8lbs  Current Weight:173.8lbs today  177.3lbs, 176.6lbs, 178.4lbs, 177.2lbs, 178.2lbs, 179.7lbs   Blood Sugar Range:   AM: 82 - 145  Noon: 109 - 174  PM: 106 - 147  HS: 118 - 210    ALLERGIES: Codeine and Penicillins  Past Medical, Surgical, Family and Social History reviewed and updated in EPIC.    Current Outpatient Prescriptions   Medication Sig Dispense Refill     Polyethylene Glycol 1450 POWD 17 g as needed For constipation       ACETAMINOPHEN PO Take 1,000 mg by mouth 2 times daily AND Give 2 tablet by mouth as needed for pain daily       carboxymethylcellulose (REFRESH PLUS) 0.5 % SOLN ophthalmic solution Place 1 drop into both eyes 4 times daily       furosemide (LASIX) 40 MG tablet Take 40 mg by mouth every morning "       melatonin 1 MG CAPS Take 1 mg by mouth nightly as needed       HYDROmorphone (DILAUDID) 2 MG tablet Take 1 tablet (2 mg) by mouth every 8 hours as needed for moderate to severe pain 60 tablet 0     ipratropium - albuterol 0.5 mg/2.5 mg/3 mL (DUONEB) 0.5-2.5 (3) MG/3ML neb solution Take 1 vial (3 mLs) by nebulization 4 times daily 360 mL      albuterol (2.5 MG/3ML) 0.083% neb solution Take 1 vial (2.5 mg) by nebulization every 2 hours as needed for wheezing or shortness of breath / dyspnea 360 mL      insulin glargine (LANTUS) 100 UNIT/ML injection Inject 4 Units Subcutaneous At Bedtime       senna-docusate (SENOKOT-S;PERICOLACE) 8.6-50 MG per tablet Take 1 tablet by mouth 2 times daily as needed for constipation 100 tablet      bisacodyl (DULCOLAX) 10 MG Suppository Place 1 suppository (10 mg) rectally every 72 hours as needed for constipation (If no BM for 3 days) 30 suppository      Nutritional Supplements (GLUCERNA PO) Take 8 oz by mouth daily       carbidopa-levodopa (SINEMET)  MG per tablet Take 1 tablet by mouth 1 tablet by mouth every 5 hours as needed for tremors       polyethylene glycol (MIRALAX/GLYCOLAX) powder Take 17 g by mouth daily as needed for constipation       Skin Protectants, Misc. (EUCERIN) cream Apply topically 2 times daily 120 g      neomycin-bacitracin-polymyxin (NEOSPORIN) 5-400-5000 ointment Apply topically daily  1     carbidopa-levodopa (SINEMET CR)  MG per tablet Take 1 tablet by mouth 2 times daily 60 tablet 3     omeprazole (PRILOSEC) 20 MG capsule Take 1 capsule (20mg) by mouth once daily 90 capsule 2     blood glucose monitoring (ONE TOUCH ULTRA) test strip Use to test blood sugar 2 times daily or as directed. 100 each PRN     NOVOFINE 30 30G X 8 MM insulin pen needle Use three times daily or as directed 100 each PRN     pramipexole (MIRAPEX) 0.5 MG tablet Take 1 tablet by mouth once daily (with dinner) 90 tablet 2     allopurinol (ZYLOPRIM) 100 MG tablet Take 2  tablets (200 mg) by mouth daily 180 tablet 3     simvastatin (ZOCOR) 40 MG tablet Take 1 tablet (40 mg) by mouth At Bedtime 90 tablet 3     ferrous sulfate (IRON) 325 (65 FE) MG tablet Take 325 mg by mouth daily (with lunch)        Loperamide HCl (IMODIUM A-D PO) Take 2 mg by mouth 4 times daily as needed       aspirin  MG EC tablet Take 1 tablet (325 mg) by mouth daily       blood glucose monitoring (FREESTYLE) lancets Use to test blood sugars 2 times daily or as directed. 1 Box 5     co-enzyme Q-10 100 MG CAPS Take 1 capsule by mouth daily. 30 capsule 0     cholecalciferol 5000 UNITS TABS Take 5,000 Units by mouth daily. 30 tablet 0     Medications reviewed:  Medications reconciled to facility chart and changes were made to reflect current medications as identified as above med list. Below are the changes that were made:   Medications stopped since last EPIC medication reconciliation:   Medications Discontinued During This Encounter   Medication Reason     furosemide (LASIX) 20 MG tablet Medication Reconciliation Clean Up       Medications started since last [facility] medication reconciliation:  No orders of the defined types were placed in this encounter.      REVIEW OF SYSTEMS:  10 point ROS of systems including Constitutional, Eyes, Respiratory, Cardiovascular, Gastroenterology, Genitourinary, Integumentary, Muscularskeletal, Psychiatric were all negative except for pertinent positives noted in my HPI.    Physical Exam:  /64  Pulse 86  Temp 98.4  F (36.9  C)  Resp 18  Wt 173 lb 12.8 oz (78.8 kg)  SpO2 92%  BMI 24.59 kg/m2  GENERAL APPEARANCE:  Alert, in no distress  ENT:  Mouth and posterior oropharynx normal, moist mucous membranes, [place]  EYES:  EOM, conjunctivae, lids, pupils and irises normal, PERRL  RESP:  respiratory effort and palpation of chest normal, no respiratory distress, diminished breath sounds bases bilaterally, crackles left base noted, rhonchi throughout fields bilaterally noted  CV:   Palpation and auscultation of heart done , irregular rate and rhythm per baseline Atrial fibriatlation, no murmur, rub, or gallop, LE edema 1+ bilaterally, improved, weight continues to trend down  ABDOMEN:  normal bowel sounds, soft, nontender, no hepatosplenomegaly or other masses  M/S:   Examination of:   right upper extremity, left upper extremity, right lower extremity and left lower extremity  Inspection, ROM, stability and muscle strength normal and generalized weakness noted  SKIN:  Inspection of skin and subcutaneous tissue baseline  NEURO:   Cranial nerves 2-12 are normal tested and grossly at patient's baseline, speech WNL    Recent Labs:    X-Ray result: CXR 2/28/17 no acute findings, lungs unremarkable no consolidation, hear is enlarged, heavy marking in the chest but no definite acute disease  CBC RESULTS:   Recent Labs   Lab Test 02/05/17 01/31/17   0525  01/29/17   0615   WBC   --   5.3  7.4   RBC   --   2.78*  2.93*   HGB  8.3*  7.9*  8.2*   HCT  26.3*  24.5*  26.3*   MCV   --   88  90   MCH   --   28.4  28.0   MCHC   --   32.2  31.2*   RDW   --   17.8*  17.8*   PLT   --   158  120*       Last Basic Metabolic Panel:  2/23/17 Na 139, K+ 4.3, BUN 31, creat 1.93  Recent Labs   Lab Test 02/06/17 02/05/17   NA  139  139   POTASSIUM  4.7  4.7   CHLORIDE  105  103   JARETH  8.4  8.7   CO2  27  27   BUN  48*  49*   CR  2.22*  2.08*   GLC  97  156*       Liver Function Studies -   Recent Labs   Lab Test 02/06/17 01/23/17   0515   PROTTOTAL  6.0*  6.3*   ALBUMIN  2.4*  2.3*   BILITOTAL  0.5  0.6   ALKPHOS  117  93   AST  17  133*   ALT  <10  54       TSH   Date Value Ref Range Status   04/21/2016 2.76 0.40 - 4.00 mU/L Final   04/17/2015 1.18 0.40 - 4.00 mU/L Final   ]    Lab Results   Component Value Date    A1C 7.0 12/25/2016    A1C 7.8 08/12/2016           Assessment/Plan:  Advanced directives discussion:   Had discussion with patient regarding chronic condition, decline in baseline status and ability for  care for himself at home, he states he will have a discussion with his wife regarding goals of care.     Acute respiratory failure  Acute:  Continue duonebs TID scheduled for 10 days, levaquin 250mg QD for 10 days  CBC, BMP and swab for influenza A and B    Acute diastolic congestive heart failure (H)  Acute: daily weights, vitals daily and prn, BMP again on monday, monitor SaO2 at rest and with activity, continue lasix  40mg QAM  only    Chronic atrial fibrillation (H)  Chronic: vitals daily and prn, BMP follow, continue to follow off Rx    CKD (chronic kidney disease) stage 4, GFR 15-29 ml/min (H)  Chronic: creat 2.0-2.1 BMP follow, monitor    Essential hypertension, benign  Chronic: vitals daily and prn, BMP follow, no Rx , follow off Rx, on lasix as above    Physical deconditioning  Acute: PT and OT for strengthening    Orders:   BMP and CBC today  Influenza swab for A and B today      Electronically signed by  Tonya Lynn Haase, APRN CNP

## 2017-03-02 NOTE — PROGRESS NOTES
"[facility]    Chief Complaint   Patient presents with     Nursing Home Acute       HPI:    Armand Smith is a 87 year old  (11/7/1929), who is being seen today for an episodic care visit at [facility]. Today's concern is:    Acute respiratory failure with hypoxia (H)  Patient was started on Levaquin yesterday for increased productive cough and shortness of breath. Chest xray was negative for infiltrates, influenza swab negative. CBC with a WBC count of 3.6. He is on 2 liters per nasal cannula, saturating 92-96%, RR 18-22. Overall, pt feels like his symptoms are improving and that he may \"just have a cold\".   --continue scheduled DuoNebs QID   --discontinue Levaquin and monitor respiratory status  --wean oxygen as able    Chronic diastolic congestive heart failure (H)  Chronic atrial fibrillation (H)  Essential hypertension, benign  Physical deconditioning  Bibasilar crackles on exam today. Creatinine on labs was 2.02, which is stable and down from 2.2 on 3/6. Currently on scheduled lasix 40 mg daily.   BP Range: 106/56 - 156/87. Admission Weight: 182.8lbs, current weight range: 173.8lbs - 185.6lbs.  Wt Readings from Last 2 Encounters:   03/02/17 173 lb 12.8 oz (78.8 kg)   03/01/17 173 lb 12.8 oz (78.8 kg)   --20 mg lasix PO one time today    ALLERGIES: Codeine and Penicillins  Past Medical, Surgical, Family and Social History reviewed and updated in [facility].    Current Outpatient Prescriptions   Medication Sig Dispense Refill     ACETAMINOPHEN PO Take 1,000 mg by mouth 2 times daily AND Give 2 tablet by mouth as needed for pain daily       carboxymethylcellulose (REFRESH PLUS) 0.5 % SOLN ophthalmic solution Place 1 drop into both eyes 4 times daily       furosemide (LASIX) 40 MG tablet Take 40 mg by mouth every morning       melatonin 1 MG CAPS Take 1 mg by mouth nightly as needed       HYDROmorphone (DILAUDID) 2 MG tablet Take 1 tablet (2 mg) by mouth every 8 hours as needed for moderate to " severe pain 60 tablet 0     ipratropium - albuterol 0.5 mg/2.5 mg/3 mL (DUONEB) 0.5-2.5 (3) MG/3ML neb solution Take 1 vial (3 mLs) by nebulization 4 times daily 360 mL      albuterol (2.5 MG/3ML) 0.083% neb solution Take 1 vial (2.5 mg) by nebulization every 2 hours as needed for wheezing or shortness of breath / dyspnea 360 mL      insulin glargine (LANTUS) 100 UNIT/ML injection Inject 4 Units Subcutaneous At Bedtime       senna-docusate (SENOKOT-S;PERICOLACE) 8.6-50 MG per tablet Take 1 tablet by mouth 2 times daily as needed for constipation 100 tablet      bisacodyl (DULCOLAX) 10 MG Suppository Place 1 suppository (10 mg) rectally every 72 hours as needed for constipation (If no BM for 3 days) 30 suppository      Nutritional Supplements (GLUCERNA PO) Take 8 oz by mouth daily       carbidopa-levodopa (SINEMET)  MG per tablet Take 1 tablet by mouth 1 tablet by mouth every 5 hours as needed for tremors       polyethylene glycol (MIRALAX/GLYCOLAX) powder Take 17 g by mouth daily as needed for constipation       Skin Protectants, Misc. (EUCERIN) cream Apply topically 2 times daily 120 g      neomycin-bacitracin-polymyxin (NEOSPORIN) 5-400-5000 ointment Apply topically daily  1     carbidopa-levodopa (SINEMET CR)  MG per tablet Take 1 tablet by mouth 2 times daily 60 tablet 3     omeprazole (PRILOSEC) 20 MG capsule Take 1 capsule (20mg) by mouth once daily 90 capsule 2     blood glucose monitoring (ONE TOUCH ULTRA) test strip Use to test blood sugar 2 times daily or as directed. 100 each PRN     NOVOFINE 30 30G X 8 MM insulin pen needle Use three times daily or as directed 100 each PRN     pramipexole (MIRAPEX) 0.5 MG tablet Take 1 tablet by mouth once daily (with dinner) 90 tablet 2     allopurinol (ZYLOPRIM) 100 MG tablet Take 2 tablets (200 mg) by mouth daily 180 tablet 3     simvastatin (ZOCOR) 40 MG tablet Take 1 tablet (40 mg) by mouth At Bedtime 90 tablet 3     ferrous sulfate (IRON) 325 (65 FE) MG  "tablet Take 325 mg by mouth daily (with lunch)        Loperamide HCl (IMODIUM A-D PO) Take 2 mg by mouth 4 times daily as needed       aspirin  MG EC tablet Take 1 tablet (325 mg) by mouth daily       blood glucose monitoring (FREESTYLE) lancets Use to test blood sugars 2 times daily or as directed. 1 Box 5     co-enzyme Q-10 100 MG CAPS Take 1 capsule by mouth daily. 30 capsule 0     cholecalciferol 5000 UNITS TABS Take 5,000 Units by mouth daily. 30 tablet 0     Medications reviewed:  Medications reconciled to facility chart and changes were made to reflect current medications as identified as above med list. Below are the changes that were made:   Medications stopped since last EPIC medication reconciliation:   Medications Discontinued During This Encounter   Medication Reason     Polyethylene Glycol 1450 POWD Medication Reconciliation Clean Up       Medications started since last Psychiatric medication reconciliation:  Orders Placed This Encounter   Medications     levofloxacin (LEVAQUIN) 250 MG tablet     Sig: Take 250 mg by mouth daily       REVIEW OF SYSTEMS:  10 point ROS of systems including Constitutional, Eyes, Respiratory, Cardiovascular, Gastroenterology, Genitourinary, Integumentary, Musculoskeletal, Psychiatric were all negative except for pertinent positives noted in my HPI.    Physical Exam:  /87  Pulse 81  Temp 97  F (36.1  C)  Resp 20  Ht 5' 10.5\" (1.791 m)  Wt 173 lb 12.8 oz (78.8 kg)  SpO2 94%  BMI 24.59 kg/m2  GENERAL APPEARANCE:  Alert, in no distress, pleasant, cooperative, oriented x 3  EYES:  EOM, lids, pupils and irises normal, sclera clear and conjunctiva normal, no discharge or mattering on lids or lashes noted  ENT:  Mouth normal, moist mucous membranes, nose without drainage or crusting, external ears without lesions  NECK:  Nontender, supple, symmetrical; no adenopathy, masses or thyromegaly, trachea midline  RESP:  respiratory effort and palpation of chest normal, no chest wall " tenderness, no respiratory distress, Bibasilar crackles, patient is on 2 liters nasal cannula.  CV:  Regular rate and rhythm irregular, no murmur, no rub or gallop. Edema present in bilateral lower extremities, ACE wraps in place.   ABDOMEN:  normal bowel sounds, soft, nontender, no grimacing or guarding with palpation, no hepatosplenomegaly or other masses  SKIN:  Inspection and palpation of skin and subcutaneous tissue: skin on warm, dry and intact without rashes  NEURO: cranial nerves 2-12 grossly intact, no facial asymmetry, no speech deficits and able to follow directions, moves all extremities symmetrically  PSYCH:  insight and judgement, memory, affect and mood normal    Recent Labs:    3/1/17  Na-140  K-4.6  Creat-2.2  BUN-40  WBC-3.6  HGB-10.5  Plt-181      Assessment/Plan:  (J96.01) Acute respiratory failure with hypoxia (H)  (primary encounter diagnosis)  Comment: Acute, improving  Plan: d/c Levaquin. Titrate oxygen as able.  QID DuoNebs    (I50.32) Chronic diastolic congestive heart failure (H)  (I10) Essential hypertension, benign  (I48.2) Chronic atrial fibrillation (H)  (R53.81) Physical deconditioning  Comment: Chronic, stable  Plan: one time lasix 20 mg today. Continue with PT/OT    Orders:  Discontinue Levaquin  Lasix 20 mg once today  Staff to update provider if not effective or if condition changes/worsens    Mine Franklin, RN, NP Intern    This patient was seen along with a nurse practitioner intern.  The histories and reviews of systems were obtained by intern and confirmed by myself. All objective, assessments and plans were completed by myself.   Lindsey Lopez     Total time spent with patient visit was 25 min including patient visit, review of past records and discussion of patient status and POC with staff. Greater than 50% of total time spent with counseling and coordinating care.     Electronically signed by  AMARJIT Marin CNP      ADDENDUM  On 3/3 notified patient now c/o  "increased lower back pain, yesterday c/o severe upper back pain. On scheduled Tylenol 1000 mg PO BID and daily PRN. Also takes PRN Dilaudid 2 mg PO every 8 hrs and does not feel pain controlled. No known new injury but now working in therapies. Staff report patient is \"down\" and failing to thrive since Advance directive conversation earlier this week.     PLAN  Xray of thoracic and lumbar spine today.   Increase Dilaudid 2 mg PO every 6 hrs PRN pain.   Refer to in-house psych due to failure to thrive.     Electronically signed by AMARJIT Marin, IRMA             "

## 2017-03-02 NOTE — MR AVS SNAPSHOT
After Visit Summary   3/2/2017    Armand Smith    MRN: [id number]           Patient Information     Date Of Birth          11/7/1929        Visit Information        Provider Department      3/2/2017 7:30 AM Lindsey Lopez APRN CNP Geriatrics Transitional Care        Today's Diagnoses     Acute respiratory failure with hypoxia (H)    -  1    Chronic diastolic congestive heart failure (H)        Essential hypertension, benign        Chronic atrial fibrillation (H)        Physical deconditioning           Follow-ups after your visit        Your next 10 appointments already scheduled     Mar 09, 2017  1:00 PM CST   LAB with [facility]   [facility] ([facility])    [address] [place] [id number] [phone]           Patient must bring picture ID.  Patient should be prepared to give a urine specimen  Please do not eat 10-12 hours before your appointment if you are coming in fasting for labs on lipids, cholesterol, or glucose (sugar).  Pregnant women should follow their Care Team instructions. Water with medications is okay. Do not drink coffee or other fluids.   If you have concerns about taking  your medications, please ask at office or if scheduling via [employer], send a message by clicking on Secure Messaging, Message Your Care Team.            Mar 09, 2017  1:50 PM CST   Core Return with AMARJIT Galeas CNP   [facility] ([facility])    [address] [place] [id number] [phone]              Who to contact     If you have questions or need follow up information about today's clinic visit or your schedule please contact G[facility]AT[facility] directly at [phone].  Normal or non-critical lab and imaging results will be communicated to you by MyChart, letter or phone within 4 business days after the clinic has received the results. If you do  "not hear from us within 7 days, please contact the clinic through [employer] or phone. If you have a critical or abnormal lab result, we will notify you by phone as soon as possible.  Submit refill requests through [employer] or call your pharmacy and they will forward the refill request to us. Please allow 3 business days for your refill to be completed.          Additional Information About Your Visit        [employer] Information     [employer] gives you secure access to your electronic health record. If you see a primary care provider, you can also send messages to your care team and make appointments. If you have questions, please call your primary care clinic.  If you do not have a primary care provider, please call [phone] and they will assist you.        Care EveryWhere ID     This is your Care EveryWhere ID. This could be used by other organizations to access your [facility] medical records  [id number]        Your Vitals Were     Pulse Temperature Respirations Height Pulse Oximetry BMI (Body Mass Index)    81 97  F (36.1  C) 20 5' 10.5\" (1.791 m) 94% 24.59 kg/m2       Blood Pressure from Last 3 Encounters:   03/02/17 156/87   03/01/17 124/64   02/28/17 119/59    Weight from Last 3 Encounters:   03/02/17 173 lb 12.8 oz (78.8 kg)   03/01/17 173 lb 12.8 oz (78.8 kg)   02/28/17 177 lb 4.8 oz (80.4 kg)              Today, you had the following     No orders found for display         Today's Medication Changes          These changes are accurate as of: 3/2/17 11:39 AM.  If you have any questions, ask your nurse or doctor.               Stop taking these medicines if you haven't already. Please contact your care team if you have questions.     LEVAQUIN 250 MG tablet   Generic drug:  levofloxacin   Stopped by:  Lindsey Lopez APRN CNP                    Primary Care Provider Office Phone # Fax #    Aniyah Magana [phone]       [facility] [address] [place] [address] [address] [place] " [phone]        Thank you!     Thank you for choosing GERIATRICS TRANSITIONAL [facility]  for your care. Our goal is always to provide you with excellent care. Hearing back from our patients is one way we can continue to improve our services. Please take a few minutes to complete the written survey that you may receive in the mail after your visit with us. Thank you!             Your Updated Medication List - Protect others around you: Learn how to safely use, store and throw away your medicines at www.disposemymeds.org.          This list is accurate as of: 3/2/17 11:39 AM.  Always use your most recent med list.                   Brand Name Dispense Instructions for use    ACETAMINOPHEN PO      Take 1,000 mg by mouth 2 times daily AND Give 2 tablet by mouth as needed for pain daily       albuterol (2.5 MG/3ML) 0.083% neb solution     360 mL    Take 1 vial (2.5 mg) by nebulization every 2 hours as needed for wheezing or shortness of breath / dyspnea       allopurinol 100 MG tablet    ZYLOPRIM    180 tablet    Take 2 tablets (200 mg) by mouth daily       aspirin 325 MG EC tablet      Take 1 tablet (325 mg) by mouth daily       bisacodyl 10 MG Suppository    DULCOLAX    30 suppository    Place 1 suppository (10 mg) rectally every 72 hours as needed for constipation (If no BM for 3 days)       blood glucose monitoring lancets     1 Box    Use to test blood sugars 2 times daily or as directed.       blood glucose monitoring test strip    ONE TOUCH ULTRA    100 each    Use to test blood sugar 2 times daily or as directed.       * carbidopa-levodopa  MG per tablet    SINEMET     Take 1 tablet by mouth 1 tablet by mouth every 5 hours as needed for tremors       * carbidopa-levodopa  MG per CR tablet    SINEMET CR    60 tablet    Take 1 tablet by mouth 2 times daily       carboxymethylcellulose 0.5 % Soln ophthalmic solution    REFRESH PLUS     Place 1 drop into both eyes 4 times daily       Cholecalciferol 5000 UNITS  Tabs     30 tablet    Take 5,000 Units by mouth daily.       co-enzyme Q-10 100 MG Caps capsule     30 capsule    Take 1 capsule by mouth daily.       eucerin cream     120 g    Apply topically 2 times daily       ferrous sulfate 325 (65 FE) MG tablet    IRON     Take 325 mg by mouth daily (with lunch)       furosemide 40 MG tablet    LASIX     Take 40 mg by mouth every morning       GLUCERNA PO      Take 8 oz by mouth daily       HYDROmorphone 2 MG tablet    DILAUDID    60 tablet    Take 1 tablet (2 mg) by mouth every 8 hours as needed for moderate to severe pain       IMODIUM A-D PO      Take 2 mg by mouth 4 times daily as needed       insulin glargine 100 UNIT/ML injection    LANTUS     Inject 4 Units Subcutaneous At Bedtime       ipratropium - albuterol 0.5 mg/2.5 mg/3 mL 0.5-2.5 (3) MG/3ML neb solution    DUONEB    360 mL    Take 1 vial (3 mLs) by nebulization 4 times daily       melatonin 1 MG Caps      Take 1 mg by mouth nightly as needed       neomycin-bacitracin-polymyxin 5-400-5000 ointment      Apply topically daily       NOVOFINE 30 30G X 8 MM   Generic drug:  insulin pen needle     100 each    Use three times daily or as directed       omeprazole 20 MG CR capsule    priLOSEC    90 capsule    Take 1 capsule (20mg) by mouth once daily       polyethylene glycol powder    MIRALAX/GLYCOLAX     Take 17 g by mouth daily as needed for constipation       pramipexole 0.5 MG tablet    MIRAPEX    90 tablet    Take 1 tablet by mouth once daily (with dinner)       senna-docusate 8.6-50 MG per tablet    SENOKOT-S;PERICOLACE    100 tablet    Take 1 tablet by mouth 2 times daily as needed for constipation       simvastatin 40 MG tablet    ZOCOR    90 tablet    Take 1 tablet (40 mg) by mouth At Bedtime       * Notice:  This list has 2 medication(s) that are the same as other medications prescribed for you. Read the directions carefully, and ask your doctor or other care provider to review them with you.

## 2017-03-06 NOTE — PROGRESS NOTES
[facility]    Chief Complaint   Patient presents with     RECHECK       HPI:    Armand Smith is a 87 year old  (11/7/1929), who is being seen today for an episodic care visit at [facility]   Acute low back pain: patient started having low back pain last Thursday, acute low back pain states he is having back spasms, started on dilaudid prn with little effect, nursing state pain seems to be worse in the evening and night, on exam today patient states low back pain is rated 4/10 at time of exam, sitting up in chair, states that if he moves he has back spasms and the pain is unbearable, denies radicular pain, states he injured his back when an aide pulled him over in bed during the night last week.   Depression/failure to thrive: patient chronic conditions are at end stage, psychology did see patient on Saturday and suggested starting an antidepressant, celexa was started, patient states he is not sleeping well, appetite is poor and he does not want to live with this low back pain and ongoing decline in condition, weight is down,   CHF: SOB as above, see weights which are stable   Deconditioning: patient is not participating in therapy due to low back pain.   HTN: Last 3 BPs: 111/62, 127/69, 131/73 denies CP, palpitations  Admission Weight: 182.8lbs  Current Weight: 161.6lbs, 161.7lbs, 173.2lbs, 173.8lbs, 177.3lbs  Blood Sugar Range:   AM: 79 - 106  Noon: 127 - 174  PM: 111 - 258  HS: 114 - 255    ALLERGIES: Codeine and Penicillins  Past Medical, Surgical, Family and Social History reviewed and updated in [facility].    Current Outpatient Prescriptions   Medication Sig Dispense Refill     citalopram (CELEXA) 10 MG tablet Take 10 mg by mouth daily       Benzocaine-Menthol (CEPACOL EXTRA STRENGTH MT) Take 1 lozenge by mouth every 2 hours as needed       HYDROmorphone (DILAUDID) 2 MG tablet Take 2 mg by mouth every 6 hours as needed for moderate to severe pain       ACETAMINOPHEN PO Take 1,000 mg by mouth  2 times daily AND Give 2 tablet by mouth as needed for pain daily       carboxymethylcellulose (REFRESH PLUS) 0.5 % SOLN ophthalmic solution Place 1 drop into both eyes 4 times daily       furosemide (LASIX) 40 MG tablet Take 40 mg by mouth every morning       melatonin 1 MG CAPS Take 1 mg by mouth nightly as needed       ipratropium - albuterol 0.5 mg/2.5 mg/3 mL (DUONEB) 0.5-2.5 (3) MG/3ML neb solution Take 1 vial (3 mLs) by nebulization 4 times daily 360 mL      albuterol (2.5 MG/3ML) 0.083% neb solution Take 1 vial (2.5 mg) by nebulization every 2 hours as needed for wheezing or shortness of breath / dyspnea 360 mL      insulin glargine (LANTUS) 100 UNIT/ML injection Inject 4 Units Subcutaneous At Bedtime       senna-docusate (SENOKOT-S;PERICOLACE) 8.6-50 MG per tablet Take 1 tablet by mouth 2 times daily as needed for constipation 100 tablet      bisacodyl (DULCOLAX) 10 MG Suppository Place 1 suppository (10 mg) rectally every 72 hours as needed for constipation (If no BM for 3 days) 30 suppository      Nutritional Supplements (GLUCERNA PO) Take 8 oz by mouth daily       carbidopa-levodopa (SINEMET)  MG per tablet Take 1 tablet by mouth 1 tablet by mouth every 5 hours as needed for tremors       polyethylene glycol (MIRALAX/GLYCOLAX) powder Take 17 g by mouth daily as needed for constipation       Skin Protectants, Misc. (EUCERIN) cream Apply topically 2 times daily 120 g      neomycin-bacitracin-polymyxin (NEOSPORIN) 5-400-5000 ointment Apply topically daily  1     carbidopa-levodopa (SINEMET CR)  MG per tablet Take 1 tablet by mouth 2 times daily 60 tablet 3     omeprazole (PRILOSEC) 20 MG capsule Take 1 capsule (20mg) by mouth once daily 90 capsule 2     blood glucose monitoring (ONE TOUCH ULTRA) test strip Use to test blood sugar 2 times daily or as directed. 100 each PRN     NOVOFINE 30 30G X 8 MM insulin pen needle Use three times daily or as directed 100 each PRN     pramipexole (MIRAPEX) 0.5  "MG tablet Take 1 tablet by mouth once daily (with dinner) 90 tablet 2     allopurinol (ZYLOPRIM) 100 MG tablet Take 2 tablets (200 mg) by mouth daily 180 tablet 3     simvastatin (ZOCOR) 40 MG tablet Take 1 tablet (40 mg) by mouth At Bedtime 90 tablet 3     ferrous sulfate (IRON) 325 (65 FE) MG tablet Take 325 mg by mouth daily (with lunch)        Loperamide HCl (IMODIUM A-D PO) Take 2 mg by mouth 4 times daily as needed       aspirin  MG EC tablet Take 1 tablet (325 mg) by mouth daily       blood glucose monitoring (FREESTYLE) lancets Use to test blood sugars 2 times daily or as directed. 1 Box 5     co-enzyme Q-10 100 MG CAPS Take 1 capsule by mouth daily. 30 capsule 0     cholecalciferol 5000 UNITS TABS Take 5,000 Units by mouth daily. 30 tablet 0     Medications reviewed:  Medications reconciled to facility chart and changes were made to reflect current medications as identified as above med list. Below are the changes that were made:   Medications stopped since last EPIC medication reconciliation:   Medications Discontinued During This Encounter   Medication Reason     HYDROmorphone (DILAUDID) 2 MG tablet Dose adjustment       Medications started since last [facility] medication reconciliation:  Orders Placed This Encounter   Medications     citalopram (CELEXA) 10 MG tablet     Sig: Take 10 mg by mouth daily     Benzocaine-Menthol (CEPACOL EXTRA STRENGTH MT)     Sig: Take 1 lozenge by mouth every 2 hours as needed     HYDROmorphone (DILAUDID) 2 MG tablet     Sig: Take 2 mg by mouth every 6 hours as needed for moderate to severe pain       REVIEW OF SYSTEMS:  10 point ROS of systems including Constitutional, Eyes, Respiratory, Cardiovascular, Gastroenterology, Genitourinary, Integumentary, Muscularskeletal, Psychiatric were all negative except for pertinent positives noted in my HPI.    Physical Exam:  /66  Pulse 81  Temp 97.8  F (36.6  C)  Resp 18  Ht 5' 10.5\" (1.791 m)  Wt 161 lb 11.2 oz (73.3 kg)  SpO2 " 93%  BMI 22.87 kg/m2  GENERAL APPEARANCE:  Alert, thin, appears to be in pain  ENT:  Mouth and posterior oropharynx normal, moist mucous membranes, [place]  EYES:  EOM, conjunctivae, lids, pupils and irises normal, PERRL  RESP:  respiratory effort and palpation of chest normal, no respiratory distress, diminished breath sounds bases bilaterally, crackles left base noted, rhonchi throughout fields bilaterally noted  CV:  Palpation and auscultation of heart done , irregular rate and rhythm per baseline Atrial fibriatlation, no murmur, rub, or gallop, LE edema trace bilaterally which is  improved, weight continues to trend down  ABDOMEN:  normal bowel sounds, soft, nontender, no hepatosplenomegaly or other masses  M/S:   Examination of:   right upper extremity, left upper extremity, right lower extremity and left lower extremity  Inspection, ROM, stability and muscle strength normal and generalized weakness noted  SKIN:  Inspection of skin and subcutaneous tissue baseline  NEURO:   Cranial nerves 2-12 are normal tested and grossly at patient's baseline, speech WNL    Recent Labs:   X-ray of lumbar spine on 3/3/17 no acute findings, unremakable   X-Ray result: CXR 2/28/17 no acute findings, lungs unremarkable no consolidation, hear is enlarged, heavy marking in the chest but no definite acute disease  CBC RESULTS:   Recent Labs   Lab Test 03/01/17 02/05/17 01/31/17   0525  01/29/17   0615   WBC  3.6*  3.6*   --   5.3  7.4   RBC  3.64*  3.64*   --   2.78*  2.93*   HGB  10.5*  10.5*  8.3*  7.9*  8.2*   HCT  33.1*  33.1*  26.3*  24.5*  26.3*   MCV  90.9  90.9   --   88  90   MCH   --    --   28.4  28.0   MCHC   --    --   32.2  31.2*   RDW  19.7*  19.7*   --   17.8*  17.8*   PLT  181  181   --   158  120*       Last Basic Metabolic Panel:  Recent Labs   Lab Test 03/01/17 02/27/17   NA  140  140  139   POTASSIUM  4.6  4.6  4.8   CHLORIDE  105  105  106   JARETH  8.3*  8.3*  8.6   CO2  28  28  25   BUN  40*  40*  36*    CR  2.02*  2.02*  1.90*   GLC  132*  132*  117*       Liver Function Studies -   Recent Labs   Lab Test 02/06/17 01/23/17   0515   PROTTOTAL  6.0*  6.3*   ALBUMIN  2.4*  2.3*   BILITOTAL  0.5  0.6   ALKPHOS  117  93   AST  17  133*   ALT  <10  54     Assessment/Plan:    Acute low back pain  Acute: DC dilaudid, start norco 5/325mg 1 PO QID scheduled while awake and q 4 hours prn for pain, start valium 2mg qhs scheduled and q 6 hours prn, resume therapy when pain under control    Depression:   Acute: Dc celexa, start remeron 7.5mg qhs, continue f/u with house psychology    Chronic diastolic congestive heart failure (H)  Acute: daily weights, vitals daily and prn, BMP in AM, monitor SaO2 at rest and with activity, decrease lasix  To 20mg QAM  Only  Bmp in AM    Chronic atrial fibrillation (H)  Chronic: vitals daily and prn, BMP follow, continue to follow off Rx    CKD (chronic kidney disease) stage 4, GFR 15-29 ml/min (H)  Chronic: creat 2.0-2.1 BMP follow, monitor    Essential hypertension, benign  Chronic: vitals daily and prn, BMP follow, no Rx , follow off Rx, on lasix as above    Physical deconditioning  Acute: PT and OT for strengthening resume when back pain controlled    Orders:   BMP in AM  Decrease lasix to 20mg QD start tomorrow  DC dilaudid  Start norco 5/325mg QID scheduled while awake and q 4 hours prn  Valium 2mg qhs scheduled and q 6 hours prn  DC celexa  Start remeron 7.5mg qhs      Electronically signed by  Tonya Lynn Haase, APRN CNP

## 2017-03-16 NOTE — PROGRESS NOTES
Clinic Care Coordination Contact  Care Team Conversations    Notification received that patient will be discharging from [facility] TCU tomorrow with [place] home care.  He has PCP visit scheduled on 3/24/2017.  He is [facility] patient and needs to be followed closely per chart review.      Plan: CC will follow up with patient on 3/20/2017 to ensure that home care has started and will reach out to home care at that time as well.    REYMUNDO Gilomre, RN, PHN  [facility] Care Coordinator  [phone]  March 16, 2017

## 2017-03-16 NOTE — PROGRESS NOTES
"  [facility] DISCHARGE SUMMARY    PATIENT'S NAME: Armand Smith  YOB: 1929  MEDICAL RECORD NUMBER:  [id number]    PRIMARY CARE PROVIDER AND CLINIC RESPONSIBLE AFTER TRANSFER[facility] [address] [place] [address]  / TERI GARNICA    CODE STATUS/ADVANCE DIRECTIVES DISCUSSION:   CPR/Full code        Allergies   Allergen Reactions     Codeine      nausea vomiting     Penicillins      \" turned red from head to foot\"       TRANSFERRING PROVIDERS: Tonya Lynn Haase, APRN CNP, Dr. Sowmya MD  DATE OF SNF ADMISSION:  February / 01 / 2017  DATE OF SNF (anticipated) DISCHARGE: March / 17 / 2017  DISCHARGE DISPOSITION: [facility] Provider   Nursing Facility: [facility] stay 1/20/17 to 2/1/17.     Condition on Discharge:  Improving./fair/guarded  Function:  Needing assistance with sit to stand, once patient is standing he is able to walk with SBA using RW up to 150 feet, needing assistance with cares and ADL's  Cognitive Scores: BIMS 15/15    Equipment: walker    DISCHARGE DIAGNOSIS:   1. Chronic diastolic congestive heart failure (H)    2. Chronic atrial fibrillation (H)    3. Essential hypertension, benign    4. CKD (chronic kidney disease) stage 4, GFR 15-29 ml/min (H)    5. Acute bilateral low back pain without sciatica    6. Physical deconditioning    7. Depression with anxiety    8. Type 2 diabetes mellitus with stage 4 chronic kidney disease, without long-term current use of insulin (H)        PAST MEDICAL HISTORY:  has a past medical history of Aortic regurgitation; Atrial fibrillation (H); Basal cell Ca; Chronic diastolic congestive heart failure (H); CKD (chronic kidney disease); Essential hypertension, benign; Great toe amputation status (H); Hyperlipidaemia; Lumbosacral spondylosis without myelopathy; Nodular lymphoma, unspecified site, extranodal and solid organ sites (1970); Other testicular hypofunction; Parkinson's " disease (H); Personal history of diseases of blood and blood-forming organs; Poor circulation of extremity (1/24/2014); Pulmonary HTN (H); Tricuspid regurgitation; Type II or unspecified type diabetes mellitus without mention of complication, not stated as uncontrolled; Unspecified cerebral artery occlusion with cerebral infarction (2013); and Unspecified psychosexual disorder.    DISCHARGE MEDICATIONS:  Current Outpatient Prescriptions   Medication Sig Dispense Refill     furosemide (LASIX) 20 MG tablet Take 20 mg by mouth every morning       mirtazapine (REMERON) 15 MG tablet Take 7.5 mg by mouth At Bedtime       diazepam (VALIUM) 2 MG tablet Take 2 mg by mouth At Bedtime       HYDROcodone-acetaminophen (NORCO) 5-325 MG per tablet Take 1 tablet by mouth 4 times daily       HYDROcodone-acetaminophen (NORCO) 5-325 MG per tablet Take 1 tablet by mouth every 4 hours as needed for moderate to severe pain       diazepam (VALIUM) 2 MG tablet Take 2 mg by mouth every 6 hours as needed for anxiety or sleep       carboxymethylcellulose (REFRESH PLUS) 0.5 % SOLN ophthalmic solution Place 1 drop into both eyes 4 times daily       melatonin 1 MG CAPS Take 1 mg by mouth nightly as needed       ipratropium - albuterol 0.5 mg/2.5 mg/3 mL (DUONEB) 0.5-2.5 (3) MG/3ML neb solution Take 1 vial (3 mLs) by nebulization 4 times daily 360 mL      albuterol (2.5 MG/3ML) 0.083% neb solution Take 1 vial (2.5 mg) by nebulization every 2 hours as needed for wheezing or shortness of breath / dyspnea 360 mL      insulin glargine (LANTUS) 100 UNIT/ML injection Inject 4 Units Subcutaneous At Bedtime       senna-docusate (SENOKOT-S;PERICOLACE) 8.6-50 MG per tablet Take 1 tablet by mouth 2 times daily as needed for constipation 100 tablet      bisacodyl (DULCOLAX) 10 MG Suppository Place 1 suppository (10 mg) rectally every 72 hours as needed for constipation (If no BM for 3 days) 30 suppository      carbidopa-levodopa (SINEMET)  MG per  tablet Take 1 tablet by mouth 1 tablet by mouth every 5 hours as needed for tremors       polyethylene glycol (MIRALAX/GLYCOLAX) powder Take 17 g by mouth daily as needed for constipation       Skin Protectants, Misc. (EUCERIN) cream Apply topically 2 times daily 120 g      neomycin-bacitracin-polymyxin (NEOSPORIN) 5-400-5000 ointment Apply topically daily  1     carbidopa-levodopa (SINEMET CR)  MG per tablet Take 1 tablet by mouth 2 times daily 60 tablet 3     omeprazole (PRILOSEC) 20 MG capsule Take 1 capsule (20mg) by mouth once daily 90 capsule 2     blood glucose monitoring (ONE TOUCH ULTRA) test strip Use to test blood sugar 2 times daily or as directed. 100 each PRN     NOVOFINE 30 30G X 8 MM insulin pen needle Use three times daily or as directed 100 each PRN     pramipexole (MIRAPEX) 0.5 MG tablet Take 1 tablet by mouth once daily (with dinner) 90 tablet 2     allopurinol (ZYLOPRIM) 100 MG tablet Take 2 tablets (200 mg) by mouth daily 180 tablet 3     simvastatin (ZOCOR) 40 MG tablet Take 1 tablet (40 mg) by mouth At Bedtime 90 tablet 3     ferrous sulfate (IRON) 325 (65 FE) MG tablet Take 325 mg by mouth daily (with lunch)        Loperamide HCl (IMODIUM A-D PO) Take 2 mg by mouth 4 times daily as needed       aspirin  MG EC tablet Take 1 tablet (325 mg) by mouth daily       blood glucose monitoring (FREESTYLE) lancets Use to test blood sugars 2 times daily or as directed. 1 Box 5     co-enzyme Q-10 100 MG CAPS Take 1 capsule by mouth daily. 30 capsule 0     cholecalciferol 5000 UNITS TABS Take 5,000 Units by mouth daily. 30 tablet 0       MEDICATION CHANGES/RATIONALE:   Tapered lasix down to 20mg QAM (DC from hospital on 40mg QD) weights, labs and breathing stable on current lasix dose.   Started on levaquin on 2/28/17 for decline in condition, increased SOB, need for O2 and increased congestion, did DC on 3/2/17 workup negative for pneumonia, influenza   DC celexa and started remeron  On  "3/7/17  Started on dilaudid 3/3/17 for acute back pain with very little improvement in pain, did DC dilaudid on 3/6/17 and started norco 5/325mg QID and q 4 hours prn with improvement in pain control and overall condition  Did have an advanced directives discussion with patient during TCU stay, regarding ongoing chronic conditions and need to talk to wife and family regarding goals of care and future living arrangement, need for more care and assistance at home.   Last 3 BPs: 116/59, 109/59, 122/65.  Admission Weight: 182.8lbs  Current Weight: 168.5lbs, 167.3lbs, 164.4lbs, 162.6lbs, 162lbs  Blood Sugar Range:   AM: 97 - 167  Noon: 154 - 230  PM: 102 - 208  HS: 131 - 203    Controlled medications sent with patient: Script for norco 5/325mg 1 PO q 6 hours prn medication for 30 tabs and 0 refills given to patient at dischage to have them fill at their out patient pharmacy     ROS:    10 point ROS of systems including Constitutional, Eyes, Respiratory, Cardiovascular, Gastroenterology, Genitourinary, Integumentary, Muscularskeletal, Psychiatric were all negative except for pertinent positives noted in my HPI.    Physical Exam:   Vitals: /60  Pulse 79  Temp 96.9  F (36.1  C)  Resp 18  Ht 5' 10.5\" (1.791 m)  Wt 168 lb 8 oz (76.4 kg)  SpO2 94%  BMI 23.84 kg/m2  BMI= Body mass index is 23.84 kg/(m^2).    GENERAL APPEARANCE:  Alert, in no distress  ENT:  Mouth and posterior oropharynx normal, moist mucous membranes, [place]  EYES:  EOM, conjunctivae, lids, pupils and irises normal, PERRL  RESP:  respiratory effort and palpation of chest normal, lungs clear to auscultation , no respiratory distress, diminished breath sounds bases bilaterally  CV:  Palpation and auscultation of heart done , regular rate and rhythm, no murmur, rub, or gallop, peripheral edema trace+ in LE bilaterally  ABDOMEN:  normal bowel sounds, soft, nontender, no hepatosplenomegaly or other masses  M/S:   Examination of:   right upper extremity, left " upper extremity, right lower extremity and left lower extremity  Inspection, ROM, stability and muscle strength normal and generalized weakness, frail  SKIN:  Inspection of skin and subcutaneous tissue baseline  NEURO:   Cranial nerves 2-12 are normal tested and grossly at patient's baseline, speech WNL     [facility] Course:  Patient progressed in therapy to assist of 1 with transfers, needing assistance with sit to stand due to leg weakness, once patient is up he is able to walk with RW with SBA up to 150 feet, needing assist of 1 with all ADL's and cares, patient will DC to home with wife states he is going to hire in 24 hour care, will DC with home RN, HHA, and PT for further treatment.     Acute low back pain  Acute: DC home with home PT, RN and HHA,  norco 5/325mg 1 PO q 6 hours prn, DC valium       Depression:   Acute: Dc celexa and start remeron 7.5mg qhs on 3/7/17, did have visits from house psychology  Did have an advanced directive discussion with patient     Chronic diastolic congestive heart failure (H)  Acute: continue daily weights, decrease lasix To 20mg QAM Only from 40mg at DC from hospital, labs, weights, vitals and breathing stable on this dose  F/u with [facility] as directed     Chronic atrial fibrillation (H)  Chronic: continue to follow off Rx     CKD (chronic kidney disease) stage 4, GFR 15-29 ml/min (H)  Chronic: creat 2.0-2.1 BMP follow, creat improved during TCU Stay 1.88     Essential hypertension, benign  Chronic: follow off Rx, on lasix as above     DMII with CKD:   Chronic: blood sugar monitor per home routine, continue lantus insulin 4 units sQ Qhs    Physical deconditioning  Acute: Home PT for strengthening        DISCHARGE PLAN:  Physical Therapy, Registered Nurse, Home Health Aide and [facility]  Patient instructed to follow-up with:  PCP in 5 to 7 days       Current [place] scheduled appointments:  Next 5 appointments (look out 90 days)     Mar 24, 2017   2:00 PM CDT   Office Visit with Aniyah Magana MD   [facility] ([facility])    [address] [facility] [id number] [phone]                  Pending labs: none    SNF labs   CBC RESULTS:   Recent Labs   Lab Test 03/01/17 02/05/17 01/31/17   0525  01/29/17   0615   WBC  3.6*  3.6*   --   5.3  7.4   RBC  3.64*  3.64*   --   2.78*  2.93*   HGB  10.5*  10.5*  8.3*  7.9*  8.2*   HCT  33.1*  33.1*  26.3*  24.5*  26.3*   MCV  90.9  90.9   --   88  90   MCH   --    --   28.4  28.0   MCHC   --    --   32.2  31.2*   RDW  19.7*  19.7*   --   17.8*  17.8*   PLT  181  181   --   158  120*       Last Basic Metabolic Panel:  Recent Labs   Lab Test 03/09/17 03/01/17   NA  144  140  140   POTASSIUM  4.3  4.6  4.6   CHLORIDE  106  105  105   JARETH  9.1  8.3*  8.3*   CO2  32*  28  28   BUN  39*  40*  40*   CR  1.88*  2.02*  2.02*   GLC  100  132*  132*       Liver Function Studies -   Recent Labs   Lab Test 02/06/17 01/23/17   0515   PROTTOTAL  6.0*  6.3*   ALBUMIN  2.4*  2.3*   BILITOTAL  0.5  0.6   ALKPHOS  117  93   AST  17  133*   ALT  <10  54     Discharge Treatments: none    TOTAL DISCHARGE TIME:   Greater than 30 minutes  Electronically signed by:  Tonya Lynn Haase, APRN CNP

## 2017-03-16 NOTE — PROGRESS NOTES
Wife called, pt discharging from TCU tomorrow. Pt missed OV last week with [facility] and she got a call from scheduling to reschedule. Wife states it is too difficult to get here, they cannot reschedule at this time. Reviewed with wife, we have not seen pt now since 10/2016, they have missed and canceled numerous appointments, and hard to manage pt when we can't see him. Wife understands, but wishes to follow up with Dr. Magana for now in regards to pt's HF. Pt has visit next Friday 3/24 with Dr. Magana. Wife will express to her their want to haver her manage pt. Will dis-enroll pt from TELEMANAGEMENT. Discussed this with wife. Did tell her pt still needs to weigh daily, and call PMD if wt gain of 3# in a day or 5# in a week, or increased SOB or swelling. Wife agreed. Will send update to Jeannette. JOSE Higgins

## 2017-03-18 NOTE — TELEPHONE ENCOUNTER
Call Type: Triage Call    Presenting Problem: [facility]. Med list discrepancies from  [facility] and home care orders.  Triage Note:  Guideline Title: Information Only Call; No Symptom Triage (Adult)  Recommended Disposition: Provide Information or Advice Only  Original Inclination: Wanted to speak with a nurse  Override Disposition:  Intended Action: Follow advice given  Physician Contacted: No  Health information question; person denies any symptoms, no triage required.  Information provided from approved references or clinical experience. ?  YES  Requesting regular office appointment ? NO  Sign(s) or symptom(s) associated with a diagnosed condition or with a new illness  ? NO  Requesting information about provider, services or community resources ? NO  Call back to complete assessment/clarification of information from prior caller to  complete triage ? NO  Requesting information and provider is best resource; no triage required. ? NO  Caller not with patient and is unable to provide clinical information about  patient to facilitate triage. ? NO  Requesting provider information for recently scheduled test, procedure; no triage  required. Needed information not available per approved resources or clinical  experience. ? NO  Requesting information not available per approved reference or clinical  experience; no triage required. ? NO  Requesting information regarding scheduled exam, test or procedure; no triage  required. Information provided from approved resources or clinical experience. ?  NO  Physician Instructions:  Care Advice:

## 2017-03-18 NOTE — TELEPHONE ENCOUNTER
[facility] Orders.  RN visit 1W1, 2w3, 2PRN  Rehab, PT, OT 1Day1 to eval and treat   Discharged from TCU transitioning to home care.  Verbal orders to [facility].  Tabatha Nunez RN  [facility]    Also, per [facility],  the medication list on the paper work sent with patient from [facility]   doesn't reflect the changes in the discharge summary or the current med list in Epic.

## 2017-03-20 NOTE — TELEPHONE ENCOUNTER
[facility]  [phone] Zoraida    PT orders for one time a week for one week and two times a week for three weeks.     Approval given to fax orders for edgar Pitts RN

## 2017-03-20 NOTE — PROGRESS NOTES
Clinic Care Coordination Contact  OUTREACH    Referral Information:  Referral Source: SNF/TCU Hand-Off (non-IP Report)  Reason for Contact: TCU discharge  Care Conference: No     Universal Utilization:   ED Visits in last year: 0  Hospital visits in last year: 4  Last PCP appointment: 11/14/16  Missed Appointments: 0  Concerns: utilization is appropriate  Multiple Providers or Specialists: Yes    Clinical Concerns:  Current Medical Concerns: Contacted patient's home and spoke with patient's wife (ADAMARIS on file).  Patient's wife reported that [facility] RN assessment occurred on 3/19/17 and RN will be visiting again today (3/20/17) and also physical therapy has visit scheduled for today (3/20/17).  Patient has chronic diastolic CHF and chronic kidney disease and multiple chronic conditions.  Wife reported that she is not sure if they will continue to follow-up with [facility].  [facility] is working on completing home care assessments and developing ongoing plan of care with patient.  Patient continues to needed assist with position changes and has significant back pain that home care nurse is addressing with clinic.  Wife reported that patient is weighing himself daily and wife denies that patient has any respiratory distress or swelling.  Wife is able to drive and is able to do the grocery shopping and cooking and is assisting patient with medication management.  Wife is not sure if patient will want to have HHA assistance from home care.     Current Behavioral Concerns: Denies acute concerns at this time    Education Provided to patient: Reinforced appointment with PCP on 3/24/17.  Discussed need to contact clinic if any weight gain of 2# overnight or 5# in a week or any difficulty breathing/SOB or increase in swelling.    Clinical Pathway Name: None  Clinical Pathway: None    Medication Management:  Home care RN is working with PCP clinic to clarify medications.       Functional Status:  Mobility Status: Independent  w/Device  Equipment Currently Used at Home: cane, straight, walker, rolling, shower chair, raised toilet (mostly FWW lately)  Transportation: Wife drives           Psychosocial:  Current living arrangement:: I live in a private home with spouse  Financial/Insurance: [facility]  3 dtrs, 1 lives nearby and 2 live in [place]     Resources and Interventions:  Current Resources: Home Care;       [facility] Linkage Line Referral Placed: 02/01/17  Advanced Care Plans/Directives on file:: Yes  Referrals Placed: Other  (None)     Patient/Caregiver understanding: Wife has fair understanding of condition.  Unclear at this time if patient will improve at home with skilled home care services or if patient will need to have assistance of private pay services to maintain home safety.  Possible future need for hospice services if patient chooses.    Frequency of Care Coordination: as needed  Upcoming appointment: 03/24/17     Plan:  1. PCP on 3/24/17.   2. Will send to [facility]  to follow home care episode and transfer back to care coordinator if any needs identified during home care episode or at discharge from home care.      Marialuisa Banda RN   FPA [facility]   [phone]  March 20, 2017

## 2017-03-21 NOTE — PROGRESS NOTES
Clinic Care Coordination Contact  Care Team Conversations    Notification received that patient discharged from [facility] on 3/17 to home with [facility] care services.     Per [facility], the patient's  is Cheyanne Mack RN. The start of care visit was on 3/17/17.     Secure e-mail sent to Cheyanne with [facility].  I identified my self and asked for progress updates and discharge plan when appropriate.  Will check on home care status in 3 weeks    Tatiana Oliver.

## 2017-03-24 NOTE — MR AVS SNAPSHOT
After Visit Summary   3/24/2017    Armand Smith    MRN: [id number]           Patient Information     Date Of Birth          11/7/1929        Visit Information        Provider Department      3/24/2017 2:00 PM Aniyah Magana MD [facility] [place]        Today's Diagnoses     Low back pain without sciatica, unspecified back pain laterality, unspecified chronicity    -  1    IDDM (insulin dependent diabetes mellitus) (H)        Venous (peripheral) insufficiency           Follow-ups after your visit        Follow-up notes from your care team     Return in about 2 months (around 5/24/2017) for Physical Exam.      Who to contact     If you have questions or need follow up information about today's clinic visit or your schedule please contact [facility] [place] directly at [phone].  Normal or non-critical lab and imaging results will be communicated to you by MyChart, letter or phone within 4 business days after the clinic has received the results. If you do not hear from us within 7 days, please contact the clinic through [employer] or phone. If you have a critical or abnormal lab result, we will notify you by phone as soon as possible.  Submit refill requests through [employer] or call your pharmacy and they will forward the refill request to us. Please allow 3 business days for your refill to be completed.          Additional Information About Your Visit        MyChart Information     [employer] gives you secure access to your electronic health record. If you see a primary care provider, you can also send messages to your care team and make appointments. If you have questions, please call your primary care clinic.  If you do not have a primary care provider, please call [phone] and they will assist you.        Care EveryWhere ID     This is your Care EveryWhere ID. This could be used by other organizations to access your [facility] medical records  [id number]        Your Vitals Were      Pulse Temperature BMI (Body Mass Index)             84 96  F (35.6  C) (Tympanic) 23.91 kg/m2          Blood Pressure from Last 3 Encounters:   03/24/17 110/50   03/16/17 112/60   03/06/17 133/66    Weight from Last 3 Encounters:   03/24/17 169 lb (76.7 kg)   03/16/17 168 lb 8 oz (76.4 kg)   03/06/17 161 lb 11.2 oz (73.3 kg)              Today, you had the following     No orders found for display         Today's Medication Changes          These changes are accurate as of: 3/24/17  2:51 PM.  If you have any questions, ask your nurse or doctor.               These medicines have changed or have updated prescriptions.        Dose/Directions    HYDROcodone-acetaminophen 5-325 MG per tablet   Commonly known as:  NORCO   This may have changed:  when to take this   Used for:  Low back pain without sciatica, unspecified back pain laterality, unspecified chronicity   Changed by:  Aniyah Magana MD        Dose:  1 tablet   Take 1 tablet by mouth every 6 hours as needed for moderate to severe pain   Quantity:  30 tablet   Refills:  0       insulin glargine 100 UNIT/ML injection   Commonly known as:  LANTUS   This may have changed:  how much to take   Used for:  IDDM (insulin dependent diabetes mellitus) (H)   Changed by:  Aniyah Magana MD        Dose:  6 Units   Inject 6 Units Subcutaneous At Bedtime   Quantity:  15 mL   Refills:  1         Stop taking these medicines if you haven't already. Please contact your care team if you have questions.     albuterol (2.5 MG/3ML) 0.083% neb solution   Stopped by:  Aniyah Magana MD           bisacodyl 10 MG Suppository   Commonly known as:  DULCOLAX   Stopped by:  Aniyah Magana MD           blood glucose monitoring lancets   Stopped by:  Aniyah Magana MD           Cholecalciferol 5000 UNITS Tabs   Stopped by:  Aniyah Magana MD           co-enzyme Q-10 100 MG Caps capsule   Stopped by:  Aniyah Magana MD           diazepam 2 MG tablet   Commonly known as:  VALIUM   Stopped by:  [place]  MD Aniyah           ipratropium - albuterol 0.5 mg/2.5 mg/3 mL 0.5-2.5 (3) MG/3ML neb solution   Commonly known as:  DUONEB   Stopped by:  Aniyah Magana MD           melatonin 1 MG Caps   Stopped by:  Aniyah Magana MD           polyethylene glycol powder   Commonly known as:  MIRALAX/GLYCOLAX   Stopped by:  Aniyah Magana MD                Where to get your medicines      These medications were sent to [id number] - [place], [place] - [address]   [address] [facility] [address]     Phone:  [phone]     furosemide 20 MG tablet    insulin glargine 100 UNIT/ML injection         Some of these will need a paper prescription and others can be bought over the counter.  Ask your nurse if you have questions.     Bring a paper prescription for each of these medications     HYDROcodone-acetaminophen 5-325 MG per tablet                Primary Care Provider Office Phone # Fax #    Aniyah Magana [phone]       [address]  [place] [address]        Thank you!     Thank you for [place] [facility]  [place] your [place]. Our goal is always to provide you with excellent care. Hearing back from our patients is one way we can continue to improve our services. Please take a few minutes to complete the written survey that you may receive in the mail after your visit with us. Thank you!             Your Updated Medication List - Protect others around you: Learn how to safely use, store and throw away your medicines at www.disposemymeds.org.          This list is accurate as of: 3/24/17  2:51 PM.  Always use your most recent med list.                   Brand Name Dispense Instructions for use    allopurinol 100 MG tablet    ZYLOPRIM    180 tablet    Take 2 tablets (200 mg) by mouth daily       aspirin 325 MG EC tablet      Take 1 tablet (325 mg) by mouth daily       blood glucose monitoring test strip    ONE TOUCH ULTRA    100 each     Use to test blood sugar 2 times daily or as directed.       * carbidopa-levodopa  MG per tablet    SINEMET     Take 1 tablet by mouth 1 tablet by mouth every 5 hours as needed for tremors       * carbidopa-levodopa  MG per CR tablet    SINEMET CR    60 tablet    Take 1 tablet by mouth 2 times daily       carboxymethylcellulose 0.5 % Soln ophthalmic solution    REFRESH PLUS     Place 1 drop into both eyes 4 times daily       eucerin cream     120 g    Apply topically 2 times daily       ferrous sulfate 325 (65 FE) MG tablet    IRON     Take 325 mg by mouth daily (with lunch)       furosemide 20 MG tablet    LASIX    90 tablet    Take 1 tablet (20 mg) by mouth every morning       HYDROcodone-acetaminophen 5-325 MG per tablet    NORCO    30 tablet    Take 1 tablet by mouth every 6 hours as needed for moderate to severe pain       IMODIUM A-D PO      Take 2 mg by mouth 4 times daily as needed       insulin glargine 100 UNIT/ML injection    LANTUS    15 mL    Inject 6 Units Subcutaneous At Bedtime       mirtazapine 15 MG tablet    REMERON     Take 7.5 mg by mouth At Bedtime       neomycin-bacitracin-polymyxin 5-400-5000 ointment      Apply topically daily       NOVOFINE 30 30G X 8 MM   Generic drug:  insulin pen needle     100 each    Use three times daily or as directed       omeprazole 20 MG CR capsule    priLOSEC    90 capsule    Take 1 capsule (20mg) by mouth once daily       pramipexole 0.5 MG tablet    MIRAPEX    90 tablet    Take 1 tablet by mouth once daily (with dinner)       senna-docusate 8.6-50 MG per tablet    SENOKOT-S;PERICOLACE    100 tablet    Take 1 tablet by mouth 2 times daily as needed for constipation       simvastatin 40 MG tablet    ZOCOR    90 tablet    Take 1 tablet (40 mg) by mouth At Bedtime       * Notice:  This list has 2 medication(s) that are the same as other medications prescribed for you. Read the directions carefully, and ask your doctor or other care provider to review  them with you.

## 2017-03-24 NOTE — PROGRESS NOTES
SUBJECTIVE:                                                    Armand Smith is a 87 year old male who presents to clinic today for the following health issues:      Concern - Pt is here to f/u from [facility]. He was discharged on 3/17.  Was injured in the rehab. Injured back while there.      Needs a refill on norco. Due to poor renal functions, he cannot use ibuprofen. Tylenol is not helping much.  Denies any side effects from use of Norco.    Needs to review his medication list. Thinks that his medications have been changed.   He continue to use leg wraps. Weight is much down as compared to before. Thighs and scrotum is still swollen.        Problem list and histories reviewed & adjusted, as indicated.  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     Colon polyps     advanced small vessel disease (brain CT)     Transient ischemic attack (TIA), and cerebral infarction without residual deficits     Diffuse brain atrophy     Stroke (H)     Cerebral infarction (H)     Parkinsonism (H)     Health Care Home     Poor circulation of extremity (H)     Renal failure     Primary pulmonary hypertension (H)     Venous (peripheral) insufficiency     Atrial fibrillation (H)     Physical deconditioning     Great toe amputation status (H)     Aortic regurgitation     Tricuspid regurgitation     Chronic diastolic congestive heart failure (H)     Type 2 diabetes mellitus with diabetic chronic kidney disease (H)     Right-sided heart failure  (H)     Anasarca     Hyperuricemia     Cardiorenal disease     CKD (chronic kidney disease) stage 4, GFR 15-29 ml/min (H)     Chronic atrial fibrillation (H)     Cellulitis     Wound, open, foot     Paralysis agitans (H)     Cough     Pneumonia     Acute respiratory failure with hypoxia (H)     Acute diastolic congestive heart failure (H)     Past Surgical History:   Procedure Laterality Date     AMPUTATE TOE(S) Right 4/7/2015    Procedure: AMPUTATE TOE(S);  Surgeon: Leroy Bright DPM;  Location: [facility]     AMPUTATE TOE(S)       AMPUTATE TOE(S) Right 10/20/2015    Procedure: AMPUTATE TOE(S);  Surgeon: Leroy Bright DPM;  Location: [facility]     BIOPSY  skin bx for cancer     BIOPSY BONE TOE Right 10/20/2015    Procedure: BIOPSY BONE TOE;  Surgeon: Leroy Bright DPM;  Location: [facility]     COLONOSCOPY       HC RIGHT HEART CATHETERIZATION  9/29/2015    Severely elevated right-sided filling pressures and moderately elevated left-sided filling pressures; mild to moderate mixed pulmonaty HTN     PHACOEMULSIFICATION CLEAR CORNEA WITH STANDARD INTRAOCULAR LENS IMPLANT  12/12/2013    Procedure: PHACOEMULSIFICATION CLEAR CORNEA WITH STANDARD INTRAOCULAR LENS IMPLANT;  RIGHT PHACOEMULSIFICATION CLEAR CORNEA WITH STANDARD INTRAOCULAR LENS IMPLANT ;  Surgeon: Dwayne Carver MD;  Location: [facility]     PHACOEMULSIFICATION CLEAR CORNEA WITH STANDARD INTRAOCULAR LENS IMPLANT  12/30/2013    Procedure: PHACOEMULSIFICATION CLEAR CORNEA WITH STANDARD INTRAOCULAR LENS IMPLANT;  LEFT PHACOEMULSIFICATION CLEAR CORNEA WITH STANDARD INTRAOCULAR LENS IMPLANT ;  Surgeon: Dwayne Carver MD;  Location: [facility]     SPLENECTOMY  1970       Social History   Substance Use Topics     Smoking status: Former Smoker     Years: 58.00     Types: Pipe     Quit date: 1/7/2011     Smokeless tobacco: Never Used      Comment: occasional pipe      Alcohol use 0.0 oz/week     0 Standard drinks or equivalent per week      Comment: wine  occ      Family History   Problem Relation Age of Onset     CEREBROVASCULAR DISEASE Father          Current Outpatient Prescriptions   Medication Sig Dispense Refill     HYDROcodone-acetaminophen (NORCO) 5-325 MG per tablet Take 1 tablet by mouth every 6 hours as needed for moderate to severe pain 30 tablet 0     insulin glargine (LANTUS) 100 UNIT/ML injection Inject 6 Units Subcutaneous At Bedtime 15 mL 1     furosemide (LASIX) 20 MG tablet Take 1 tablet (20 mg) by mouth every morning 90 tablet 1     mirtazapine (REMERON) 15 MG tablet Take 7.5 mg by mouth At Bedtime       carboxymethylcellulose (REFRESH PLUS) 0.5 % SOLN ophthalmic solution Place 1 drop into both eyes 4 times daily       senna-docusate (SENOKOT-S;PERICOLACE) 8.6-50 MG per tablet Take 1 tablet by mouth 2 times daily as needed for constipation 100 tablet      carbidopa-levodopa (SINEMET)  MG per tablet Take 1 tablet by mouth 1 tablet by mouth every 5 hours as needed for tremors       Skin Protectants, Misc. (EUCERIN) cream Apply topically 2 times daily 120 g      neomycin-bacitracin-polymyxin (NEOSPORIN) 5-400-5000 ointment Apply topically daily  1     carbidopa-levodopa (SINEMET CR)  MG per tablet Take 1 tablet by mouth 2 times daily 60 tablet 3     omeprazole (PRILOSEC) 20 MG capsule Take 1 capsule (20mg) by mouth once daily 90 capsule 2     blood glucose monitoring (ONE TOUCH ULTRA) test strip Use to test blood sugar 2 times daily or as directed. 100 each PRN     NOVOFINE 30 30G X 8 MM insulin pen needle Use three times daily or as directed 100 each PRN     pramipexole (MIRAPEX) 0.5 MG tablet Take 1 tablet by mouth once daily (with dinner) 90 tablet 2     allopurinol (ZYLOPRIM) 100 MG tablet Take 2 tablets (200 mg) by mouth daily 180 tablet 3     simvastatin (ZOCOR) 40 MG tablet Take 1 tablet (40 mg) by mouth At Bedtime 90 tablet 3     ferrous sulfate (IRON) 325 (65 FE) MG tablet Take 325 mg by mouth daily (with lunch)        Loperamide  "HCl (IMODIUM A-D PO) Take 2 mg by mouth 4 times daily as needed       aspirin  MG EC tablet Take 1 tablet (325 mg) by mouth daily       Allergies   Allergen Reactions     Codeine      nausea vomiting     Penicillins      \" turned red from head to foot\"       Reviewed and updated as needed this visit by clinical staff       Reviewed and updated as needed this visit by Provider         ROS:  CONSTITUTIONAL:NEGATIVE  for chills and fever   E/M: NEGATIVE for ear, mouth and throat problems  R: NEGATIVE for significant cough or SOB  CV: NEGATIVE for chest pain, palpitations or peripheral edema  GI: NEGATIVE for nausea, abdominal pain, heartburn, or change in bowel habits    OBJECTIVE:                                                    /50 (BP Location: Left arm, Patient Position: Chair, Cuff Size: Adult Regular)  Pulse 84  Temp 96  F (35.6  C) (Tympanic)  Wt 169 lb (76.7 kg)  BMI 23.91 kg/m2  Body mass index is 23.91 kg/(m^2).   GENERAL: healthy, alert, well nourished, well hydrated, no distress  HENT: ear canals- normal; TMs- normal; Nose- normal; Mouth- no ulcers, no lesions  NECK: no tenderness, no adenopathy, no asymmetry, no masses, no stiffness; thyroid- normal to palpation  RESP: lungs clear to auscultation - no rales, no rhonchi, no wheezes  CV: irregular rhythm  ABDOMEN: soft, no tenderness, no  hepatosplenomegaly, no masses, normal bowel sounds  MS: extremities- leg wraps in place         ASSESSMENT/PLAN:                                                      1. Low back pain without sciatica, unspecified back pain laterality, unspecified chronicity  Refill on Tofte given. Patient has a few tabs of Dilaudid at home. Recommended to avoid using those.   - HYDROcodone-acetaminophen (NORCO) 5-325 MG per tablet; Take 1 tablet by mouth every 6 hours as needed for moderate to severe pain  Dispense: 30 tablet; Refill: 0    2. IDDM (insulin dependent diabetes mellitus) (H)  Patient has increased lantus from " 4 to 6 units, which I am in agreement with. DM is well managed.  Lab Results   Component Value Date    A1C 7.0 12/25/2016    A1C 7.8 08/12/2016    A1C 7.7 04/21/2016    A1C 7.7 01/22/2016    A1C 7.1 06/11/2015       - insulin glargine (LANTUS) 100 UNIT/ML injection; Inject 6 Units Subcutaneous At Bedtime  Dispense: 15 mL; Refill: 1    3. Venous (peripheral) insufficiency  Patients med list was missing diuretic. Lasix restarted.   - furosemide (LASIX) 20 MG tablet; Take 1 tablet (20 mg) by mouth every morning  Dispense: 90 tablet; Refill: 1    4. Essential hypertension, benign  Well controlled    5. Chronic atrial fibrillation (H)  Restart ASA. It also was stopped at the time of discharge for unknown reasons.       Follow up with Provider - 2 months.   Total time spent was 25 minutes, more than half the time was spent in counseling the patient about the disease pathogenesis, treatment plan and coordinating care.        Aniyah Magana MD  [facility] [place]

## 2017-03-24 NOTE — NURSING NOTE
"Chief Complaint   Patient presents with     RECHECK       Initial /50 (BP Location: Left arm, Patient Position: Chair, Cuff Size: Adult Regular)  Pulse 84  Temp 96  F (35.6  C) (Tympanic)  Wt 169 lb (76.7 kg)  BMI 23.91 kg/m2 Estimated body mass index is 23.91 kg/(m^2) as calculated from the following:    Height as of 3/16/17: 5' 10.5\" (1.791 m).    Weight as of this encounter: 169 lb (76.7 kg).  Medication Reconciliation: complete  "

## 2017-03-27 NOTE — TELEPHONE ENCOUNTER
Verbal order given for continuation of care for 4 additional visits:  twice a week for 2 weeks. Visits are for home safety, adaptive equipment and ADL's.  Ericka Keys RN

## 2017-04-06 NOTE — TELEPHONE ENCOUNTER
Orders for lymphedema therapy F/U  SN x 2 x a week for 2 weeks   1x1 for 4  WOK nurse for open area on legs    Ok per [facility] [place].    Salena Saunders RN  [facility]  [phone]

## 2017-04-10 NOTE — TELEPHONE ENCOUNTER
Increase lasix from 20 to 40 mg QD. Continue to monitor weight.   Check BMP in 1 week recommended.

## 2017-04-10 NOTE — PROGRESS NOTES
Call from Lianne GARCIA w/ [facility] w/ concerns of pt's wt gain, was high 160#s at dc from TCU last month and is now mid 180#s and he's on lasix 20mg daily.     Per Gisela GARCIA/Jeannette CALLAHAN on 3/16 pt's wife said it was too difficult for them to get to cardiology clinic and they preferred to stop f/u here and cont f/u care through PCP. Informed Lianne and asked her to call PCP. She agrees.

## 2017-04-10 NOTE — TELEPHONE ENCOUNTER
1.  [facility] called stating that patient's weight is up to 183.5 pounds (out of hospital was 171 on 3/23), last Friday was 180 and trending up    Edema generalized LE up to buttocks, hard, better than it used to be.     Patient thought he was going to [facility] but now advised that PCP is managing him    Has crackles in the right base, takes Lasix 20 mg daily, she is not sure if patient is on fluid restriction    2.  Patient has scratched lower leg and has 2-3 inch long scratch and is draining clear drainage, no fever, vs are good.     Patient has had draining scratches in the past (had plenty of wound supplies at home)     Cheyanne [facility] nurse asked for home care orders for dressing change: Zeroform gauze change daily, cover with 4 x 4 ABD.      Also will have wound nurse see this week for eval and treat. Asking for dressing change orders and wound nurse eval and treat.     Approval given for home care dressing change orders and wound nurse eval and treat.  Will fax orders for signature.    Triage:  Call ROE Edward nurse with response regarding increased weight at [phone]      JOSE Pitts

## 2017-04-10 NOTE — TELEPHONE ENCOUNTER
Detailed message left for nurse informing of below.   Di Rodriguez RN   [facility] - Triage

## 2017-04-11 NOTE — PROGRESS NOTES
Clinic Care Coordination Contact  Care Team Conversations    Per [facility] chart review patient is still open to [place] home care for services for SN/OT lymphedema and PT.  Per [facility] chart review he has dis-enrolled in the [facility] clinic/telemanagement for his HF due to difficulty getting to the clinic.  Patient may benefit from complex care clinic referral to see if he would qualify for home visits.        Plan: Secure e-mail sent to home care , Cheyanne Narvaez RN, to check on status and discuss complex care clinic.  Will check on status in 4-6 weeks.    REYMUNDO Gilmore, RN, PHN  [facility] Care Coordinator  [phone]  April 11, 2017

## 2017-04-11 NOTE — PROGRESS NOTES
[facility] now requests orders and shares plan of care/discharge summaries for some patients through [employer].  Please REPLY TO THIS MESSAGE in order to give authorization for orders when needed.  This is considered a verbal order, you will still receive a faxed copy of orders for signature.  Thank you for your assistance in improving collaboration for our patients.    ORDER  Requesting orders for OT lymphedema treatment 2x/week x 3 weeks to include gradient compression bandaging, exercises to facilitate the lymphatic system, manual lymph drainage as tolerated and patient education.     [email or web address] [email or web address]  [phone]

## 2017-04-11 NOTE — PROGRESS NOTES
Clinic Care Coordination Contact  Care Team Conversations    JOSE Edward with [facility] advised I speak with patient's wife regarding complex care clinic referral.  She felt it was a good idea.  She updated me that he has a new open area from a scratch and he is not wanting to take his Lasix.        CC called wife, [place], ADAMARIS is on file for her. She does not feel he needs the mobile clinic at this time.  She said he can get to Dr. Magana's office just fine but not to cardiologist due to elevators and stairs.  CC advised her on Cardiocom scale which is offered through [facility] to help monitor heart failure at home.  She said she would discuss it with Dr. Magana.  She said he is due for follow up soon but no future appointment on file.  She said she was making breakfast and could not speak at this time.      Plan: CC updated JOSE Edward with [facility].  CC asked her to discuss mobile clinic and cardiocom scale with patient and wife at her visit next week.  CC also advised Cheyanne that patient is due for follow up with PCP in May 2017.   CC will check on status in 2-3 weeks.      REYMUNDO Gilmore, RN, PHN  A Care Coordinator  [phone]  April 11, 2017

## 2017-04-11 NOTE — PROGRESS NOTES
Clinic Care Coordination Contact  Care Team Conversations    VM received from wife, [place].  She asked if we had spoken about a medication that was not covered by his insurance.  CC advised we spoke this morning about mobile care clinic and cardiocom scale.  We reviewed Cardiocom scale again.  She asked if he really needs it since he has home care coming to see him.  CC advised that he will need more close monitoring once home care is finished as home care will not be long term.  She verbalized understanding.  CC advised he will need to see PCP more frequently as well since he is no longer seeing cardiology.  She verbalized undertanding and will schedule follow up with Dr. Magana in May as recommended in last office note.      Plan: FYI to PCP.  CC will follow home care episode and follow up with patient after discharge.    REYMUNDO Gilmore, RN, PHN  [facility] Care Coordinator  [phone]  April 11, 2017

## 2017-04-13 NOTE — PROGRESS NOTES
Please give the verbal okay.  Thanks    Aniyah Magana MD  [facility], [place]

## 2017-04-13 NOTE — PROGRESS NOTES
Dr. Magana    Patient has two open trauma wounds on RLE, no s/sx infection. He was three new serous blisters formed to his LLE shin. OT lymph edema is switching to Short stretch wraps today. He also has a hx of diabetic callus on his right foot, tip of 3rd digit and left foot, tip of big toe. Today those areas are scabbed but draining light purulent drainage.    Requesting wound cares as follows.    Cover any intact blisters with adaptic, gauze and secure, change daily or with compression wrap changes.    Cleanse with wound cleanser and cover RLE open wounds with Xeroform gauze, ABD/Gauze and secure, change daily or with compression wrap changes.    Bilateral diabetic ulcers, cleanse with wound cleanser, paint with betadine and cover with gauze, change daily.     Thank you,    Michael Stromberg BSN, RN, CWOCN  [phone]  [email or web address]

## 2017-04-13 NOTE — PROGRESS NOTES
Routing note back to JOSE Harper.   Di Rodriguez RN   [facility] - Triage

## 2017-04-13 NOTE — TELEPHONE ENCOUNTER
Rossy with [facility] calling for home care orders to extend PT to 2 times a week for 3 weeks.    Verbal approval given for Home Care orders requested.  Routing to PCP to notify per FM protocol  Nikki Patricio RN  Triage [employer]

## 2017-04-20 NOTE — TELEPHONE ENCOUNTER
home care nurse Nikki calling to report that family called to cancel the Home care visit for today -    RN was going to drawn labs-wife states that patient is not feeling well and they will not allow her visit today    Nurse advised that it would be beneficial for the patient if he is ill- wife refused.    Just an update for Dr. Magana.    Salena Saunders,RN  [facility]  [phone]

## 2017-04-22 NOTE — TELEPHONE ENCOUNTER
Home health nurse calling stating pt is not doing well would like to have a basic metabolic panel ordered.  Entered order.  Will route to clinic

## 2017-04-22 NOTE — TELEPHONE ENCOUNTER
Call Type: Triage Call    Presenting Problem: Home health nurse calling stating pt is not doing  well would like to have a basic metabolic panel ordered.  Triage Note:  Guideline Title: No Guideline - Advice Per Reference (Adult)  Recommended Disposition: Call Provider within 72 Hours  Original Inclination: Would have called clinic  Override Disposition:  Intended Action: Follow advice given  Physician Contacted: No  CALL PROVIDER WITHIN 72 HOURS ?  YES  SEE ED IMMEDIATELY ? NO  CALL POISON CENTER IMMEDIATELY ? NO  CALL LOCAL AGENCY IMMEDIATELY ? NO  SEE DENTIST WITHIN 4 HOURS ? NO  SEE DENTIST WITHIN 24 HOURS ? NO  CALL LOCAL AGENCY WITHIN 24 HOURS ? NO  ACTIVATE  ? NO  SEE PROVIDER WITHIN 4 HOURS ? NO  SEE PROVIDER WITHIN 24 HOURS ? NO  CALL PROVIDER WITHIN 24 HOURS ? NO  SEE PROVIDER WITHIN 72 HOURS ? NO  CALL PROVIDER IMMEDIATELY ? NO  Physician Instructions:  Care Advice:

## 2017-04-24 NOTE — TELEPHONE ENCOUNTER
Alda, [facility] [phone], see patient today.  Reported that on Friday patient reported diarrhea and loose stool and not feeling well.  Those symptoms are much better today  Wound look good.  States that his weight is slowing gone up and lasix dose was increased last 1.5 weeks ago.  No symptoms at this time.  Denies any SOB or breathing issues.    BMP draw today.   Will send to lab   Routing to PCP as SERAFIN Contreras RN

## 2017-04-24 NOTE — TELEPHONE ENCOUNTER
Await the results. Please have the home health nurse up date us about his health.     Aniyah Magana MD  [facility], [place]

## 2017-04-25 NOTE — TELEPHONE ENCOUNTER
[facility] pharmacist calling to request refills for patient- states that hospitalist Dr. Tonya Haase prescribed Remeron and Allopurinol-  [facility] states that patient is completely out of the medications and wants an expedited review  They do not have allopurinol refills on file so will need new prescriptions    Salena Saunders,RN  [facility]  [phone]    allopuironol       Last Written Prescription Date: 06/2016  Last Fill Quantity: 180, # refills: 3  Last Office Visit with [facility], [facility] or  Health prescribing provider:  03/24/17        Uric Acid   Date Value Ref Range Status   04/21/2016 4.7 3.5 - 7.2 mg/dL Final     Comment:     Results confirmed by repeat test   ]  Creatinine   Date Value Ref Range Status   04/24/2017 1.73 (H) 0.66 - 1.25 mg/dL Final   ]  Lab Results   Component Value Date    WBC 3.6 03/01/2017    WBC 3.6 03/01/2017     Lab Results   Component Value Date    RBC 3.64 03/01/2017    RBC 3.64 03/01/2017     Lab Results   Component Value Date    HGB 10.5 03/01/2017    HGB 10.5 03/01/2017     Lab Results   Component Value Date    HCT 33.1 03/01/2017    HCT 33.1 03/01/2017     No components found for: MCT  Lab Results   Component Value Date    MCV 90.9 03/01/2017    MCV 90.9 03/01/2017     Lab Results   Component Value Date    MCH 28.4 01/31/2017     Lab Results   Component Value Date    MCHC 32.2 01/31/2017     Lab Results   Component Value Date    RDW 19.7 03/01/2017    RDW 19.7 03/01/2017     Lab Results   Component Value Date     03/01/2017     03/01/2017     Lab Results   Component Value Date    AST 17 02/06/2017     Lab Results   Component Value Date    ALT <10 02/06/2017     remeron       Last Written Prescription Date:   Last Fill Quantity: ; # refills:   Last Office Visit with [facility], [facility] or [facility] prescribing provider:  03/24/17        Last PHQ-9 score on record= No flowsheet data found.    Lab Results   Component Value Date    AST 17 02/06/2017     Lab Results    Component Value Date    ALT <10 02/06/2017

## 2017-04-25 NOTE — PROGRESS NOTES
Please call the patient to notify patient that the blood work shows kidney functions with slight improvement as compared to last 2 months.  Blood glucose is 149. I'm not sure if it was a fasting blood draw or not. Patient is a known diabetic. Resume current diabetes medications and close monitoring.  Patient is seen by home health.    Aniyah Magana MD

## 2017-04-26 NOTE — PROGRESS NOTES
Clinic Care Coordination Contact  Care Team Conversations    VM update received from JOSE Edward with [facility].  She said that patient has been getting visits from the wound nurse and getting lymphedema therapy at home.  He has not been taking his Lasix as ordered and was gaining weight.  She said his HF is not well controlled and they will monitor him closely.  He has been cancelling visits due to not feeling well.  He is declining complex care clinic at this time.  Last home SN visit was on 4/24/2017.  He is scheduled for PT today, OT tomorrow and SN on 5/3/2017 per home care chart review.      Plan: , Tatiana, will continue to monitor home care episode.  Patient would benefit from CC follow up after discharge from [facility] due to no longer enrolled in [facility] clinic and will need close monitor of his heart failure by PCP.    REYMUNDO Gilmore, RN, PHN  [facility] Care Coordinator  [phone]  April 26, 2017

## 2017-04-27 NOTE — PROGRESS NOTES
[facility] now requests orders and shares plan of care/discharge summaries for some patients through [employer].  Please REPLY TO THIS MESSAGE in order to give authorization for orders when needed.  This is considered a verbal order, you will still receive a faxed copy of orders for signature.  Thank you for your assistance in improving collaboration for our patients.    ORDER  Requesting orders to continue OT lymphedema treatment 5x/month x 1 month to include gradient compression bandaging, exercises to facilitate the lymphatic system, manual lymph drainage as tolerated and patient education.     [email or web address] [email or web address]  [phone]

## 2017-05-01 NOTE — TELEPHONE ENCOUNTER
I can see him for OV.     Aniyah Magana MD  [facility], [place]

## 2017-05-01 NOTE — TELEPHONE ENCOUNTER
BMP and stool for C-diff ordered. Please have the RN do a home visit and get the samples !    Aniyah Magana MD  [facility], [place]

## 2017-05-01 NOTE — TELEPHONE ENCOUNTER
[facility] OT, lymphedema specialist calling to report that the patient's weight continues to increase. Last week Saturday his weight was 194.8 lbs. Today his weight is 202.6 lbs.  Patient weight at LOV on 3/24/17 169 lbs.    Patient is also complaining of diarrhea the last 2 weeks. Reports having 5-6 loose stools a day.    Home care RN, Cheyanne, next visit isn't until 5/9/17. Cheyanne GARCIA phone # [phone].    Please advise. Sooner RN visit or OV? Triage to call patient and nurse.  Ericka Keys RN

## 2017-05-01 NOTE — TELEPHONE ENCOUNTER
Patient's wife states that the patient does not want to come into the clinic for fear of explosive diarrhea. Explained that it is important for patient to be seen to try to prevent him from going back to the hospital.    Informed that patient's home care nurse will be there tomorrow to instruct on collecting stool samples.     Will try to get patient to come for appt once his stools are under control.  Ericka Keys RN

## 2017-05-01 NOTE — TELEPHONE ENCOUNTER
Cheyanne GARCIA, patient's  states that the patient is not taking his meds correctly because he doesn't want to go to the bathroom. He is refusing home care  visits and labs. Home care has spoken with the patient and his family, but have not made progress.     Cheyanne GARCIA home care recommends appointment with Dr. Magana. She is concerned that the patient will end up back in the hospital.    Cheyanne will see tomorrow.     Do you want me to also schedule the patient to see you or wait for lab results first?  Ericka Keys RN

## 2017-05-02 PROBLEM — I50.82 BIVENTRICULAR CHF (CONGESTIVE HEART FAILURE) (H): Status: ACTIVE | Noted: 2017-01-01

## 2017-05-02 NOTE — ED NOTES
Bed: ED24  Expected date:   Expected time:   Means of arrival:   Comments:  514  87 M weakness/leg pain  1237

## 2017-05-02 NOTE — TELEPHONE ENCOUNTER
Cheyanne - [facility] RN calling to report patients weight was up to 204lbs today, patient was standing but could not walk. In the past 9 weeks his weight has gone from 166 to 204lbs. Nurse called 911 and was taken via ambulance to [facility].   Di Rodriguez RN   [facility] - Triage

## 2017-05-02 NOTE — H&P
PRIMARY CARE PHYSICIAN:  Aniyah Magana M.D.      CHIEF COMPLAINT:  Difficulty moving also weight gain and shortness of breath.      HISTORY OF PRESENT ILLNESS:  Mr. Armand Smith is an 87-year-old male who has a history of atrial fibrillation, chronic kidney disease, congestive heart failure, Parkinson's disease, type 2 diabetes and history of a stroke who was hospitalized from 01/20 to 02/01/2017.  During that time he was treated for congestive heart failure and was followed by both Cardiology and Nephrology due to the fact that he has elevated creatinine.  He actually required dobutamine drip in order to get his diuresis.  The patient has been followed in the past by the [facility].  There is a note from the [facility] that the patient's wife said it was too hard to get him to the [facility]; however, the patient and his wife deny this to me and say the reason that they did not go to the [facility] is that the patient has been in rehab for such a prolonged period of time.  After discharge on 02/01/2017 the patient went to rehab where he was there for about a month and then went home for only 1 day and had to go back to rehab for another month.  He has now been home about 3 weeks.  According to the patient's wife, the rehab conference when the patient was discharged the staff there felt that the patient would be better served in a long-term care facility; however, the patient wanted to go home with his wife.  The patient's wife does say that it is increasingly hard for her to manage him at home, especially as his legs have gotten very heavy.      The patient has gained 40 pounds since his hospitalization.  He used to be on metolazone twice a week, but now is not on metolazone anymore.  The patient says that he has been taking his medications.  The patient admits that he has swelling from his waist down and has scrotal edema and penile edema.  His abdomen is quite distended and hard as well as  his legs.  He does have wraps put on his legs twice a week, but he admits it is very hard to bend his legs because of swelling.      Apparently last night the patient got short of breath and he is also short of breath this morning; they had a home care nurse come and see him today due to he had some severe diarrhea.  According to the patient, he stood up and then could not move and it felt like his legs were numb.  It affected both legs but right more than left.  The patient says that he has never had this before, although he has had a stroke.  The patient now feels like he has no pain.  He says that he does not have this numbness anymore.  The patient does have a history of toe amputations.  He has on his right foot the great toe and second toe have been amputated.  He does have a wound on the tip of the third toe which is being followed by Podiatry. On his left foot on his great toe he does have deformed toenail and this again is being followed by Podiatry.      The patient's last echo was from 01/22/2017 which was a limited echo.  This showed RV severely dilated, RV systolic function moderately to severely reduced, moderate to moderately severe tricuspid regurg, RV systolic pressure approximately 41 mmHg plus right atrial pressure consistent with moderate to severe pulmonary hypertension.  He had a flattened septum consistent with RV pressure volume overload, moderate left pleural effusion also seen compared with previous echos significant pulmonary hypertension persists with secondary effects on the right heart.      PAST MEDICAL HISTORY:   1.  History of acute diastolic congestive heart failure exacerbation for which he was hospitalized from 01/20 to 02/01/2017.   2.  Chronic kidney disease, stage IV.  His baseline creatinine appears to be around 2.02, although is quite variable.   3.  History of atrial fibrillation, not on anticoagulation but is on full size aspirin.   4.  History of hypertension.   5.   History of hyperlipidemia.   6.  History of nodular lymphoma.   7.  History of Parkinson's disease.   8.  History of severe pulmonary hypertension by echocardiogram as above.   9.  History of 2-3+ tricuspid regurgitation.   10.  Type 2 diabetes.  The patient's last A1c was 7.0 on .   11.  History of cerebral artery occlusion with cerebral infarction.   12.  History of peripheral neuropathy.   13.  History of basal cell carcinoma.   14.  History of lumbosacral spondylosis.   15.  History of nodular lymphoma sounds like this is why the patient had some kind of procedure done on his feet according to the patient.  Unclear, however.   16.  History of gout.      ALLERGIES:  Codeine and penicillin.      MEDICATIONS ON ADMISSION:   1.  Allopurinol 200 mg p.o. daily.   2.  Aspirin 325 mg daily.   3.  Carbidopa/levodopa 50/200 one tablet b.i.d.   4.  Carbidopa/levodopa 25/100 one tablet every 5 hours p.r.n. tremor   5.  Refresh Plus 0.5% ophthalmic solution 1 drop both eyes 4 times daily.   6.  Ferrous sulfate 325 mg daily.   7.  Lasix 40 mg in the morning.   8.  Norco 1 tablet every 6 hours p.r.n. moderate to severe pain.   9.  Lantus insulin 6 units subcu at bedtime.   10.  Imodium p.r.n.   11.  Remeron 7.5 mg at bedtime.   12.  Neosporin ointment.   13.  Omeprazole 20 mg daily.   14.  Mirapex 0.5 mg once daily with dinner.   15.  Senokot b.i.d. p.r.n.   16.  Simvastatin 40 mg daily.      PAST SURGICAL HISTORY:  Post cataract surgeries; skin biopsy; splenectomy, right hallux amputation, post-right second toe and great toe amputation.      FAMILY HISTORY:  Father  at 78, mother  at 105.      SOCIAL HISTORY:  The patient is .  He used to smoke a pipe, quit many years ago.  He lives with his wife.  They have 3 grown children, 2 live in [place] area and 1 lives in [place].  The patient is a retired .  The patient does drink an occasional glass of wine with dinner.  He and his wife live  in their own home.      REVIEW OF SYSTEMS:     CONSTITUTIONAL:  The patient denies fevers, chills and night sweats.  He has had a 40-pound weight gain as above.     HEENT:  Eyes, patient reports that he has had an eye infection lately and also had one when he was in the hospital in January.  ENT:  Negative.   PULMONARY:  The patient says he was short of breath last night and this morning.   CARDIAC:  History of atrial fibrillation, denies chest pain.   GASTROINTESTINAL:  The patient did have some diarrhea earlier this week.  He denies blood or black tarry stools.   :  The patient has chronic kidney disease.  He says sometimes it is a little painful to urinate due to the fact that his penis and scrotum are swollen.   ENDOCRINE:  The patient has type 2 diabetes.   NEUROLOGIC:  The patient has a history of a stroke, but this did not leave him with any deficits.   SKIN:  The patient has some skin lesions on his toes as dictated in history of present illness.  Followed by Podiatry.   PSYCHIATRIC:  The patient denies depression and anxiety.      PHYSICAL EXAMINATION:   GENERAL:  Pleasant male who is now on oxygen.   VITAL SIGNS:   Initial sat was at 74% on room air, now in the low 90s on 4 liters.  Blood pressure 120/72, pulse of 92, respiratory rate 16, temperature afebrile.   HEENT:  Pupils are equal and reactive.  No obvious signs of infection of his eyes at present.  Pharynx without erythema.   NECK:  Unable to assess JVD.   Supple.   CHEST:  He has some crackles at the right base.   CARDIOVASCULAR:  Irregularly irregular, no murmur auscultated, no rub.   ABDOMEN:  Obese.  He has anasarca extending into his abdomen, but is nontender.  The patient's anasarca also causing swelling of his scrotum and penis.   MUSCULOSKELETAL:  The patient has wraps on his legs.  He has anasarca going from his feet up to his abdomen and lower chest.   NEUROLOGIC:  Cranial nerves II-XII grossly intact.  Strength is symmetrically  bilateral arms is 5/5 on his legs  4/5 bilaterally.   SKIN:  The patient has some erythema on his right third toe, but it does not look acutely infected.  His left great toe has chronic-appearing skin changes around the nail.      ELECTROCARDIOGRAM:  Shows atrial fibrillation with right bundle branch block.      LABORATORY DATA:  Sodium 139, potassium 5.6, chloride 107, bicarbonate 25, BUN 55, creatinine 2.06, calcium 8.6, albumin 2.6, ALT of 17, AST of 206.  BNP 8761, glucose 91.  White count 6.9, hemoglobin 11.9, platelet count 187.        IMAGING:  Chest x-ray shows enlarged cardiac silhouette unchanged, mild mixed interstitial and airspace opacities over both lungs, edema versus atypical infection.       ASSESSMENT AND PLAN: Mr. Armand Smith is an 87-year-old male with a known history of left and right-sided congestive heart failure, mostly right-sided heart failure with severe pulmonary hypertension and tricuspid regurgitation.  The patient also has a 40-pound weight gain and has anasarca on exam and is now hypoxic requiring oxygen, which is new for him.  He did get 40 mg of Lasix in the emergency room.      In addition to this, the patient has  stage IV chronic kidney disease, Parkinson's disease, type 2 diabetes and history of a stroke.  He is not on anticoagulation.  He is followed with home care nurse and also a podiatrist.  There is some question of if he is still going to [facility] because of difficulty getting there in the car.  We did discuss going with [facility].   1.  Congestive heart failure, probably mostly right-sided, given his history.  The patient had an echocardiogram in 01/2017 (I am not going to repeat this at this time).  We will start with Lasix 40 mg b.i.d.  He did require dobutamine for diuresis during his last hospitalization.  Would like to have Cardiology and [facility] also see him as he has been followed there in the past.  The patient has been on metolazone in the  past but is not currently.  According to the patient and his wife he has been dietary compliant with a low-salt diet.  Will get daily weights and I's and O's.  [place] [facility] [place]   2.  Type 2 diabetes.  The patient will be continued on Lantus and medium sliding scale and on a moderate carbohydrate diet.  His last A1c was under good control in December which was 7.0.   3.  Chronic kidney disease, stage IV.  Patient's creatinine today is 2.06, was 1.73 on 04/24/2017, but looking back, it looks like it has been 2.02 on 03/01/2017 and that appears to be probably at his baseline.  The patient does have a potassium of 5.6.  He will get some Lasix in the emergency room and this will probably treat that and would like to repeat a BNP in the morning.  The patient was seen by Nephrology during his last hospitalization.   4.  History of atrial fibrillation.  Rate controlled.   5.  History of hypertension.  We will continue his current medications.   6.  Hyperlipidemia.  We will continue his simvastatin.   7.  History of Parkinson's disease.  We will continue the patient's Sinemet as he was on prior.   8.  History of cerebral artery occlusion with cerebral infarction.   The patient has a normal neuro exam at present.  Discussed doing an MRI with the patient and at this point he declines.  Will have Physical Therapy and Occupational Therapy see the patient.   9.  History of amputations of 2 toes on the right foot and also he now has a small wound on the third toe of his right foot and the left great toe also is abnormal.  Would like to have wound care nurse evaluate.  He is followed by Podiatry as an outpatient.   10.  DVT prophylaxis.  The patient will be on SCDs.      CODE STATUS:   Discussed with the patient at this point he would like to have limited resuscitation in that he would not want it prolonged and would not want to be intubated in the ICU for a prolonged period of time, so at this point, the  patient is full code.      DISPOSITION:  The patient will be admitted under inpatient status.         NELSON TOMPKINS MD             D: 2017 16:35   T: 2017 18:06   MT: [id number]      Name:     GIO BERRY   MRN:      [id number]        Account:      [id number]   :      1929           Admitted:     [id number]      Document: [id number]       cc: Aniyah Magana MD

## 2017-05-02 NOTE — ED NOTES
Patient oxygen upon arrival to ED was 74% on room air. Patient placed on 4l NC and oxygen is up to 95%.

## 2017-05-02 NOTE — PHARMACY-ADMISSION MEDICATION HISTORY
Admission medication history interview status for the 5/2/2017  admission is complete. See EPIC admission navigator for prior to admission medications     Medication history source reliability:Moderate  He is pretty disorganized with meds at home..     Actions taken by pharmacist (provider contacted, etc):None     Additional medication history information not noted on PTA med list :non compliant with lasix for fear of getting up to the bathroom.    Medication reconciliation/reorder completed by provider prior to medication history? No    Time spent in this activity: 20 min    Prior to Admission medications    Medication Sig Last Dose Taking? Auth Provider   mirtazapine (REMERON) 15 MG tablet Take 0.5 tablets (7.5 mg) by mouth At Bedtime 5/1/2017 at Unknown time Yes Aniyah Magana MD   allopurinol (ZYLOPRIM) 100 MG tablet Take 2 tablets (200 mg) by mouth daily 5/2/2017 at Unknown time Yes Aniyah Magana MD   furosemide (LASIX) 20 MG tablet Take 2 tablets (40 mg) by mouth every morning noncompliant at Unknown time Yes Aniyah Magana MD   insulin glargine (LANTUS) 100 UNIT/ML injection Inject 6 Units Subcutaneous At Bedtime 5/1/2017 at Unknown time Yes Aniyah Magana MD   carboxymethylcellulose (REFRESH PLUS) 0.5 % SOLN ophthalmic solution Place 1 drop into both eyes 4 times daily 5/2/2017 at Unknown time Yes Reported, Patient   carbidopa-levodopa (SINEMET)  MG per tablet Take 1 tablet by mouth 1 tablet by mouth every 5 hours as needed for tremors 5/2/2017 at Unknown time Yes Reported, Patient   carbidopa-levodopa (SINEMET CR)  MG per tablet Take 1 tablet by mouth 2 times daily 5/2/2017 at Unknown time Yes Aniyah Magana MD   omeprazole (PRILOSEC) 20 MG capsule Take 1 capsule (20mg) by mouth once daily 5/2/2017 at Unknown time Yes Aniyah Magana MD   pramipexole (MIRAPEX) 0.5 MG tablet Take 1 tablet by mouth once daily (with dinner) 5/1/2017 at Unknown time Yes Aniyah Magana MD   simvastatin (ZOCOR) 40 MG tablet  Take 1 tablet (40 mg) by mouth At Bedtime 5/1/2017 at Unknown time Yes Aniyah Magana MD   ferrous sulfate (IRON) 325 (65 FE) MG tablet Take 325 mg by mouth daily (with lunch)  5/2/2017 at Unknown time Yes Reported, Patient   aspirin  MG EC tablet Take 1 tablet (325 mg) by mouth daily 5/2/2017 at Unknown time Yes Augusto Carrasco MD   HYDROcodone-acetaminophen (NORCO) 5-325 MG per tablet Take 1 tablet by mouth every 6 hours as needed for moderate to severe pain prn  Aniyah Magana MD   senna-docusate (SENOKOT-S;PERICOLACE) 8.6-50 MG per tablet Take 1 tablet by mouth 2 times daily as needed for constipation prn  Iman Hood MD   Skin Protectants, Misc. (EUCERIN) cream Apply topically 2 times daily   Tenzin Nieto MD   neomycin-bacitracin-polymyxin (NEOSPORIN) 5-400-5000 ointment Apply topically daily   Tenzin Nieto MD   blood glucose monitoring (ONE TOUCH ULTRA) test strip Use to test blood sugar 2 times daily or as directed.   Aniyah Magana MD   NOVOFINE 30 30G X 8 MM insulin pen needle Use three times daily or as directed   Aniyah Magana MD   Loperamide HCl (IMODIUM A-D PO) Take 2 mg by mouth 4 times daily as needed prn  Reported, Patient

## 2017-05-02 NOTE — ED NOTES
"[facility]  ED Nurse Handoff Report    ED Chief complaint: Generalized Weakness (Patient brought in by EMS from home. Patient had a home health nurse to stop by and collect a stool specimen. Patient states he was unable to get up due to pain in his legs. States has also had increase in his edema. Shortness of breath that started this morning.)      ED Diagnosis:   Final diagnoses:   Acute on chronic respiratory failure with hypoxia (H)   Acute on chronic diastolic congestive heart failure (H)   Lower leg edema   Hyperkalemia   Acute-on-chronic kidney injury (H)       Code Status: Not addressed by ED MD.    Allergies:   Allergies   Allergen Reactions     Codeine      nausea vomiting     Penicillins      \" turned red from head to foot\"       Activity level - Baseline/Home:  Independent    Activity Level - Current:  Patient has not gotten up while in ED due to increased weakness.   Needed?: No    Isolation: Yes  Infection: MRSA    Bariatric?: No    Vital Signs:   Vitals:    05/02/17 1430 05/02/17 1445 05/02/17 1500 05/02/17 1515   BP: 112/64  99/69    Pulse:       Resp:  8 14 22   SpO2:  98% 97% 93%       Cardiac Rhythm: ,   Cardiac  Cardiac Rhythm: Atrial fibrillation    Pain level: 0-10 Pain Scale: 0    Is this patient confused?: No    Patient Report: Initial Complaint: Weakness  Focused Assessment: Patient brought in by EMS from home. Patient states has had increasing edema. States is swollen from feet to abdomen. Today home health nurse came out and found patient to be more swollen, SOB this morning and patient was unable to get out of bed. Patient has wounds to both feet on the toes.   Lung sound were diminished and oxygen sats were 74% on room air. Patient was placed on 4l NC and oxygen bumped up to 96%. Patient alert and oriented. Lasix was administered in ED>    Tests Performed: See EPIC  Abnormal Results: See EPIC  Treatments provided: Lasix and oxygen.    Family Comments: Family " member at bedside.    OBS brochure/video discussed/provided to patient: No    ED Medications:   Medications   furosemide (LASIX) injection 40 mg (40 mg Intravenous Given 5/2/17 6316)       Drips infusing?:  No      ED NURSE PHONE NUMBER: [phone]

## 2017-05-02 NOTE — ED PROVIDER NOTES
History     Chief Complaint:  Leg swelling    HPI   Armand Smith is a 87 year old male with a history of atrial fibrillation, CKD, CHF, Parkinson's disease, diabetes, and stroke who presents with leg swelling. He states that he has had progressively worsening edema up from his feet to his abdomen and weight gain since the last time he was in the hospital. The patient weighed 166 pounds on March 22nd and weighed 204 pounds today. He states that he has never had swelling this severe in the past. This morning, the patient began experiencing difficulty ambulating due to the pain in his feet and legs which prompted his home nurse to call EMS. He is able to stand with help. The patient was last in the hospital around a month and a half ago for pneumonia and has been home from a TCU for the last week. On presentation, the patient's oxygen saturation was 76% on room air. No dizziness. No chest pain or shortness of breath.     Cardiac Risk Factors   Sex: male   Tobacco: former smoker  Hypertension: positive  Diabetes: positive  Hyperlipidemia: positive  Family History: positive    Allergies:  Codeine  Penicillins     Medications:    Remeron  Zyloprim  Lasix  Norco  Lantus 100 unit/mL  Refresh plus  Senna-docusate  Sinemet  Eucerin  Neosporin  Omeprazole  Mirapex  Simvastatin  Iron   Imodium  Aspirin    Past Medical History:    Aortic regurgitation  Atrial fibrillation  Basal cell carcinoma  Chronic diastolic congestive heart failure  Hypertension  Hyperlipidemia  Lumbosacral spondylosis  Nodular lymphoma  Testicular hypofunction  Parkinson's disease  Pulmonary hypertension  Tricuspid regurgitation  Diabetes  Cerebral artery occlusion with cerebral infarction  Psychosexual disorder  CKD  Peripheral neuropathy     Past Surgical History:    Amputate toes x3  Biopsy  Biopsy bone toe  Colonoscopy  Right heart catheterization  Phacoemulsification clear cornea with standard intraocular lens implant x2  Splenectomy    Family  History:    Cerebrovascular disease - father    Social History:  Marital Status:   Presents to the ED alone via EMS  Tobacco Use: former smoker  Alcohol Use: positive  PCP: Aniyah Magana     Review of Systems   Constitutional: Positive for unexpected weight change.   Respiratory: Negative for shortness of breath.    Cardiovascular: Positive for leg swelling. Negative for chest pain.   Neurological: Negative for dizziness.   All other systems reviewed and are negative.    Physical Exam   First Vitals:  BP: 120/72 mmHg  Heart Rate: 92   Resp: 16  SpO2: 74% RA  Temp: 97.8  F (oral)       Physical Exam  Physical Exam   Constitutional:  Dyspnea at rest, although able to speak in full sentences. Nontoxic appearing  Head: Head moves freely with normal range of motion.   ENT: Oropharynx is clear and moist.   Eyes: Conjunctivae pink. EOMs intact.   Neck: Normal range of motion.   Cardiovascular: Irregularly irregular rhythm. Normal heart sounds. No concerning murmur. Intact distal pulses: radial pulses 2+ on the right, 2+ on the left.   Pulmonary/Chest: No respiratory distress. Decreased lung sounds bilateral bases. No wheezes. No rhonchi. No rales. Lungs clear throughout.   Abdominal: Distended, soft, nontender abdomen. No rebound, no guarding.   Musculoskeletal: Distal capillary refill and sensation intact. Legs are edematous with bilateral ace wraps from toes to just below the knee. Absent toes on the right foot.   Neurological: Oriented to person, place, and time. No focal deficits.   Skin: Skin is warm and normal in color. No rash noted.      Emergency Department Course   ECG:  @ 1301  Indication: leg swelling  Vent. Rate 92 bpm. MO interval * ms. QRS duration 130 ms. QT/QTc 368/455 ms. P-R-T axis * 98 -39.   Atrial fibrillation. Right bundle branch block. Possible lateral infarct, age undetermined. Inferior infarct, age undetermined. Abnormal ECG.    Read @ 1353.    Imaging:  Radiographic findings were  communicated with the patient who voiced understanding of the findings.    Chest XR, PA & LAT  Enlarged cardiac silhouette is again seen and unchanged.  Mild mixed interstitial and airspace opacities over both lungs, edema  versus atypical infection. No pneumothorax identified. Likely small  left pleural effusion.  Report per radiology.    Laboratory:  CBC:  WBC 6.7, HGB 11.9 (L),   CMP: potassium 5.6 (H), BUN 55 (H), creatinine 2.06 (H), GFR 31 (L), albumin 2.6 (L), alkphos 158 (H)  (H), otherwise WNL   BNP: 8761 (H)     Interventions:  (1529) Lasix, 40 mg, IV    Emergency Department Course:  The patient arrived in the emergency department via EMS.   Nursing notes and vitals reviewed.  I performed an exam of the patient as documented above.   EKG was done, interpretation as above.  A peripheral IV was established. Blood was drawn from the patient. This was sent for laboratory testing, findings above.   The patient was sent for a chest x-ray while in the emergency department, findings above.   Findings and plan explained to the patient who consents to admission.   (1514) I discussed the patient with Dr. Cook of the hospitalist service, who will [place] the [place] [facility] [place] further monitoring, evaluation, and treatment.    Impression & Plan    Medical Decision Making:  Armand Smith is a 87 year old male with a PMH of CHF, atrial fibrillation, Parkison's disease, and diabetes who presents for evaluation of shortness of breath. Work up today reveals acute on chronic decompensated diastolic heart failure with elevated creatinine and mildly elevated potassium in the setting of chronic renal disease. No infectious findings today. Pt notes he is complaint with his medications although home health nurse questions this as seen in her note. The patient and wife also seem confused about home services available to them. Lasix 40mg given here and I discussed possible need for lasix gtt given 40 lb weight  gain over past month. Dr Cook will admit. Patient and wife amenable to plan.     Diagnosis:    ICD-10-CM    1. Acute on chronic respiratory failure with hypoxia (H) J96.21    2. Acute on chronic diastolic congestive heart failure (H) I50.33    3. Lower leg edema R60.0        [place]  [place] [facility]    Adán BELTRÁN, am serving as a scribe on 5/2/2017 at 1:06 PM to personally document services performed by Domenica Andrew NP based on my observations and the provider's statements to me.        Domenica Andrew, APRN CNP  05/02/17 0145

## 2017-05-03 NOTE — CONSULTS
NEPHROLOGY CONSULTATION      IDENTIFICATION:  Armand Smith is an 87-year-old male admitted through the emergency room on the evening of 05/02/2017 in the setting of increasing leg swelling.      REASON FOR CONSULTATION:  Evaluation and assessment with respect to chronic kidney disease and volume overload and modest hyperkalemia.      REQUESTING PHYSICIAN:  Dr. Faith Cook, Hospitalist Service.      IMPRESSION:   1.  Longstanding CKD stage IV.  The patient has abnormal serum creatinines dating back to at least 2005.  His recent level of function is typically a creatinine of 2.5, representing stage IV CKD.   2.  History of modest albuminuria.   3.  History of secondary hyperparathyroidism.   4.  Congestive heart failure which appears primarily right-sided on the basis of his volume overload.   5.  Hypoalbuminemia.   6.  History of iron deficiency.   7.  Diabetes.   8.  Hypertension.      DISCUSSION:  Elderly male with a number of comorbid medical problems who has advanced chronic kidney disease.  His baseline creatinine is typically in the range of 2.5 mg per deciliter, representing stage IV chronic kidney disease.  He is presenting now with at or near baseline renal function and obvious volume overload.  In this setting, he has modest hyperkalemia.  His management needs to be centered around attempt at diuresis while carefully monitoring his renal function.  Certainly aggressive diuresis may risk deterioration in his renal function.  Given his age, comorbid medical problems and cardiac issues, he would be a difficult dialysis patient.      RECOMMENDATIONS:   1.  Begin albumin plus bumetanide infusion.  If this does not improve his volume status, we may need to consider continuous infusion diuretic.   2.  Requantification of urine protein.   3.  Documentation of mineral bone disease in a patient with previously documented secondary hyperparathyroidism with both PTH and vitamin D studies.   4.  Repeat iron  studies given his history of iron deficiency.   5.  Careful monitoring of his renal function.   6.  Avoid IV contrast, nonsteroidals or any other overt nephrotoxic agents at this time.      HISTORY:  A 78-year-old pleasant gentleman with a number of medical problems centered around chronic kidney disease, congestive heart failure, Parkinson's disease, diabetes, hypertension and cerebrovascular disease who presents for assessment of increasing weight gain and some shortness of breath.      He was previously hospitalized in this facility under similar circumstances 01/20 through 02/01/2017.  He was treated at that time for heart failure seen both by Nephrology initial consultation by Dr. Joe Kc as well as Cardiology.  He did require dobutamine during the course of that hospitalization by report.      He has been at home with increasing lower extremity edema and swelling by report, but up until the last few days had been generally well by his report.  He is able to use a walker and ambulate around his home.      His weight is up 40 pounds by report since his previous hospitalization.  His current outpatient regimen includes just loop diuretic.        He is not having a headache or chest pain at this time, some intermittent increasing shortness of breath.  He has been making urine by report.  He has not been using any nonsteroidal anti-inflammatory medications.      Previous echo was noted.  It shows severe right ventricular dilatation and right ventricular systolic dysfunction and likely severe pulmonary hypertension.      PAST MEDICAL HISTORY:   1.  CKD stage IV.   2.  Right ventricular dysfunction.   3.  History of diastolic congestive heart failure.   4.  Atrial fibrillation.   5.  Hypertension.   6.  Hyperlipidemia.   7.  Lymphoma.   8.  Parkinson's.   9.  Tricuspid regurgitation.   10.  Diabetes.   11.  Cerebral vascular disease.   12.  Peripheral neuropathy.      ALLERGIES:  Codeine and penicillin.       HOME MEDICATIONS:  Reviewed from a renal standpoint, one dose of Lasix 40 q.a.m.,  iron pills      SOCIAL HISTORY:  . Remote tobacco, no alcohol.  Lives with his wife.  They currently live in their own home.      FAMILY HISTORY:  No renal disease, noncontributory.      REVIEW OF SYSTEMS:  Complete 10-point review of systems is undertaken, pertinent positives and negatives are as I noted.  Remarkable for progressive worsening lower extremity edema and intermittent shortness of breath and approximately a 40-pound weight gain.  He has been making urine and has not been using nonsteroidals.      PHYSICAL EXAMINATION:   GENERAL:  Awake, alert, pleasant, interactive.   VITAL SIGNS:  Afebrile, rhythm irregularly irregular, rate controlled.  Blood pressure 120 range, sats are adequate on nasal cannula oxygen.   HEENT:  Normocephalic, atraumatic.  Pupils equal, round, reactive to light and accommodation.  Extraocular muscles intact.   CHEST:  Basilar crackles.   CARDIOVASCULAR:  Irregularly irregular.   ABDOMEN:  Distended, fluid noted.   GENITOURINARY:  Swollen scrotum and penis.   EXTREMITIES:  3-4+ edema extending to his abdomen through his thighs.   SKIN:  Without overt rash, some chronic stasis.  Toe amputations noted.   NEUROLOGIC:  Grossly nonfocal.  Cranial nerves intact.   PSYCHIATRIC:  Normal affect, pleasant mood.      LABORATORY DATA:  Current and historical reviewed in detail.         SHAHBAZ LO MD             D: 2017 10:41   T: 2017 11:15   MT: CG      Name:     GIO BERRY   MRN:      [id number]        Account:       [id number]   :      1929           Consult Date:  2017      Document: [id number]       cc: Faith Cook MD

## 2017-05-03 NOTE — PLAN OF CARE
Problem: Goal Outcome Summary  Goal: Goal Outcome Summary  Outcome: No Change  Patient admitted from ED with a complain of SOB, increase in LLE edema. A&OX4. PRN tylenol given once for pain on the rt foot. PRN sinemet given once for c/o tremor. Vitals stable. O2 sat maintained at 94-95% with 4 L/min. B/L LLE wraps continue. TELE shows A-fib with CVR with BBB. On IV lasix. Diuresing well. Plan for cardiology consult, PT/OT consult, wound consult tomorrow.

## 2017-05-03 NOTE — PROGRESS NOTES
[facility]    Hospitalist Progress Note    Date of Service (when I saw the patient): 05/03/2017    Assessment & Plan   Mr. Armand Smith is an 87-year-old male with a known history of left and right-sided congestive heart failure, mostly right-sided heart failure with severe pulmonary hypertension and tricuspid regurgitation. The patient also had a 40-pound weight gain and has anasarca on exam and is now hypoxic requiring oxygen, which is new for him.       In addition to this, the patient has stage IV chronic kidney disease, Parkinson's disease, type 2 diabetes and history of a stroke. He is not on anticoagulation.     Suspected acute decompensation of secondary to pulmonary HTN with right heart failure and TR -  Last 01/2017. Hasn't been able to follow closely with [facility] due to transportation issues.  He required dobutamine for diuresis during his last hospitalization. He has been on metolazone in the past, not currently. Compliant with low sodium diet. BNP in the 8000's. CXR reviewed by myself on 5/3 reveals mild hilar patchy infiltrates, no effusions.   - Initially started on lasix 40 mg BID   - Cardiology and nephrology consulted.  - Appreciate nephrology's input. Started on albumin + bumetanide infusion on 5/3.   - Probable demand ischemia with flat troponin around 0.1, presentation and troponins not c/w ACS.     Acute Kidney Injury with hyperkalemia on chronic kidney disease, stage IV with hyperkalemia Patient's creatinine today is 2.06, was 1.73 on 04/24/2017, but looking back, it looks like it has been 2.02 on 03/01/2017 and that appears to be probably at his baseline. The patient does have a potassium of 5.6. He will get some Lasix in the emergency room and this will probably treat that and would like to repeat a BNP in the morning. The patient was seen by Nephrology during his last hospitalization.     Recent Labs  Lab 05/03/17  0655 05/02/17  1302   CR 2.29* 2.06*     - We  need measurement of I and O's.   - Follow up BMP in AM.   - Appreciate Nephrology consult for worsening hyperkalemia, fluid overload in the context of CKD  - F/u BMP ordered for later today.   - Avoid nephrotoxins, contrast, NSAIDs  - Eval and treatment for iron deficiency and mineral bone disease per nephrology.     Type 2 diabetes. The patient will be continued on Lantus and medium sliding scale and on a moderate carbohydrate diet. His last A1c was under good control in December which was 7.0.     Recent Labs  Lab 05/03/17  1139 05/03/17  0800 05/03/17  0655 05/03/17  0213 05/02/17  1828 05/02/17  1302   GLC  --   --  104*  --   --  91   * 98  --  138* 136*  --      - continued on PTA lantus 6 units.   - Given BS under 100, decrease to 4 units.   - SSI ordered on 5/3.     Atrial fibrillation. Rate controlled, not on rate controlling medications, not on anticoagulation   - Admission EKG reviewed on 5/3 and shows old RBBB with controlled afib.   - Continues on PTA  mg daily.   - H/o stroke, not on anticoagulation as patient states he has tried to be on anticoagulation, in the past with recurrent GI bleeding, but can not give me details. He think he had ulcers.     Hyperlipidemia.   - Continue PTA simvastatin.     Parkinson's disease.   - Continue PTA Sinemet and pramipexole.     S/p amputations of 2 toes on the right foot and also he now has a small wound on the third toe of his right foot and the left great toe also is abnormal.   - Would like to have wound care nurse evaluate.   - Followed by Podiatry as an outpatient.     Gout  - Continue PTA allopurinol.     DVT prophylaxis. The patient will be on SCDs.       CODE STATUS: Discussed at admission, He would like limited resuscitation in that he would not want it prolonged and would not want to be intubated in the ICU for a prolonged period of time, so at this point, the patient is full code.     DISPO: He is followed with home care nurse and also a  podiatrist. There is some question of if he is still going to [facility] because of difficulty getting there in the car. [employer] discussed    Tracy Perez  [phone] (p)  Text Page (7AM - 6PM)     Interval History   Denies any acute concerns, no significant change since admission.     -Data reviewed today: I reviewed all new labs and imaging results over the last 24 hours. I personally reviewed the EKG tracing showing as above and the chest x-ray image(s) showing as above. Reviewed on 5/3. .    Physical Exam   Temp: 94.5  F (34.7  C) Temp src: Oral BP: 110/72 Pulse: 92 Heart Rate: 77 Resp: 20 SpO2: 93 % O2 Device: Nasal cannula Oxygen Delivery: 4 LPM  Vitals:    05/02/17 1620 05/03/17 0500   Weight: 94.2 kg (207 lb 10.8 oz) 93.1 kg (205 lb 4 oz)     Vital Signs with Ranges  Temp:  [94.5  F (34.7  C)-97.9  F (36.6  C)] 94.5  F (34.7  C)  Pulse:  [92] 92  Heart Rate:  [74-89] 77  Resp:  [8-34] 20  BP: ()/(58-72) 110/72  SpO2:  [74 %-100 %] 93 %  I/O last 3 completed shifts:  In: -   Out: 525 [Urine:525]    Constitutional:  NAD,   Neuropsyche:  alert and oriented, answers questions appropriately.   Respiratory:  Breathing comfortably, good air exchange, no wheezes, no crackles.   Cardiovascular:  Irregular rate and rhythm, 3+ edema, legs wrapped.   GI:  soft, NT/ND, BS normal  Skin/Integumen:  No acute rash or sign of bleeding.     Medications   All medications reviewed on 05/03/17      - MEDICATION INSTRUCTIONS -       Reason ACE/ARB order not selected       - MEDICATION INSTRUCTIONS -         simvastatin  40 mg Oral At Bedtime     allopurinol  200 mg Oral Daily     aspirin  325 mg Oral Daily     carbidopa-levodopa  1 tablet Oral BID     carboxymethylcellulose  1 drop Both Eyes 4x Daily     ferrous sulfate  325 mg Oral Daily with lunch     insulin glargine  6 Units Subcutaneous At Bedtime     mirtazapine  7.5 mg Oral At Bedtime     omeprazole  20 mg Oral QAM     pramipexole  0.5 mg Oral Daily  with supper     eucerin   Topical BID     sodium chloride (PF)  3 mL Intracatheter Q8H     furosemide  40 mg Intravenous BID     Data     Recent Labs  Lab 05/03/17  0655 05/02/17  2300 05/02/17  1745 05/02/17  1302   WBC 5.4  --   --  6.7   HGB 11.5*  --   --  11.9*   MCV 95  --   --  93     --   --  187     --   --  139   POTASSIUM 5.7*  --   --  5.6*   CHLORIDE 108  --   --  107   CO2 26  --   --  25   BUN 61*  --   --  55*   CR 2.29*  --   --  2.06*   ANIONGAP 5  --   --  7   JARETH 8.3*  --   --  8.6   *  --   --  91   ALBUMIN  --   --   --  2.6*   PROTTOTAL  --   --   --  7.1   BILITOTAL  --   --   --  1.0   ALKPHOS  --   --   --  158*   ALT  --   --   --  17   AST  --   --   --  206*   TROPI  --  0.100* 0.098*  --        Recent Results (from the past 24 hour(s))   Chest XR,  PA & LAT    Narrative    XR CHEST 2 VW 5/2/2017 2:16 PM    COMPARISON: 1/27/2017    HISTORY: Congestive heart failure.      Impression    IMPRESSION: Enlarged cardiac silhouette is again seen and unchanged.  Mild mixed interstitial and airspace opacities over both lungs, edema  versus atypical infection. No pneumothorax identified. Likely small  left pleural effusion.    OPAL CARRASCO

## 2017-05-03 NOTE — PLAN OF CARE
Problem: Goal Outcome Summary  Goal: Goal Outcome Summary  Outcome: No Change  Pt a/o x4. Vss overnight on 4 L 02. Reports being unable to get out of bed, turned q 2 hours. Tele: A fib CVR w/ BBB. Skin concerns: pt had right great and second toe amputation d/c/i. Leg wraps on. Pt reports pain in R foot. PRN hydrocodone was ordered and given. Pt reports decrease in pain to an appropriate level. Pt had low urine output overnight of 150 cc. Pt had elevated K+ level of 5.6 on admission, monitoring with diuresis. Scheduled for WOC, PT/OT, nutrition and CARDs consult today 5/3. Plan continue to diuresis, follow trops. Continue to monitor.

## 2017-05-03 NOTE — CONSULTS
"Standard CHF consult received for 2gm Na diet education    Chart reviewed    Note pt has been in TCU for extended length of time (recently dc'd to his home)  Pt and wife seen (1/2017) for CHF consult:  Information obtained from pt and his wife, [place]:   - Pt has followed a \"low-salt\" diet for 20+ years.   - Pt's wife does all the cooking and shopping. She states she is very mindful of sodium content and anything she can buy \"low-salt\" she does. Pt's wife is familiar with reading nutrition labels.  - Pt's wife reports reducing salt content of all her recipes to 1/8th the original amount.   - Pt's typical intake is as follows: (B) cornflakes with 1% milk or bagel or muffin; (L) Healthy choice canned soup; (D) spaghetti and meat sauce made from low-sodium canned red sauce or stir-bryant without soy sauce.  - Pt rarely dines out.    Diet: Renal, Moderate CHO    Per chart review - pt with advanced CKD, volume overload, severe cor pulmonale with right heart failure    Per H&P - \"According to the patient and his wife he has been dietary compliant with a low-salt diet.\"    Stopped by to see pt this afternoon - sleeping soundly, did not disturb  Will follow POC and need for further diet teaching        "

## 2017-05-03 NOTE — PROGRESS NOTES
[facility]  Patient is currently open to home care services with [facility].  The patient is currently receiving RN/PT/OT services.  [facility]  and team have been notified of patient admission.  [facility] liaison will continue to follow patient during stay.  If appropriate provide orders to resume home care at time of discharge.

## 2017-05-03 NOTE — PROGRESS NOTES
Report of foot pain uncontrolled with tylenol. Takes hydrocodone at home. Reordered.     Davide Church MD  [phone]

## 2017-05-03 NOTE — PLAN OF CARE
Problem: Goal Outcome Summary  Goal: Goal Outcome Summary  OT/CR: Evaluation and treatment initiated. Pt lives in a home with his spouse and prior was independent in ADLs, was ambulating with a walker, and was receiving assist from a nurse to manage medications, pt also had home OT and PT. Pt recently had a TCU stay.   Currently: Pt went from supine to sit EOB with extra time and minimum assist for LE. Pt sat EOB demonstrating difficulty with sitting balance often leaning posteriorly, requiring minimum to moderate assist for upright sitting balance. With maximum assist of 3 patient went from sit to standing. Pt took 3 small side steps with bed behind him. Pt demonstrated difficulty with balance and required verbal cues for hand and feet placement. Pt required total assist for shoes and socks. Vitals monitored at rest 114/67; HR 69; O2 96 and with activity BP: 11/68; HR75; O2 94.  Recommend Long term care.

## 2017-05-03 NOTE — CONSULTS
CORE consult placed on admission. Pt was previously a [facility] patient until 4/2017 - pt's wife spoke with clinic staff last month and stated they were going to stop follow up there due to difficulty getting to the cardiology clinic.  They have been continuing care thru their PCP.  CORE consult ambrocio'emily.      Cathie Cuellar   CV Transitional Specialist RN  May 3, 2017

## 2017-05-03 NOTE — CONSULTS
CARDIOLOGY CONSULTATION      DATE OF CONSULTATION:  05/03/2017      REQUESTING PHYSICIAN:  None given.      REASON FOR CONSULTATION:  Heart failure.      HISTORY:  Armand Smith is an 87-year-old gentleman.  He is a most kind but very unfortunate story here.  I will leave it to his primary doctor and the internist if he should actually be seen by Palliative Care also.  He has severe cor pulmonale with right heart failure.  [facility] has [facility] in [facility], but because of numerous visits to nursing homes he has missed most of those visits.  On 09/29/2015 Dr. Carrasco took him to the heart catheterization lab for right heart catheterization.  The RA pressure mean was 16.  RV pressure 62/17, PA pressure 60/20/38.  Pulmonary capillary wedge mean was 22.  Thermodilution cardiac output 4.2.  PVR 3.8 Wood units, SVR 13:50.  Dr. Carrasco interpreted this as severe elevated filling pressures on the right side, moderate elevated pressures on the left side, mixed pulmonary hypertension, which he thought was due to both pulmonary disease and LV diastolic dysfunction.  The patient's LV ejection fraction was normal, but the echo showed severe RV enlargement with moderate to severe decrease in RV systolic function and pulmonary hypertension with 3+ TR.  If one looks at that data, the transpulmonary gradient would be 16 (38 -22), normal being 12-15, so that suggested some element of the pulmonary hypertension is from LV filling pressures being elevated, but that would not account for all of it.  No doubt this is a mixed combo xxxxxxxxxxx disease.  His last echocardiogram was dated 01/22/2017 and is listed above.  It notes normal LV size and function, however, the septum is flattened due to RV pressure volume overload.  RV is severely dilated with moderate to severe decrease in RV systolic function.  Interestingly, the atrial sizes are normal.  There was no significant mitral valve disease.  There was 2-3+ TR with pulmonary hypertension.  No  significant aortic valve disease and also interesting they said the IVC was not dilated but of course that is from several months ago.  The patient has had heart failure and in the past has been admitted and given IV dobutamine and probably also has chronic kidney disease which makes diuresis additionally more difficult because of the pulmonary hypertension leading to reduced LV forward flow.  Apparently with increased dose of diuretic the creatinine has gone up.  When he was in the hospital on 02/01/2017 Dr. Hernan Rodríguez saw him and her note states unknown what his true dry weight is.  The patient told me he thinks it is 170.  Dr. Becerra's note thinks it is closer to 190 and he was actually down to 167.  She states that the patient is DNR/DNI, but he is listed as full code on this admission here.  She noted that the patient had long-term poor prognosis.  She noted hypoxia respiratory failure with pleural effusion and had a thoracentesis during that admission.  She noted again stage IV chronic renal insufficiency, chronic atrial fib, not on anticoagulation due to GI bleeding and epistaxis.  She noted mild elevated troponin.  She notes severe anemia for which he was transfused, given iron and asked Nephrology to comment if he should be on EPO.  He was noted to be intermittently hypotensive, but off dobutamine his blood pressure is thought to be fine, and he was discharged.  The problem is he has been in and out of nursing care facilities and has not made it to the office [facility].  If I am understanding what he is saying correctly, he has actually only been in his home for a week now but had visiting nurses.  He states he has gained about 40 pounds in the last 4 weeks.  Dr. Cook's admission note says he has actually been home 3 weeks, but the patient told me 1 week.  His shortness of breath only appeared in the last day or two and he partially came in because he could not walk anymore and I believe it is because  his legs are so edematous they were too heavy and he could not even use his walker to walk any further.  His metolazone dose was decreased at some point in the last few weeks, probably because of elevated creatinine.  He denies chest pain, cough, sputum, dizziness or syncope.  No infection type symptoms or fevers.      PAST MEDICAL HISTORY:   1.  Cor pulmonale class IV heart failure.   2.  Normal LV ejection fraction but elevated LV filling pressures as evidenced by heart catheterization previously.  See discussion above.   3.  Atrial fib, not on anticoagulation due to recurrent GI bleeds and epistaxis.   4.  Hypertension.   5.  Hyperlipidemia.   6.  History of nodular lymphoma.   7.  Parkinson's disease.   8.  2-3+ tricuspid insufficiency.   9.  Type 2 diabetes mellitus.   10.  Cerebral infarction.   11.  Peripheral neuropathy.   12.  Basal cell carcinoma.   13.  Lumbar sacral spondylosis.   14.  Gout.    15.  Hypotestosteronism.   16.  Mild aortic valve insufficiency.   17.  Previous toe amputations with what might be a slowly healing ulcer now.   18.  Splenectomy in 1970.   19.  Corneal surgery and lens implant.      MEDICATIONS:   1.  On admission, we believe include Remeron 7.5 mg at bedtime.   2.  Allopurinol 200 mg daily.   3.  Lasix 40 mg daily.   4.  Hydrocodone Norco as needed.   5.  Insulin.   6.  Refresh eye drops.   7.  Senokot.   8.  Sinemet 25/100 every 5 hours as needed for tremor.   9.  Eucerin cream.   10.  Neosporin applied daily, I assume to his feet.   11.  Sinemet-CR 50/200 b.i.d.  I have to clarify that with the other dose of Sinemet that I listed above.   12.  Prilosec 20 mg daily.   13.  Mirapex 0.5.      ALLERGIES:  Codeine and penicillin.      FAMILY HISTORY:  Father had cerebrovascular disease.      SOCIAL HISTORY:  The patient is a remote smoker of a pipe, stopped in 2011.  Alcohol intake is minimal.  He is  and retired.      REVIEW OF SYSTEMS:   CARDIAC:  As above.   RESPIRATORY:   As above, shortness breath only in the last couple days.   GASTROINTESTINAL:  Includes acid reflux with PPI.   NEUROLOGIC:  Includes neuropathy.    ENDOCRINE:  Includes diabetes and hypotestosterone.  Remainder review of systems is otherwise negative.      PHYSICAL EXAMINATION:   GENERAL:  Reveals a very pleasant gentleman, obese and lying comfortably in bed currently.   VITAL SIGNS:  Blood pressure is 110/72, highest listed was 121/71, lowest was 99/69, heart rate was 77.  Weight 93.1 kilograms.  O2 sat 93%.   SKIN:  No petechiae or rash.   HEENT:  Nonicteric.   NECK:  Supple, without thyromegaly.   CHEST:  Abnormal.  I had him lean from side-to-side because he could not actually sit up.  There were decreased breath sounds particularly on the left base and maybe a little bit on the right.  The upper lung fields are relatively clear.   CARDIAC:  Regular rate and rhythm.  No significant murmurs noted.  Carotid upstrokes are approximately 2+.  Femoral pulses are hard to feel because of obesity, they are probably 1-2+.  Dorsal pedal pulses were not sought after as his legs are wrapped.  Neck veins are hard to assess due to thick neck but are probably minimally dilated at most.   ABDOMEN:  Obese and can tell that he must have weighed more at one point.  He has folds of skin.  The abdomen exam is difficult because of the obesity and pannus.  No organomegaly is identified.   EXTREMITIES:  Lower extremities show anasarca all the way up to the penis and scrotum.   NEUROLOGIC:  Alert and oriented, cranial nerves II through XII are intact.      LABORATORY DATA:  Old labs include an echo and heart catheterization above.  Currently sodium 139, potassium 5.7, up from 5.6 yesterday and 4.9 a month ago.  Creatinine is 2.29, up from 2.06 yesterday, 1.73 last month.  Hemoglobin A1c 6.9%.  Troponins have nonspecific elevation of 0.10, which is what was described last time.  Glucose 104, white count 5.4, hemoglobin 11.5, MCV 95,  platelet count 178,000.  NT BNP was 8761, ALT was 17, .  Alkaline phosphatase 158.  Albumin is low at 2.6 with a total protein 7.1.  Calcium was 8.3.  Chest x-ray yesterday reports enlarged cardiac silhouette, mixed interstitial and airspace opacities over both lungs, edema versus atypical infection, small left pleural effusion.      EKG demonstrated atrial fibrillation with controlled rate, right bundle branch block and small Q-waves III and aVF and slightly rightward axis.  Low amplitude in the lateral precordial leads.      IMPRESSION:  This is a patient who probably has end-stage cor pulmonale was right heart failure.  It is probably a mixed picture.  Some of the pulmonary hypertension is probably from elevated LV filling pressure.  The echocardiogram showed normal LV size and function, but with atrial fib and diurese probably indicated but with chronic renal failure some of this fluid overload may be because of kidney dysfunction.  The creatinine is slowly getting worse and the potassium is slowly getting worse.  Dr. Moore is seeing the patient as I am dictating, and he is going to suggest albumin with intravenous diuretic.  The patient is in atrial fib and not on anticoagulation listed above.  The troponin rise is nonspecific and was noted previously.  I do not think it represents coronary ischemia or any acute coronary syndrome.  It is interesting that the albumin is low and the total protein is normal.  There was some abnormality with a SGOT.  Whether the patient should have SPEP or we should be looking for myeloma or similar disease that might explain the kidney and disparity in the albumin, I will leave to Internal Medicine.  From a cardiac standpoint, I am going to empirically place the patient on Viagra at low dose, probably 20 mg t.i.d. for his severe pulmonary hypertension with RV failure.  Dr. Moore as I mentioned is going to be handling his diuretics.  Will reserve judgment on restarting  dobutamine or even milrinone is sometimes used for this problem, but milrinone can cause hypotension and all these drugs can also potentially cause systemic hypotension and also affect the kidney function if there is decreased forward flow to the kidneys.  Again, I will leave it to Internal Medicine if thyroid and SPEP or other workup for myeloma or explanation of why there is such a disparity between the serum albumin and the total protein.  Again not surprising it may be that it is spilling protein in the urine from the diabetic kidney disease or low nutrition also.  We will follow with you.  I think there needs to be a discussion with the family again about no code status.  He was listed as DNR/DNI on last admission but listed as full code now, and I even think palliative care might be appropriate.  I think we may have to revisit the issue if he is not able to stay home even with the visiting nurse or if he requires a higher level of care, and I do not know if he is going to end up ultimately needing dialysis to get the fluid off, keeping in mind again that this is really right heart failure and not left heart failure and drawing the fluid off too quickly may cause hypotension.         BRITT DE LA GARZA MD             D: 2017 11:26   T: 2017 12:24   MT: KINGSLEY      Name:     GIO BERRY   MRN:      [id number]        Account:       [id number]   :      1929           Consult Date:  2017      Document: [id number]       cc: Augusto Lamb MD

## 2017-05-03 NOTE — PROGRESS NOTES
Care Transition Initial Assessment - RN    Reason For Consult: care coordination/care conference, discharge planning   Met with: Patient.    DATA   Active Problems:    Biventricular CHF (congestive heart failure) (H)       Cognitive Status: awake, alert and oriented.  Primary Care Clinic Name: [facility]  Primary Care MD Name: Dr Aniyah Magana  Contact information and PCP information verified: Yes    Lives With: spouse     Quality Of Family Relationships: supportive, involved  Description of Support System: Supportive, Involved   Who is your support system?: Wife (adult children)         Insurance concerns: No Insurance issues identified    ASSESSMENT  Patient currently receives the following services:  [facility] RN/PT/OT     Identified issues/concerns regarding health management: May need more care than he can get at home. Will follow progress for further DC planning      PLAN  Patient given options and choices for discharge  N/A at this time.  Patient/family is agreeable to the plan?  N/A  Patient anticipates discharging to depends on progress .        Patient anticipates needs for home equipment: No    Plan/Disposition: pending progress  Appointments:N/A, to early to do this.    Met with patient for care coordinator consult. Patient very ill appearing and weak.  Alert and able to answer questions. Lives in house with wife. Has adult children who are involved.  Wife still drives and takes to appointments. Currently uses [facility] RN/PT/OT. Uses walker. Has recently had electric stair lift installed in house. Has several grab bars in the bathroom Wife makes meals and is main caregiver. Wife and home RN help set up his medications. MD mentions possible palliative to get involved . Will need to follow when GOC are in place and discharge disposition.      Care  (CTS) will continue to follow as needed.

## 2017-05-03 NOTE — PROGRESS NOTES
05/03/17 1540   Quick Adds   Type of Visit Initial Occupational Therapy Evaluation   Living Environment   Lives With spouse   Living Arrangements house   Home Accessibility bed and bath on same level  (Walk in )   Number of Stairs to Enter Home 2   Number of Stairs Within Home 13  (Has a chair lift for stairs)   Transportation Available family or friend will provide   Self-Care   Dominant Hand right   Usual Activity Tolerance fair   Current Activity Tolerance poor   Regular Exercise no   Equipment Currently Used at Home walker, rolling;shower chair;raised toilet   Functional Level Prior   Ambulation 1-->assistive equipment   Transferring 1-->assistive equipment   Toileting 1-->assistive equipment   Bathing 1-->assistive equipment   Dressing 2-->assistive person   Eating 0-->independent   Communication 0-->understands/communicates without difficulty   Swallowing 0-->swallows foods/liquids without difficulty   Cognition 0 - no cognition issues reported   Fall history within last six months yes   Number of times patient has fallen within last six months 2  (pt unsure of exact number of falls)   Prior Functional Level Comment Had home OT/PT; recent stay at [facility]   General Information   Onset of Illness/Injury or Date of Surgery - Date 05/02/17   Referring Physician Faith Cook MD   Patient/Family Goals Statement Home   Additional Occupational Profile Info/Pertinent History of Current Problem Per chart: 87-year-old male with a known history of left and right-sided congestive heart failure, mostly right-sided heart failure with severe pulmonary hypertension and tricuspid regurgitation. The patient also had a 40-pound weight gain and has anasarca on exam and is now hypoxic requiring oxygen, which is new for him.    Precautions/Limitations fall precautions   Heart Disease Risk Factors Age;Medical history;Lack of physical activity   Cognitive Status Examination   Orientation orientation to person, place and time  "  Level of Consciousness alert   Able to Follow Commands WNL/WFL   Personal Safety (Cognitive) at risk behaviors demonstrated   Pain Assessment   Patient Currently in Pain Yes, see Vital Sign flowsheet   Transfer Skills   Transfer Transfer Skill: Stand to Sit;Transfer Safety Analysis Sit/Stand;Transfer Safety Analysis Bed/Chair   Transfer Skill: Sit to Stand   Level of [place]: Sit/Stand maximum assist (25% patients effort)   Physical Assist/Nonphysical Assist: Sit/Stand (3 persons)   Transfer Safety Analysis Sit/Stand   Transfer Safety Concerns Noted: Sit/Stand decreased balance during turns   Instrumental Activities of Daily Living (IADL)   Previous Responsibilities (RN comes in 2x/week; assist from spouse)   Activities of Daily Living Analysis   Impairments Contributing to Impaired Activities of Daily Living balance impaired   General Therapy Interventions   Planned Therapy Interventions ADL retraining;risk factor education;progressive activity/exercise;home program guidelines;transfer training   Clinical Impression   Criteria for Skilled Therapeutic Interventions Met yes, treatment indicated   OT Diagnosis Decreased ADLs, functional transfers   Influenced by the following impairments pain, weakness   Assessment of Occupational Performance 1-3 Performance Deficits   Identified Performance Deficits Decreased ADLs (dressing, bathing, toileting), functional transfers   Clinical Decision Making (Complexity) Low complexity   Therapy Frequency daily   Predicted Duration of Therapy Intervention (days/wks) 4 days   Anticipated Discharge Disposition Long Term Care Facility   Risks and Benefits of Treatment have been explained. Yes   Patient, Family & other staff in agreement with plan of care Yes   [facility] TM \"6 Clicks\"   2016, Trustees of [facility], under license to [employer].  All rights reserved.   6 Clicks Short Forms Daily Activity Inpatient Short Form   [facility] AM-PAC  \"6 " "Clicks\" Daily Activity Inpatient Short Form   1. Putting on and taking off regular lower body clothing? 1 - Total   2. Bathing (including washing, rinsing, drying)? 1 - Total   3. Toileting, which includes using toilet, bedpan or urinal? 1 - Total   4. Putting on and taking off regular upper body clothing? 2 - A Lot   5. Taking care of personal grooming such as brushing teeth? 2 - A Lot   6. Eating meals? 4 - None   Daily Activity Raw Score (Score out of 24.Lower scores equate to lower levels of function) 11   Total Evaluation Time   Total Evaluation Time (Minutes) 10     "

## 2017-05-03 NOTE — PROGRESS NOTES
Pt. Having therapy now with PT, Lymphedema has not been ordered yet for short stretch wrap which is currently on the pt.  Will follow up tomorrow and coordinate with PT for assessment of LE wound.

## 2017-05-03 NOTE — PLAN OF CARE
Problem: Goal Outcome Summary  Goal: Goal Outcome Summary  Outcome: No Change  Pt has been pain free with V/signs stable and maintaining his O2 sats  in the mid 90's on 4 L of O2 per N/C. Am Cr 2.29 so am Lasix was held and then DC per Dr Vincent. Pt was given Albumin and then after infusion was completed 2 mg of Bumex was given at 13:30. Offered the Pt the Urinal at that time and he stated he had no urgency to void. Ducumented urine output 125 + since 6 am. Pt was repostioned at that time and needs freq turning with assist of 2. Remains in Controlled A-fib but is not on Coumadin secondary to GI bleeds.

## 2017-05-04 NOTE — PLAN OF CARE
Problem: Goal Outcome Summary  Goal: Goal Outcome Summary  PT/Lymph: Unable to complete PT eval at this time as pt unable to stay awake. Performed lymph evaluation. Pt presents with 2+ B LE edema with fibrotic tissue present in legs. Wounds were assessed and dressed by [facility] RN prior to PT session. PT applied B LE lymph wraps toes to knees with appropriate stretch and spacing to allow for gradient compression. Pt tolerating them well. Anticipate pt will need to discharge to TCU with lymphedema cares or LTC with skilled PT services to improve mobility as pt is currently requiring the lift to get OOB and is not safe to return home.

## 2017-05-04 NOTE — PROGRESS NOTES
[facility]    Hospitalist Progress Note    Date of Service (when I saw the patient): 05/04/2017    Assessment & Plan   Mr. Armand Smith is an 87-year-old male with a known history of left and right-sided congestive heart failure, mostly right-sided heart failure with severe pulmonary hypertension and tricuspid regurgitation. The patient also had a 40-pound weight gain and has anasarca on exam and is hypoxic requiring oxygen.       In addition to this, the patient has stage IV chronic kidney disease, Parkinson's disease, type 2 diabetes and history of a stroke. He is not on anticoagulation.     Weight gain and acute hypoxic respiratory failure secondary to acute on chronic decompensation of right heart failure with pulmonary HTN and TR -  Echo on 01/2017 showed severely dilated RV with mod to severely reduced RV function with mod-sever TR, mod-severe pulmonary HTN. LVEF 55-60%. He hasn't been able to follow closely with [facility] due to transportation issues.  He required dobutamine for diuresis during his last hospitalization. He has been on metolazone in the past, not currently. Per patient, compliant with low sodium diet. BNP in the 8000's. CXR reviewed by myself on 5/3 reveals mild hilar patchy infiltrates, no effusions.   - Cardiology and nephrology consulted.  - On 5/4, no significant improvement with bumex and albumin infusion started on 5/3, with BUN/Cr up a bit, and K up to 5.7, about 1.2 L UOP over last 24 hours, remains on 4L, First weight on floor 93.1 kg on 5/3 >93.3 on 5/4.   - Cardiologist started dobutamine gtt on 5/4 an requested palliative care consult.   - Cardiology started sidenafil.   - PICC line ordered on 5/4.     Probable demand ischemia with flat troponin around 0.1, presentation and troponins not c/w ACS.     Acute Kidney Injury with hyperkalemia on chronic kidney disease, stage IV with hyperkalemia Patient's creatinine today is 2.06, was 1.73 on 04/24/2017, but  "looking back, it looks like it has been 2.02 on 03/01/2017 and that appears to be probably at his baseline. The patient does have a potassium of 5.6. He will get some Lasix in the emergency room and this will probably treat that and would like to repeat a BNP in the morning. The patient was seen by Nephrology during his last hospitalization.     Recent Labs  Lab 05/04/17  0530 05/03/17  1325 05/03/17  0655 05/02/17  1302   CR 2.45* 2.29* 2.29* 2.06*     - K 5.7 on 5/4.   - Appreciate Nephrology consult for worsening hyperkalemia, fluid overload in the context of CKD  - Avoid nephrotoxins, contrast, NSAIDs  - Dr. Dunn discussed dialysis with family on 5/4 and emphasized that he would not be a good dialysis candidate.   - Eval and treatment for iron deficiency and mineral bone disease per nephrology.     Goals of Care: Unfortunately he is not responding to above measures, and prognosis is limited. Discussed with patient and wife on 5/4, goals of care. They both seem to understand that options are limited and patient agrees he would never want to attempt dialysis, understands that he is a poor candidate. We discussed his code status. Although wife stated, that she already told us that they would want us to \"wake him up\" but then stop if that doesn't work, after I explained what's involved with resuscitation attempt including chest compressions, possible shocks to the heart and possible intubation and placement on ventilator, with the anticipated result of ultimate death or if he survives a poorer quality of life, they were both in agreement that he would not want resuscitation attempts made. I explained that I am going to change his status to \"do not resuscitate,\" and patient and wife agree, and express that they thought that was already in his directives. Changed to DNR/DNI on 5/4. Subsequently went on to discuss options if we don't get much improvement during this hospitalization and cardiology and nephrology does " not feel further hospitalizations would be very beneficial. They both expressed interest in hospice at home post discharge. Palliative care will see today and we will go from there. Discussed with Anitha of palliative care.     Elevated Protein - Albumin Gap of greater than 4, with CKD and anemia. I agree this is concerning for possible paraprotein / multiple myeloma.   - Ordered SPEP, 24 hour UPEP and urine light chains.     Type 2 diabetes. The patient will be continued on Lantus and medium sliding scale and on a moderate carbohydrate diet. His last A1c was under good control in December which was 7.0.     Recent Labs  Lab 05/04/17  1308 05/04/17  0803 05/04/17  0530 05/03/17  2106 05/03/17  1708 05/03/17  1325 05/03/17  1139 05/03/17  0800 05/03/17  0655  05/02/17  1302   GLC  --   --  141*  --   --  193*  --   --  104*  --  91   * 140*  --  171* 148*  --  156* 98  --   < >  --    < > = values in this interval not displayed.  - continued on PTA lantus 6 units.   - Given BS under 100, decrease to 4 units on 5/3.   - SSI ordered on 5/3.     Atrial fibrillation. Rate controlled, not on rate controlling medications, not on anticoagulation   - Admission EKG reviewed on 5/3 and shows old RBBB with controlled afib.   - Continues on PTA  mg daily.   - H/o stroke, not on anticoagulation as patient states he has tried to be on anticoagulation, in the past with recurrent GI bleeding, but can not give me details. He think he had ulcers.     Hyperlipidemia.   - Continue PTA simvastatin.     Parkinson's disease.   - Continue PTA Sinemet and pramipexole.     S/p amputations of 2 toes on the right foot and also he now has a small wound on the third toe of his right foot and the left great toe also is abnormal.   - Would like to have wound care nurse evaluate.   - Followed by Podiatry as an outpatient.     Gout  - Continue PTA allopurinol.     DVT prophylaxis. The patient will be on SCDs.       CODE STATUS: Discussed  at admission, He would like limited resuscitation in that he would not want it prolonged and would not want to be intubated in the ICU for a prolonged period of time, so at this point, the patient is full code.     DISPO: Depending on cardiology plan and how much improvement we are seeing with above treatment. Possible discharge to hospice, depending.     Tracy Perez  [phone] (p)  Text Page (7AM - 6PM)     Interval History   No acute changes, no significant improvement. See above.     -Data reviewed today: I reviewed all new labs and imaging results over the last 24 hours. I personally reviewed no images or EKG's today.    Physical Exam   Temp: 97.8  F (36.6  C) Temp src: Axillary BP: 103/63 Pulse: 72 Heart Rate: 85 Resp: 18 SpO2: 93 % O2 Device: Nasal cannula Oxygen Delivery: 4 LPM  Vitals:    05/02/17 1620 05/03/17 0500 05/04/17 0500   Weight: 94.2 kg (207 lb 10.8 oz) 93.1 kg (205 lb 4 oz) 93.3 kg (205 lb 11 oz)     Vital Signs with Ranges  Temp:  [97.8  F (36.6  C)-98.4  F (36.9  C)] 97.8  F (36.6  C)  Pulse:  [72] 72  Heart Rate:  [74-85] 85  Resp:  [18-20] 18  BP: (103-125)/(58-78) 103/63  SpO2:  [92 %-99 %] 93 %  I/O last 3 completed shifts:  In: 610 [P.O.:510]  Out: 925 [Urine:925]    Constitutional:  NAD,   Neuropsyche:  alert and oriented, answers questions appropriately.   Respiratory:  Breathing comfortably, good air exchange, no wheezes, no crackles.   Cardiovascular:  Irregular rate and rhythm, murmur heard, 3+ edema, legs wrapped.   GI:  soft, NT/ND, BS normal  Skin/Integumen:  No acute rash or sign of bleeding.       Medications   All medications reviewed on 05/04/17      DOBUTamine 5 mcg/kg/min (05/04/17 1331)     bumetanide       - MEDICATION INSTRUCTIONS -       Reason ACE/ARB order not selected       - MEDICATION INSTRUCTIONS -         albumin human  25 g Intravenous Q8H     sildenafil  10 mg Oral TID     simvastatin  40 mg Oral At Bedtime     insulin glargine  4 Units Subcutaneous At  Bedtime     insulin aspart  1-7 Units Subcutaneous TID AC     insulin aspart  1-5 Units Subcutaneous At Bedtime     allopurinol  200 mg Oral Daily     aspirin  325 mg Oral Daily     carbidopa-levodopa  1 tablet Oral BID     carboxymethylcellulose  1 drop Both Eyes 4x Daily     ferrous sulfate  325 mg Oral Daily with lunch     mirtazapine  7.5 mg Oral At Bedtime     omeprazole  20 mg Oral QAM     pramipexole  0.5 mg Oral Daily with supper     eucerin   Topical BID     sodium chloride (PF)  3 mL Intracatheter Q8H     Data     Recent Labs  Lab 05/04/17  0530 05/03/17  1325 05/03/17  0655 05/02/17  2300 05/02/17  1745 05/02/17  1302   WBC  --   --  5.4  --   --  6.7   HGB  --   --  11.5*  --   --  11.9*   MCV  --   --  95  --   --  93   PLT  --   --  178  --   --  187    138 139  --   --  139   POTASSIUM 5.7* 5.4* 5.7*  --   --  5.6*   CHLORIDE 106 107 108  --   --  107   CO2 29 27 26  --   --  25   BUN 65* 63* 61*  --   --  55*   CR 2.45* 2.29* 2.29*  --   --  2.06*   ANIONGAP 3 4 5  --   --  7   JARETH 8.5 8.3* 8.3*  --   --  8.6   * 193* 104*  --   --  91   ALBUMIN  --   --   --   --   --  2.6*   PROTTOTAL  --   --   --   --   --  7.1   BILITOTAL  --   --   --   --   --  1.0   ALKPHOS  --   --   --   --   --  158*   ALT  --   --   --   --   --  17   AST  --   --   --   --   --  206*   TROPI  --   --   --  0.100* 0.098*  --        No results found for this or any previous visit (from the past 24 hour(s)).

## 2017-05-04 NOTE — TELEPHONE ENCOUNTER
Sinemet Cr     Last Written Prescription Date: 05/2/17 from hospital encounter with Dr. Smith  Last Fill Quantity: , # refills:   Last Office Visit with [facility] or [facility] prescribing provider: 03/24/17        BP Readings from Last 3 Encounters:   05/04/17 103/63   03/24/17 110/50   03/16/17 112/60     Routing refill request to provider for review/approval because:  Drug not on the [facility] refill protocol     Salena Saunders RN  [facility]  [phone]

## 2017-05-04 NOTE — PLAN OF CARE
Alert, oriented to self, VSS, Tele Afib CVR with a HR of 88, total assist, some incontinence, uses urinal and bedpan. Dobutamine IV started today and PICC line is being placed due to the med. Palliative consulted today, family not ready to make the decision to go with hospice. Wound care consulted, recommended podiatry consult and orders for dressing change are in. LE wraps on. No c/o CP or pain. CTM.

## 2017-05-04 NOTE — PROGRESS NOTES
Renal Medicine Progress Note                                Armand Smith MRN# [id number]   Age: 87 year old YOB: 1929   Date of Admission: 5/2/2017 Hospital LOS: 2                  Assessment/Plan:     87-year-old male admitted through the emergency room on the evening of 05/02/2017   in the setting of increasing leg swelling.       Evaluated respect to chronic kidney disease and volume overload and modest hyperkalemia.       1.  Longstanding CKD   -stage 4  2.  Modest albuminuria  3.  Episodic ARF episodes  4.  Cor Pulmonale  5.  Pulmonary hypertension  6.  Hyperphosphatemia  7.  Secondary hyperparathyroidism   -vitamin D level pending  8.  Hyperkalemia     Will try dobutamine and bumetanide continuous infusion   Follow labs  Palliative care appropiate        Interval History:     Comfortable but ill appearing.  Follows.  SOB improved.  Edema same.  Wife here today    Discussed the concept of dialysis with them.  Emphasized that he would not be a   candidate for dialysis given his age and multiple co morbid issues    ROS:     GENERAL: NAD, No fever,chills  R: NEGATIVE for significant cough or SOB  CV: NEGATIVE for chest pain, palpitations  EXT: no change edema  ROS otherwise negative    Medications and Allergies:     Reviewed    Physical Exam:     Vitals were reviewed  Patient Vitals for the past 8 hrs:   BP Temp Temp src Heart Rate Resp SpO2 Weight   05/04/17 0806 115/61 98.4  F (36.9  C) Oral 81 18 93 % -   05/04/17 0500 - - - - - - 93.3 kg (205 lb 11 oz)     I/O last 3 completed shifts:  In: 610 [P.O.:510]  Out: 925 [Urine:925]    Vitals:    05/02/17 1620 05/03/17 0500 05/04/17 0500   Weight: 94.2 kg (207 lb 10.8 oz) 93.1 kg (205 lb 4 oz) 93.3 kg (205 lb 11 oz)         GENERAL: awake, alert, follows  HEENT: NC/AT, PERRLA, EOMI, non icteric, pharynx moist without lesion  RESP:  clear anteriorly  CV: RRR, normal S1 S2  ABDOMEN: soft, nontender, no HSM or masses and bowel sounds normal  MS: no  clubbing, cyanosis   SKIN: clear without significant rashes or lesions  EXT: anasarca    Data:       Recent Labs  Lab 05/04/17  0530 05/03/17  1325 05/03/17  0655 05/02/17  1302    138 139 139   POTASSIUM 5.7* 5.4* 5.7* 5.6*   CHLORIDE 106 107 108 107   CO2 29 27 26 25   ANIONGAP 3 4 5 7   * 193* 104* 91   BUN 65* 63* 61* 55*   CR 2.45* 2.29* 2.29* 2.06*   GFRESTIMATED 25* 27* 27* 31*   GFRESTBLACK 30* 33* 33* 37*   JARETH 8.5 8.3* 8.3* 8.6         Recent Labs   Lab Test  05/04/17   0530  05/03/17   1325  05/03/17   0655  05/02/17   1302  04/24/17   1440 03/09/17 03/01/17 02/27/17 02/06/17 02/05/17   CR  2.45*  2.29*  2.29*  2.06*  1.73*  1.88*  2.02*  2.02*  1.90*  2.22*  2.08*       Recent Labs  Lab 05/04/17  0530 05/03/17  0655 05/02/17  1302   PHOS 6.1*  --   --    HGB  --  11.5* 11.9*       Recent Labs  Lab 05/04/17  0530   PTHI 329*         G Jamir Moore    [facility] - Nephrology  [phone]

## 2017-05-04 NOTE — PLAN OF CARE
Problem: Goal Outcome Summary  Goal: Goal Outcome Summary  OT: Pt limited due to decreased balance, activity tolerance, strength, safety/cognition. Pt initially lethargic but more alert as session progressed. Pt requires MAX A X 2 for bed mobility and sat at EOB approx 5 mins with SBA to mod A, needing increased A initially but as session progressed needing SBA/Moses and cues to sit erect. Pt able to wash face with set up and SBA at EOB. Slight hypotension seated at EOB, at rest /63 and at EOB 98/63, no c/o dizziness. Recommend LTC/TCU at discharge.

## 2017-05-04 NOTE — PROGRESS NOTES
[facility] Nurse Inpatient Wound Assessment     Initial Assessment of wound(s) on pt's:   BLE        Data:   Patient History:      per MD note(s): Mr. Armand Smith is an 87-year-old male with a known history of left and right-sided congestive heart failure, mostly right-sided heart failure with severe pulmonary hypertension and tricuspid regurgitation. The patient also had a 40-pound weight gain and has anasarca on exam and is now hypoxic requiring oxygen, which is new for him.   In addition to this, the patient has stage IV chronic kidney disease, Parkinson's disease, type 2 diabetes and history of a stroke. He is not on anticoagulation.        Current Diet / Nutrition:       Active Diet Order      Combination Diet Renal Diet; 9388-9069 Calories: Moderate Consistent CHO (4-6 CHO units/meal); 2 gm NA Diet             Bhupinder Assessment and sub scores:   Bhupinder Score  Avg: 15.8  Min: 14  Max: 17       Labs:    Recent Labs   Lab Test  05/03/17   0655  05/02/17   1302   01/25/17   1124   11/14/16   2025   ALBUMIN   --   2.6*   < >   --    < >  2.9*   HGB  11.5*  11.9*   < >   --    < >  9.9*   RBC  3.79*  3.89*   < >   --    < >  3.30*   WBC  5.4  6.7   < >   --    < >  5.0   PLT  178  187   < >  112*   < >  199   INR   --    --    --   1.20*   --    --    A1C  6.9*   --    --    --    < >   --    CRP   --    --    --    --    --   28.7*    < > = values in this interval not displayed.          Wound Assessment (location):   BLE, right distal 3rd toe  Wound History:  Pt has diabetes and chronic venous issues, has Lymphedema wrap legs a few times a week at home.  Using xeroform gauze to shallow wounds.  Sees Dr. Bright from Podiatry occasionally, has prior amp of right great and second toes.     Wound Base:   1.  Right lateral upper shin:  A very few very small superficial pink moist wounds remain, <2cm.  Scant serosang drainage.    2.  Left anterior shin:  A few areas were covered with adaptic but  skin appears basically intact. Some maceration noted.   3.  Right distal 3rd toe, at toenail:  Approx. 0.3 x 0.3 x 0.3 cm, pinkish moist tissue underneath soft reddish-brown scabby 'toenail.'  Small amount purulent/creamy drainage was expressed and then some serosang.  Probes to somewhat hard end-point.  Minimal pain.    4.  Left medial distal great toe:  Approx. 2 x 1 x 0 cm firm dry tan-brown eschar that looks like it has been slowly improving.    5.  General BLE:  Scaly, peely, darkened, but mostly intact; edema under control with wraps.              Intervention:     Patient's chart evaluated.      Wound(s) assessed    Wound Care: legs cleansed and moisturized    Orders  Written    Supplies  Reviewed and placed in room    Discussed plan of care with Patient, Family, Nurse and paged Physician Dr. Perez          Assessment:              BLE with chronic venous issues, appears well-controlled with Lymphedema wraps.  Skin scaly but mostly intact, a scant few small superficial wounds remains.  Pt using xeroform gauze at home - will continue this to keep drying out wounds/skin and provide antimicrobial.         Right distal 3rd toenail area with small wound that probes nearly 0.5cm.  There was a small amount of creamy drainage expressed initially but there is minimal fluctuance to toe and no overt s/s infection.  However pt has diabetes and already had 1st and 2nd toes amputated for osteomyelitis.  To be on the safe side would have Podiatry evaluate toe.          Plan:     Nursing to notify the Provider(s) and re-consult the [facility] Nurse if wound(s) deteriorate(s) or if the wound care plan needs reevaluation.      Plan of care for BLE: Daily:  1.  Cleanse BLE with mild cleanser, dry well.    2.  Apply Sween 24 cream to intact skin, avoiding between toes.  3.  Apply Xeroform gauze (in unit supply room) to right lateral upper shin and left anterior upper shin, over any weepy/macerated areas or open wounds.  Can use about  a 1/2 sheet on each leg.   4.  Cover with ABD pads and secure with Kerlix.  Label with time/date/initials.  5.  Swab right 3rd toe and left great toe wounds with betadine wipes, let dry.  Can cover right 3rd toe with dry gauze if needed; leave left great toe open to air.   6.  Compression per Lymphedema.  7.  Elevate legs as much as possible and float heels at all times.      WOC Nurse will return: weekly and prn     Face to face time: 30 Minutes

## 2017-05-04 NOTE — PROGRESS NOTES
"[facility]  Cardiology Progress Note         Assessment and Plan:     Biventricular CHF (congestive heart failure) (H)--stage 4 cor pulmonale is primary problem--see my 5/3 consult note    Worsening renal failure   Weight no change  PTH up likely secondary to CRF  Addendum--i spoke with dr linda   Little progress on renal/uo  Will try iv dobut and get palliative care consult  1. Cor pulmonale class IV heart failure.   2. Normal LV ejection fraction but elevated LV filling pressures as evidenced by heart catheterization previously. See discussion above.   3. Atrial fib, not on anticoagulation due to recurrent GI bleeds and epistaxis.   4. Hypertension.   5. Hyperlipidemia.   6. History of nodular lymphoma.   7. Parkinson's disease.   8. 2-3+ tricuspid insufficiency.   9. Type 2 diabetes mellitus.   10. Cerebral infarction.   11. Peripheral neuropathy.   12. Basal cell carcinoma.   13. Lumbar sacral spondylosis.   14. Gout.   15. Hypotestosteronism.   16. Mild aortic valve insufficiency.   17. Previous toe amputations with what might be a slowly healing ulcer now.   18. Splenectomy in 1970.   19. Corneal surgery and lens implant.       I spoke with pt and wife today re: dnr/i  They don't really understand the concept and said it was \"ok\" if just used to \"wake him up\"  I tried to explain that once the code blue starts they have no way to know if it is short term or long term. They couldn't make a decision. I will order palliative care consult.  I spoke with renal DR Linda yesterday, he hasn't been here today yet. The creat is worse and still elevated K.  Pt tolerated viagra low dose, so i will go to 20tid  (addendum: now unclear if he ever got viagra yesterday???)  I don't know if pt feels better -when i asked if sob etc, all he said was \"they changed my room and I'm confused\"             Interval History:   See above       Medications:   Scheduled Meds:     simvastatin  40 mg Oral At Bedtime     " "insulin glargine  4 Units Subcutaneous At Bedtime     insulin aspart  1-7 Units Subcutaneous TID AC     insulin aspart  1-5 Units Subcutaneous At Bedtime     allopurinol  200 mg Oral Daily     aspirin  325 mg Oral Daily     carbidopa-levodopa  1 tablet Oral BID     carboxymethylcellulose  1 drop Both Eyes 4x Daily     ferrous sulfate  325 mg Oral Daily with lunch     mirtazapine  7.5 mg Oral At Bedtime     omeprazole  20 mg Oral QAM     pramipexole  0.5 mg Oral Daily with supper     eucerin   Topical BID     sodium chloride (PF)  3 mL Intracatheter Q8H     PRN Meds: HYDROcodone-acetaminophen, naloxone, carbidopa-levodopa, senna-docusate, lidocaine, lidocaine 4%, sodium chloride (PF), - MEDICATION INSTRUCTIONS -, nitroglycerin, alum & mag hydroxide-simethicone, acetaminophen, acetaminophen, Reason ACE/ARB order not selected, - MEDICATION INSTRUCTIONS -, glucose **OR** dextrose **OR** glucagon         Physical Exam:   Blood pressure 115/61, pulse 72, temperature 98.4  F (36.9  C), temperature source Oral, resp. rate 18, height 1.816 m (5' 11.5\"), weight 93.3 kg (205 lb 11 oz), SpO2 93 %.  Vitals:    17 1620 17 0500 17 0500   Weight: 94.2 kg (207 lb 10.8 oz) 93.1 kg (205 lb 4 oz) 93.3 kg (205 lb 11 oz)       Intake/Output Summary (Last 24 hours) at 17 1024  Last data filed at 17 0900   Gross per 24 hour   Intake              730 ml   Output              975 ml   Net             -245 ml           Vital Sign Ranges  Temperature Temp  Av  F (36.7  C)  Min: 97  F (36.1  C)  Max: 98.4  F (36.9  C)   Blood pressure Systolic (24hrs), Av , Min:103 , Max:141        Diastolic (24hrs), Av, Min:58, Max:78      Pulse Pulse  Av  Min: 72  Max: 72   Respirations Resp  Av.4  Min: 18  Max: 20   Pulse oximetry SpO2  Av.9 %  Min: 92 %  Max: 99 %             Constitutional: Awake, alert, cooperative, no apparent distress       Skin: No rash, petechia, cyanosis       Neck: No " thyromegaly or adenopathy       Lungs: Clear ant       Cardiovasc: No change       Abdomen: Normal bowel sounds, soft, non-distended, non-tender, no hepatomegaly       Neuro: Alert and oriented??? x3, non focal exam       Extremities: Wrapped legs, significant anasarca/edema       Other:             Data:     Recent Labs   Lab Test  05/03/17   0655  05/02/17   1302   01/25/17   1124   09/29/15   0940   WBC  5.4  6.7   < >   --    < >  8.2   HGB  11.5*  11.9*   < >   --    < >  11.3*   MCV  95  93   < >   --    < >  85   PLT  178  187   < >  112*   < >  222   INR   --    --    --   1.20*   --   1.08    < > = values in this interval not displayed.      Recent Labs   Lab Test  05/04/17   0530  05/03/17   1325   NA  138  138   POTASSIUM  5.7*  5.4*   CHLORIDE  106  107   BUN  65*  63*   CR  2.45*  2.29*       Recent Labs  Lab 05/04/17  0530 05/03/17  1325 05/03/17  0655 05/02/17  1302   * 193* 104* 91     Recent Labs   Lab Test  05/02/17   1302 02/06/17   ALT  17  <10   AST  206*  17     No components found for: TROPONINIES    Lab Results   Component Value Date    CHOL 89 04/21/2016     Lab Results   Component Value Date    HDL 50 04/21/2016     Lab Results   Component Value Date    LDL 30 04/21/2016     Lab Results   Component Value Date    TRIG 45 04/21/2016     Lab Results   Component Value Date    CHOLHDLRATIO 2.3 01/22/2015          TSH   Date Value Ref Range Status   05/04/2017 1.73 0.40 - 4.00 mU/L Final

## 2017-05-04 NOTE — PLAN OF CARE
Problem: Goal Outcome Summary  Goal: Goal Outcome Summary  Outcome: No Change  Neph and Cards following. Lymphedema consult for tomorrow. WOC will coordinate with them for wound care.

## 2017-05-04 NOTE — PROGRESS NOTES
"Pt VSS on 4L NC w/ humidifier. Afib CVR. A&Ox4, forgetful. Contact precautions for MRSA. Up with assist of 2-3, and lifts, using urinals. Lymph wraps on, to be changed tomorrow with assistance of wound care. Denies pain. Slept. Plan for pt/ot tomorrow. Woke up this morning very confused, still Ox4, PERRLA intact, sats 95%. Pt repeatedly states \"I must be asleep, I can't believe this is happening to me.\" Report given to oncoming nurse. Continue to monitor.    "

## 2017-05-04 NOTE — PROGRESS NOTES
"Palliative consult received. Chart reviewed at length. Attempted to visit with pt and wife this afternoon. Wife stopped me at the door and told me that now was not a good time. She states that Armand is really \"out of it.\" She states that he is talking about dying. I told her that I am here to support pts with serious illness, and their loved ones. I told her that I was worried about Armand's health. She says that there isn't anything new that I am going to talk to them about that she doesn't already know. She states that they have papers that designate this. I told her that having a conversation is still very important. She was really resistant to my visit. She did eventually agree to let me return at 1430. I updated Dr. Perez, who will try to see pt and wife before my visit.     Kelly OCASIO, [facility]  Palliative Medicine   Pager: [phone]      "

## 2017-05-04 NOTE — PROGRESS NOTES
05/04/17 1798   General Information   Discipline PT   Onset of Edema (many years ago)   Affected Body Part(s) Left LE;Right LE   Etiology Comments CHF exacerbation   Edema Precautions Cardiac Edema/CHF;Renal Insufficiency   Pain   Patient currently in pain Yes, see Vital Signs flowsheet   Pain comments B LE pain to touch   Edema Examination / Assessment   Skin Condition Pitting;Dryness   Scar No   Ulcerations Yes   Description Wounds on shins, dressed by WOC RN today   Drainage yes   Stemmer Sign Positive   ROM   Range of Motion (WFL) no deficits were identified   Strength   Strength (WFL) other (describe)   Description of Strength Deficits B LEs functionally weak. B hip flex 3+/5, knee ext 4/5, DF 4/5   Sensation   Sensation (WFL) other (describe)   Description of Sensation Deficits baseline neuropathy   Assessment/Plan   Patient presents with Stage 2 Lymphedema   Assessment 2+ pitting B LEs toes to knees   Clinical Presentation Evolving/Changing   Clinical Presentation Rationale Medically working up, treating   Clinical Decision Making (Complexity) Moderate complexity   Planned Edema Interventions Gradient compression bandaging;Exercises;Precautions to prevent infection/exacerbation;Education;Manual therapy   Treatment Frequency daily   Treatment Duration 3 days   Patient, Family and/or Staff in agreement with plan of care. Yes   Risks and benefits of treatment have been explained. Yes   Total Evaluation Time   Total Evaluation Time (Minutes) 8

## 2017-05-04 NOTE — CONSULTS
[facility]    Palliative Care Consultation     Armand Smith  MRN# [id number]  Date of Admission:  5/2/2017  Date of Service (when I saw the patient): 05/04/17  Reason for consult: Consulted by Dr. Bermudez for Goals of care    Assessment & Plan   Armand Smith is a 87 year old male with PMH significant for cor pulmonale and severe HF 2/2 pulmonary HTN and tricuspid regurgitation, CKD stage IV, a.fib, lymphoma, parkinson's disease, DM, CVA, and peripheral neuropathy, who presents with weight gain/anasarca and SOB. He is noted to have acute on chronic respiratory failure and BALAJI on CKD. Cardiology and Renal are following closely, prognosis is very poor. He is being started on dobutamine and bumex gtt today. He is deemed a poor HD candidate. We are consulted for goals of care.     Symptoms/Recommendations   -Now DNR/DNI  -Cardiology and Renal to determine when maximal benefit has been reached from dobutamine and bumex gtt  -Plan then to transition to comfort measures with hospice disposition   -SW to check in with pt and family to determine when they are ready for hospice consultation     Support/Coping  -Met with wife India today   -1 daughter lives [facility], [facility]. 2 other adult children live in [place]   -Wife states valery is not important to them. They attend different churches and do not have Judaism in common. She does not feel they need spiritual support at this time     Decisional Support, Goals of Care, Counseling & Coordination  Decisional Capacity Intact?  -Not assessed   Health Care Directive on File?  -No, POLST in place and reviewed, will need update with hospice disposition   Code Status/Resuscitation Preferences?  -DNR/DNI after Dr. Perez's visit   Plan of Care?    Discussion  Revisited with pt's wife India this afternoon. She did not want me to meet with Armand today. My visit with India was joined by Silvia Laura, palliative . I introduced the scope of our practice to [place].  "Discussed our potential roles for symptom management, support/coping, and decisional support (aka goals of care).     India's understanding is that Armand's health is very poor. He has been chronically ill for the last 10 years, able to care for himself until this past year. His health is declining greatly in the last year. She tells me he is very \"out of it\" today. This AM, he told her he was dying. She believes it is time for hospice and wants to do this at home, per his wish.     I educated India regarding the hospice philosophy and prognostic criteria. Dispelled common myths. Discussed what hospice is (and is not), what services are usually provided (and those that are not, ex \"[facility] care\"), under what circumstances people tend to enroll, and the variety of places people can get hospice care (along with subsequent financial implications).     India is not very strong (had difficulty getting out of the chair during our visit). I expressed concern to her about being the sole caregiver for Armand at home with hospice. She states that she will hire additional help. We talked about the financial implication of hiring help. She seems unfazed by this. I wonder if a facility would be better. She states that Armand's wish is to be home and she wants to support this.     We talk about next steps, which include a hospice consultation. She is not ready for this, prefers to wait for Armand's mentation to clear up so she can discuss hospice with him. I share with her that given how sick he is, it is very possible that his mentation may not clear enough for her to have a clear discussion with him about this. She believes it will.    Be briefly discuss delirium and the various causes (hospitalization, illness, medications, age, etc). I expressed to her again that his delirium may not clear, given how ill he is.     At this time, India feels she has all the information and support she needs. She feels it will be too overwhelming " to have future visits with our team. I did ensure her that we are available should she want to see us again.     We then spent some time talking about Armand's late mother who lived until the age of 105. India shared some loving memories of her endearing MIL.     Plan of care reviewed with unit TALON Motley and Dr. Perez.       Thank you for involving us in the care of this patient and family. We will sign off at this time. Please do not hesitate to contact me with questions or concerns or the on-call provider for our team if evening or weekend.    Kelly OCASIO, [facility]  Palliative Medicine   Pager [phone]    Attestation:  Total time on the floor involved in the patient's care: 60 minutes  Total time spent in counseling/care coordination: >50%    Chief Complaint   Weight gain/anasarca, SOB    History is obtained from the staff, family and extensive chart review.     Past Medical History    I have reviewed this patient's medical history and updated it with pertinent information if needed.   Past Medical History:   Diagnosis Date     Aortic regurgitation     mild, 1+ per echo 3/30/15     Atrial fibrillation (H)      Basal cell Ca     lg. excision abd. yrs ago; recurrence 12/03 (exc. by shave, DSJ)     Chronic diastolic congestive heart failure (H)     EF=55-60% per echo 3/30/15     CKD (chronic kidney disease)      Essential hypertension, benign      Great toe amputation status (H)      Hyperlipidaemia      Lumbosacral spondylosis without myelopathy      Nodular lymphoma, unspecified site, extranodal and solid organ sites 1970    Non-Hodgkins (NHL)     Other testicular hypofunction      Parkinson's disease (H)      Personal history of diseases of blood and blood-forming organs      Poor circulation of extremity 1/24/2014     Pulmonary HTN (H)      Tricuspid regurgitation     1-2+ per echo 3/30/15     Type II or unspecified type diabetes mellitus without mention of complication, not stated as uncontrolled       Unspecified cerebral artery occlusion with cerebral infarction 2013     Unspecified psychosexual disorder        Past Surgical History   I have reviewed this patient's surgical history and updated it with pertinent information if needed.  Past Surgical History:   Procedure Laterality Date     AMPUTATE TOE(S) Right 4/7/2015    Procedure: AMPUTATE TOE(S);  Surgeon: Leroy Bright DPM;  Location: [facility]     AMPUTATE TOE(S)       AMPUTATE TOE(S) Right 10/20/2015    Procedure: AMPUTATE TOE(S);  Surgeon: Leroy Bright DPM;  Location: [facility]     BIOPSY  skin bx for cancer     BIOPSY BONE TOE Right 10/20/2015    Procedure: BIOPSY BONE TOE;  Surgeon: Leroy Bright DPM;  Location: [facility]     COLONOSCOPY       HC RIGHT HEART CATHETERIZATION  9/29/2015    Severely elevated right-sided filling pressures and moderately elevated left-sided filling pressures; mild to moderate mixed pulmonaty HTN     PHACOEMULSIFICATION CLEAR CORNEA WITH STANDARD INTRAOCULAR LENS IMPLANT  12/12/2013    Procedure: PHACOEMULSIFICATION CLEAR CORNEA WITH STANDARD INTRAOCULAR LENS IMPLANT;  RIGHT PHACOEMULSIFICATION CLEAR CORNEA WITH STANDARD INTRAOCULAR LENS IMPLANT ;  Surgeon: Dwayne Carver MD;  Location: [facility]     PHACOEMULSIFICATION CLEAR CORNEA WITH STANDARD INTRAOCULAR LENS IMPLANT  12/30/2013    Procedure: PHACOEMULSIFICATION CLEAR CORNEA WITH STANDARD INTRAOCULAR LENS IMPLANT;  LEFT PHACOEMULSIFICATION CLEAR CORNEA WITH STANDARD INTRAOCULAR LENS IMPLANT ;  Surgeon: Dwayne Carver MD;  Location: [facility]     SPLENECTOMY  1970       Social History   Living situation: With wife India     Family system: Wife India, 3 adult siblings, dtr Emmy lives [facility], other 2 are in [place]     Self-identified support system: ND    Employment/education: ND    Activities/interests: ND    Use of community resources: ND    Confucianism affiliation: Per wife, they go to different churches and do not have this in common with each other  "    Involvement in valery community: ND    Impact of illness on patient: Pt has many comorbidities and is not responding to therapy, he is dying     Family History   I have reviewed this patient's family history and updated it with pertinent information if needed.   Family History   Problem Relation Age of Onset     CEREBROVASCULAR DISEASE Father        Allergies   Allergies   Allergen Reactions     Codeine      nausea vomiting     Penicillins      \" turned red from head to foot\"       Medications   Current Facility-Administered Medications Ordered in Epic   Medication Dose Route Frequency Last Rate Last Dose     DOBUTamine 500 mg in dextrose 5% 250 mL (adult std)  5 mcg/kg/min Intravenous Continuous 14 mL/hr at 05/04/17 1331 5 mcg/kg/min at 05/04/17 1331     albumin human 25 % injection 25 g  25 g Intravenous Q8H   25 g at 05/04/17 1358     bumetanide (BUMEX) 0.25 mg/mL infusion  0.5 mg/hr Intravenous Continuous 2 mL/hr at 05/04/17 1358 0.5 mg/hr at 05/04/17 1358     sildenafil (REVATIO/VIAGRA) cap/half-tab 10 mg  10 mg Oral TID   10 mg at 05/04/17 1359     lidocaine 1 % 0.5-5 mL  0.5-5 mL Other Once PRN         lidocaine (LMX4) cream   Topical Once PRN         sodium chloride (PF) 0.9% PF flush 5-50 mL  5-50 mL Intracatheter Once PRN         heparin lock flush 10 UNIT/ML injection 2-5 mL  2-5 mL Intracatheter Once PRN         HYDROcodone-acetaminophen (NORCO) 5-325 MG per tablet 1 tablet  1 tablet Oral Q6H PRN   1 tablet at 05/03/17 2117     naloxone (NARCAN) injection 0.1-0.4 mg  0.1-0.4 mg Intravenous Q2 Min PRN         simvastatin (ZOCOR) tablet 40 mg  40 mg Oral At Bedtime   40 mg at 05/03/17 2117     insulin glargine (LANTUS) injection 4 Units  4 Units Subcutaneous At Bedtime   4 Units at 05/03/17 2116     insulin aspart (NovoLOG) inj (RAPID ACTING)  1-7 Units Subcutaneous TID AC   1 Units at 05/04/17 1326     insulin aspart (NovoLOG) inj (RAPID ACTING)  1-5 Units Subcutaneous At Bedtime         allopurinol " (ZYLOPRIM) tablet 200 mg  200 mg Oral Daily   200 mg at 05/04/17 0835     aspirin EC EC tablet 325 mg  325 mg Oral Daily   325 mg at 05/04/17 0834     carbidopa-levodopa (SINEMET CR)  MG per CR tablet 1 tablet  1 tablet Oral BID   1 tablet at 05/04/17 0834     carbidopa-levodopa (SINEMET)  MG per tablet 1 tablet  1 tablet Oral Q6H PRN   1 tablet at 05/03/17 2128     carboxymethylcellulose (REFRESH PLUS) 0.5 % ophthalmic solution 1 drop  1 drop Both Eyes 4x Daily   1 drop at 05/04/17 1331     ferrous sulfate (IRON) tablet 325 mg  325 mg Oral Daily with lunch   325 mg at 05/04/17 1330     mirtazapine (REMERON) tablet TABS 7.5 mg  7.5 mg Oral At Bedtime   7.5 mg at 05/03/17 2117     omeprazole (priLOSEC) CR capsule 20 mg  20 mg Oral QAM   20 mg at 05/04/17 0834     senna-docusate (SENOKOT-S;PERICOLACE) 8.6-50 MG per tablet 1 tablet  1 tablet Oral BID PRN         pramipexole (MIRAPEX) tablet 0.5 mg  0.5 mg Oral Daily with supper   0.5 mg at 05/03/17 1811     eucerin cream   Topical BID         lidocaine 1 % 1 mL  1 mL Other Q1H PRN         lidocaine (LMX4) cream   Topical Q1H PRN         sodium chloride (PF) 0.9% PF flush 3 mL  3 mL Intracatheter Q1H PRN         sodium chloride (PF) 0.9% PF flush 3 mL  3 mL Intracatheter Q8H   3 mL at 05/04/17 0027     medication instruction   Does not apply Continuous PRN         nitroglycerin (NITROSTAT) sublingual tablet 0.4 mg  0.4 mg Sublingual Q5 Min PRN         alum & mag hydroxide-simethicone (MYLANTA ES/MAALOX  ES) suspension 15-30 mL  15-30 mL Oral Q4H PRN         acetaminophen (TYLENOL) tablet 650 mg  650 mg Oral Q4H PRN   650 mg at 05/02/17 1754     acetaminophen (TYLENOL) Suppository 650 mg  650 mg Rectal Q4H PRN         Reason ACE/ARB order not selected   Other DOES NOT GO TO MAR         Continuing beta blocker from home medication list OR beta blocker order already placed during this visit   Does not apply DOES NOT GO TO MAR         glucose 40 % gel 15-30 g   15-30 g Oral Q15 Min PRN        Or     dextrose 50 % injection 25-50 mL  25-50 mL Intravenous Q15 Min PRN        Or     glucagon injection 1 mg  1 mg Subcutaneous Q15 Min PRN         No current Epic-ordered outpatient prescriptions on file.       Review of Systems   The comprehensive review of systems is not completed as wife did not allow me to assess pt today     Physical Exam   Temp: 97.8  F (36.6  C) Temp src: Axillary BP: 103/63 Pulse: 72 Heart Rate: 85 Resp: 18 SpO2: 93 % O2 Device: Nasal cannula Oxygen Delivery: 4 LPM  Vitals:    05/02/17 1620 05/03/17 0500 05/04/17 0500   Weight: 94.2 kg (207 lb 10.8 oz) 93.1 kg (205 lb 4 oz) 93.3 kg (205 lb 11 oz)     CONSTITUTIONAL: Chronically ill elderly man seen lying in bed in NAD, somnolent, wife did not allow me to assess him further or engage in conversation with him     Data   Results for orders placed or performed during the hospital encounter of 05/02/17 (from the past 24 hour(s))   Glucose by meter   Result Value Ref Range    Glucose 148 (H) 70 - 99 mg/dL   Glucose by meter   Result Value Ref Range    Glucose 171 (H) 70 - 99 mg/dL   Basic metabolic panel   Result Value Ref Range    Sodium 138 133 - 144 mmol/L    Potassium 5.7 (H) 3.4 - 5.3 mmol/L    Chloride 106 94 - 109 mmol/L    Carbon Dioxide 29 20 - 32 mmol/L    Anion Gap 3 3 - 14 mmol/L    Glucose 141 (H) 70 - 99 mg/dL    Urea Nitrogen 65 (H) 7 - 30 mg/dL    Creatinine 2.45 (H) 0.66 - 1.25 mg/dL    GFR Estimate 25 (L) >60 mL/min/1.7m2    GFR Estimate If Black 30 (L) >60 mL/min/1.7m2    Calcium 8.5 8.5 - 10.1 mg/dL   Phosphorus   Result Value Ref Range    Phosphorus 6.1 (H) 2.5 - 4.5 mg/dL   Iron and iron binding capacity   Result Value Ref Range    Iron 15 (L) 35 - 180 ug/dL    Iron Binding Cap 290 240 - 430 ug/dL    Iron Saturation Index 5 (L) 15 - 46 %   Vitamin D Deficiency   Result Value Ref Range    Vitamin D Deficiency screening 40 20 - 75 ug/L   Ferritin   Result Value Ref Range    Ferritin 117 26 -  388 ng/mL   Parathyroid Hormone Intact   Result Value Ref Range    Parathyroid Hormone Intact 329 (H) 12 - 72 pg/mL   Protein electrophoresis   Result Value Ref Range    Albumin Fraction Pending 3.7 - 5.1 g/dL    Alpha 1 Fraction Pending 0.2 - 0.4 g/dL    Alpha 2 Fraction Pending 0.5 - 0.9 g/dL    Beta Fraction Pending 0.6 - 1.0 g/dL    Gamma Fraction Pending 0.7 - 1.6 g/dL    Monoclonal Peak Pending 0.0 g/dL    ELP Interpretation: Pending    TSH with free T4 reflex   Result Value Ref Range    TSH 1.73 0.40 - 4.00 mU/L   Kappa and lambda light chain   Result Value Ref Range    Kappa Free Lt Chain 12.70 (H) 0.33 - 1.94 mg/dL    Lambda Free Lt Chain 7.19 (H) 0.57 - 2.63 mg/dL    Kappa Lambda Ratio 1.77 (H) 0.26 - 1.65   Glucose by meter   Result Value Ref Range    Glucose 140 (H) 70 - 99 mg/dL   Glucose by meter   Result Value Ref Range    Glucose 170 (H) 70 - 99 mg/dL

## 2017-05-04 NOTE — PLAN OF CARE
Problem: Goal Outcome Summary  Goal: Goal Outcome Summary  Outcome: No Change  VSS. Tele shows A-fib with CVR and BBB. Pt denies any chest pain or SOB. He has c/o an increase in tremors, medication given. Dobutamine drip currently stopped for PICC line verification. Plan is to restart Dobutamine when PICC line is verified. Bed alarm on. Will continue to monitor pt.

## 2017-05-05 NOTE — PROGRESS NOTES
[facility]  Cardiology Progress Note         Assessment and Plan:     Biventricular CHF (congestive heart failure) (H)--stage 4 cor pulmonale is primary problem--see my 5/3 consult note  5/5: appreciate palliative care consult.. No family here now, pt is just continuously nonspecifically moaning. No weights or blood test  Comfort care. bp had been low could be the dz or viagra or dobut if the dobut can't inc the CO the beta property will drop bp. Will stop viagra and dobut. Since not getting bmp, prob should stop bumex since cant tell if it help/hurt--will let maddi decide.  This is endstage disease and agree with hospice--comfort care only is appropriate, I have no further cardiac options (dobut was the last real option since it helped on last admit, now it not helping)        Worsening renal failure -no new labs, no weights etc    PTH up likely secondary to CRF    1. Cor pulmonale class IV heart failure.   2. Normal LV ejection fraction but elevated LV filling pressures as evidenced by heart catheterization previously. See discussion above.   3. Atrial fib, not on anticoagulation due to recurrent GI bleeds and epistaxis.   4. Hypertension.   5. Hyperlipidemia.   6. History of nodular lymphoma.   7. Parkinson's disease.   8. 2-3+ tricuspid insufficiency.   9. Type 2 diabetes mellitus.   10. Cerebral infarction.   11. Peripheral neuropathy.   12. Basal cell carcinoma.   13. Lumbar sacral spondylosis.   14. Gout.   15. Hypotestosteronism.   16. Mild aortic valve insufficiency.   17. Previous toe amputations with what might be a slowly healing ulcer now.   18. Splenectomy in 1970.   19. Corneal surgery and lens implant.       Cant talk to pt he is only moaning continuously non purposeful moan             Interval History:   See above       Medications:   Scheduled Meds:     sodium chloride (PF)  10 mL Intracatheter Q7 Days     insulin glargine  4 Units Subcutaneous At Bedtime     allopurinol  200 mg  "Oral Daily     aspirin  325 mg Oral Daily     carbidopa-levodopa  1 tablet Oral BID     carboxymethylcellulose  1 drop Both Eyes 4x Daily     mirtazapine  7.5 mg Oral At Bedtime     omeprazole  20 mg Oral QAM     pramipexole  0.5 mg Oral Daily with supper     eucerin   Topical BID     sodium chloride (PF)  3 mL Intracatheter Q8H     PRN Meds: morphine, acetaminophen, LORazepam **OR** LORazepam **OR** LORazepam, OLANZapine zydis, atropine, hypromellose-dextran, artificial saliva, lidocaine, lidocaine 4%, sodium chloride (PF), heparin lock flush, lidocaine 4%, sodium chloride (PF), HYDROcodone-acetaminophen, naloxone, carbidopa-levodopa, senna-docusate, lidocaine, lidocaine 4%, sodium chloride (PF), - MEDICATION INSTRUCTIONS -, nitroglycerin, alum & mag hydroxide-simethicone, acetaminophen, Reason ACE/ARB order not selected, - MEDICATION INSTRUCTIONS -, glucose **OR** dextrose **OR** glucagon         Physical Exam:   Blood pressure (!) 82/47, pulse 74, temperature 97.6  F (36.4  C), temperature source Oral, resp. rate 20, height 1.816 m (5' 11.5\"), weight 93.3 kg (205 lb 11 oz), SpO2 (!) 84 %.  Vitals:    17 1620 17 0500 17 0500   Weight: 94.2 kg (207 lb 10.8 oz) 93.1 kg (205 lb 4 oz) 93.3 kg (205 lb 11 oz)       Intake/Output Summary (Last 24 hours) at 17 1024  Last data filed at 17 0900   Gross per 24 hour   Intake              730 ml   Output              975 ml   Net             -245 ml           Vital Sign Ranges  Temperature Temp  Av  F (36.7  C)  Min: 97  F (36.1  C)  Max: 98.4  F (36.9  C)   Blood pressure Systolic (24hrs), Av , Min:103 , Max:141        Diastolic (24hrs), Av, Min:58, Max:78      Pulse Pulse  Av  Min: 72  Max: 72   Respirations Resp  Av.4  Min: 18  Max: 20   Pulse oximetry SpO2  Av.9 %  Min: 92 %  Max: 99 %             Constitutional: moans       Skin: No rash, petechia, cyanosis       Neck: No thyromegaly or adenopathy       Lungs: " Clear ant  But hard to hear over moan       Cardiovasc: Distant HT hard to hear plus moan       Abdomen: Normal bowel sounds, soft, non-distended, non-tender, no hepatomegaly       Neuro: >moans but moves all 4's       Extremities: Wrapped legs, significant anasarca/edema       Other:             Data:     Recent Labs   Lab Test  05/03/17   0655  05/02/17   1302   01/25/17   1124   09/29/15   0940   WBC  5.4  6.7   < >   --    < >  8.2   HGB  11.5*  11.9*   < >   --    < >  11.3*   MCV  95  93   < >   --    < >  85   PLT  178  187   < >  112*   < >  222   INR   --    --    --   1.20*   --   1.08    < > = values in this interval not displayed.      Recent Labs   Lab Test  05/04/17   1812  05/04/17   0530  05/03/17   1325   NA   --   138  138   POTASSIUM   --   5.7*  5.4*   CHLORIDE   --   106  107   BUN   --   65*  63*   CR  2.65*  2.45*  2.29*       Recent Labs  Lab 05/04/17  0530 05/03/17  1325 05/03/17  0655 05/02/17  1302   * 193* 104* 91     Recent Labs   Lab Test  05/02/17   1302 02/06/17   ALT  17  <10   AST  206*  17     No components found for: TROPONINIES    Lab Results   Component Value Date    CHOL 89 04/21/2016     Lab Results   Component Value Date    HDL 50 04/21/2016     Lab Results   Component Value Date    LDL 30 04/21/2016     Lab Results   Component Value Date    TRIG 45 04/21/2016     Lab Results   Component Value Date    CHOLHDLRATIO 2.3 01/22/2015          TSH   Date Value Ref Range Status   05/04/2017 1.73 0.40 - 4.00 mU/L Final

## 2017-05-05 NOTE — PLAN OF CARE
Problem: Goal Outcome Summary  Goal: Goal Outcome Summary  Outcome: No Change  VSS. Tele shows A-fib with CVR and BBB. Pt denies any chest pain or SOB. Dobutamine drip running through PICC line. Bumex running. Bed alarm on. O2 sats dropped to 85% on 6L via NC. Oxy mask placed on 15L sats remain 85%. MD notified and RT paged. Will continue to monitor pt.

## 2017-05-05 NOTE — PROGRESS NOTES
MOONLIGHT NOTE    Called for RRT for 15L O2 per face mask. Pt alert and conversant. Per notes and his report he has multiple health problems and is at the end of life. Both pt and wife, [place] (who I called on the phone) were in agreement with comfort care only at this time and no interventions such as bipap or intubation. They are both aware that he may die in the near future and want to focus on comfort. I have spoken with the on call hospitalist and they are in agreement.  I have stopped his sliding scale insulin, some of his oral meds, V/S, and telemetry. I did not stop his bumex or dobutamine gtts at this time. Comfort care orders placed with morphine for pain control.     Gisel Fonseca MD

## 2017-05-05 NOTE — PROGRESS NOTES
"[facility]    Hospitalist Progress Note    Date of Service (when I saw the patient): 05/05/2017    Assessment & Plan   Mr. Armand Smith is an 87-year-old male with a known history of left and right-sided congestive heart failure, mostly right-sided heart failure with severe pulmonary hypertension and tricuspid regurgitation. The patient also had a 40-pound weight gain and has anasarca on exam and is hypoxic requiring oxygen.       In addition to this, the patient has stage IV CKD, Parkinson's disease, type 2 diabetes and history of a stroke. He is not on anticoagulation.     Transitioned to comfort cares early morning 5/5 after acute decompensation with respiratory distress. Discussed with daughter and wife on 5/5 and all very comfortable with course and wish comfort measures only and allow natural death. Patient is not able to participate in involved discussion other than to say, I am going to die comfortable.\"   - Changed IV morphine to dilaudid given CKD and added oral oxycodone PRN. Try to control symptoms with oral medications. If unable IV dilaudid available.     EVENTS PRIOR TO TRANSITION TO COMFORT CARES    Weight gain and acute hypoxic respiratory failure secondary to acute on chronic decompensation of right heart failure with pulmonary HTN and TR -  Echo on 01/2017 showed severely dilated RV with mod to severely reduced RV function with mod-sever TR, mod-severe pulmonary HTN. LVEF 55-60%. He hasn't been able to follow closely with [facility] due to transportation issues.  He required dobutamine for diuresis during his last hospitalization. He has been on metolazone in the past, not currently. Per patient, compliant with low sodium diet. BNP in the 8000's. CXR reviewed by myself on 5/3 reveals mild hilar patchy infiltrates, no effusions.   - Cardiology and nephrology consulted.  - On 5/4, no significant improvement with bumex and albumin infusion started on 5/3, with BUN/Cr up a bit, " and K up to 5.7, about 1.2 L UOP over last 24 hours, remains on 4L, First weight on floor 93.1 kg on 5/3 >93.3 on 5/4.   - Cardiologist started dobutamine gtt on 5/4 an requested palliative care consult.   - Cardiology started sidenafil.   - PICC line placed on 5/4.      Probable demand ischemia with flat troponin around 0.1, presentation and troponins not c/w ACS.     Acute Kidney Injury with hyperkalemia on chronic kidney disease, stage IV with hyperkalemia Patient's creatinine today is 2.06, was 1.73 on 04/24/2017, but looking back, it looks like it has been 2.02 on 03/01/2017 and that appears to be probably at his baseline. The patient does have a potassium of 5.6. He will get some Lasix in the emergency room and this will probably treat that and would like to repeat a BNP in the morning. The patient was seen by Nephrology during his last hospitalization.     Recent Labs  Lab 05/04/17  1812 05/04/17  0530 05/03/17  1325 05/03/17  0655 05/02/17  1302   CR 2.65* 2.45* 2.29* 2.29* 2.06*     - K 5.7 on 5/4.   - Appreciate Nephrology consult for worsening hyperkalemia, fluid overload in the context of CKD  - Avoid nephrotoxins, contrast, NSAIDs  - Dr. Dunn discussed dialysis with family on 5/4 and emphasized that he would not be a good dialysis candidate.   - Eval and treatment for iron deficiency and mineral bone disease per nephrology.     Elevated Protein - Albumin Gap of greater than 4, with CKD and anemia. I agree this is concerning for possible paraprotein / multiple myeloma.   - Ordered SPEP, 24 hour UPEP and urine light chains.   - No longer clinically relevant.     Type 2 diabetes. The patient will be continued on Lantus and medium sliding scale and on a moderate carbohydrate diet. His last A1c was under good control in December which was 7.0.     Recent Labs  Lab 05/04/17  2114 05/04/17  1740 05/04/17  1308 05/04/17  0803 05/04/17  0530 05/03/17  2106 05/03/17  1708 05/03/17  1325  05/03/17  0655   05/02/17  1302   GLC  --   --   --   --  141*  --   --  193*  --  104*  --  91   * 172* 170* 140*  --  171* 148*  --   < >  --   < >  --    < > = values in this interval not displayed.  - continued on PTA lantus 6 units.   - Given BS under 100, decrease to 4 units on 5/3.   - Discontinued all diabetic orders with transition to comfort cares.     Atrial fibrillation. Rate controlled, not on rate controlling medications, not on anticoagulation   - Admission EKG reviewed on 5/3 and shows old RBBB with controlled afib.   - Continued on PTA  mg daily.   - H/o stroke, not on anticoagulation as patient states he has tried to be on anticoagulation, in the past with recurrent GI bleeding, but can not give me details. He think he had ulcers.   - Discontinued ASA with transition to comfort cares.   Hyperlipidemia.   - Continue PTA simvastatin.     Parkinson's disease.   - Continue PTA Sinemet and pramipexole if able to take PO medications but unlikely.     Probable depression,insomnia   - Continue PTA mirtazapine if able to take PO medications.     S/p amputations of 2 toes on the right foot and also he now has a small wound on the third toe of his right foot and the left great toe also is abnormal.   - Wound care nurse consulted.   - Followed by Podiatry as an outpatient.     Gout  - Continue PTA allopurinol. Discontinued with transition to comfort cares.       CODE STATUS: Transitioned to DNR/DNI after discussion with family on 5/4, wanted transition to comfort cares if declines.     DISPO: Death appears imminent, but is stabalizes over the next 24 hours, may need to transition out of hospital on hospice. Discussed with family that this will assess tomorrow 5/6. May transition from floor if bed is needed.     Communication: Discussed with daughter and wife on 05/05/17    Time Spent on this Encounter   I spent 35 minutes on the unit/floor managing the care of Armand Smith. Over 50% of my time was spent  counseling the patient and/or coordinating care regarding services listed in this note.      Tracy Perez  [phone] (p)  Text Page (7AM - 6PM)     Interval History   As above, acute decompensation overnight.     -Data reviewed today: I reviewed all new labs and imaging results over the last 24 hours. I personally reviewed no images or EKG's today.    Physical Exam   Temp: 97.6  F (36.4  C) Temp src: Oral BP: (!) 82/47 Pulse: 74 Heart Rate: 102 Resp: 20 SpO2: (!) 84 % O2 Device: Oxymask Oxygen Delivery: 15 LPM  Vitals:    05/02/17 1620 05/03/17 0500 05/04/17 0500   Weight: 94.2 kg (207 lb 10.8 oz) 93.1 kg (205 lb 4 oz) 93.3 kg (205 lb 11 oz)     Vital Signs with Ranges  Temp:  [95.2  F (35.1  C)-97.8  F (36.6  C)] 97.6  F (36.4  C)  Pulse:  [] 74  Heart Rate:  [] 102  Resp:  [18-24] 20  BP: ()/(43-65) 82/47  SpO2:  [84 %-96 %] 84 %  I/O last 3 completed shifts:  In: 120 [P.O.:120]  Out: 200 [Urine:200]    Constitutional: Appears comfortable   Neuropsyche:  denies pain but unable to engage in meaningful conversation about course. Respiratory:  Breathing comfortably, good air exchange, no wheezes, no crackles.   Cardiovascular:  Irregular rate and rhythm, murmur heard, 3+ edema, legs wrapped.   GI:  soft, NT/ND, BS normal  Skin/Integumen:  No acute rash or sign of bleeding.       Medications   All medications reviewed on 05/05/17      - MEDICATION INSTRUCTIONS -       Reason ACE/ARB order not selected       - MEDICATION INSTRUCTIONS -         sodium chloride (PF)  10 mL Intracatheter Q7 Days     carbidopa-levodopa  1 tablet Oral BID     carboxymethylcellulose  1 drop Both Eyes 4x Daily     mirtazapine  7.5 mg Oral At Bedtime     pramipexole  0.5 mg Oral Daily with supper     eucerin   Topical BID     sodium chloride (PF)  3 mL Intracatheter Q8H     Data     Recent Labs  Lab 05/04/17  1812 05/04/17  0530 05/03/17  1325 05/03/17  0655 05/02/17  2300 05/02/17  1745 05/02/17  1302   WBC  --   --    --  5.4  --   --  6.7   HGB  --   --   --  11.5*  --   --  11.9*   MCV  --   --   --  95  --   --  93   PLT  --   --   --  178  --   --  187   NA  --  138 138 139  --   --  139   POTASSIUM  --  5.7* 5.4* 5.7*  --   --  5.6*   CHLORIDE  --  106 107 108  --   --  107   CO2  --  29 27 26  --   --  25   BUN  --  65* 63* 61*  --   --  55*   CR 2.65* 2.45* 2.29* 2.29*  --   --  2.06*   ANIONGAP  --  3 4 5  --   --  7   JARETH  --  8.5 8.3* 8.3*  --   --  8.6   GLC  --  141* 193* 104*  --   --  91   ALBUMIN  --   --   --   --   --   --  2.6*   PROTTOTAL  --   --   --   --   --   --  7.1   BILITOTAL  --   --   --   --   --   --  1.0   ALKPHOS  --   --   --   --   --   --  158*   ALT  --   --   --   --   --   --  17   AST  --   --   --   --   --   --  206*   TROPI  --   --   --   --  0.100* 0.098*  --        Recent Results (from the past 24 hour(s))   XR Chest Port 1 View    Narrative    CHEST ONE VIEW PORTABLE    5/4/2017 5:37 PM     HISTORY: RN placed PICC - verify tip placement    COMPARISON: 5/2/2017.    FINDINGS: Right-sided PICC line with tip in the right atrium. It  should be pulled back approximately 6 cm to be within the SVC.  Near-complete opacification left hemithorax, new since 5/2/2017.      Impression    IMPRESSION: New right-sided PICC line tip is in the right atrium.    ANTHONY OLIVO MD   XR Chest Port 1 View    Narrative    CHEST ONE VIEW PORTABLE   5/4/2017 6:57 PM     HISTORY: RN placed PICC - verify tip placement    COMPARISON: 5/4/2017 at 1713.    FINDINGS: PICC line has been repositioned since the prior study and  the tip is now in good position in the mid to distal SVC.  Opacification left hemithorax again noted.      Impression    IMPRESSION: Tip of the PICC line is in good position in the mid to  distal SVC.    ANTHONY OLIVO MD

## 2017-05-05 NOTE — PLAN OF CARE
Problem: Goal Outcome Summary  Goal: Goal Outcome Summary  Occupational Therapy Discharge Summary     Reason for therapy discharge:    Change in medical status.  Pt now comfort cares.     Progress towards therapy goal(s). See goals on Care Plan in [facility] electronic health record for goal details.  Goals not met.  Barriers to achieving goals:   pt transitioned to comfort cares. .     Therapy recommendation(s):    No further therapy is recommended. pt transitioned to comfort cares.

## 2017-05-05 NOTE — PROGRESS NOTES
Renal Medicine       Note plans for transition to comfort care  Agree with discontinuation of diuretic     Call with questions          Recent Labs  Lab 05/04/17  1812 05/04/17  0530   NA  --  138   POTASSIUM  --  5.7*   CHLORIDE  --  106   CO2  --  29   ANIONGAP  --  3   GLC  --  141*   BUN  --  65*   CR 2.65* 2.45*   GFRESTIMATED 23* 25*   GFRESTBLACK 28* 30*   JARETH  --  8.5           MARISOL Moore    [facility]  [phone]

## 2017-05-05 NOTE — PLAN OF CARE
Problem: Goal Outcome Summary  Goal: Goal Outcome Summary  Outcome: Declining  Patient is on comfort cares, PRN morphine given this am and patient appears to be resting comfortably at this time, family at bedside, patient able to report if in pain or not, denies needing pain medication and declining repositioning at this time.  Incontinence cares done, family declined to do wound cares.  No oral meds given this AM due to lethargy, pt more awake this afternoon and drinking ensure.  Report called to station 88 RN, patient will transfer shortly, family aware. Will continue to monitor.

## 2017-05-05 NOTE — PLAN OF CARE
Problem: Goal Outcome Summary  Goal: Goal Outcome Summary  Outcome: Declining  RRT called on pt last night, wife called, family has agreed to comfort cares, hospitalist continuing dubutimine and bumex. Pt bp in 100s most of the night, has been in the 80's since 0600. Heart rate has been between the 70s-120s. Pt is satting between 82-90% on 15L oxymask Morphine given x2 for respiratory effort. Afib w/ BBB, bundle has been widening as of this morning. Pt has been very lethargic, is still able to respond to verbal commands. Contact precautions for MRSA. Incontinent of urine. Has been continually denying pain. Slept. Wife notified this morning to pt condition and will be coming in today. Continue to monitor.

## 2017-05-05 NOTE — PLAN OF CARE
Writer is [employer] RN responding to RRT for increased O2 requirement and AMS. Pt is DNR/DNI with significant health hx with palliative care involved with treatment plan. Notes indicate that current treatments are last stitch efforts to prolong life and response is minimal. House MD spoke with pt's wife and it was decided not to escalate care, also not to withdraw any current treatments. Comfort care orders in place including morphine IVP for air hunger and generalized pain.

## 2017-05-05 NOTE — PLAN OF CARE
Problem: Goal Outcome Summary  Goal: Goal Outcome Summary  PT: Patient has now transitioned to comfort cares. Nursing to perform lymphedema wrapping if family wants it.     PT goals not met.     Physical Therapy Discharge Summary     Reason for therapy discharge:    Change in medical status.  change to hospice      Progress towards therapy goal(s). See goals on Care Plan in Epic electronic health record for goal details.  Goals not met.  Barriers to achieving goals:   change to hospice.     Therapy recommendation(s):    No further therapy is recommended.

## 2017-05-06 NOTE — PROGRESS NOTES
Care Transition Initial Assessment - SW  Reason For Consult: discharge planning, end of life/hospice  Met with: Patient and Family    Active Problems:    Biventricular CHF (congestive heart failure) (H)         DATA  Lives With: spouse  Living Arrangements: house  Description of Support System: Supportive, Involved  Who is your support system?: Wife, Children  Support Assessment: Adequate family and caregiver support, Adequate social supports.   Identified issues/concerns regarding health management: Hospice is appropriate as pt is end-of-life       Quality Of Family Relationships: supportive, helpful, involved  Transportation Available: ambulance; pt will require stretcher if he DC'd from the hospital    ASSESSMENT  Cognitive Status:  Pt did not wake during my interview with his family. Pt's dtr reports that pt was alert and communicative last evening but has been sleeping since that time.  Concerns to be addressed: hospice/DC planning .  SW met with pt's daughter, Silvia. Silvia recently came to town due to pt's declining condition. Per Silvia, pt's spouse is unclear about DC plans and feels that pt may pass away in the hospital. SW discussed DC planning process and hospice introduction. Silvia asked that SW discuss these things again once pt's spouse and other dtr are present this afternoon. SW will return this afternoon to discuss DC options.      PLAN  SW will meet with pt's family to discuss hospice and DC planning options.

## 2017-05-06 NOTE — PLAN OF CARE
Problem: Goal Outcome Summary  Goal: Goal Outcome Summary  Outcome: Declining  Reason for Admission: Hyperkalemia [E87.5]  Acute on chronic diastolic congestive heart failure (H) [I50.33]  Lower leg edema [R60.0]  Acute-on-chronic kidney injury (H) [N17.9, N18.9]  Acute on chronic respiratory failure with hypoxia (H) [J96.21]  Heart failure (H) [I50.9]  Pertinent Medical History: MRSA (Contact precautions)     Plans for DC: Unknown     DO NOT REMOVE ANY FIELDS BELOW - TYPE N/A OR WNL IF NO INFO TO ADD  Procedure/POD: N/A  Neuro/Psych/Sleep: Disoriented, very lethargic, sleeping majority of shift  CV/Tele/Abnormal VS: Comfort cares, no VS needed  Resp: Shallow, accessory muscles used  Skin/Wound: Bruising. Family declining changing ace wrap dressing on legs  /GI/Diet: Only drinking small amount of liquids at this time  IV: SL  Pain: Given IV Dilaudid x 2  Chemo/Radiation: N/A  Abnormal Labs/Glucose: N/A  Activity/Safety: Bedrest. Repositioning is painful for patient  Consults: Palliative. Will re-evaluate within 24 hours  Education: Pt unable to be educated at this time

## 2017-05-06 NOTE — DISCHARGE SUMMARY
"[facility]    Death Summary  Hospitalist    Date of Admission:  5/2/2017  Date of Discharge:  5/6/2017  Discharging Provider: Tracy Perez  Date of Service (when I saw the patient): 05/06/17    History of Present Illness   Mr. Armand Smith was an 87-year-old male with a known history of left and right-sided congestive heart failure, mostly right-sided heart failure with severe pulmonary hypertension and tricuspid regurgitation. The patient also had a 40-pound weight gain and has anasarca on exam and is hypoxic requiring oxygen.       In addition to this, the patient has stage IV CKD, Parkinson's disease, type 2 diabetes and history of a stroke.     He was transitioned to comfort cares early morning 5/5 after acute decompensation with respiratory distress. Discussed with daughter and wife on 5/5 and all very comfortable with course and wish comfort measures only and allow natural death. Patient was not able to participate in involved discussion other than to say, \"I am going to die comfortable.\"     On 5/6 at 2:15 PM when I went to visit him he was without respiration, pulse or heart sounds and was pronounced dead. His wife was at the bedside and expressed that he was ready to go, that it had been a long course and she was grateful that he passed comfortably.     Hospital Course      Organized by Discharge Diagnosis  Armand Smith was admitted on 5/2/2017.  The following problems were addressed during his hospitalization:    EVENTS PRIOR TO TRANSITION TO COMFORT CARES     Weight gain and acute hypoxic respiratory failure secondary to acute on chronic decompensation of right heart failure with pulmonary HTN and TR - Echo on 01/2017 showed severely dilated RV with mod to severely reduced RV function with mod-sever TR, mod-severe pulmonary HTN. LVEF 55-60%. He hasn't been able to follow closely with [facility] due to transportation issues. He required dobutamine for diuresis during his last " hospitalization. He has been on metolazone in the past, not currently. Per patient, compliant with low sodium diet. BNP in the 8000's. CXR reviewed by myself on 5/3 reveals mild hilar patchy infiltrates, no effusions.   - Cardiology and nephrology consulted.  - On 5/4, no significant improvement with bumex and albumin infusion started on 5/3, with BUN/Cr up a bit, and K up to 5.7, about 1.2 L UOP over last 24 hours, remains on 4L, First weight on floor 93.1 kg on 5/3 >93.3 on 5/4.   - Cardiologist started dobutamine gtt on 5/4 an requested palliative care consult.   - Cardiology started sidenafil.   - PICC line placed on 5/4.       Probable demand ischemia with flat troponin around 0.1, presentation and troponins not c/w ACS.      Acute Kidney Injury with hyperkalemia on chronic kidney disease, stage IV with hyperkalemia Patient's creatinine today is 2.06, was 1.73 on 04/24/2017, but looking back, it looks like it has been 2.02 on 03/01/2017 and that appears to be probably at his baseline. The patient does have a potassium of 5.6. He will get some Lasix in the emergency room and this will probably treat that and would like to repeat a BNP in the morning. The patient was seen by Nephrology during his last hospitalization.      Recent Labs  Lab 05/04/17  1812 05/04/17  0530 05/03/17  1325 05/03/17  0655 05/02/17  1302   CR 2.65* 2.45* 2.29* 2.29* 2.06*      - K 5.7 on 5/4.   - Appreciate Nephrology consult for worsening hyperkalemia, fluid overload in the context of CKD  - Avoid nephrotoxins, contrast, NSAIDs  - Dr. Dunn discussed dialysis with family on 5/4 and emphasized that he would not be a good dialysis candidate.   - Eval and treatment for iron deficiency and mineral bone disease per nephrology.      Elevated Protein - Albumin Gap of greater than 4, with CKD and anemia. I agree this is concerning for possible paraprotein / multiple myeloma.   - Ordered SPEP, 24 hour UPEP and urine light chains.   - No longer  clinically relevant.      Type 2 diabetes. The patient will be continued on Lantus and medium sliding scale and on a moderate carbohydrate diet. His last A1c was under good control in December which was 7.0.      Recent Labs  Lab 05/04/17  2114 05/04/17  1740 05/04/17  1308 05/04/17  0803 05/04/17  0530 05/03/17  2106 05/03/17  1708 05/03/17  1325   05/03/17  0655   05/02/17  1302   GLC --  --  --  --  141* --  --  193* --  104* --  91   * 172* 170* 140* --  171* 148* --  < > --  < > --    < > = values in this interval not displayed.  - continued on PTA lantus 6 units.   - Given BS under 100, decrease to 4 units on 5/3.   - Discontinued all diabetic orders with transition to comfort cares.      Atrial fibrillation. Rate controlled, not on rate controlling medications, not on anticoagulation   - Admission EKG reviewed on 5/3 and shows old RBBB with controlled afib.   - Continued on PTA  mg daily.   - H/o stroke, not on anticoagulation as patient states he has tried to be on anticoagulation, in the past with recurrent GI bleeding, but can not give me details. He think he had ulcers.   - Discontinued ASA with transition to comfort cares.   Hyperlipidemia.   - Continue PTA simvastatin.      Parkinson's disease.   - Continue PTA Sinemet and pramipexole if able to take PO medications but unlikely.      Probable depression,insomnia   - Continue PTA mirtazapine if able to take PO medications.      S/p amputations of 2 toes on the right foot and also he now has a small wound on the third toe of his right foot and the left great toe also is abnormal.   - Wound care nurse consulted.   - Followed by Podiatry as an outpatient.      Gout  - Continue PTA allopurinol. Discontinued with transition to comfort cares.     Tracy Perez    Significant Results and Procedures   PICC line placement on 5/4    Pending Results   None   Unresulted Labs Ordered in the Past 30 Days of this Admission     No orders found from  "3/3/2017 to 5/3/2017.          Code Status   DNR / DNI       Primary Care Physician   Aniyah Magana    Physical Exam                     Patient without spontaneous breaths,pulse or heart sounds.   Extremities cool.           Discharge Disposition   Patient passed away during his stay.       Consultations This Hospital Stay   CORE CLINIC EVALUATION IP CONSULT  CARDIAC REHAB IP CONSULT  CARE COORDINATOR IP CONSULT  NUTRITION SERVICES ADULT IP CONSULT  CARDIOLOGY IP CONSULT  PHYSICAL THERAPY ADULT IP CONSULT  OCCUPATIONAL THERAPY ADULT IP CONSULT  SOCIAL WORK IP CONSULT  WOUND OSTOMY CONTINENCE NURSE  IP CONSULT  NEPHROLOGY IP CONSULT  LYMPHEDEMA THERAPY IP CONSULT  PALLIATIVE CARE ADULT IP CONSULT  VASCULAR ACCESS ADULT IP CONSULT  PHARMACY IP CONSULT  SOCIAL WORK IP CONSULT  SMOKING CESSATION PROGRAM IP CONSULT    Time Spent on this Encounter   ITracy, personally saw the patient today and spent less than or equal to 30 minutes discharging this patient.    Discharge Orders   No discharge procedures on file.  Discharge Medications     Allergies   Allergies   Allergen Reactions     Codeine      nausea vomiting     Penicillins      \" turned red from head to foot\"     Data     IMAGING RESULTS FROM THIS HOSPITAL STAY:  Results for orders placed or performed during the hospital encounter of 05/02/17   Chest XR,  PA & LAT    Narrative    XR CHEST 2 VW 5/2/2017 2:16 PM    COMPARISON: 1/27/2017    HISTORY: Congestive heart failure.      Impression    IMPRESSION: Enlarged cardiac silhouette is again seen and unchanged.  Mild mixed interstitial and airspace opacities over both lungs, edema  versus atypical infection. No pneumothorax identified. Likely small  left pleural effusion.    OPAL CARRASCO   XR Chest Port 1 View    Narrative    CHEST ONE VIEW PORTABLE    5/4/2017 5:37 PM     HISTORY: RN placed PICC - verify tip placement    COMPARISON: 5/2/2017.    FINDINGS: Right-sided PICC line with tip in the right atrium. " It  should be pulled back approximately 6 cm to be within the SVC.  Near-complete opacification left hemithorax, new since 5/2/2017.      Impression    IMPRESSION: New right-sided PICC line tip is in the right atrium.    ANTHONY OLIVO MD   XR Chest Port 1 View    Narrative    CHEST ONE VIEW PORTABLE   5/4/2017 6:57 PM     HISTORY: RN placed PICC - verify tip placement    COMPARISON: 5/4/2017 at 1713.    FINDINGS: PICC line has been repositioned since the prior study and  the tip is now in good position in the mid to distal SVC.  Opacification left hemithorax again noted.      Impression    IMPRESSION: Tip of the PICC line is in good position in the mid to  distal SVC.    ANTHONY OLIVO MD       MOST RECENT LAB RESULTS:  Most Recent 3 CBC's:  Recent Labs   Lab Test  05/03/17   0655  05/02/17   1302 03/01/17   WBC  5.4  6.7  3.6*  3.6*   HGB  11.5*  11.9*  10.5*  10.5*   MCV  95  93  90.9  90.9   PLT  178  187  181  181      Most Recent 3 BMP's:  Recent Labs   Lab Test  05/04/17   1812  05/04/17   0530  05/03/17   1325  05/03/17   0655   NA   --   138  138  139   POTASSIUM   --   5.7*  5.4*  5.7*   CHLORIDE   --   106  107  108   CO2   --   29  27  26   BUN   --   65*  63*  61*   CR  2.65*  2.45*  2.29*  2.29*   ANIONGAP   --   3  4  5   JARETH   --   8.5  8.3*  8.3*   GLC   --   141*  193*  104*     Most Recent 3 Troponin's:  Recent Labs   Lab Test  05/02/17   2300  05/02/17   1745  01/22/17   0405   TROPI  0.100*  0.098*  0.223*         Most Recent 2 LFT's:  Recent Labs   Lab Test  05/02/17   1302 02/06/17   AST  206*  17   ALT  17  <10   ALKPHOS  158*  117   BILITOTAL  1.0  0.5         Most Recent TSH, T4 and HgbA1c:   Recent Labs   Lab Test  05/04/17   0530  05/03/17   0655   TSH  1.73   --    A1C   --   6.9*

## 2017-05-06 NOTE — PLAN OF CARE
Problem: Goal Outcome Summary  Goal: Goal Outcome Summary  Outcome: No Change  Patient lethargic/somnolent. Comfort cares, IV dilaudid given for pain management. Patient has shallow respirations. Patient not taking anything PO currently.  Family contact information Armand/ Emmy Triana home [phone], cell [phone]. 2 daughters in [facility] Lila triana [phone], Silvia kamilah [phone] continue monitoring

## 2018-07-02 ENCOUNTER — TELEPHONE (OUTPATIENT)
Dept: PODIATRY | Facility: CLINIC | Age: 83
End: 2018-07-02

## 2018-07-02 NOTE — TELEPHONE ENCOUNTER
India, wife,  left voicemail asking for a return call regarding a pair of shoes. In reviewing her chart, please see the following that was documented about patient under wife's chart:    5/22/18 12:00 PM   Note      Spoke with India. After looking in his 's chart I saw that the orthotics were ordered through [facility]. I went to the office at [facility] and asked them about returns. They state that they do not take any returns. India was informed of this.           Reason for Call:  Other call back     Detailed comments: India Smith called with a request.  She wants to know if her 's special shoes ordered by Dr Bright that cost $234, can be returned.  Her , Armand Smith [id number], passed away 5/6/2017.  He only wore the shoes 2 hrs while in bed. India doesn't have the information of where she got the shoes.  Phone Number Patient can be reached at: Home number on file [phone] (home)     Best Time: anytime     Can we leave a detailed message on this number? YES     Call taken on 5/22/2018 at 11:19 AM by SUNDAY MAYERS    Phone call to wife. She states patient wore the shoes for 2 hrs only from his bed to a chair on a carpeted floor. He was then admitted to the hospital and passed away.   Informed  that she had called about this on 5/22/18 and apologized but that we were told by [facility] that she would not be able to return the shoes for a refund. Gave her Orthotics phone number. She was appreciative of call.     ELLYN Levin RN

## 2019-04-22 NOTE — IP AVS SNAPSHOT
` [facility] CARDIAC SPECIALTY CARE: [phone]                                              INTERAGENCY TRANSFER FORM - NURSING   2017                    Hospital Admission Date: 2017  GIO BERRY   : 1929  Sex: Male        Attending Provider: Iman Hood MD     Allergies:  Codeine, Penicillins    Infection:  MRSA-Contact Isolation   Service:  HOSPITALIST    Ht:  --   Wt:  76.2 kg (167 lb 15.9 oz)   Admission Wt:  86.7 kg (191 lb 2.2 oz)    BMI:  23.44 kg/m 2   BSA:  1.95 m 2            Patient PCP Information     Provider PCP Type    Aniyah Magana MD General      Current Code Status     Date Active Code Status Order ID Comments User Context       Prior      Code Status History     Date Active Date Inactive Code Status Order ID Comments User Context    2017 10:26 AM  DNR/DNI [id number]  Iman Hood MD Outpatient    2017  4:15 PM 2017 10:26 AM DNR/DNI [id number]  Gregorio Grove RN Inpatient    2017  5:18 PM 2017  4:15 PM Full Code [id number]  Iman Hood MD Inpatient    2016 10:46 AM 2017  5:18 PM Full Code [id number]  Justin Ortiz MD Outpatient    2016  5:26 AM 2016 10:46 AM Full Code [id number]  Ezekiel Oconnor MD Inpatient    2016 10:28 AM 2016  5:26 AM DNR/DNI [id number]  Tenzin Nieto MD Outpatient    2016 11:53 PM 2016 10:28 AM DNR/DNI [id number]  José Luis Betancur MD Inpatient    2016  8:02 AM 2016 11:53 PM Full Code [id number]  Mich Ann MD Outpatient    2016  5:55 PM 2016  8:02 AM Full Code [id number]  Nabor Gutierres MD Inpatient    2016  4:56 PM 2016  4:52 PM Full Code [id number]  Andrew Maguire MD Inpatient    4/10/2015 10:07 AM 2016  4:56 PM Full Code [id number]  Oracio Walden MD Outpatient    3/30/2015 11:29 AM 4/10/2015 10:07 AM Full Code [id number]  Sadiq Arellano PA Inpatient    2013  10:31 PM 4/28/2013  5:27 PM Full Code [id number]  Cruz Camilo PA-C Inpatient    11/10/2003  4:36 PM 11/10/2003  4:36 PM  None  Emmy Crespo Demographics      Advance Directives        Does patient have a scanned Advance Directive/ACP document in EPIC?           Yes        Hospital Problems as of 2/1/2017              Priority Class Noted POA    Acute respiratory failure with hypoxia (H) Medium  1/20/2017 Yes      Non-Hospital Problems as of 2/1/2017              Priority Class Noted    Essential hypertension, benign High  12/4/2003    Testicular hypofunction Medium  12/4/2003    Lumbosacral spondylosis without myelopathy Medium  12/4/2003    Impotence of organic origin Low  3/31/2004    Lumbago Medium  3/15/2005    paresthesias Medium  10/20/2005    Calculus of kidney Medium  8/29/2006    Other malignant neoplasm of skin of trunk, except scrotum Medium  11/15/2007    Benign essential tremor Low  5/22/2009    Chronic diarrhea Medium  12/22/2009    Hyperlipidemia LDL goal <100 High  10/31/2010    Advanced directives, counseling/discussion Low  5/19/2011    Balance problem Medium  3/23/2012    Anemia High  3/26/2012    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 High  4/10/2012    Colon polyps Medium  2/6/2013    advanced small vessel disease (brain CT) High  2/13/2013    Transient ischemic attack (TIA), and cerebral infarction without residual deficits Medium  2/15/2013    Diffuse brain atrophy Medium  2/21/2013    Stroke (H) High  4/25/2013    Cerebral infarction (H) High  4/26/2013    Parkinsonism (H)   4/29/2013    Health Care Home   8/13/2013    Poor circulation of extremity (H)   1/24/2014    Renal failure   3/30/2015    Primary pulmonary hypertension (H) Medium  4/13/2015    Venous (peripheral) insufficiency Medium  4/13/2015    Atrial fibrillation (H) Medium  4/13/2015    Physical deconditioning Medium  4/13/2015    Great toe amputation status (H)   Unknown    Aortic regurgitation   Unknown    Tricuspid  regurgitation   Unknown    Chronic diastolic congestive heart failure (H) Medium  Unknown    Type 2 diabetes mellitus with diabetic chronic kidney disease (H) Medium  10/15/2015    Right-sided heart failure (H) Medium  Unknown    Anasarca Medium  2/8/2016    Hyperuricemia Medium  4/19/2016    Cardiorenal disease Medium  8/11/2016    CKD (chronic kidney disease) stage 4, GFR 15-29 ml/min (H) Medium  8/29/2016    Chronic atrial fibrillation (H) Medium  8/29/2016    Cellulitis Medium  11/14/2016    Wound, open, foot Medium  11/18/2016    Paralysis agitans (H) Medium  11/18/2016    Cough Medium  12/2/2016    Pneumonia Medium  12/24/2016      Immunizations     Name Date      HERPES ZOSTER 03/23/12     Influenza (H1N1) 12/22/09     Influenza (High Dose) 3 valent vaccine 08/29/16     Influenza (High Dose) 3 valent vaccine 10/15/15     Influenza (High Dose) 3 valent vaccine 10/13/14     Influenza (High Dose) 3 valent vaccine 10/04/13     Influenza (High Dose) 3 valent vaccine 09/28/12     Influenza (IIV3) 09/06/11     Influenza (IIV3) 10/21/10     Influenza (IIV3) 09/25/09     Influenza (IIV3) 10/16/08     Influenza (IIV3) 10/26/07     Influenza (IIV3) 11/01/06     Influenza (IIV3) 10/20/05     Influenza (IIV3) 10/22/04     Influenza (IIV3) 11/03/03     Pneumococcal (PCV 13) 06/11/15     Pneumococcal 23 valent 08/29/16     TD (ADULT, 7+) 10/04/02     TDAP (ADACEL AGES 11-64) 05/21/13          END      ASSESSMENT     Discharge Profile Flowsheet     EXPECTED DISCHARGE     Patient's communication style  spoken language (English or Bilingual) 01/20/17 0939    Expected Discharge Date  02/01/17 (1815 via HE to [facility]) 02/01/17 1426   FINAL RESOURCES      DISCHARGE NEEDS ASSESSMENT     Resources List  Skilled Nursing Facility 02/01/17 1622    Concerns To Be Addressed  other (see comments) 01/23/17 0834   Other Resources  Home Care 01/23/17 0834    Concerns Comments  [facility] home [facility] 01/23/17 0834   Skilled Nursing Facility   "[facility] [phone], Fax: [phone] 02/01/17 1622    Equipment Currently Used at Home  cane, straight;walker, rolling (mostly FWW lately) 01/23/17 0834   PAS Number  [id number] 02/01/17 1622    Transportation Available  family or friend will provide 01/22/17 1235   Senior Linkage Line Referral Placed  02/01/17 02/01/17 1622    # of Referrals Placed by CTS  Post Acute Facilities;Senior Linkage Line;Transportation 02/01/17 1622   Referrals Placed  Other *** (None) 01/23/17 0834    Does Patient Need a Referral for Clinic CC  No 08/14/16 1104   Existing Resources/Services  Home Care 07/20/15 1054    Equipment Used at Home  cane, straight;grab bar;raised toilet;walker, rolling (Had shower chair but no longer as he tripped over it) 03/04/16 0945   SKIN      FUNCTIONAL LEVEL CURRENT     Inspection  Full 02/01/17 1416    Change in Functional Status Since Onset of Current Illness/Injury  no 08/14/16 1104   Skin WDL  ex 02/01/17 1416    GASTROINTESTINAL (ADULT,PEDIATRIC,OB)     Skin Color/Characteristics  bruised (ecchymotic) 02/01/17 1416    GI WDL  WDL 02/01/17 1416   Skin Integrity  bruise(s) 02/01/17 1416    Last Bowel Movement  01/30/17 02/01/17 1416   Skin areas NOT inspected  -- (legs under lymph wraps) 01/25/17 1159    GI Signs/Symptoms  abdominal discomfort 01/26/17 2031   SAFETY      Passing flatus  yes 01/30/17 0847   Safety WDL  WDL 02/01/17 1416    COMMUNICATION ASSESSMENT     Safety Equipment  suction equipment 01/20/17 2158                 Assessment WDL (Within Defined Limits) Definitions           Safety WDL     Effective: 09/28/15    Row Information: <b>WDL Definition:</b> Bed in low position, wheels locked; call light in reach; upper side rails up x 2; ID band on<br> <font color=\"gray\"><i>Item=AS safety wdl>>List=AS safety wdl>>Version=F14</i></font>      Skin WDL     Effective: 09/28/15    Row Information: <b>WDL Definition:</b> Warm; dry; intact; elastic; without discoloration; pressure " "points without redness<br> <font color=\"gray\"><i>Item=AS skin wdl>>List=AS skin wdl>>Version=F14</i></font>      Vitals     Vital Signs Flowsheet     VITAL SIGNS     Side Effects Monitoring: Sedation Level  1 01/30/17 1625    Temp  96.8  F (36  C) 02/01/17 1556   HEIGHT AND WEIGHT      Temp src  Oral 02/01/17 1556   Weight  76.2 kg (167 lb 15.9 oz) 02/01/17 0533    Resp  18 02/01/17 1556   EKG MONITORING      Pulse  80 01/23/17 0813   Cardiac Regularity  Irregular 01/20/17 1011    Heart Rate  77 02/01/17 1556   Cardiac Rhythm  Atrial fibrillation 01/20/17 1011    Pulse/Heart Rate Source  Monitor 02/01/17 1556   CARRIE COMA SCALE      BP  96/48 mmHg 02/01/17 1556   Best Eye Response  4-->(E4) spontaneous 02/01/17 1423    BP Location  Left arm 02/01/17 1556   Best Motor Response  6-->(M6) obeys commands 02/01/17 1423    OXYGEN THERAPY     Best Verbal Response  5-->(V5) oriented 02/01/17 1423    SpO2  98 % 02/01/17 1556   Fletcher Coma Scale Score  15 02/01/17 1423    O2 Device  Nasal cannula 02/01/17 1556   POSITIONING      FiO2 (%)  50 % 01/24/17 0028   Body Position  supine, head elevated 02/01/17 1416    Oxygen Delivery  1 LPM 02/01/17 1556   Head of Bed (HOB)  HOB at 30-45 degrees 02/01/17 1416    PAIN/COMFORT     Positioning/Transfer Devices  pillows 02/01/17 1416    Patient Currently in Pain  denies 02/01/17 1057   Chair  Upright in chair 01/30/17 1625    Preferred Pain Scale  number (Numeric Rating Pain Scale) 02/01/17 1057   DAILY CARE      Patient's Stated Pain Goal  No pain 02/01/17 1057   Activity Type  activity adjusted per tolerance 02/01/17 1416    0-10 Pain Scale  0 02/01/17 1057   Activity Level of Assistance  assistance, 1 person 02/01/17 1416    Word Pain Scale  -- 01/23/17 1731   Activity Assistive Device  gait belt;walker 02/01/17 1416    Pain Location  Back 02/01/17 0018   Additional Documentation  Activity Device Assistance (Row) 01/25/17 1150    Pain Orientation  Lower 02/01/17 0018   POINT OF " CARE TESTING      Pain Descriptors  Burning 02/01/17 0018   Puncture Site  fingertip 01/22/17 1637    Pain Management Interventions  analgesia administered 02/01/17 0018   Bedside Glucose (mg/dl )   111 mg/dl 01/22/17 1637    Pain Intervention(s)  Medication (See eMAR) 02/01/17 0018   ECG      Response to Interventions  Decrease in pain 02/01/17 0331   ECG Rhythm  Atrial fibrillation (BBB) 01/30/17 0417    ANALGESIA SIDE EFFECTS MONITORING     Ectopy  None 01/22/17 1637    Side Effects Monitoring: Respiratory Quality  R 01/30/17 1625   Lead Monitored  Lead II;V 1 01/22/17 1230    Side Effects Monitoring: Respiratory Depth  N 01/30/17 1625   Equipment  electrodes changed;telemetry batteries changed 01/31/17 1106            Patient Lines/Drains/Airways Status    Active LINES/DRAINS/AIRWAYS     Name: Placement date: Placement time: Site: Days: Last dressing change:    Wound 11/14/16 Left;Medial Foot 11/14/16  2345  Foot  78     Wound 11/14/16 Left;Upper Arm Skin tear 11/14/16  2345  Arm  78     Wound 11/14/16 Right;Lower;Midline Leg Shear injury x 2 11/14/16  2345  Leg  78     Wound Right Calf Shear injury     Calf       Wound 12/27/16 Right Arm Abrasion(s) skin tear 12/27/16  0800  Arm  36     Wound 12/27/16 Posterior Buttocks small red areas of irritation 12/27/16  1700  Buttocks  36     Wound 01/20/17 Right Wrist Skin tear 01/20/17    Wrist  12     Wound 01/20/17 Bilateral Leg Ulceration scattered open areas to bilateral calves, feet 01/20/17    Leg  12     Rash upper back                   Patient Lines/Drains/Airways Status    Active PICC/CVC     **None**            Intake/Output Detail Report     Date Intake       Output Net    Shift P.O. I.V. IV Piggyback Blood Components Total Urine Total       Day 01/31/17 0700 - 01/31/17 1459 240 -- -- -- 240 575 575 -335    Patience 01/31/17 1500 - 01/31/17 2259 -- -- -- -- -- 450 450 -450    Noc 01/31/17 2300 - 02/01/17 0659 240 -- -- -- 240 300 300 -60    Day 02/01/17  0700 - 02/01/17 1459 240 -- -- -- 240 525 525 -285    Patience 02/01/17 1500 - 02/01/17 2259 -- -- -- -- -- -- -- 0      Last Void/BM       Most Recent Value    Urine Occurrence     Stool Occurrence 1 at 01/27/2017 0030      Case Management/Discharge Planning     Case Management/Discharge Planning Flowsheet     REFERRAL INFORMATION     Expected Discharge Date  02/01/17 (1815 via HE to [facility]) 02/01/17 1426    Did the Initial Social Work Assessment result in a Social Work Case?  Yes 01/23/17 1606   ASSESSMENT/CONCERNS TO BE ADDRESSED      Admission Type  inpatient 01/23/17 1606   Concerns To Be Addressed  other (see comments) 01/23/17 0834    Arrived From  home or self-care 01/23/17 1606   Concerns Comments  [facility] home care 01/23/17 0834    Referral Source  physician 01/23/17 1606   DISCHARGE PLANNING      # of Referrals Placed by CTS  Post Acute Facilities;Senior Linkage Line;Transportation 02/01/17 1622   Transportation Available  family or friend will provide 01/22/17 1235    Post Acute Facilities  TCU 02/01/17 1622   Does Patient Need a Referral for Clinic CC  No 08/14/16 1104    Reason For Consult  discharge planning 01/23/17 1606   Equipment Used at Home  cane, straight;grab bar;raised toilet;walker, rolling (Had shower chair but no longer as he tripped over it) 03/04/16 0945    Record Reviewed  medical record 01/23/17 1606   FINAL NOTE       Assigned to Case  Tiesha Rick 01/23/17 1606   Final Note  D/C to [facility] 02/01/17 1622    LIVING ENVIRONMENT     FINAL RESOURCES      Lives With  spouse 01/23/17 1606   Equipment Currently Used at Home  cane, straight;walker, rolling (mostly FWW lately) 01/23/17 0834    Living Arrangements  house 01/23/17 1606   Resources List  Skilled Nursing Facility 02/01/17 1622    Provides Primary Care For  no one 01/23/17 1606   Other Resources  Home Care 01/23/17 0834    Quality Of Family Relationships  supportive;involved 01/23/17 1606   Skilled Nursing Facility   [facility] [phone], Fax: [phone] 02/01/17 1622    ASSESSMENT OF FAMILY/SOCIAL SUPPORT     PAS Number  [id number] 02/01/17 1622    Marital Status   01/23/17 1606   Senior Linkage Line Referral Placed  02/01/17 02/01/17 1622    Who is your support system?  Wife;Children 01/23/17 1606   Referrals Placed  Other *** (None) 01/23/17 0834    Spouse's Name  India 01/23/17 1606   Existing Resources/Services  Home Care 07/20/15 1054    Description of Support System  Supportive;Involved 01/23/17 1606   ABUSE RISK SCREEN      Support Assessment  Adequate family and caregiver support 01/23/17 1606   QUESTION TO PATIENT:  Has a member of your family or a partner(now or in the past) intimidated, hurt, manipulated, or controlled you in any way?  no 01/20/17 0942    Quality of Family Relationships  supportive;involved 01/23/17 1606   QUESTION TO PATIENT: Do you feel safe going back to the place where you are living?  yes 01/20/17 0942    EMPLOYMENT     OBSERVATION: Is there reason to believe there has been maltreatment of a vulnerable adult (ie. Physical/Sexual/Emotional abuse, self neglect, lack of adequate food, shelter, medical care, or financial exploitation)?  no 01/20/17 0942    Do you work full or part-time?  no 01/23/17 1606   (R) MENTAL HEALTH SUICIDE RISK      COPING/STRESS     Are you depressed or being treated for depression?  No 01/21/17 0435    Major Change/Loss/Stressor  none 01/21/17 0436   HOMICIDE RISK      EXPECTED DISCHARGE     Homicidal Ideation  no 01/20/17 0942             Do Not Use Extremity;

## 2021-12-28 NOTE — PROGRESS NOTES
I agree with the orders.     Aniyah Magana MD  [facility], [place]            Transport

## 2024-03-04 NOTE — TELEPHONE ENCOUNTER
CHULA Irwin  [phone] call to report   Patient is having congestion, wet non-productive cough, runny nose, SOB,  Audible wheezing with O2 sat at 88% room air.    Denies fever, distress breathing, chill, ear pain, headache, earache, body ache  Vital signs stable.  Patient is not on inhaler or oxygen.     Took mucinex last night and this morning - helped last night but not today.  Has been drinking plenty of fluid but appetite is low    Patient refused OV and ER at this time.       Advised home care measure and instructed to proceed to ER if breathing worsen   Dc HC will discuss further with patient again.        Route to Dr. Magana for further advise.  Thank you    Caitlin Contreras RN        
Check O 2Sats again today and vitals ( temp, BP and pulse and resp) . Notify me back if abnormal.     Aniyah Magana MD  [facility], [place]           
Home care has left this visit and is with another patient - she does not go back out to the patients home until tomorrow.  They will call tomorrow with the vitals.    Salena Saunders RN  [facility]  [phone]      
06:42